# Patient Record
Sex: FEMALE | Race: WHITE | NOT HISPANIC OR LATINO | Employment: UNEMPLOYED | URBAN - METROPOLITAN AREA
[De-identification: names, ages, dates, MRNs, and addresses within clinical notes are randomized per-mention and may not be internally consistent; named-entity substitution may affect disease eponyms.]

---

## 2019-02-08 PROBLEM — R40.4 UNRESPONSIVE EPISODE: Status: ACTIVE | Noted: 2019-02-08

## 2023-02-15 ENCOUNTER — OFFICE VISIT (OUTPATIENT)
Dept: CARDIAC SURGERY | Facility: CLINIC | Age: 78
End: 2023-02-15
Payer: MEDICARE

## 2023-02-15 VITALS
HEART RATE: 67 BPM | DIASTOLIC BLOOD PRESSURE: 65 MMHG | HEIGHT: 62 IN | WEIGHT: 165 LBS | BODY MASS INDEX: 30.36 KG/M2 | SYSTOLIC BLOOD PRESSURE: 143 MMHG

## 2023-02-15 DIAGNOSIS — I35.0 AORTIC STENOSIS WITH TRILEAFLET VALVE: Primary | ICD-10-CM

## 2023-02-15 PROCEDURE — 99214 OFFICE O/P EST MOD 30 MIN: CPT | Mod: ,,, | Performed by: THORACIC SURGERY (CARDIOTHORACIC VASCULAR SURGERY)

## 2023-02-15 PROCEDURE — 99214 PR OFFICE/OUTPT VISIT, EST, LEVL IV, 30-39 MIN: ICD-10-PCS | Mod: ,,, | Performed by: THORACIC SURGERY (CARDIOTHORACIC VASCULAR SURGERY)

## 2023-02-15 RX ORDER — AMLODIPINE BESYLATE 10 MG/1
10 TABLET ORAL DAILY
COMMUNITY

## 2023-02-15 RX ORDER — CYCLOBENZAPRINE HCL 10 MG
10 TABLET ORAL 3 TIMES DAILY
COMMUNITY

## 2023-02-15 RX ORDER — CARVEDILOL 12.5 MG/1
12.5 TABLET ORAL 2 TIMES DAILY WITH MEALS
Status: ON HOLD | COMMUNITY
End: 2023-02-28 | Stop reason: SDUPTHER

## 2023-02-15 RX ORDER — FUROSEMIDE 40 MG/1
40 TABLET ORAL DAILY
COMMUNITY

## 2023-02-15 NOTE — PROGRESS NOTES
History & Physical    SUBJECTIVE:     History of Present Illness:  The patient is presenting for evaluation for severe aortic valve stenosis with an aortic valve area of 0.8 mean gradient of 41.  His EF is 65%.  She has a patent LIMA to LAD graft.  No significant carotid disease.  She is here for transcatheter aortic valve replacement evaluation.      Chief Complaint   Patient presents with    Pre-op Exam     TAVR SCHEDULED 2/27/23-DR. SAL.  DX:  AS, HTN, CABG X 3, CAD, DLP,  L CEA (Dr. Burk), CVA.  States chest pain, (central) and SOB on exertion.       Review of patient's allergies indicates:   Allergen Reactions    Morphine sulfate Other (See Comments)     Hallucinations    Codeine phosphate Itching       Current Outpatient Medications   Medication Sig Dispense Refill    cloNIDine (CATAPRES) 0.2 MG tablet Take 0.2 mg by mouth 2 (two) times daily.      estrogens, conjugated, (PREMARIN) 0.625 MG tablet Take 1 tablet (0.625 mg total) by mouth once daily. 30 tablet 4    gabapentin (NEURONTIN) 600 MG tablet Take 600 mg by mouth 2 (two) times daily.      hydrOXYzine (ATARAX) 50 MG tablet Take 50 mg by mouth 3 (three) times daily as needed for Itching.      rosuvastatin (CRESTOR) 40 MG Tab Take 40 mg by mouth every evening.      sertraline (ZOLOFT) 50 MG tablet Take 150 mg by mouth every evening.       No current facility-administered medications for this visit.       Past Medical History:   Diagnosis Date    Abdominal pain, unspecified site     Coronary atherosclerosis of native coronary artery     Coronary atherosclerosis of native coronary artery     Coronary atherosclerosis of unspecified type of vessel, native or graft     Esophageal reflux     Lumbosacral spondylosis without myelopathy     Other and unspecified hyperlipidemia     Postsurgical aortocoronary bypass status     Rectocele     Unspecified essential hypertension      Past Surgical History:   Procedure Laterality Date    HYSTERECTOMY       History  "reviewed. No pertinent family history.  Social History     Tobacco Use    Smoking status: Never    Smokeless tobacco: Never   Substance Use Topics    Alcohol use: No    Drug use: No        Review of Systems:  Review of Systems   Constitutional: Negative.    HENT: Negative.     Eyes: Negative.    Respiratory:  Positive for shortness of breath.    Cardiovascular: Negative.    Gastrointestinal: Negative.    Endocrine: Negative.    Genitourinary: Negative.    Musculoskeletal: Negative.         Claudications   Skin: Negative.    Allergic/Immunologic: Negative.    Neurological: Negative.    Hematological: Negative.    Psychiatric/Behavioral: Negative.       OBJECTIVE:     Vital Signs (Most Recent)  Pulse: 67 (02/15/23 1137)  BP: (!) 143/65 (02/15/23 1137)  5' 2" (1.575 m)  74.8 kg (165 lb)     Physical Exam:  Physical Exam  Vitals reviewed.   Constitutional:       Appearance: Normal appearance.   HENT:      Head: Normocephalic and atraumatic.      Nose: Nose normal.      Mouth/Throat:      Mouth: Mucous membranes are dry.      Pharynx: Oropharynx is clear.   Eyes:      Extraocular Movements: Extraocular movements intact.      Conjunctiva/sclera: Conjunctivae normal.      Pupils: Pupils are equal, round, and reactive to light.   Cardiovascular:      Rate and Rhythm: Normal rate and regular rhythm.      Pulses: Normal pulses.   Pulmonary:      Effort: Pulmonary effort is normal.      Breath sounds: Normal breath sounds.   Abdominal:      General: Abdomen is flat.      Palpations: Abdomen is soft.   Musculoskeletal:         General: Normal range of motion.      Cervical back: Neck supple.   Skin:     General: Skin is warm and dry.   Neurological:      General: No focal deficit present.   Psychiatric:         Mood and Affect: Mood normal.       Laboratory:  None      Diagnostic Results:  Echocardiogram reviewed      ASSESSMENT/PLAN:     Severe aortic valve stenosis.  The patient will definitely better benefit from a " transcatheter aortic valve replacement.  The risks and benefits of the procedure have been explained to the patient and she elected to proceed

## 2023-02-23 ENCOUNTER — ANESTHESIA EVENT (OUTPATIENT)
Dept: CARDIOLOGY | Facility: HOSPITAL | Age: 78
DRG: 267 | End: 2023-02-23
Payer: MEDICARE

## 2023-02-23 RX ORDER — TRAMADOL HYDROCHLORIDE 50 MG/1
50 TABLET ORAL 3 TIMES DAILY
COMMUNITY
Start: 2023-02-14

## 2023-02-23 RX ORDER — TORSEMIDE 10 MG/1
10 TABLET ORAL DAILY
COMMUNITY

## 2023-02-23 RX ORDER — ASPIRIN 325 MG
1 TABLET ORAL DAILY
COMMUNITY

## 2023-02-23 RX ORDER — ACETAMINOPHEN 500 MG
1 TABLET ORAL DAILY
COMMUNITY

## 2023-02-23 RX ORDER — CHOLECALCIFEROL (VITAMIN D3) 125 MCG
1 CAPSULE ORAL 2 TIMES DAILY
COMMUNITY

## 2023-02-23 RX ORDER — CYANOCOBALAMIN (VITAMIN B-12) 2000 MCG
1 TABLET ORAL DAILY
COMMUNITY

## 2023-02-24 ENCOUNTER — HOSPITAL ENCOUNTER (OUTPATIENT)
Dept: RADIOLOGY | Facility: HOSPITAL | Age: 78
Discharge: HOME | DRG: 267 | End: 2023-02-24
Attending: INTERNAL MEDICINE
Payer: MEDICARE

## 2023-02-24 DIAGNOSIS — I35.0 NODULAR CALCIFIC AORTIC VALVE STENOSIS: ICD-10-CM

## 2023-02-24 PROCEDURE — 71046 X-RAY EXAM CHEST 2 VIEWS: CPT | Mod: TC

## 2023-02-27 ENCOUNTER — HOSPITAL ENCOUNTER (INPATIENT)
Facility: HOSPITAL | Age: 78
LOS: 1 days | Discharge: HOME | DRG: 267 | End: 2023-02-28
Attending: INTERNAL MEDICINE | Admitting: INTERNAL MEDICINE
Payer: MEDICARE

## 2023-02-27 ENCOUNTER — ANESTHESIA (OUTPATIENT)
Dept: CARDIOLOGY | Facility: HOSPITAL | Age: 78
DRG: 267 | End: 2023-02-27
Payer: MEDICARE

## 2023-02-27 DIAGNOSIS — I25.10 CAD (CORONARY ARTERY DISEASE): ICD-10-CM

## 2023-02-27 DIAGNOSIS — I35.0 AORTIC STENOSIS: ICD-10-CM

## 2023-02-27 LAB
AV INDEX (PROSTH): 0.45
AV MEAN GRADIENT: 14 MMHG
AV PEAK GRADIENT: 17 MMHG
AV VALVE AREA: 1.4 CM2
AV VELOCITY RATIO: 0.4
BSA FOR ECHO PROCEDURE: 1.84 M2
DOP CALC AO PEAK VEL: 2.08 M/S
DOP CALC AO VTI: 46 CM
DOP CALC LVOT AREA: 3.1 CM2
DOP CALC LVOT DIAMETER: 2 CM
DOP CALC LVOT PEAK VEL: 0.84 M/S
DOP CALC LVOT STROKE VOLUME: 64.37 CM3
DOP CALCLVOT PEAK VEL VTI: 20.5 CM
GROUP & RH: NORMAL
INDIRECT COOMBS GEL: NORMAL
LVOT MG: 1 MMHG
LVOT MV: 0.53 CM/S
POC ACTIVATED CLOTTING TIME K: 125 SEC (ref 74–137)
SAMPLE: NORMAL

## 2023-02-27 PROCEDURE — 27800903 OPTIME MED/SURG SUP & DEVICES OTHER IMPLANTS: Performed by: INTERNAL MEDICINE

## 2023-02-27 PROCEDURE — 27000221 HC OXYGEN, UP TO 24 HOURS

## 2023-02-27 PROCEDURE — C1887 CATHETER, GUIDING: HCPCS | Performed by: INTERNAL MEDICINE

## 2023-02-27 PROCEDURE — 25500020 PHARM REV CODE 255: Performed by: INTERNAL MEDICINE

## 2023-02-27 PROCEDURE — C1760 CLOSURE DEV, VASC: HCPCS | Performed by: INTERNAL MEDICINE

## 2023-02-27 PROCEDURE — 86900 BLOOD TYPING SEROLOGIC ABO: CPT | Performed by: INTERNAL MEDICINE

## 2023-02-27 PROCEDURE — C1894 INTRO/SHEATH, NON-LASER: HCPCS | Performed by: INTERNAL MEDICINE

## 2023-02-27 PROCEDURE — 63600175 PHARM REV CODE 636 W HCPCS: Performed by: NURSE ANESTHETIST, CERTIFIED REGISTERED

## 2023-02-27 PROCEDURE — 93005 ELECTROCARDIOGRAM TRACING: CPT

## 2023-02-27 PROCEDURE — 33361 REPLACE AORTIC VALVE PERQ: CPT | Mod: Q0 | Performed by: INTERNAL MEDICINE

## 2023-02-27 PROCEDURE — 25000242 PHARM REV CODE 250 ALT 637 W/ HCPCS: Performed by: ANESTHESIOLOGY

## 2023-02-27 PROCEDURE — C1883 ADAPT/EXT, PACING/NEURO LEAD: HCPCS | Performed by: INTERNAL MEDICINE

## 2023-02-27 PROCEDURE — 33361 PR TAVR, PERCUTANEOUS FEMORAL: ICD-10-PCS | Mod: 62,Q0,, | Performed by: THORACIC SURGERY (CARDIOTHORACIC VASCULAR SURGERY)

## 2023-02-27 PROCEDURE — 63600175 PHARM REV CODE 636 W HCPCS: Performed by: ANESTHESIOLOGY

## 2023-02-27 PROCEDURE — 25000003 PHARM REV CODE 250: Performed by: INTERNAL MEDICINE

## 2023-02-27 PROCEDURE — C1779 LEAD, PMKR, TRANSVENOUS VDD: HCPCS | Performed by: INTERNAL MEDICINE

## 2023-02-27 PROCEDURE — 37000008 HC ANESTHESIA 1ST 15 MINUTES: Performed by: INTERNAL MEDICINE

## 2023-02-27 PROCEDURE — 63600175 PHARM REV CODE 636 W HCPCS

## 2023-02-27 PROCEDURE — 93010 ELECTROCARDIOGRAM REPORT: CPT | Mod: ,,, | Performed by: STUDENT IN AN ORGANIZED HEALTH CARE EDUCATION/TRAINING PROGRAM

## 2023-02-27 PROCEDURE — 37000009 HC ANESTHESIA EA ADD 15 MINS: Performed by: INTERNAL MEDICINE

## 2023-02-27 PROCEDURE — C1725 CATH, TRANSLUMIN NON-LASER: HCPCS | Performed by: INTERNAL MEDICINE

## 2023-02-27 PROCEDURE — 33361 REPLACE AORTIC VALVE PERQ: CPT | Mod: 62,Q0,, | Performed by: THORACIC SURGERY (CARDIOTHORACIC VASCULAR SURGERY)

## 2023-02-27 PROCEDURE — C1769 GUIDE WIRE: HCPCS | Performed by: INTERNAL MEDICINE

## 2023-02-27 PROCEDURE — 21400001 HC TELEMETRY ROOM

## 2023-02-27 PROCEDURE — 25000003 PHARM REV CODE 250: Performed by: NURSE ANESTHETIST, CERTIFIED REGISTERED

## 2023-02-27 PROCEDURE — 93010 EKG 12-LEAD: ICD-10-PCS | Mod: ,,, | Performed by: STUDENT IN AN ORGANIZED HEALTH CARE EDUCATION/TRAINING PROGRAM

## 2023-02-27 DEVICE — 18F MANTA VASCULAR CLOSURE DEVICE
Type: IMPLANTABLE DEVICE | Site: HEART | Status: FUNCTIONAL
Brand: MANTA

## 2023-02-27 DEVICE — IMPLANTABLE DEVICE: Type: IMPLANTABLE DEVICE | Site: HEART | Status: FUNCTIONAL

## 2023-02-27 RX ORDER — HYDROCODONE BITARTRATE AND ACETAMINOPHEN 5; 325 MG/1; MG/1
1 TABLET ORAL EVERY 4 HOURS PRN
Status: DISCONTINUED | OUTPATIENT
Start: 2023-02-27 | End: 2023-02-28 | Stop reason: HOSPADM

## 2023-02-27 RX ORDER — ONDANSETRON 4 MG/1
8 TABLET, ORALLY DISINTEGRATING ORAL EVERY 8 HOURS PRN
Status: DISCONTINUED | OUTPATIENT
Start: 2023-02-27 | End: 2023-02-28 | Stop reason: HOSPADM

## 2023-02-27 RX ORDER — PROPOFOL 10 MG/ML
VIAL (ML) INTRAVENOUS CONTINUOUS PRN
Status: DISCONTINUED | OUTPATIENT
Start: 2023-02-27 | End: 2023-02-27

## 2023-02-27 RX ORDER — PHENYLEPHRINE HYDROCHLORIDE 10 MG/ML
INJECTION INTRAVENOUS CONTINUOUS PRN
Status: DISCONTINUED | OUTPATIENT
Start: 2023-02-27 | End: 2023-02-27

## 2023-02-27 RX ORDER — AMLODIPINE BESYLATE 5 MG/1
10 TABLET ORAL DAILY
Status: DISCONTINUED | OUTPATIENT
Start: 2023-02-27 | End: 2023-02-28 | Stop reason: HOSPADM

## 2023-02-27 RX ORDER — DEXAMETHASONE SODIUM PHOSPHATE 4 MG/ML
INJECTION, SOLUTION INTRA-ARTICULAR; INTRALESIONAL; INTRAMUSCULAR; INTRAVENOUS; SOFT TISSUE
Status: DISCONTINUED | OUTPATIENT
Start: 2023-02-27 | End: 2023-02-27

## 2023-02-27 RX ORDER — SERTRALINE HYDROCHLORIDE 50 MG/1
100 TABLET, FILM COATED ORAL NIGHTLY
Status: DISCONTINUED | OUTPATIENT
Start: 2023-02-27 | End: 2023-02-28 | Stop reason: HOSPADM

## 2023-02-27 RX ORDER — ASPIRIN 325 MG
325 TABLET ORAL DAILY
Status: DISCONTINUED | OUTPATIENT
Start: 2023-02-27 | End: 2023-02-28 | Stop reason: HOSPADM

## 2023-02-27 RX ORDER — HEPARIN SODIUM 1000 [USP'U]/ML
INJECTION, SOLUTION INTRAVENOUS; SUBCUTANEOUS
Status: DISCONTINUED | OUTPATIENT
Start: 2023-02-27 | End: 2023-02-27

## 2023-02-27 RX ORDER — ACETAMINOPHEN 10 MG/ML
INJECTION, SOLUTION INTRAVENOUS
Status: DISCONTINUED | OUTPATIENT
Start: 2023-02-27 | End: 2023-02-27

## 2023-02-27 RX ORDER — HYDROCODONE BITARTRATE AND ACETAMINOPHEN 500; 5 MG/1; MG/1
TABLET ORAL
Status: DISCONTINUED | OUTPATIENT
Start: 2023-02-27 | End: 2023-02-28 | Stop reason: HOSPADM

## 2023-02-27 RX ORDER — MORPHINE SULFATE 4 MG/ML
2 INJECTION, SOLUTION INTRAMUSCULAR; INTRAVENOUS EVERY 4 HOURS PRN
Status: DISCONTINUED | OUTPATIENT
Start: 2023-02-27 | End: 2023-02-28 | Stop reason: HOSPADM

## 2023-02-27 RX ORDER — ONDANSETRON 2 MG/ML
INJECTION INTRAMUSCULAR; INTRAVENOUS
Status: DISCONTINUED | OUTPATIENT
Start: 2023-02-27 | End: 2023-02-27

## 2023-02-27 RX ORDER — METOPROLOL TARTRATE 1 MG/ML
INJECTION, SOLUTION INTRAVENOUS
Status: DISCONTINUED | OUTPATIENT
Start: 2023-02-27 | End: 2023-02-27

## 2023-02-27 RX ORDER — CEFAZOLIN SODIUM 1 G/3ML
INJECTION, POWDER, FOR SOLUTION INTRAMUSCULAR; INTRAVENOUS
Status: DISCONTINUED | OUTPATIENT
Start: 2023-02-27 | End: 2023-02-27

## 2023-02-27 RX ORDER — IPRATROPIUM BROMIDE AND ALBUTEROL SULFATE 2.5; .5 MG/3ML; MG/3ML
3 SOLUTION RESPIRATORY (INHALATION) ONCE
Status: COMPLETED | OUTPATIENT
Start: 2023-02-27 | End: 2023-02-27

## 2023-02-27 RX ORDER — HYDRALAZINE HYDROCHLORIDE 20 MG/ML
20 INJECTION INTRAMUSCULAR; INTRAVENOUS
Status: DISCONTINUED | OUTPATIENT
Start: 2023-02-27 | End: 2023-02-28 | Stop reason: HOSPADM

## 2023-02-27 RX ORDER — ASPIRIN 81 MG/1
81 TABLET ORAL DAILY
Status: DISCONTINUED | OUTPATIENT
Start: 2023-02-28 | End: 2023-02-27

## 2023-02-27 RX ORDER — CEFAZOLIN SODIUM 1 G/3ML
1 INJECTION, POWDER, FOR SOLUTION INTRAMUSCULAR; INTRAVENOUS
Status: DISCONTINUED | OUTPATIENT
Start: 2023-02-27 | End: 2023-02-27

## 2023-02-27 RX ORDER — MUPIROCIN 20 MG/G
OINTMENT TOPICAL DAILY
Status: DISCONTINUED | OUTPATIENT
Start: 2023-02-27 | End: 2023-02-27

## 2023-02-27 RX ORDER — MEPERIDINE HYDROCHLORIDE 25 MG/ML
12.5 INJECTION INTRAMUSCULAR; INTRAVENOUS; SUBCUTANEOUS ONCE
Status: DISCONTINUED | OUTPATIENT
Start: 2023-02-27 | End: 2023-02-27 | Stop reason: HOSPADM

## 2023-02-27 RX ORDER — PROTAMINE SULFATE 10 MG/ML
INJECTION, SOLUTION INTRAVENOUS
Status: DISCONTINUED | OUTPATIENT
Start: 2023-02-27 | End: 2023-02-27

## 2023-02-27 RX ORDER — SODIUM CHLORIDE 9 MG/ML
INJECTION, SOLUTION INTRAVENOUS ONCE
Status: DISCONTINUED | OUTPATIENT
Start: 2023-02-27 | End: 2023-02-27

## 2023-02-27 RX ORDER — METOPROLOL TARTRATE 1 MG/ML
INJECTION, SOLUTION INTRAVENOUS
Status: DISPENSED
Start: 2023-02-27 | End: 2023-02-27

## 2023-02-27 RX ORDER — HYDRALAZINE HYDROCHLORIDE 20 MG/ML
INJECTION INTRAMUSCULAR; INTRAVENOUS
Status: COMPLETED
Start: 2023-02-27 | End: 2023-02-27

## 2023-02-27 RX ORDER — ACETAMINOPHEN 325 MG/1
650 TABLET ORAL EVERY 4 HOURS PRN
Status: DISCONTINUED | OUTPATIENT
Start: 2023-02-27 | End: 2023-02-28 | Stop reason: HOSPADM

## 2023-02-27 RX ORDER — IPRATROPIUM BROMIDE AND ALBUTEROL SULFATE 2.5; .5 MG/3ML; MG/3ML
SOLUTION RESPIRATORY (INHALATION)
Status: DISPENSED
Start: 2023-02-27 | End: 2023-02-27

## 2023-02-27 RX ORDER — HYDROMORPHONE HYDROCHLORIDE 2 MG/ML
0.2 INJECTION, SOLUTION INTRAMUSCULAR; INTRAVENOUS; SUBCUTANEOUS EVERY 5 MIN PRN
Status: DISCONTINUED | OUTPATIENT
Start: 2023-02-27 | End: 2023-02-27 | Stop reason: HOSPADM

## 2023-02-27 RX ORDER — HYDRALAZINE HYDROCHLORIDE 20 MG/ML
10 INJECTION INTRAMUSCULAR; INTRAVENOUS ONCE
Status: COMPLETED | OUTPATIENT
Start: 2023-02-27 | End: 2023-02-27

## 2023-02-27 RX ORDER — ONDANSETRON 2 MG/ML
4 INJECTION INTRAMUSCULAR; INTRAVENOUS ONCE
Status: DISCONTINUED | OUTPATIENT
Start: 2023-02-27 | End: 2023-02-27 | Stop reason: HOSPADM

## 2023-02-27 RX ORDER — LIDOCAINE HYDROCHLORIDE 20 MG/ML
INJECTION, SOLUTION EPIDURAL; INFILTRATION; INTRACAUDAL; PERINEURAL
Status: DISCONTINUED | OUTPATIENT
Start: 2023-02-27 | End: 2023-02-27

## 2023-02-27 RX ORDER — FENTANYL CITRATE 50 UG/ML
INJECTION, SOLUTION INTRAMUSCULAR; INTRAVENOUS
Status: DISCONTINUED | OUTPATIENT
Start: 2023-02-27 | End: 2023-02-27

## 2023-02-27 RX ORDER — HYDROMORPHONE HYDROCHLORIDE 2 MG/ML
0.5 INJECTION, SOLUTION INTRAMUSCULAR; INTRAVENOUS; SUBCUTANEOUS EVERY 5 MIN PRN
Status: DISCONTINUED | OUTPATIENT
Start: 2023-02-27 | End: 2023-02-27 | Stop reason: HOSPADM

## 2023-02-27 RX ORDER — DEXMEDETOMIDINE HYDROCHLORIDE 100 UG/ML
INJECTION, SOLUTION INTRAVENOUS
Status: DISCONTINUED | OUTPATIENT
Start: 2023-02-27 | End: 2023-02-27

## 2023-02-27 RX ORDER — DIPHENHYDRAMINE HYDROCHLORIDE 50 MG/ML
25 INJECTION INTRAMUSCULAR; INTRAVENOUS EVERY 6 HOURS PRN
Status: DISCONTINUED | OUTPATIENT
Start: 2023-02-27 | End: 2023-02-27 | Stop reason: HOSPADM

## 2023-02-27 RX ADMIN — MUPIROCIN: 20 OINTMENT TOPICAL at 09:02

## 2023-02-27 RX ADMIN — ONDANSETRON 4 MG: 2 INJECTION INTRAMUSCULAR; INTRAVENOUS at 09:02

## 2023-02-27 RX ADMIN — PHENYLEPHRINE HYDROCHLORIDE 0.2 MCG/KG/MIN: 10 INJECTION INTRAVENOUS at 09:02

## 2023-02-27 RX ADMIN — DEXMEDETOMIDINE 10 MCG: 200 INJECTION, SOLUTION INTRAVENOUS at 09:02

## 2023-02-27 RX ADMIN — DEXAMETHASONE SODIUM PHOSPHATE 4 MG: 4 INJECTION, SOLUTION INTRA-ARTICULAR; INTRALESIONAL; INTRAMUSCULAR; INTRAVENOUS; SOFT TISSUE at 09:02

## 2023-02-27 RX ADMIN — HYDROMORPHONE HYDROCHLORIDE 0.5 MG: 2 INJECTION, SOLUTION INTRAMUSCULAR; INTRAVENOUS; SUBCUTANEOUS at 11:02

## 2023-02-27 RX ADMIN — SERTRALINE HYDROCHLORIDE 100 MG: 50 TABLET ORAL at 09:02

## 2023-02-27 RX ADMIN — ACETAMINOPHEN 1000 MG: 10 INJECTION, SOLUTION INTRAVENOUS at 09:02

## 2023-02-27 RX ADMIN — LIDOCAINE HYDROCHLORIDE 50 MG: 20 INJECTION, SOLUTION EPIDURAL; INFILTRATION; INTRACAUDAL; PERINEURAL at 09:02

## 2023-02-27 RX ADMIN — HEPARIN SODIUM 8000 UNITS: 1000 INJECTION, SOLUTION INTRAVENOUS; SUBCUTANEOUS at 09:02

## 2023-02-27 RX ADMIN — HYDRALAZINE HYDROCHLORIDE 10 MG: 20 INJECTION INTRAMUSCULAR; INTRAVENOUS at 11:02

## 2023-02-27 RX ADMIN — CEFAZOLIN 1 G: 1 INJECTION, POWDER, FOR SOLUTION INTRAMUSCULAR; INTRAVENOUS at 09:02

## 2023-02-27 RX ADMIN — FENTANYL CITRATE 50 MCG: 50 INJECTION, SOLUTION INTRAMUSCULAR; INTRAVENOUS at 09:02

## 2023-02-27 RX ADMIN — HYDRALAZINE HYDROCHLORIDE 20 MG: 20 INJECTION INTRAMUSCULAR; INTRAVENOUS at 04:02

## 2023-02-27 RX ADMIN — PROPOFOL 100 MCG/KG/MIN: 10 INJECTION, EMULSION INTRAVENOUS at 09:02

## 2023-02-27 RX ADMIN — SODIUM CHLORIDE, SODIUM GLUCONATE, SODIUM ACETATE, POTASSIUM CHLORIDE AND MAGNESIUM CHLORIDE: 526; 502; 368; 37; 30 INJECTION, SOLUTION INTRAVENOUS at 09:02

## 2023-02-27 RX ADMIN — IPRATROPIUM BROMIDE AND ALBUTEROL SULFATE 3 ML: .5; 2.5 SOLUTION RESPIRATORY (INHALATION) at 11:02

## 2023-02-27 RX ADMIN — PROTAMINE SULFATE 80 MG: 10 INJECTION, SOLUTION INTRAVENOUS at 10:02

## 2023-02-27 RX ADMIN — METOPROLOL TARTRATE 3 MG: 1 INJECTION, SOLUTION INTRAVENOUS at 10:02

## 2023-02-27 NOTE — TRANSFER OF CARE
"Anesthesia Transfer of Care Note    Patient: lAanna Moya    Procedure(s) Performed: Procedure(s) (LRB):  REPLACEMENT, AORTIC VALVE, TRANSCATHETER (TAVR) (N/A)    Patient location: PACU    Anesthesia Type: general    Transport from OR: Transported from OR on 6-10 L/min O2 by face mask with adequate spontaneous ventilation    Post pain: adequate analgesia    Post assessment: no apparent anesthetic complications    Post vital signs: stable    Level of consciousness: awake and alert    Complications: none    Transfer of care protocol was followed      Last vitals:   Visit Vitals  BP (!) 169/71   Pulse 74   Temp 36.5 °C (97.7 °F) (Oral)   Ht 5' 2" (1.575 m)   Wt 77 kg (169 lb 12.1 oz)   SpO2 97%   Breastfeeding No   BMI 31.05 kg/m²     "

## 2023-02-27 NOTE — Clinical Note
The catheter was repositioned into the Distal AO. An angiography was performed of the RFA. The angiography was performed via power injection.

## 2023-02-27 NOTE — OP NOTE
[facility]                       [address] [address]    PATIENT NAME:      STEVEN TORRES  YOB: 1945  CSN:               [id number]  MRN:               [id number]  ADMIT DATE:        02/27/2023 07:10:00  PHYSICIAN:         Miya Correa MD                          OPERATIVE REPORT      DATE OF SURGERY:    02/27/2023 00:00:00    SURGEON:  Miya Correa MD    PREOPERATIVE DIAGNOSIS:  Severe aortic valve stenosis.    PROCEDURE:  Transcatheter aortic valve replacement.    POSTOPERATIVE DIAGNOSIS:  Severe aortic valve stenosis.    CARDIOLOGIST:  Dr Alejandro Ramon.    BLOOD LOSS:  Minimal.    TECHNIQUE:  Under informed consent, the patient was taken to the cath lab.  MAC   anesthesia was initiated.  The skin of the bilateral groins was prepped and   draped in usual sterile fashion.  Please for full procedure report, refer to Dr. Alejandro Ramon's note.  Aortic valve was deployed very nicely with no   perivalvular leak.  The right groin was closed using the Manta.  There was a   little narrowing in the vessel after the Manta necessitating balloon   angioplasty.  Heparin was reversed with protamine.  Patient tolerated procedure   well.        ______________________________  MD SERGIO Shore/AQS  DD:  02/27/2023  Time:  11:09AM  DT:  02/27/2023  Time:  11:22AM  Job #:  [id number]      OPERATIVE REPORT

## 2023-02-27 NOTE — ANESTHESIA PREPROCEDURE EVALUATION
02/27/2023  Alanna Moya is a 77 y.o., female.  S/p CABG  Here for TAVR  Nl PFTs  EF 65%    Pre-op Assessment    I have reviewed the Patient Summary Reports.     I have reviewed the Nursing Notes. I have reviewed the NPO Status.   I have reviewed the Medications.     Review of Systems  Anesthesia Hx:  No problems with previous Anesthesia    Cardiovascular:   Hypertension CAD  CABG/stent     Musculoskeletal:   Arthritis         Physical Exam  General: Well nourished, Cooperative, Alert and Oriented    Airway:  Mallampati: II   Mouth Opening: Normal  TM Distance: Normal  Tongue: Normal  Neck ROM: Normal ROM    Dental:  Dentures        Anesthesia Plan  Type of Anesthesia, risks & benefits discussed:    Anesthesia Type: Gen Natural Airway  Intra-op Monitoring Plan: Standard ASA Monitors and Art Line  Post Op Pain Control Plan: multimodal analgesia  Induction:  IV  Informed Consent: Informed consent signed with the Patient and all parties understand the risks and agree with anesthesia plan.  All questions answered. Patient consented to blood products? Yes  ASA Score: 3  Day of Surgery Review of History & Physical: H&P Update referred to the surgeon/provider.    Ready For Surgery From Anesthesia Perspective.     .

## 2023-02-27 NOTE — Clinical Note
The catheter was inserted into the, was removed from the and was inserted over the wire into the RCA. An angiography was performed of the RCF. The angiography was performed via power injection.

## 2023-02-27 NOTE — INTERVAL H&P NOTE
Patient name: Alanna Moya  MRN: [id number]  : 1945  Cath Lab Procedure H&P Update    Pre-Procedure Assessment:    I saw and examined the patient face to face. The patient has been re-evaluated and her condition is unchanged. The reason for admission, procedure and care is still present.  Based on the patients H&P, pre-procedure physical exam, relevant diagnostic studies, NPO status and information obtained from the patient, I determined the patient is an appropriate candidate for the proposed procedure and anesthesia planned. I further certify the anesthesia risks, benefits and options have been explained to the patient to which she agrees as documented on the procedural consent.

## 2023-02-27 NOTE — ANESTHESIA PROCEDURE NOTES
Intubation    Date/Time: 2/27/2023 9:27 AM  Performed by: Alecia Chinchilla CRNA  Authorized by: Alecia Chinchilla CRNA     Intubation:     Induction:  Intravenous    Mask Ventilation:  Easy mask    Attempts:  1    Attempted By:  CRNA    Difficult Airway Encountered?: No      Complications:  None    Airway Device:  Supraglottic airway/LMA    Airway Device Size:  3.0    Style/Cuff Inflation:  Cuffed    Placement Verified By:  Capnometry    Complicating Factors:  None    Findings Post-Intubation:  BS equal bilateral

## 2023-02-27 NOTE — Clinical Note
The catheter was inserted in the right ventricle. The transvenous pacing lead was inserted into the RV and was placed and capture was confirmed. The pacer was paced at 50 BPM 10 mA 0.1 mV.

## 2023-02-28 VITALS
HEIGHT: 62 IN | DIASTOLIC BLOOD PRESSURE: 69 MMHG | TEMPERATURE: 99 F | RESPIRATION RATE: 20 BRPM | BODY MASS INDEX: 31.24 KG/M2 | OXYGEN SATURATION: 88 % | WEIGHT: 169.75 LBS | SYSTOLIC BLOOD PRESSURE: 147 MMHG | HEART RATE: 94 BPM

## 2023-02-28 PROBLEM — I35.0 AORTIC STENOSIS: Status: ACTIVE | Noted: 2023-02-28

## 2023-02-28 LAB
ANION GAP SERPL CALC-SCNC: 7 MEQ/L
AV INDEX (PROSTH): 0.6
AV MEAN GRADIENT: 9 MMHG
AV PEAK GRADIENT: 16 MMHG
AV VALVE AREA: 1.87 CM2
AV VELOCITY RATIO: 0.54
BASOPHILS # BLD AUTO: 0.01 X10(3)/MCL (ref 0–0.2)
BASOPHILS NFR BLD AUTO: 0.1 %
BSA FOR ECHO PROCEDURE: 1.84 M2
BUN SERPL-MCNC: 13.8 MG/DL (ref 9.8–20.1)
CALCIUM SERPL-MCNC: 9.1 MG/DL (ref 8.4–10.2)
CHLORIDE SERPL-SCNC: 109 MMOL/L (ref 98–107)
CO2 SERPL-SCNC: 24 MMOL/L (ref 23–31)
CREAT SERPL-MCNC: 0.73 MG/DL (ref 0.55–1.02)
CREAT/UREA NIT SERPL: 19
CV ECHO LV RWT: 0.44 CM
DOP CALC AO PEAK VEL: 1.97 M/S
DOP CALC AO VTI: 38.2 CM
DOP CALC LVOT AREA: 3.1 CM2
DOP CALC LVOT DIAMETER: 2 CM
DOP CALC LVOT PEAK VEL: 1.07 M/S
DOP CALC LVOT STROKE VOLUME: 71.59 CM3
DOP CALC MV VTI: 39.9 CM
DOP CALCLVOT PEAK VEL VTI: 22.8 CM
E WAVE DECELERATION TIME: 169 MSEC
E/A RATIO: 1.47
E/E' RATIO: 19.68 M/S
ECHO LV POSTERIOR WALL: 1.08 CM (ref 0.6–1.1)
EJECTION FRACTION: 59 %
EOSINOPHIL # BLD AUTO: 0.01 X10(3)/MCL (ref 0–0.9)
EOSINOPHIL NFR BLD AUTO: 0.1 %
ERYTHROCYTE [DISTWIDTH] IN BLOOD BY AUTOMATED COUNT: 13.5 % (ref 11.5–17)
FRACTIONAL SHORTENING: 25 % (ref 28–44)
GFR SERPLBLD CREATININE-BSD FMLA CKD-EPI: >60 MLS/MIN/1.73/M2
GLUCOSE SERPL-MCNC: 101 MG/DL (ref 82–115)
HCT VFR BLD AUTO: 30.8 % (ref 37–47)
HGB BLD-MCNC: 9.4 G/DL (ref 12–16)
IMM GRANULOCYTES # BLD AUTO: 0.06 X10(3)/MCL (ref 0–0.04)
IMM GRANULOCYTES NFR BLD AUTO: 0.7 %
INTERVENTRICULAR SEPTUM: 1.15 CM (ref 0.6–1.1)
LEFT ATRIUM SIZE: 4 CM
LEFT ATRIUM VOLUME INDEX MOD: 47.6 ML/M2
LEFT ATRIUM VOLUME MOD: 84.8 CM3
LEFT INTERNAL DIMENSION IN SYSTOLE: 3.67 CM (ref 2.1–4)
LEFT VENTRICLE DIASTOLIC VOLUME INDEX: 63.48 ML/M2
LEFT VENTRICLE DIASTOLIC VOLUME: 113 ML
LEFT VENTRICLE MASS INDEX: 115 G/M2
LEFT VENTRICLE SYSTOLIC VOLUME INDEX: 20.6 ML/M2
LEFT VENTRICLE SYSTOLIC VOLUME: 36.7 ML
LEFT VENTRICULAR INTERNAL DIMENSION IN DIASTOLE: 4.91 CM (ref 3.5–6)
LEFT VENTRICULAR MASS: 204.96 G
LV LATERAL E/E' RATIO: 17 M/S
LV SEPTAL E/E' RATIO: 23.38 M/S
LVOT MG: 3 MMHG
LVOT MV: 0.75 CM/S
LYMPHOCYTES # BLD AUTO: 1.59 X10(3)/MCL (ref 0.6–4.6)
LYMPHOCYTES NFR BLD AUTO: 19.3 %
MCH RBC QN AUTO: 27.1 PG
MCHC RBC AUTO-ENTMCNC: 30.5 G/DL (ref 33–36)
MCV RBC AUTO: 88.8 FL (ref 80–94)
MONOCYTES # BLD AUTO: 0.96 X10(3)/MCL (ref 0.1–1.3)
MONOCYTES NFR BLD AUTO: 11.7 %
MR PISA EROA: 0.15 CM2
MV MEAN GRADIENT: 6 MMHG
MV PEAK A VEL: 1.27 M/S
MV PEAK E VEL: 1.87 M/S
MV PEAK GRADIENT: 14 MMHG
MV STENOSIS PRESSURE HALF TIME: 78 MS
MV VALVE AREA BY CONTINUITY EQUATION: 1.79 CM2
MV VALVE AREA P 1/2 METHOD: 2.82 CM2
NEUTROPHILS # BLD AUTO: 5.59 X10(3)/MCL (ref 2.1–9.2)
NEUTROPHILS NFR BLD AUTO: 68.1 %
NRBC BLD AUTO-RTO: 0 %
PISA MRMAX VEL: 5.98 M/S
PISA RADIUS: 0.7 CM
PISA TR MAX VEL: 3.53 M/S
PISA VN NYQUIST MS: 0.29 M/S
PISA VN NYQUIST: 0.29 M/S
PLATELET # BLD AUTO: 205 X10(3)/MCL (ref 130–400)
PMV BLD AUTO: 10.4 FL (ref 7.4–10.4)
POCT GLUCOSE: 173 MG/DL (ref 70–110)
POTASSIUM SERPL-SCNC: 3.9 MMOL/L (ref 3.5–5.1)
RA PRESSURE: 3 MMHG
RBC # BLD AUTO: 3.47 X10(6)/MCL (ref 4.2–5.4)
SINUS: 3.3 CM
SODIUM SERPL-SCNC: 140 MMOL/L (ref 136–145)
TDI LATERAL: 0.11 M/S
TDI SEPTAL: 0.08 M/S
TDI: 0.1 M/S
TR MAX PG: 50 MMHG
TRICUSPID ANNULAR PLANE SYSTOLIC EXCURSION: 1.86 CM
TV REST PULMONARY ARTERY PRESSURE: 53 MMHG
WBC # SPEC AUTO: 8.2 X10(3)/MCL (ref 4.5–11.5)

## 2023-02-28 PROCEDURE — 86923 COMPATIBILITY TEST ELECTRIC: CPT | Mod: 91 | Performed by: INTERNAL MEDICINE

## 2023-02-28 PROCEDURE — 85025 COMPLETE CBC W/AUTO DIFF WBC: CPT | Performed by: INTERNAL MEDICINE

## 2023-02-28 PROCEDURE — 27000221 HC OXYGEN, UP TO 24 HOURS

## 2023-02-28 PROCEDURE — 25000003 PHARM REV CODE 250: Performed by: INTERNAL MEDICINE

## 2023-02-28 PROCEDURE — 93010 EKG 12-LEAD: ICD-10-PCS | Mod: ,,, | Performed by: INTERNAL MEDICINE

## 2023-02-28 PROCEDURE — 93005 ELECTROCARDIOGRAM TRACING: CPT

## 2023-02-28 PROCEDURE — 80048 BASIC METABOLIC PNL TOTAL CA: CPT | Performed by: INTERNAL MEDICINE

## 2023-02-28 PROCEDURE — 25000003 PHARM REV CODE 250

## 2023-02-28 PROCEDURE — 93010 ELECTROCARDIOGRAM REPORT: CPT | Mod: ,,, | Performed by: INTERNAL MEDICINE

## 2023-02-28 RX ORDER — CARVEDILOL 12.5 MG/1
12.5 TABLET ORAL 2 TIMES DAILY WITH MEALS
Start: 2023-02-28

## 2023-02-28 RX ORDER — CLONIDINE HYDROCHLORIDE 0.2 MG/1
0.2 TABLET ORAL 2 TIMES DAILY
Status: DISCONTINUED | OUTPATIENT
Start: 2023-02-28 | End: 2023-02-28 | Stop reason: HOSPADM

## 2023-02-28 RX ADMIN — CLONIDINE HYDROCHLORIDE 0.2 MG: 0.2 TABLET ORAL at 09:02

## 2023-02-28 RX ADMIN — AMLODIPINE BESYLATE 10 MG: 5 TABLET ORAL at 09:02

## 2023-02-28 RX ADMIN — ASPIRIN 325 MG ORAL TABLET 325 MG: 325 PILL ORAL at 09:02

## 2023-02-28 RX ADMIN — ACETAMINOPHEN 325MG 650 MG: 325 TABLET ORAL at 02:02

## 2023-02-28 NOTE — NURSING
"Mrs. Moya refused blood glucose check. She states, "She is not a diabetic and does not need to get check."   "

## 2023-02-28 NOTE — ANESTHESIA POSTPROCEDURE EVALUATION
Anesthesia Post Evaluation    Patient: Alanna Moya    Procedure(s) Performed: Procedure(s) (LRB):  REPLACEMENT, AORTIC VALVE, TRANSCATHETER (TAVR) (N/A)    Final Anesthesia Type: general      Patient location during evaluation: PACU  Patient participation: Yes- Able to Participate  Level of consciousness: awake and alert  Post-procedure vital signs: reviewed and stable  Pain management: adequate  Airway patency: patent  NELY mitigation strategies: Multimodal analgesia  PONV status at discharge: No PONV  Anesthetic complications: no      Cardiovascular status: blood pressure returned to baseline and hemodynamically stable  Respiratory status: unassisted and spontaneous ventilation  Hydration status: euvolemic  Follow-up not needed.          Vitals Value Taken Time   /73 02/27/23 1501   Temp 36.6 °C (97.9 °F) 02/27/23 1050   Pulse 78 02/27/23 1523   Resp 19 02/27/23 1523   SpO2 96 % 02/27/23 1522   Vitals shown include unvalidated device data.      Event Time   Out of Recovery 15:25:00         Pain/Jerrica Score: Pain Rating Prior to Med Admin: 8 (2/27/2023 11:20 AM)  Jerrica Score: 8 (2/27/2023  3:38 PM)

## 2023-02-28 NOTE — PLAN OF CARE
02/28/23 1047   Discharge Assessment   Assessment Type Discharge Planning Assessment   Confirmed/corrected address, phone number and insurance Yes   Confirmed Demographics Correct on Facesheet   Source of Information patient;family  (Daughter: Nakia Aguirre [phone])   Communicated [place] with patient/caregiver Date not available/Unable to determine   Reason For Admission Severe aortic valve stenosis.TAVR procedure performed on: 2/27/2023.   People in Home child(freeman), adult  (Lives with her disabled Son: Aaron Aguirre.)   Facility Arrived From: Private residence.   Do you expect to return to your current living situation? Yes   Do you have help at home or someone to help you manage your care at home? Yes   Who are your caregiver(s) and their phone number(s)? Daughter: Nakia Aguirre and Son: Aaron Aguirre.   Prior to hospitilization cognitive status: Alert/Oriented   Current cognitive status: Alert/Oriented   Home Layout Other (see comments)  (Climbs 3 steps to enter home onto wooden deck before entering home.)   Equipment Currently Used at Home rollator;raised toilet;bath bench   Readmission within 30 days? No   Patient currently being followed by outpatient case management? No   Do you currently have service(s) that help you manage your care at home? No   Do you take prescription medications? Yes   Do you have prescription coverage? Yes   Coverage Payor:  [employer] MANAGED MEDICARE - [employer] MEDICARE PLAN PPO   Do you have any problems affording any of your prescribed medications? No   Is the patient taking medications as prescribed? yes   Who is going to help you get home at discharge? Daughter: Nakia Aguirre & Son: Aaron Aguirre.   How do you get to doctors appointments? family or friend will provide   Are you on dialysis? No   Do you take coumadin? No   Discharge Plan A Home with family   Discharge Plan B Home with family   DME Needed Upon Discharge  none   Discharge Plan discussed with: Adult  children  (Daughter: Nakia Aguirre.)   Discharge Barriers Identified None   Social Connections   Are you , , , , never , or living with a partner?    Alcohol Use   Q1: How often do you have a drink containing alcohol? Never   Q2: How many drinks containing alcohol do you have on a typical day when you are drinking? None   Q3: How often do you have six or more drinks on one occasion? Never   OTHER   Name(s) of People in Home Son: Aaron Timothy

## 2023-02-28 NOTE — NURSING
Nurses Note -- 4 Eyes      2/27/2023   9:46 PM      Skin assessed during: Admit      [x] No Pressure Injuries Present    []Prevention Measures Documented      [] Yes- Altered Skin Integrity Present or Discovered   [] LDA Added if Not in Epic (Describe Wound)   [] New Altered Skin Integrity was Present on Admit and Documented in LDA   [] Wound Image Taken    Wound Care Consulted? No    Attending Nurse:  Joyce Roberts RN     Second RN/Staff Member:  Rafael Burris RN

## 2023-02-28 NOTE — ANESTHESIA RELEASE NOTE
"Anesthesia Release from PACU Note    Patient: Alanna Moya    Procedure(s) Performed: Procedure(s) (LRB):  REPLACEMENT, AORTIC VALVE, TRANSCATHETER (TAVR) (N/A)    Anesthesia type: general    Post pain: Adequate analgesia    Post assessment: no apparent anesthetic complications    Last Vitals:   Visit Vitals  BP (!) 192/69   Pulse 81   Temp 36.6 °C (97.9 °F) (Skin)   Resp 16   Ht 5' 2" (1.575 m)   Wt 77 kg (169 lb 12.1 oz)   SpO2 99%   Breastfeeding No   BMI 31.05 kg/m²       Post vital signs: stable    Level of consciousness: awake, alert , oriented and unresponsive    Nausea/Vomiting: no nausea/no vomiting    Complications: none    Airway Patency: patent    Respiratory: unassisted, spontaneous ventilation, room air    Cardiovascular: stable and blood pressure at baseline    Hydration: euvolemic  "

## 2023-02-28 NOTE — DISCHARGE SUMMARY
[facility] Tele[facility]  Cardiology  Discharge Summary      Patient Name: Alanna Moya  MRN: [id number]  Admission Date: 2/27/2023  Hospital Length of Stay: 1 days  Discharge Date and Time:  02/28/2023 10:43 AM  Attending Physician: Anselmo Arango MD  Discharging Provider: STEF Davies  Primary Care Physician: Cornel Haddad MD    HPI:   Ms. Moya is a 77 year old male who is known to [facility], Dr. Ramon. She presents to [facility] for a scheduled TAVR. Please see procedure below.     Hospital course:   2.28.23: NAD. Denies CP, SOB, or palps. Bilateral groin sites benign.     Procedure(s) (LRB):  REPLACEMENT, AORTIC VALVE, TRANSCATHETER (TAVR) (N/A)   1. TAVR  23  mm S3 ultra valve, nominal prep,  Right TF approach  2. Limited echo pre, post valve placement  3. Root angiography  4. Distal aortography  5. Temporary pacemaker placement removal  6. Manta closure  Right CFA access site.  7. U/S guided bilateral right groin access   8. Balloon angioplasty of right CFA with a 5 x 40 Napoleon.      Final Active Diagnoses:    Diagnosis Date Noted POA    PRINCIPAL PROBLEM:  Aortic stenosis [I35.0] 02/28/2023 Yes      Problems Resolved During this Admission:       Discharged Condition: stable    Review of Systems   Cardiovascular:  Negative for chest pain and palpitations.   Respiratory:  Negative for shortness of breath.    All other systems reviewed and are negative.    Physical Exam  Vitals reviewed.   HENT:      Head: Normocephalic.      Nose: Nose normal.      Mouth/Throat:      Mouth: Mucous membranes are dry.   Eyes:      Extraocular Movements: Extraocular movements intact.   Cardiovascular:      Rate and Rhythm: Normal rate and regular rhythm.      Pulses: Normal pulses.      Heart sounds: Normal heart sounds.      Comments: Right groin incision - c/d/I  Left groin - soft, nontender. No hematoma noted. +2 peripheral pulse  Pulmonary:      Effort: Pulmonary effort is normal.       Breath sounds: Normal breath sounds.   Abdominal:      Palpations: Abdomen is soft.   Skin:     General: Skin is warm and dry.   Neurological:      Mental Status: She is alert and oriented to person, place, and time.   Psychiatric:         Behavior: Behavior normal.       Disposition: Home or Self Care    Follow Up:   Follow-up Information       Alejandro Ramon MD Follow up on 3/7/2023.    Specialties: Cardiovascular Disease, Interventional Cardiology, Cardiology  Why: [place] office f/u @ 10:10 am  needs MCT x 2 weeks ( in [place])  Contact information:  [address] [place] [address]  [phone]                           Patient Instructions:      Diet Cardiac     Lifting restrictions   Order Comments: Nothing > 10 lbs x 3 days     No driving until:   Order Comments: X 3 days     Notify your health care provider if you experience any of the following:  redness, tenderness, or signs of infection (pain, swelling, redness, odor or green/yellow discharge around incision site)     Notify your health care provider if you experience any of the following:  temperature >100.4     No dressing needed   Order Comments: Shower daily w/ antibacterial soap     Activity as tolerated     Medications:  Reconciled Home Medications:      Medication List        CONTINUE taking these medications      amLODIPine 10 MG tablet  Commonly known as: NORVASC  Take 10 mg by mouth once daily.     aspirin 325 MG tablet  Take 1 tablet by mouth once daily.     carvediloL 12.5 MG tablet  Commonly known as: COREG  Take 1 tablet (12.5 mg total) by mouth 2 (two) times daily with meals.     cholecalciferol (vitamin D3) 50 mcg (2,000 unit) Cap capsule  Commonly known as: VITAMIN D3  Take 1 capsule by mouth once daily.     cloNIDine 0.2 MG tablet  Commonly known as: CATAPRES  Take 0.2 mg by mouth 2 (two) times daily.     cyanocobalamin (vitamin B-12) 2,000 mcg Tab  Take 1 tablet by mouth once daily.      cyclobenzaprine 10 MG tablet  Commonly known as: FLEXERIL  Take 10 mg by mouth 3 (three) times daily.     DAILY PROBIOTIC ORAL  Take 1 capsule by mouth once daily.     estrogens (conjugated) 0.625 MG tablet  Commonly known as: PREMARIN  Take 1 tablet (0.625 mg total) by mouth once daily.     furosemide 40 MG tablet  Commonly known as: LASIX  Take 40 mg by mouth once daily.     gabapentin 600 MG tablet  Commonly known as: NEURONTIN  Take 600 mg by mouth 2 (two) times daily.     hydrOXYzine 50 MG tablet  Commonly known as: ATARAX  Take 50 mg by mouth 3 (three) times daily as needed for Itching.     naproxen sodium 220 mg Cap  Take 1 capsule by mouth 2 (two) times a day.     POTASSIUM GLUCONATE ORAL  Take 1 tablet by mouth once daily. Pt stated 650 mg     rosuvastatin 40 MG Tab  Commonly known as: CRESTOR  Take 40 mg by mouth every evening.     sertraline 50 MG tablet  Commonly known as: ZOLOFT  Take 100 mg by mouth every evening.     STOOL SOFTENER ORAL  Take 2 capsules by mouth 2 (two) times a day.     torsemide 10 MG Tab  Commonly known as: DEMADEX  Take 10 mg by mouth once daily.     traMADoL 50 mg tablet  Commonly known as: ULTRAM  Take 50 mg by mouth 3 (three) times daily.              Impression:  Severe AS    - s/p TAVR 2.27.23  CAD    - s/p CABG x 3  HTN  HLD  Bilateral MELCHOR  CVA    Plan:   Continue single anti-platelet therapy (ASA).  Groin precautions reviewed.  Instructed to hold BB x 48 hours total. Can resume tomorrow evening.  Will need MCT x 2 weeks at discharge. Will arrange for pickup in [place] tomorrow.  Keep scheduled f/u with Dr. Ramon on 3.7.23.    Time spent on the discharge of patient: 39 minutes    STEF Davies  Cardiology  [facility] Tele[facility]     I have seen the patient, reviewed the Nurse Practitioner's note, assessment and plan. I have personally interviewed and examined the patient at bedside and agree with the findings. Medical decision  making listed above were done under my guidance.

## 2023-03-02 LAB
ABO + RH BLD: NORMAL
ABO + RH BLD: NORMAL
BLD PROD TYP BPU: NORMAL
BLD PROD TYP BPU: NORMAL
BLOOD UNIT EXPIRATION DATE: NORMAL
BLOOD UNIT EXPIRATION DATE: NORMAL
BLOOD UNIT TYPE CODE: 5100
BLOOD UNIT TYPE CODE: 5100
CROSSMATCH INTERPRETATION: NORMAL
CROSSMATCH INTERPRETATION: NORMAL
DISPENSE STATUS: NORMAL
DISPENSE STATUS: NORMAL
UNIT NUMBER: NORMAL
UNIT NUMBER: NORMAL

## 2024-06-01 ENCOUNTER — HOSPITAL ENCOUNTER (INPATIENT)
Facility: HOSPITAL | Age: 79
LOS: 21 days | Discharge: HOSPICE | DRG: 189 | End: 2024-06-22
Attending: HOSPITALIST | Admitting: HOSPITALIST
Payer: MEDICARE

## 2024-06-01 DIAGNOSIS — I50.9 CHF (CONGESTIVE HEART FAILURE): ICD-10-CM

## 2024-06-01 DIAGNOSIS — R00.1 SINUS BRADYCARDIA: ICD-10-CM

## 2024-06-01 DIAGNOSIS — I49.9 IRREGULAR CARDIAC RHYTHM: ICD-10-CM

## 2024-06-01 DIAGNOSIS — R09.02 HYPOXIA: ICD-10-CM

## 2024-06-01 DIAGNOSIS — I25.10 CAD (CORONARY ARTERY DISEASE): ICD-10-CM

## 2024-06-01 DIAGNOSIS — J96.90 RESPIRATORY FAILURE: ICD-10-CM

## 2024-06-01 DIAGNOSIS — R94.31 EKG ABNORMALITIES: ICD-10-CM

## 2024-06-01 DIAGNOSIS — R40.4 UNRESPONSIVE EPISODE: ICD-10-CM

## 2024-06-01 DIAGNOSIS — I48.91 ATRIAL FIBRILLATION WITH RAPID VENTRICULAR RESPONSE: ICD-10-CM

## 2024-06-01 DIAGNOSIS — M54.32 BILATERAL SCIATICA: Primary | ICD-10-CM

## 2024-06-01 DIAGNOSIS — M54.31 BILATERAL SCIATICA: Primary | ICD-10-CM

## 2024-06-01 DIAGNOSIS — R07.9 CHEST PAIN: ICD-10-CM

## 2024-06-01 LAB
ALLENS TEST BLOOD GAS (OHS): YES
BASE EXCESS BLD CALC-SCNC: 6.5 MMOL/L (ref -2–2)
BIPAP(E) BLOOD GAS (OHS): 8 CM H2O
BIPAP(I) BLOOD GAS (OHS): 14 CM H2O
BLOOD GAS SAMPLE TYPE (OHS): ABNORMAL
CA-I BLD-SCNC: 1.27 MMOL/L (ref 1.12–1.23)
CO2 BLDA-SCNC: 32.7 MMOL/L
COHGB MFR BLDA: 0.2 % (ref 0.5–1.5)
DRAWN BY BLOOD GAS (OHS): ABNORMAL
HCO3 BLDA-SCNC: 31.3 MMOL/L (ref 22–26)
INHALED O2 CONCENTRATION: 100 %
METHGB MFR BLDA: 0.8 % (ref 0.4–1.5)
MODE (OHS): ABNORMAL
O2 HB BLOOD GAS (OHS): 96.3 % (ref 94–97)
OXYHGB MFR BLDA: 10.9 G/DL (ref 12–16)
PCO2 BLDA: 45 MMHG (ref 35–45)
PH BLDA: 7.45 [PH] (ref 7.35–7.45)
PO2 BLDA: 143 MMHG (ref 80–100)
POTASSIUM BLOOD GAS (OHS): 3.8 MMOL/L (ref 3.5–5)
SAMPLE SITE BLOOD GAS (OHS): ABNORMAL
SAO2 % BLDA: 99.3 %
SODIUM BLOOD GAS (OHS): 133 MMOL/L (ref 137–145)

## 2024-06-01 PROCEDURE — 87798 DETECT AGENT NOS DNA AMP: CPT

## 2024-06-01 PROCEDURE — 25000003 PHARM REV CODE 250: Performed by: HOSPITALIST

## 2024-06-01 PROCEDURE — 87581 M.PNEUMON DNA AMP PROBE: CPT

## 2024-06-01 PROCEDURE — 20000000 HC ICU ROOM

## 2024-06-01 PROCEDURE — 27000190 HC CPAP FULL FACE MASK W/VALVE

## 2024-06-01 PROCEDURE — 94660 CPAP INITIATION&MGMT: CPT

## 2024-06-01 PROCEDURE — 82803 BLOOD GASES ANY COMBINATION: CPT

## 2024-06-01 PROCEDURE — 5A09357 ASSISTANCE WITH RESPIRATORY VENTILATION, LESS THAN 24 CONSECUTIVE HOURS, CONTINUOUS POSITIVE AIRWAY PRESSURE: ICD-10-PCS | Performed by: INTERNAL MEDICINE

## 2024-06-01 PROCEDURE — 36600 WITHDRAWAL OF ARTERIAL BLOOD: CPT

## 2024-06-01 PROCEDURE — 99900035 HC TECH TIME PER 15 MIN (STAT)

## 2024-06-01 RX ORDER — ALBUMIN HUMAN 50 G/1000ML
12.5 SOLUTION INTRAVENOUS ONCE
Status: DISCONTINUED | OUTPATIENT
Start: 2024-06-02 | End: 2024-06-02

## 2024-06-01 RX ORDER — DEXMEDETOMIDINE HYDROCHLORIDE 4 UG/ML
0-1.4 INJECTION, SOLUTION INTRAVENOUS CONTINUOUS
Status: DISCONTINUED | OUTPATIENT
Start: 2024-06-01 | End: 2024-06-06

## 2024-06-01 RX ORDER — DOCUSATE SODIUM 50 MG/5ML
50 LIQUID ORAL DAILY
Status: DISCONTINUED | OUTPATIENT
Start: 2024-06-02 | End: 2024-06-23 | Stop reason: HOSPADM

## 2024-06-01 RX ORDER — MUPIROCIN 20 MG/G
OINTMENT TOPICAL 2 TIMES DAILY
Status: COMPLETED | OUTPATIENT
Start: 2024-06-01 | End: 2024-06-06

## 2024-06-01 RX ORDER — GABAPENTIN 300 MG/1
600 CAPSULE ORAL 2 TIMES DAILY
Status: DISCONTINUED | OUTPATIENT
Start: 2024-06-02 | End: 2024-06-18

## 2024-06-01 RX ORDER — ALBUMIN HUMAN 250 G/1000ML
12.5 SOLUTION INTRAVENOUS ONCE
Status: DISCONTINUED | OUTPATIENT
Start: 2024-06-02 | End: 2024-06-01

## 2024-06-01 RX ORDER — SERTRALINE HYDROCHLORIDE 50 MG/1
100 TABLET, FILM COATED ORAL NIGHTLY
Status: DISCONTINUED | OUTPATIENT
Start: 2024-06-02 | End: 2024-06-23 | Stop reason: HOSPADM

## 2024-06-01 RX ORDER — ENOXAPARIN SODIUM 100 MG/ML
40 INJECTION SUBCUTANEOUS EVERY 12 HOURS
Status: DISCONTINUED | OUTPATIENT
Start: 2024-06-02 | End: 2024-06-06

## 2024-06-01 RX ORDER — ALBUMIN HUMAN 250 G/1000ML
25 SOLUTION INTRAVENOUS ONCE
Status: DISCONTINUED | OUTPATIENT
Start: 2024-06-02 | End: 2024-06-01

## 2024-06-01 RX ORDER — ATORVASTATIN CALCIUM 40 MG/1
40 TABLET, FILM COATED ORAL DAILY
Status: DISCONTINUED | OUTPATIENT
Start: 2024-06-02 | End: 2024-06-07

## 2024-06-01 RX ADMIN — MUPIROCIN: 20 OINTMENT TOPICAL at 11:06

## 2024-06-01 NOTE — Clinical Note
The PA catheter was inserted into the right atrium. Hemodynamics were performed. Catheter repositioned to RV, PCW ,PA

## 2024-06-02 LAB
ALBUMIN SERPL-MCNC: 3.5 G/DL (ref 3.4–4.8)
ALBUMIN/GLOB SERPL: 0.9 RATIO (ref 1.1–2)
ALP SERPL-CCNC: 79 UNIT/L (ref 40–150)
ALT SERPL-CCNC: 30 UNIT/L (ref 0–55)
ANION GAP SERPL CALC-SCNC: 18 MEQ/L
AST SERPL-CCNC: 24 UNIT/L (ref 5–34)
AV INDEX (PROSTH): 0.5
AV MEAN GRADIENT: 8 MMHG
AV PEAK GRADIENT: 15 MMHG
AV VALVE AREA BY VELOCITY RATIO: 1.51 CM²
AV VALVE AREA: 1.56 CM²
AV VELOCITY RATIO: 0.48
B PERT.PT PRMT NPH QL NAA+NON-PROBE: NOT DETECTED
BASOPHILS # BLD AUTO: 0.01 X10(3)/MCL
BASOPHILS NFR BLD AUTO: 0.1 %
BILIRUB SERPL-MCNC: 0.8 MG/DL
BNP BLD-MCNC: 285.8 PG/ML
BSA FOR ECHO PROCEDURE: 1.84 M2
BUN SERPL-MCNC: 88.7 MG/DL (ref 9.8–20.1)
C PNEUM DNA NPH QL NAA+NON-PROBE: NOT DETECTED
CALCIUM SERPL-MCNC: 10.3 MG/DL (ref 8.4–10.2)
CHLORIDE SERPL-SCNC: 98 MMOL/L (ref 98–107)
CO2 SERPL-SCNC: 24 MMOL/L (ref 23–31)
CREAT SERPL-MCNC: 1.4 MG/DL (ref 0.55–1.02)
CREAT/UREA NIT SERPL: 63
CV ECHO LV RWT: 0.62 CM
DOP CALC AO PEAK VEL: 1.94 M/S
DOP CALC AO VTI: 33.8 CM
DOP CALC LVOT AREA: 3.1 CM2
DOP CALC LVOT DIAMETER: 2 CM
DOP CALC LVOT PEAK VEL: 0.93 M/S
DOP CALC LVOT STROKE VOLUME: 52.75 CM3
DOP CALC MV VTI: 29.4 CM
DOP CALCLVOT PEAK VEL VTI: 16.8 CM
E WAVE DECELERATION TIME: 201 MSEC
E/A RATIO: 0.89
E/E' RATIO: 16.77 M/S
ECHO LV POSTERIOR WALL: 1.15 CM (ref 0.6–1.1)
EOSINOPHIL # BLD AUTO: 0.04 X10(3)/MCL (ref 0–0.9)
EOSINOPHIL NFR BLD AUTO: 0.3 %
ERYTHROCYTE [DISTWIDTH] IN BLOOD BY AUTOMATED COUNT: 12.8 % (ref 11.5–17)
FRACTIONAL SHORTENING: 33 % (ref 28–44)
GFR SERPLBLD CREATININE-BSD FMLA CKD-EPI: 39 ML/MIN/1.73/M2
GLOBULIN SER-MCNC: 3.9 GM/DL (ref 2.4–3.5)
GLUCOSE SERPL-MCNC: 149 MG/DL (ref 82–115)
HADV DNA NPH QL NAA+NON-PROBE: NOT DETECTED
HCOV 229E RNA NPH QL NAA+NON-PROBE: NOT DETECTED
HCOV HKU1 RNA NPH QL NAA+NON-PROBE: NOT DETECTED
HCOV NL63 RNA NPH QL NAA+NON-PROBE: NOT DETECTED
HCOV OC43 RNA NPH QL NAA+NON-PROBE: NOT DETECTED
HCT VFR BLD AUTO: 33.1 % (ref 37–47)
HGB BLD-MCNC: 11.2 G/DL (ref 12–16)
HMPV RNA NPH QL NAA+NON-PROBE: NOT DETECTED
HPIV1 RNA NPH QL NAA+NON-PROBE: NOT DETECTED
HPIV2 RNA NPH QL NAA+NON-PROBE: NOT DETECTED
HPIV3 RNA NPH QL NAA+NON-PROBE: NOT DETECTED
HPIV4 RNA NPH QL NAA+NON-PROBE: NOT DETECTED
HR MV ECHO: 87 BPM
IMM GRANULOCYTES # BLD AUTO: 0.21 X10(3)/MCL (ref 0–0.04)
IMM GRANULOCYTES NFR BLD AUTO: 1.6 %
INTERVENTRICULAR SEPTUM: 1.28 CM (ref 0.6–1.1)
LACTATE SERPL-SCNC: 1 MMOL/L (ref 0.5–2.2)
LEFT ATRIUM SIZE: 3.6 CM
LEFT ATRIUM VOLUME INDEX MOD: 35.4 ML/M2
LEFT ATRIUM VOLUME MOD: 63 CM3
LEFT INTERNAL DIMENSION IN SYSTOLE: 2.49 CM (ref 2.1–4)
LEFT VENTRICLE DIASTOLIC VOLUME INDEX: 32.64 ML/M2
LEFT VENTRICLE DIASTOLIC VOLUME: 58.1 ML
LEFT VENTRICLE MASS INDEX: 84 G/M2
LEFT VENTRICLE SYSTOLIC VOLUME INDEX: 12.4 ML/M2
LEFT VENTRICLE SYSTOLIC VOLUME: 22.1 ML
LEFT VENTRICULAR INTERNAL DIMENSION IN DIASTOLE: 3.7 CM (ref 3.5–6)
LEFT VENTRICULAR MASS: 150.1 G
LV LATERAL E/E' RATIO: 21.8 M/S
LV SEPTAL E/E' RATIO: 13.63 M/S
LVOT MG: 2 MMHG
LVOT MV: 0.61 CM/S
LYMPHOCYTES # BLD AUTO: 0.59 X10(3)/MCL (ref 0.6–4.6)
LYMPHOCYTES NFR BLD AUTO: 4.6 %
M PNEUMO DNA NPH QL NAA+NON-PROBE: NOT DETECTED
MAGNESIUM SERPL-MCNC: 2.6 MG/DL (ref 1.6–2.6)
MCH RBC QN AUTO: 30.5 PG (ref 27–31)
MCHC RBC AUTO-ENTMCNC: 33.8 G/DL (ref 33–36)
MCV RBC AUTO: 90.2 FL (ref 80–94)
MONOCYTES # BLD AUTO: 1.09 X10(3)/MCL (ref 0.1–1.3)
MONOCYTES NFR BLD AUTO: 8.4 %
MV MEAN GRADIENT: 5 MMHG
MV PEAK A VEL: 1.22 M/S
MV PEAK E VEL: 1.09 M/S
MV PEAK GRADIENT: 8 MMHG
MV STENOSIS PRESSURE HALF TIME: 68 MS
MV VALVE AREA BY CONTINUITY EQUATION: 1.79 CM2
MV VALVE AREA P 1/2 METHOD: 3.24 CM2
NEUTROPHILS # BLD AUTO: 11 X10(3)/MCL (ref 2.1–9.2)
NEUTROPHILS NFR BLD AUTO: 85 %
NRBC BLD AUTO-RTO: 0 %
OHS LV EJECTION FRACTION SIMPSONS BIPLANE MOD: 57 %
PHOSPHATE SERPL-MCNC: 4.2 MG/DL (ref 2.3–4.7)
PISA TR MAX VEL: 3.3 M/S
PLATELET # BLD AUTO: 375 X10(3)/MCL (ref 130–400)
PMV BLD AUTO: 9.9 FL (ref 7.4–10.4)
POTASSIUM SERPL-SCNC: 3.8 MMOL/L (ref 3.5–5.1)
PROCALCITONIN SERPL-MCNC: 0.34 NG/ML
PROT SERPL-MCNC: 7.4 GM/DL (ref 5.8–7.6)
RA PRESSURE ESTIMATED: 3 MMHG
RBC # BLD AUTO: 3.67 X10(6)/MCL (ref 4.2–5.4)
RSV RNA NPH QL NAA+NON-PROBE: NOT DETECTED
RV TB RVSP: 6 MMHG
RV+EV RNA NPH QL NAA+NON-PROBE: NOT DETECTED
SINUS: 3 CM
SODIUM SERPL-SCNC: 140 MMOL/L (ref 136–145)
TDI LATERAL: 0.05 M/S
TDI SEPTAL: 0.08 M/S
TDI: 0.07 M/S
TR MAX PG: 44 MMHG
TRICUSPID ANNULAR PLANE SYSTOLIC EXCURSION: 1.6 CM
TV REST PULMONARY ARTERY PRESSURE: 47 MMHG
WBC # SPEC AUTO: 12.94 X10(3)/MCL (ref 4.5–11.5)
Z-SCORE OF LEFT VENTRICULAR DIMENSION IN END DIASTOLE: -2.85
Z-SCORE OF LEFT VENTRICULAR DIMENSION IN END SYSTOLE: -1.58

## 2024-06-02 PROCEDURE — 94760 N-INVAS EAR/PLS OXIMETRY 1: CPT

## 2024-06-02 PROCEDURE — 36415 COLL VENOUS BLD VENIPUNCTURE: CPT | Performed by: INTERNAL MEDICINE

## 2024-06-02 PROCEDURE — 25000003 PHARM REV CODE 250: Performed by: INTERNAL MEDICINE

## 2024-06-02 PROCEDURE — 27100171 HC OXYGEN HIGH FLOW UP TO 24 HOURS

## 2024-06-02 PROCEDURE — 27100092 HC HIGH FLOW DELIVERY CANNULA

## 2024-06-02 PROCEDURE — 63600175 PHARM REV CODE 636 W HCPCS

## 2024-06-02 PROCEDURE — 25000003 PHARM REV CODE 250: Performed by: HOSPITALIST

## 2024-06-02 PROCEDURE — 83735 ASSAY OF MAGNESIUM: CPT

## 2024-06-02 PROCEDURE — 87040 BLOOD CULTURE FOR BACTERIA: CPT

## 2024-06-02 PROCEDURE — 94799 UNLISTED PULMONARY SVC/PX: CPT

## 2024-06-02 PROCEDURE — 84100 ASSAY OF PHOSPHORUS: CPT

## 2024-06-02 PROCEDURE — 99900031 HC PATIENT EDUCATION (STAT)

## 2024-06-02 PROCEDURE — 94660 CPAP INITIATION&MGMT: CPT

## 2024-06-02 PROCEDURE — 20000000 HC ICU ROOM

## 2024-06-02 PROCEDURE — 85025 COMPLETE CBC W/AUTO DIFF WBC: CPT

## 2024-06-02 PROCEDURE — 99900035 HC TECH TIME PER 15 MIN (STAT)

## 2024-06-02 PROCEDURE — 83605 ASSAY OF LACTIC ACID: CPT

## 2024-06-02 PROCEDURE — 84145 PROCALCITONIN (PCT): CPT

## 2024-06-02 PROCEDURE — 27000249 HC VAPOTHERM CIRCUIT

## 2024-06-02 PROCEDURE — P9045 ALBUMIN (HUMAN), 5%, 250 ML: HCPCS | Mod: JZ,JG

## 2024-06-02 PROCEDURE — 83880 ASSAY OF NATRIURETIC PEPTIDE: CPT | Performed by: INTERNAL MEDICINE

## 2024-06-02 PROCEDURE — 36415 COLL VENOUS BLD VENIPUNCTURE: CPT

## 2024-06-02 PROCEDURE — 80053 COMPREHEN METABOLIC PANEL: CPT

## 2024-06-02 PROCEDURE — 25000003 PHARM REV CODE 250

## 2024-06-02 PROCEDURE — 63600175 PHARM REV CODE 636 W HCPCS: Performed by: INTERNAL MEDICINE

## 2024-06-02 RX ORDER — HYDROMORPHONE HYDROCHLORIDE 2 MG/ML
0.2 INJECTION, SOLUTION INTRAMUSCULAR; INTRAVENOUS; SUBCUTANEOUS EVERY 6 HOURS PRN
Status: DISCONTINUED | OUTPATIENT
Start: 2024-06-02 | End: 2024-06-11

## 2024-06-02 RX ORDER — FUROSEMIDE 10 MG/ML
40 INJECTION INTRAMUSCULAR; INTRAVENOUS ONCE
Status: COMPLETED | OUTPATIENT
Start: 2024-06-02 | End: 2024-06-02

## 2024-06-02 RX ORDER — TRAMADOL HYDROCHLORIDE 50 MG/1
50 TABLET ORAL 3 TIMES DAILY
Status: DISCONTINUED | OUTPATIENT
Start: 2024-06-02 | End: 2024-06-20

## 2024-06-02 RX ORDER — HYDRALAZINE HYDROCHLORIDE 25 MG/1
25 TABLET, FILM COATED ORAL EVERY 8 HOURS
Status: ON HOLD | COMMUNITY
End: 2024-06-22 | Stop reason: HOSPADM

## 2024-06-02 RX ORDER — TRAMADOL HYDROCHLORIDE 50 MG/1
50 TABLET ORAL 3 TIMES DAILY
Status: DISCONTINUED | OUTPATIENT
Start: 2024-06-02 | End: 2024-06-02

## 2024-06-02 RX ORDER — MAGNESIUM GLUCONATE 27 MG(500)
650 TABLET ORAL ONCE
Status: ON HOLD | COMMUNITY
End: 2024-06-22 | Stop reason: HOSPADM

## 2024-06-02 RX ORDER — SODIUM CHLORIDE 9 MG/ML
INJECTION, SOLUTION INTRAVENOUS
Status: DISCONTINUED | OUTPATIENT
Start: 2024-06-02 | End: 2024-06-23 | Stop reason: HOSPADM

## 2024-06-02 RX ADMIN — ALBUMIN (HUMAN) 12.5 G: 12.5 INJECTION, SOLUTION INTRAVENOUS at 01:06

## 2024-06-02 RX ADMIN — SERTRALINE HYDROCHLORIDE 100 MG: 50 TABLET ORAL at 08:06

## 2024-06-02 RX ADMIN — PIPERACILLIN SODIUM AND TAZOBACTAM SODIUM 4.5 G: 4; .5 INJECTION, POWDER, LYOPHILIZED, FOR SOLUTION INTRAVENOUS at 09:06

## 2024-06-02 RX ADMIN — ENOXAPARIN SODIUM 40 MG: 40 INJECTION SUBCUTANEOUS at 08:06

## 2024-06-02 RX ADMIN — DOCUSATE SODIUM LIQUID 50 MG: 100 LIQUID ORAL at 08:06

## 2024-06-02 RX ADMIN — HYDROCORTISONE SODIUM SUCCINATE 100 MG: 100 INJECTION, POWDER, FOR SOLUTION INTRAMUSCULAR; INTRAVENOUS at 01:06

## 2024-06-02 RX ADMIN — ATORVASTATIN CALCIUM 40 MG: 40 TABLET, FILM COATED ORAL at 08:06

## 2024-06-02 RX ADMIN — CEFTRIAXONE SODIUM 2 G: 2 INJECTION, POWDER, FOR SOLUTION INTRAMUSCULAR; INTRAVENOUS at 01:06

## 2024-06-02 RX ADMIN — GABAPENTIN 600 MG: 300 CAPSULE ORAL at 08:06

## 2024-06-02 RX ADMIN — TRAMADOL HYDROCHLORIDE 50 MG: 50 TABLET, COATED ORAL at 01:06

## 2024-06-02 RX ADMIN — FUROSEMIDE 40 MG: 10 INJECTION, SOLUTION INTRAMUSCULAR; INTRAVENOUS at 01:06

## 2024-06-02 RX ADMIN — MUPIROCIN: 20 OINTMENT TOPICAL at 08:06

## 2024-06-02 RX ADMIN — TRAMADOL HYDROCHLORIDE 50 MG: 50 TABLET, COATED ORAL at 05:06

## 2024-06-02 RX ADMIN — PIPERACILLIN SODIUM AND TAZOBACTAM SODIUM 4.5 G: 4; .5 INJECTION, POWDER, LYOPHILIZED, FOR SOLUTION INTRAVENOUS at 05:06

## 2024-06-02 RX ADMIN — TRAMADOL HYDROCHLORIDE 50 MG: 50 TABLET, COATED ORAL at 08:06

## 2024-06-02 RX ADMIN — HYDROCORTISONE SODIUM SUCCINATE 100 MG: 100 INJECTION, POWDER, FOR SOLUTION INTRAMUSCULAR; INTRAVENOUS at 09:06

## 2024-06-02 RX ADMIN — SODIUM CHLORIDE: 9 INJECTION, SOLUTION INTRAVENOUS at 09:06

## 2024-06-02 RX ADMIN — VANCOMYCIN HYDROCHLORIDE 1500 MG: 1.5 INJECTION, POWDER, LYOPHILIZED, FOR SOLUTION INTRAVENOUS at 10:06

## 2024-06-02 RX ADMIN — AZITHROMYCIN MONOHYDRATE 500 MG: 500 INJECTION, POWDER, LYOPHILIZED, FOR SOLUTION INTRAVENOUS at 01:06

## 2024-06-02 NOTE — NURSING
Nurses Note -- 4 Eyes      6/1/2024   10:40 PM      Skin assessed during: Admit      [x] No Altered Skin Integrity Present    [x]Prevention Measures Documented      [] Yes- Altered Skin Integrity Present or Discovered   [] LDA Added if Not in Epic (Describe Wound)   [] New Altered Skin Integrity was Present on Admit and Documented in LDA   [] Wound Image Taken    Wound Care Consulted? No    Attending Nurse:  Seda Pederson RN/Staff Member:   MELINA Gavin

## 2024-06-02 NOTE — H&P
[facility] - 7th Floor ICU  Pulmonary Critical Care Note    Patient Name: Alanna Moya  MRN: [id number]  Admission Date: 6/1/2024  Hospital Length of Stay: 0 days  Code Status: No Order  Attending Provider: Vishnu Stevenson MD  Primary Care Provider: Cornel Haddad MD     Subjective:     HPI:   78-year-old female who presents from [facility] as a transfer with past medical history of CHF, aortic valve replacement, CAD, MI (2018), hypertension, diverticular disease, and CVA for pulmonology services.  On presentation patient looks uncomfortable in his very short of breath.  She answers questions in small sentences.  Her oxygen saturation on room air is around 83%.  She arrived on BiPAP at 14/8.  Currently saturating 92%.  Patient reports for the last 2 months she has had more shortness of breath.  She is a CHF patient who is on Lasix.  She could  not tell me the rest of her medications.  Patient reports that she has noticed her diuretics sometimes work sometimes they do not.  She does report that she was given diuretics at [facility] and she had  adequate urination.  Per chart review from hospital above, chest x-rays showed bilateral airspace opacification and increase his right-sided pleural effusion suggestive of worsening pneumonia or possibly edema.  We will get a CT chest to evaluate. Echo from 5/16 with EF of 55-60%.  Patient states she lives at home with her son.  He was or next of kin and POA.  Patient also reports she wishes to be DNR.    Hospital Course/Significant events:        Past Medical History:   Diagnosis Date    Abdominal pain, unspecified site     Aortic stenosis     Coronary atherosclerosis of unspecified type of vessel, native or graft     Esophageal reflux     Fatigue     History of glaucoma     History of heart attack     Hypertension     Lumbosacral spondylosis without myelopathy     Other and unspecified hyperlipidemia     Rectocele     SOB (shortness of breath) on  exertion     Unspecified essential hypertension     Weakness        Past Surgical History:   Procedure Laterality Date    CHOLECYSTECTOMY      CORONARY ARTERY BYPASS GRAFT      GLAUCOMA SURGERY Bilateral     HYSTERECTOMY      TRANSCATHETER AORTIC VALVE REPLACEMENT (TAVR) N/A 2/27/2023    Procedure: REPLACEMENT, AORTIC VALVE, TRANSCATHETER (TAVR);  Surgeon: Anselmo Arango MD;  Location: [facility] CATH LAB;  Service: Cardiology;  Laterality: N/A;  TAVR W/ ANEST.       Social History     Socioeconomic History    Marital status:    Tobacco Use    Smoking status: Never    Smokeless tobacco: Never   Substance and Sexual Activity    Alcohol use: No    Drug use: No    Sexual activity: Never     Birth control/protection: Surgical     Social Determinants of Health     Financial Resource Strain: Low Risk  (5/3/2021)    Received from [facility]    Overall Financial Resource Strain (CARDIA)     Difficulty of Paying Living Expenses: Not hard at all   Food Insecurity: No Food Insecurity (5/3/2021)    Received from [facility]    Hunger Vital Sign     Worried About Running Out of Food in the Last Year: Never true     Ran Out of Food in the Last Year: Never true   Transportation Needs: No Transportation Needs (5/3/2021)    Received from [facility]    PRAPARE - Transportation     Lack of Transportation (Medical): No     Lack of Transportation (Non-Medical): No   Physical Activity: Sufficiently Active (5/3/2021)    Received from [facility]    Exercise Vital Sign     Days of Exercise per Week: 2 days     Minutes of Exercise per Session: 130 min   Stress: Stress Concern Present (5/3/2021)    Received from [facility]    [employer] [place] [employer] - Occupational Stress Questionnaire     Feeling of Stress : Very much           Current Outpatient Medications   Medication Instructions    amLODIPine (NORVASC) 10 mg, Oral, Daily    aspirin 325 MG tablet 1 tablet, Oral, Daily    carvediloL (COREG) 12.5 mg, Oral, 2 times  daily with meals    cholecalciferol, vitamin D3, (VITAMIN D3) 50 mcg (2,000 unit) Cap capsule 1 capsule, Oral, Daily    cloNIDine (CATAPRES) 0.2 mg, Oral, 2 times daily    cyanocobalamin, vitamin B-12, 2,000 mcg Tab 1 tablet, Oral, Daily    cyclobenzaprine (FLEXERIL) 10 mg, Oral, 3 times daily    docusate sodium (STOOL SOFTENER ORAL) 2 capsules, Oral, 2 times daily    estrogens (conjugated) (PREMARIN) 0.625 mg, Oral, Daily    furosemide (LASIX) 40 mg, Oral, Daily    gabapentin (NEURONTIN) 600 mg, Oral, 2 times daily    hydrOXYzine (ATARAX) 50 mg, Oral, 3 times daily PRN    L. acidophilus/Bifid. animalis (DAILY PROBIOTIC ORAL) 1 capsule, Oral, Daily    naproxen sodium 220 mg Cap 1 capsule, Oral, 2 times daily    POTASSIUM GLUCONATE ORAL 1 tablet, Oral, Daily, Pt stated 650 mg    rosuvastatin (CRESTOR) 40 mg, Oral, Nightly    sertraline (ZOLOFT) 100 mg, Oral, Nightly    torsemide (DEMADEX) 10 mg, Oral, Daily    traMADoL (ULTRAM) 50 mg, Oral, 3 times daily       Current Inpatient Medications   mupirocin   Nasal BID       Current Intravenous Infusions   dexmedeTOMIDine (Precedex) infusion (titrating)  0-1.4 mcg/kg/hr Intravenous Continuous             Review of Systems   Respiratory:  Positive for cough and shortness of breath.           Objective:     No intake or output data in the 24 hours ending 06/01/24 2231      Vital Signs (Most Recent):  Temp: 98.5 °F (36.9 °C) (06/01/24 2230)  Pulse: 86 (06/01/24 2230)  Resp: 17 (06/01/24 2230)  BP: (!) 161/88 (06/01/24 2230)  SpO2: (!) 92 % (06/01/24 2230)  Body mass index is 31.09 kg/m².  Weight: 77.1 kg (170 lb) Vital Signs (24h Range):  Temp:  [98.5 °F (36.9 °C)] 98.5 °F (36.9 °C)  Pulse:  [85-87] 86  Resp:  [17-25] 17  SpO2:  [87 %-92 %] 92 %  BP: (161-174)/(82-88) 161/88     Physical Exam  Constitutional:       General: She is in acute distress.      Appearance: She is ill-appearing and toxic-appearing.   Eyes:      General:         Right eye: No discharge.         Left  "eye: No discharge.   Cardiovascular:      Rate and Rhythm: Normal rate and regular rhythm.      Heart sounds:      No friction rub. No gallop.   Pulmonary:      Effort: Respiratory distress present.      Comments: Diminished breath sounds B/L  Abdominal:      General: Abdomen is flat. Bowel sounds are normal. There is no distension.      Palpations: Abdomen is soft. There is no mass.      Tenderness: There is no abdominal tenderness.      Hernia: No hernia is present.   Musculoskeletal:         General: No deformity.      Comments: 2+ pitting edema B/L   Skin:     Coloration: Skin is not jaundiced or pale.      Findings: No bruising or erythema.   Neurological:      General: No focal deficit present.      Mental Status: She is oriented to person, place, and time.      Cranial Nerves: No cranial nerve deficit.      Sensory: No sensory deficit.           Lines/Drains/Airways       None                   Significant Labs:    Lab Results   Component Value Date    WBC 8.2 02/28/2023    HGB 9.4 (L) 02/28/2023    HCT 30.8 (L) 02/28/2023    MCV 88.8 02/28/2023     02/28/2023           BMP  Lab Results   Component Value Date     05/16/2024    K 3.6 05/16/2024    CHLORIDE 97 05/04/2021    CO2 24 05/16/2024    BUN 16 05/16/2024    CREATININE 0.62 05/16/2024    CALCIUM 10.2 05/16/2024    AGAP 7.0 02/28/2023    ESTGFRAFRICA 92 05/16/2024    EGFRNONAA 28.1 (A) 08/10/2017         ABG  No results for input(s): "PH", "PO2", "PCO2", "HCO3", "POCBASEDEF" in the last 168 hours.    Mechanical Ventilation Support:         Significant Imaging:  I have reviewed the pertinent imaging within the past 24 hours.        Assessment/Plan:     Assessment  Acute respiratory failure  CHF exacerbation  Pulmonary Edema vs  Pneumonia  HTN  Aortic Valve replacement      Plan  Currently on Bipap 14/8 fio2 100%. Saturating 83% on RA. Saturating around 92% on BiPAP. CT chest ordered to evaluate. Diminished breath sounds B/L.  RT blood gas " wnl  EF 55-60% on 5/16. Repeat echo pending. Likely cause of SOB. Pulmonary Edema noted on previous CXR. 2+ B/L pitting Edema.    Pulmonary Edema likely secondary to above. Blood cultures pending. CBC. Will get resp cultures and resp panel.         DVT Prophylaxis: Lovenox  GI Prophylaxis: none     32 minutes of critical care was time spent personally by me on the following activities: development of treatment plan with patient or surrogate and bedside caregivers, discussions with consultants, evaluation of patient's response to treatment, examination of patient, ordering and performing treatments and interventions, ordering and review of laboratory studies, ordering and review of radiographic studies, pulse oximetry, re-evaluation of patient's condition.  This critical care time did not overlap with that of any other provider or involve time for any procedures.     Marga Smith MD  Pulmonary Critical Care Medicine  [facility] - 7th Floor ICU  DOS: 06/01/2024

## 2024-06-02 NOTE — PROGRESS NOTES
"Pharmacokinetic Initial Assessment: IV Vancomycin    Assessment/Plan:  Initiate intravenous vancomycin with loading dose of 1500 mg once (20 mg/kg) with subsequent doses when random concentrations are less than 20 mcg/mL  Desired empiric serum trough concentration is 15 to 20 mcg/mL  Draw vancomycin random level on 06/03 at 0900.  Pharmacy will continue to follow and monitor vancomycin.      Please contact pharmacy at extension [phone] with any questions regarding this assessment.     Thank you for the consult,   Fanny Gifford       Patient brief summary:  Alanna Moya is a 78 y.o. female initiated on antimicrobial therapy with IV Vancomycin for treatment of suspected  pneumonia    Drug Allergies:   Review of patient's allergies indicates:  No Known Allergies    Actual Body Weight:   Wt Readings from Last 1 Encounters:   06/01/24 77.1 kg (170 lb)       Renal Function:   Estimated Creatinine Clearance: 32.9 mL/min (A) (based on SCr of 1.4 mg/dL (H)).,     Dialysis Method (if applicable):  N/A    CBC (last 72 hours):  Recent Labs   Lab Result Units 06/02/24  0115   WBC x10(3)/mcL 12.94*   Hgb g/dL 11.2*   Hct % 33.1*   Platelet x10(3)/mcL 375   Mono % % 8.4   Eos % % 0.3   Basophil % % 0.1       Metabolic Panel (last 72 hours):  Recent Labs   Lab Result Units 06/02/24  0115   Sodium mmol/L 140   Potassium mmol/L 3.8   Chloride mmol/L 98   CO2 mmol/L 24   Glucose mg/dL 149*   Blood Urea Nitrogen mg/dL 88.7*   Creatinine mg/dL 1.40*   Albumin g/dL 3.5   Bilirubin Total mg/dL 0.8   ALP unit/L 79   AST unit/L 24   ALT unit/L 30   Magnesium Level mg/dL 2.60   Phosphorus Level mg/dL 4.2       Drug levels (last 3 results):  No results for input(s): "VANCOMYCINRA", "VANCORANDOM", "VANCOMYCINPE", "VANCOPEAK", "VANCOMYCINTR", "VANCOTROUGH" in the last 72 hours.    Microbiologic Results:  Microbiology Results (last 7 days)       Procedure Component Value Units Date/Time    Blood Culture [[id number]] Collected: 06/02/24 0115 "    Order Status: Resulted Specimen: Blood, Venous Updated: 06/02/24 0119    Blood Culture [[id number]] Collected: 06/02/24 0115    Order Status: Resulted Specimen: Blood, Venous Updated: 06/02/24 0119    Respiratory Culture [[id number]] Collected: 06/01/24 2318    Order Status: Sent Specimen: Sputum, Expectorated

## 2024-06-03 LAB
ANION GAP SERPL CALC-SCNC: 15 MEQ/L
BASOPHILS # BLD AUTO: 0.02 X10(3)/MCL
BASOPHILS NFR BLD AUTO: 0.2 %
BUN SERPL-MCNC: 65.1 MG/DL (ref 9.8–20.1)
CALCIUM SERPL-MCNC: 9.7 MG/DL (ref 8.4–10.2)
CHLORIDE SERPL-SCNC: 96 MMOL/L (ref 98–107)
CO2 SERPL-SCNC: 31 MMOL/L (ref 23–31)
CREAT SERPL-MCNC: 1.26 MG/DL (ref 0.55–1.02)
CREAT/UREA NIT SERPL: 52
EOSINOPHIL # BLD AUTO: 0 X10(3)/MCL (ref 0–0.9)
EOSINOPHIL NFR BLD AUTO: 0 %
ERYTHROCYTE [DISTWIDTH] IN BLOOD BY AUTOMATED COUNT: 12.5 % (ref 11.5–17)
GFR SERPLBLD CREATININE-BSD FMLA CKD-EPI: 44 ML/MIN/1.73/M2
GLUCOSE SERPL-MCNC: 174 MG/DL (ref 82–115)
HCT VFR BLD AUTO: 34.2 % (ref 37–47)
HGB BLD-MCNC: 11.3 G/DL (ref 12–16)
IMM GRANULOCYTES # BLD AUTO: 0.16 X10(3)/MCL (ref 0–0.04)
IMM GRANULOCYTES NFR BLD AUTO: 1.2 %
LYMPHOCYTES # BLD AUTO: 0.72 X10(3)/MCL (ref 0.6–4.6)
LYMPHOCYTES NFR BLD AUTO: 5.6 %
MCH RBC QN AUTO: 29.8 PG (ref 27–31)
MCHC RBC AUTO-ENTMCNC: 33 G/DL (ref 33–36)
MCV RBC AUTO: 90.2 FL (ref 80–94)
MONOCYTES # BLD AUTO: 1.2 X10(3)/MCL (ref 0.1–1.3)
MONOCYTES NFR BLD AUTO: 9.4 %
NEUTROPHILS # BLD AUTO: 10.71 X10(3)/MCL (ref 2.1–9.2)
NEUTROPHILS NFR BLD AUTO: 83.6 %
NRBC BLD AUTO-RTO: 0 %
PLATELET # BLD AUTO: 341 X10(3)/MCL (ref 130–400)
PMV BLD AUTO: 10.1 FL (ref 7.4–10.4)
POTASSIUM SERPL-SCNC: 3.1 MMOL/L (ref 3.5–5.1)
RBC # BLD AUTO: 3.79 X10(6)/MCL (ref 4.2–5.4)
SODIUM SERPL-SCNC: 142 MMOL/L (ref 136–145)
VANCOMYCIN SERPL-MCNC: 11.8 UG/ML (ref 15–20)
WBC # SPEC AUTO: 12.81 X10(3)/MCL (ref 4.5–11.5)

## 2024-06-03 PROCEDURE — 25000003 PHARM REV CODE 250: Performed by: HOSPITALIST

## 2024-06-03 PROCEDURE — 20000000 HC ICU ROOM

## 2024-06-03 PROCEDURE — 63600175 PHARM REV CODE 636 W HCPCS

## 2024-06-03 PROCEDURE — 85025 COMPLETE CBC W/AUTO DIFF WBC: CPT

## 2024-06-03 PROCEDURE — 36415 COLL VENOUS BLD VENIPUNCTURE: CPT | Performed by: HOSPITALIST

## 2024-06-03 PROCEDURE — 80048 BASIC METABOLIC PNL TOTAL CA: CPT | Performed by: HOSPITALIST

## 2024-06-03 PROCEDURE — 99900035 HC TECH TIME PER 15 MIN (STAT)

## 2024-06-03 PROCEDURE — 25000003 PHARM REV CODE 250

## 2024-06-03 PROCEDURE — 94660 CPAP INITIATION&MGMT: CPT

## 2024-06-03 PROCEDURE — 25000003 PHARM REV CODE 250: Performed by: INTERNAL MEDICINE

## 2024-06-03 PROCEDURE — 27100171 HC OXYGEN HIGH FLOW UP TO 24 HOURS

## 2024-06-03 PROCEDURE — 63600175 PHARM REV CODE 636 W HCPCS: Performed by: HOSPITALIST

## 2024-06-03 PROCEDURE — 80202 ASSAY OF VANCOMYCIN: CPT | Performed by: INTERNAL MEDICINE

## 2024-06-03 PROCEDURE — 99900031 HC PATIENT EDUCATION (STAT)

## 2024-06-03 PROCEDURE — 63600175 PHARM REV CODE 636 W HCPCS: Performed by: INTERNAL MEDICINE

## 2024-06-03 PROCEDURE — 36415 COLL VENOUS BLD VENIPUNCTURE: CPT

## 2024-06-03 PROCEDURE — 94760 N-INVAS EAR/PLS OXIMETRY 1: CPT

## 2024-06-03 RX ORDER — VANCOMYCIN HCL IN 5 % DEXTROSE 1G/250ML
1000 PLASTIC BAG, INJECTION (ML) INTRAVENOUS
Status: DISCONTINUED | OUTPATIENT
Start: 2024-06-03 | End: 2024-06-04

## 2024-06-03 RX ADMIN — VANCOMYCIN HYDROCHLORIDE 1000 MG: 1 INJECTION, POWDER, LYOPHILIZED, FOR SOLUTION INTRAVENOUS at 12:06

## 2024-06-03 RX ADMIN — HYDROCORTISONE SODIUM SUCCINATE 100 MG: 100 INJECTION, POWDER, FOR SOLUTION INTRAMUSCULAR; INTRAVENOUS at 02:06

## 2024-06-03 RX ADMIN — PIPERACILLIN SODIUM AND TAZOBACTAM SODIUM 4.5 G: 4; .5 INJECTION, POWDER, LYOPHILIZED, FOR SOLUTION INTRAVENOUS at 12:06

## 2024-06-03 RX ADMIN — TRAMADOL HYDROCHLORIDE 50 MG: 50 TABLET, COATED ORAL at 08:06

## 2024-06-03 RX ADMIN — GABAPENTIN 600 MG: 300 CAPSULE ORAL at 08:06

## 2024-06-03 RX ADMIN — DOCUSATE SODIUM LIQUID 50 MG: 100 LIQUID ORAL at 08:06

## 2024-06-03 RX ADMIN — HYDROCORTISONE SODIUM SUCCINATE 100 MG: 100 INJECTION, POWDER, FOR SOLUTION INTRAMUSCULAR; INTRAVENOUS at 05:06

## 2024-06-03 RX ADMIN — LEUCINE, PHENYLALANINE, LYSINE, METHIONINE, ISOLEUCINE, VALINE, HISTIDINE, THREONINE, TRYPTOPHAN, ALANINE, GLYCINE, ARGININE, PROLINE, SERINE, TYROSINE, SODIUM ACETATE, DIBASIC POTASSIUM PHOSPHATE, MAGNESIUM CHLORIDE, SODIUM CHLORIDE, CALCIUM CHLORIDE, DEXTROSE
311; 238; 247; 170; 255; 247; 204; 179; 77; 880; 438; 489; 289; 213; 17; 297; 261; 51; 77; 33; 5 INJECTION INTRAVENOUS at 10:06

## 2024-06-03 RX ADMIN — ATORVASTATIN CALCIUM 40 MG: 40 TABLET, FILM COATED ORAL at 08:06

## 2024-06-03 RX ADMIN — ENOXAPARIN SODIUM 40 MG: 40 INJECTION SUBCUTANEOUS at 08:06

## 2024-06-03 RX ADMIN — MUPIROCIN: 20 OINTMENT TOPICAL at 08:06

## 2024-06-03 RX ADMIN — SERTRALINE HYDROCHLORIDE 100 MG: 50 TABLET ORAL at 08:06

## 2024-06-03 RX ADMIN — HYDROMORPHONE HYDROCHLORIDE 0.2 MG: 2 INJECTION, SOLUTION INTRAMUSCULAR; INTRAVENOUS; SUBCUTANEOUS at 04:06

## 2024-06-03 RX ADMIN — HYDROCORTISONE SODIUM SUCCINATE 100 MG: 100 INJECTION, POWDER, FOR SOLUTION INTRAMUSCULAR; INTRAVENOUS at 10:06

## 2024-06-03 RX ADMIN — PIPERACILLIN SODIUM AND TAZOBACTAM SODIUM 4.5 G: 4; .5 INJECTION, POWDER, LYOPHILIZED, FOR SOLUTION INTRAVENOUS at 08:06

## 2024-06-03 RX ADMIN — PIPERACILLIN SODIUM AND TAZOBACTAM SODIUM 4.5 G: 4; .5 INJECTION, POWDER, LYOPHILIZED, FOR SOLUTION INTRAVENOUS at 04:06

## 2024-06-03 NOTE — NURSING
Nurses Note -- 4 Eyes      6/3/2024   3:30 AM      Skin assessed during: Q Shift Change      [x] No Altered Skin Integrity Present    [x]Prevention Measures Documented      [] Yes- Altered Skin Integrity Present or Discovered   [] LDA Added if Not in Epic (Describe Wound)   [] New Altered Skin Integrity was Present on Admit and Documented in LDA   [] Wound Image Taken    Wound Care Consulted? No    Attending Nurse:  Seda Pederson RN/Staff Member:  MELINA Torres

## 2024-06-03 NOTE — PROGRESS NOTES
Extensive discussion with patient's family at the bedside.  All questions answered to the best of my ability.  Patient appears to be improving now tolerating Vapotherm albeit on 40 L of flow and 100% FiO2.  Continue antibiotics.  I believe this is less likely to be congestive heart failure given BNP is only 246 and normal echocardiogram.  Patient's  also reports that patient has frequent episodes of aspiration at home.

## 2024-06-03 NOTE — PLAN OF CARE
06/03/24 0935   Medicare Message   Important Message from Medicare regarding Discharge Appeal Rights Given to patient/caregiver;Explained to patient/caregiver

## 2024-06-03 NOTE — PLAN OF CARE
06/03/24 0935   Discharge Assessment   Assessment Type Discharge Planning Assessment   Confirmed/corrected address, phone number and insurance Yes   Confirmed Demographics Correct on Facesheet   Source of Information patient;family   When was your last doctors appointment? 05/06/24   Communicated [place] with patient/caregiver Date not available/Unable to determine   Reason For Admission respiratory failure   People in Home child(freeman), adult   Facility Arrived From: [facility]   Do you expect to return to your current living situation? Yes   Do you have help at home or someone to help you manage your care at home? Yes   Who are your caregiver(s) and their phone number(s)? Aaron paul [phone]   Prior to hospitilization cognitive status: Alert/Oriented   Current cognitive status: Alert/Oriented   Walking or Climbing Stairs Difficulty yes   Walking or Climbing Stairs ambulation difficulty, requires equipment   Mobility Management walker   Dressing/Bathing Difficulty yes   Dressing/Bathing bathing difficulty, requires equipment   Dressing/Bathing Management shower chair   Equipment Currently Used at Home walker, rolling;shower chair   Patient currently being followed by outpatient case management? No   Do you currently have service(s) that help you manage your care at home? Yes   How Many hours does patient receive services 2   Name and Contact number of agency [facility]   Is the pt/caregiver preference to resume services with current agency Yes   Do you take prescription medications? Yes   Do you have prescription coverage? Yes   Coverage [employer] Managed Medicare   Who is going to help you get home at discharge? Aaron paul   How do you get to doctors appointments? family or friend will provide   Are you on dialysis? No   Do you take coumadin? No   Discharge Plan A Home with family;Home Health   Discharge Plan B Skilled Nursing Facility   DME Needed Upon Discharge  none   Discharge  Plan discussed with: Adult children;Patient   Transition of Care Barriers None   OTHER   Name(s) of People in Home sonAaron     Patient lives with her son, Aaron. Uses walker and shower chair. Has [facility]. Sent updates via [employer].

## 2024-06-03 NOTE — PROGRESS NOTES
[facility] - 7th Floor ICU  Pulmonary Critical Care Note    Patient Name: Alanna Moya  MRN: [id number]  Admission Date: 6/1/2024  Hospital Length of Stay: 2 days  Code Status: DNR  Attending Provider: Vishnu Stevenson MD  Primary Care Provider: Cornel Haddad MD     Subjective:     HPI:   78-year-old female who presents from [facility] as a transfer with past medical history of CHF, aortic valve replacement, CAD, MI (2018), hypertension, diverticular disease, and CVA for pulmonology services.  On presentation patient looks uncomfortable in his very short of breath.  She answers questions in small sentences.  Her oxygen saturation on room air is around 83%.  She arrived on BiPAP at 14/8.  Currently saturating 92%.  Patient reports for the last 2 months she has had more shortness of breath.  She is a CHF patient who is on Lasix.  She could  not tell me the rest of her medications.  Patient reports that she has noticed her diuretics sometimes work sometimes they do not.  She does report that she was given diuretics at [facility] and she had  adequate urination.  Per chart review from hospital above, chest x-rays showed bilateral airspace opacification and increase his right-sided pleural effusion suggestive of worsening pneumonia or possibly edema.  Echo from 5/16 with EF of 55-60%.  Patient states she lives at home with her son.  He was or next of kin and POA.  Patient also reports she wishes to be DNR.  Patient also had hospitalization at [facility] about 2 weeks ago for facial weakness and hypertension.  Chest x-ray at that time not available for review but radiologist reports clear lung fields.  Patient also had autoimmune workup which was all negative.    Hospital Course/Significant events:  Patient awake and alert still requiring high-flow O2.  On BiPAP overnight.  Did not tolerate Vapotherm very well during the day yesterday.  Significant desaturation despite 100%.  She complains of being thirsty  this morning.  She is awake and alert follows commands      Past Medical History:   Diagnosis Date    Abdominal pain, unspecified site     Aortic stenosis     Coronary atherosclerosis of unspecified type of vessel, native or graft     Esophageal reflux     Fatigue     History of glaucoma     History of heart attack     Hypertension     Lumbosacral spondylosis without myelopathy     Other and unspecified hyperlipidemia     Rectocele     SOB (shortness of breath) on exertion     Unspecified essential hypertension     Weakness        Past Surgical History:   Procedure Laterality Date    CHOLECYSTECTOMY      CORONARY ARTERY BYPASS GRAFT      GLAUCOMA SURGERY Bilateral     HYSTERECTOMY      TRANSCATHETER AORTIC VALVE REPLACEMENT (TAVR) N/A 2/27/2023    Procedure: REPLACEMENT, AORTIC VALVE, TRANSCATHETER (TAVR);  Surgeon: Anselmo Arango MD;  Location: [facility] CATH LAB;  Service: Cardiology;  Laterality: N/A;  TAVR W/ ANEST.       Social History     Socioeconomic History    Marital status:    Tobacco Use    Smoking status: Never    Smokeless tobacco: Never   Substance and Sexual Activity    Alcohol use: No    Drug use: No    Sexual activity: Never     Birth control/protection: Surgical     Social Determinants of Health     Financial Resource Strain: Low Risk  (5/3/2021)    Received from [facility]    Overall Financial Resource Strain (CARDIA)     Difficulty of Paying Living Expenses: Not hard at all   Food Insecurity: No Food Insecurity (5/3/2021)    Received from [facility]    Hunger Vital Sign     Worried About Running Out of Food in the Last Year: Never true     Ran Out of Food in the Last Year: Never true   Transportation Needs: No Transportation Needs (5/3/2021)    Received from [facility]    PRAPARE - Transportation     Lack of Transportation (Medical): No     Lack of Transportation (Non-Medical): No   Physical Activity: Sufficiently Active (5/3/2021)    Received from [facility]    Exercise Vital Sign      Days of Exercise per Week: 2 days     Minutes of Exercise per Session: 130 min   Stress: Stress Concern Present (5/3/2021)    Received from [facility] - Occupational Stress Questionnaire     Feeling of Stress : Very much           Current Outpatient Medications   Medication Instructions    amLODIPine (NORVASC) 10 mg, Oral, 2 times daily    aspirin 325 MG tablet 1 tablet, Oral, Daily    carvediloL (COREG) 12.5 mg, Oral, 2 times daily with meals    cholecalciferol, vitamin D3, (VITAMIN D3) 50 mcg (2,000 unit) Cap capsule 1 capsule, Oral, Daily    cyanocobalamin, vitamin B-12, 2,000 mcg Tab 1 tablet, Oral, Daily    cyclobenzaprine (FLEXERIL) 10 mg, Oral, 3 times daily    docusate sodium (STOOL SOFTENER ORAL) 2 capsules, Oral, 2 times daily    furosemide (LASIX) 40 mg, Oral, Daily    hydrALAZINE (APRESOLINE) 25 mg, Oral, Every 8 hours    L. acidophilus/Bifid. animalis (DAILY PROBIOTIC ORAL) 1 capsule, Oral, Daily    potassium gluconate 650 mg, Oral, Once    rosuvastatin (CRESTOR) 40 mg, Oral, Nightly    sertraline (ZOLOFT) 100 mg, Oral, Nightly    traMADoL (ULTRAM) 50 mg, Oral, 3 times daily       Current Inpatient Medications   atorvastatin  40 mg Oral Daily    docusate  50 mg Oral Daily    enoxparin  40 mg Subcutaneous Q12H    gabapentin  600 mg Oral BID    hydrocortisone sodium succinate  100 mg Intravenous Q8H    mupirocin   Nasal BID    piperacillin-tazobactam (Zosyn) IV (PEDS and ADULTS) (extended infusion is not appropriate)  4.5 g Intravenous Q8H    sertraline  100 mg Oral QHS    traMADoL  50 mg Oral TID       Current Intravenous Infusions   Amino acid 4.25% - dextrose 5% (CLINIMIX-E) solution (1L provides 42.5 gm AA, 50 gm CHO (170 kcal/L dextrose), Na 35, K 30, Mg 5, Ca 4.5, Acetate 70, Cl 39, Phos 15)   Intravenous Continuous        dexmedeTOMIDine (Precedex) infusion (titrating)  0-1.4 mcg/kg/hr Intravenous Continuous             Review of Systems   Respiratory:   Positive for cough and shortness of breath.           Objective:       Intake/Output Summary (Last 24 hours) at 6/3/2024 0800  Last data filed at 6/3/2024 0622  Gross per 24 hour   Intake 980.08 ml   Output 2300 ml   Net -1319.92 ml         Vital Signs (Most Recent):  Temp: 97.9 °F (36.6 °C) (06/03/24 0400)  Pulse: 86 (06/03/24 0730)  Resp: 17 (06/03/24 0730)  BP: (!) 155/94 (06/03/24 0730)  SpO2: 96 % (06/03/24 0730)  Body mass index is 31.09 kg/m².  Weight: 77.1 kg (170 lb) Vital Signs (24h Range):  Temp:  [97.9 °F (36.6 °C)-98.7 °F (37.1 °C)] 97.9 °F (36.6 °C)  Pulse:  [] 86  Resp:  [10-30] 17  SpO2:  [88 %-99 %] 96 %  BP: (133-169)/(73-94) 155/94     Physical exam  General-elderly woman in no acute distress  HEENT-BiPAP in place.  Conjunctiva injected sclerae nonicteric  Chest-crackles heard anteriorly.  No wheezes  CV-regular rhythm  Abdomen-nondistended bowel sounds present  Extremities-minimal pretibial edema.  No cyanosis or clubbing  Neuro-awake and alert.  Follows commands.  No focal deficits noted        Lines/Drains/Airways       Peripherally Inserted Central Catheter Line  Duration             PICC Double Lumen 05/31/24 1500 right basilic 2 days              Drain  Duration             Female External Urinary Catheter w/ Suction 06/02/24 0400 1 day              Peripheral Intravenous Line  Duration                  Peripheral IV - Single Lumen 05/30/24 2000 20 G Anterior;Left Forearm 3 days         Peripheral IV - Single Lumen 05/30/24 2000 20 G Right Antecubital 3 days                    Significant Labs:    Lab Results   Component Value Date    WBC 12.94 (H) 06/02/2024    HGB 11.2 (L) 06/02/2024    HCT 33.1 (L) 06/02/2024    MCV 90.2 06/02/2024     06/02/2024           BMP  Lab Results   Component Value Date     06/02/2024    K 3.8 06/02/2024    CHLORIDE 97 05/04/2021    CO2 24 06/02/2024    BUN 88.7 (H) 06/02/2024    CREATININE 1.40 (H) 06/02/2024    CALCIUM 10.3 (H) 06/02/2024     AGAP 18.0 06/02/2024    ESTGFRAFRICA 92 05/16/2024    EGFRNONAA 28.1 (A) 08/10/2017         ABG  Recent Labs   Lab 06/01/24  2314   PH 7.450   PO2 143.0*   PCO2 45.0   HCO3 31.3*   POCBASEDEF 6.50*       Mechanical Ventilation Support:  Oxygen Concentration (%): 100 (06/03/24 0400)      Significant Imaging:  I have reviewed the pertinent imaging within the past 24 hours.        Assessment/Plan:     Assessment  Hypoxemic respiratory failure requiring high-flow O2 alternating between BiPAP and Vapotherm  Bilateral pulmonary infiltrates with areas of consolidation concerning for pneumonia.  Bilateral pleural effusions and interstitial edema suggest an element CHF.  Chest x-ray 2 weeks ago was clear.  History previous CABG and AVR      Plan  Continue antibiotics.  Continue high-flow O2 as needed.  Wean as tolerated.  I will begin Clinimix.  Repeat chest x-ray today.  Continue monitoring renal function closely.  Echo yesterday shows EF of 60% and no real valve abnormalities.        DVT Prophylaxis: Lovenox  GI Prophylaxis: none     32 minutes of critical care was time spent personally by me on the following activities: development of treatment plan with patient or surrogate and bedside caregivers, discussions with consultants, evaluation of patient's response to treatment, examination of patient, ordering and performing treatments and interventions, ordering and review of laboratory studies, ordering and review of radiographic studies, pulse oximetry, re-evaluation of patient's condition.  This critical care time did not overlap with that of any other provider or involve time for any procedures.     JOESPH Brown MD  Pulmonary Critical Care Medicine  [facility] - 7th Floor ICU  DOS: 06/03/2024

## 2024-06-03 NOTE — PROGRESS NOTES
Pharmacokinetic Initial Assessment: IV Vancomycin    Assessment/Plan:    Initiate intravenous vancomycin with loading dose of 1500 mg once followed by a maintenance dose of vancomycin 1000mg IV every 24 hours  Desired empiric serum trough concentration is 15 to 20 mcg/mL  Draw vancomycin trough level 60 min prior to third dose on 06/05 at approximately 1200  Pharmacy will continue to follow and monitor vancomycin.      Please contact pharmacy at extension [phone] with any questions regarding this assessment.     Thank you for the consult,   Rl Husain       Patient brief summary:  Alanna Moya is a 78 y.o. female initiated on antimicrobial therapy with IV Vancomycin for treatment of suspected  PNA    Drug Allergies:   Review of patient's allergies indicates:  No Known Allergies    Actual Body Weight:   77 kg    Renal Function:   Estimated Creatinine Clearance: 36.5 mL/min (A) (based on SCr of 1.26 mg/dL (H)).,     Dialysis Method (if applicable):  N/A    CBC (last 72 hours):  Recent Labs   Lab Result Units 06/02/24  0115   WBC x10(3)/mcL 12.94*   Hgb g/dL 11.2*   Hct % 33.1*   Platelet x10(3)/mcL 375   Mono % % 8.4   Eos % % 0.3   Basophil % % 0.1       Metabolic Panel (last 72 hours):  Recent Labs   Lab Result Units 06/02/24 0115 06/03/24  1029   Sodium mmol/L 140 142   Potassium mmol/L 3.8 3.1*   Chloride mmol/L 98 96*   CO2 mmol/L 24 31   Glucose mg/dL 149* 174*   Blood Urea Nitrogen mg/dL 88.7* 65.1*   Creatinine mg/dL 1.40* 1.26*   Albumin g/dL 3.5  --    Bilirubin Total mg/dL 0.8  --    ALP unit/L 79  --    AST unit/L 24  --    ALT unit/L 30  --    Magnesium Level mg/dL 2.60  --    Phosphorus Level mg/dL 4.2  --        Drug levels (last 3 results):  Recent Labs   Lab Result Units 06/03/24  1029   Vancomycin Random ug/ml 11.8*       Microbiologic Results:  Microbiology Results (last 7 days)       Procedure Component Value Units Date/Time    Blood Culture [[id number]]  (Normal) Collected: 06/02/24 0115     Order Status: Completed Specimen: Blood, Venous Updated: 06/03/24 0300     Blood Culture No Growth At 24 Hours    Blood Culture [[id number]]  (Normal) Collected: 06/02/24 0115    Order Status: Completed Specimen: Blood, Venous Updated: 06/03/24 0300     Blood Culture No Growth At 24 Hours    Respiratory Culture [[id number]] Collected: 06/01/24 2318    Order Status: Sent Specimen: Sputum, Expectorated

## 2024-06-03 NOTE — PROGRESS NOTES
Inpatient Nutrition Assessment    Admit Date: 6/1/2024   Total duration of encounter: 2 days   Patient Age: 78 y.o.    Nutrition Recommendation/Prescription     -Oral diet once medically feasible. Goal Heart Healthy diet.     -If not able to resume oral diet, consider tube feeding.   Recommend Peptamen AF to goal 60mL/hr to provide  1440 kcal/day (98% est needs)  90 g/protein/day (106% est needs)  972 mL free water/day  Calculations based on tube feeding running over estimated 20hrs/day.    -Continue PPN (PICC noted) until able to resume oral diet or tolerate tube feeding       Communication of Recommendations: reviewed with nurse, reviewed with patient, and reviewed with family    Nutrition Assessment     Malnutrition Assessment/Nutrition-Focused Physical Exam    Malnutrition Context: other (see comments) (does not meet criteria) (06/03/24 1413)  Malnutrition Level: other (see comments) (does not meet criteria) (06/03/24 1413)  Energy Intake (Malnutrition): other (see comments) (does not meet criteria) (06/03/24 1413)  Weight Loss (Malnutrition): other (see comments) (does not meet criteria) (06/03/24 1413)  Subcutaneous Fat (Malnutrition): other (see comments) (does not meet criteria) (06/03/24 1413)           Muscle Mass (Malnutrition): other (see comments) (does not meet criteria) (06/03/24 1413)                          Fluid Accumulation (Malnutrition): other (see comments) (does not meet criteria) (06/03/24 1413)        A minimum of two characteristics is recommended for diagnosis of either severe or non-severe malnutrition.    Chart Review    Reason Seen: continuous nutrition monitoring    Malnutrition Screening Tool Results   Have you recently lost weight without trying?: No  Have you been eating poorly because of a decreased appetite?: No   MST Score: 0   Diagnosis:  Acute respiratory failure alternating between BiPAP and Vapotherm   CHF exacerbation  Pulmonary Edema vs Pneumonia  HTN  Aortic Valve  replacement     Relevant Medical History:    Abdominal pain      Aortic stenosis      Coronary atherosclerosis      Esophageal reflux      Fatigue      Glaucoma      Heart attack      Hypertension      Lumbosacral spondylosis without myelopathy      Other and unspecified hyperlipidemia      Rectocele      SOB (shortness of breath) on exertion      CABG and AVR         Scheduled Medications:  atorvastatin, 40 mg, Daily  docusate, 50 mg, Daily  enoxparin, 40 mg, Q12H  gabapentin, 600 mg, BID  hydrocortisone sodium succinate, 100 mg, Q8H  mupirocin, , BID  piperacillin-tazobactam (Zosyn) IV (PEDS and ADULTS) (extended infusion is not appropriate), 4.5 g, Q8H  sertraline, 100 mg, QHS  traMADoL, 50 mg, TID  vancomycin (VANCOCIN) IV (PEDS and ADULTS), 1,000 mg, Q24H    Continuous Infusions:  Amino acid 4.25% - dextrose 5% (CLINIMIX-E) solution (1L provides 42.5 gm AA, 50 gm CHO (170 kcal/L dextrose), Na 35, K 30, Mg 5, Ca 4.5, Acetate 70, Cl 39, Phos 15), Last Rate: 75 mL/hr at 06/03/24 1024  dexmedeTOMIDine (Precedex) infusion (titrating)    PRN Medications:  sodium chloride 0.9%, , PRN  HYDROmorphone, 0.2 mg, Q6H PRN  vancomycin - pharmacy to dose, , pharmacy to manage frequency    Calorie Containing IV Medications: Clinimix    Recent Labs   Lab 06/02/24  0115 06/03/24  1029 06/03/24  1355    142  --    K 3.8 3.1*  --    CALCIUM 10.3* 9.7  --    PHOS 4.2  --   --    MG 2.60  --   --    CO2 24 31  --    BUN 88.7* 65.1*  --    CREATININE 1.40* 1.26*  --    EGFRNORACEVR 39 44  --    GLUCOSE 149* 174*  --    BILITOT 0.8  --   --    ALKPHOS 79  --   --    ALT 30  --   --    AST 24  --   --    ALBUMIN 3.5  --   --    WBC 12.94*  --  12.81*   HGB 11.2*  --  11.3*   HCT 33.1*  --  34.2*     Nutrition Orders:  Amino acid 4.25% - dextrose 5% (CLINIMIX-E) solution (1L provides 42.5 gm AA, 50 gm CHO (170 kcal/L dextrose), Na 35, K 30, Mg 5, Ca 4.5, Acetate 70, Cl 39, Phos 15)      Appetite/Oral Intake: NPO/NPO  Factors  "Affecting Nutritional Intake: NPO  Social Needs Impacting Access to Food: unable to assess at this time; will attempt on follow-up  Food/Yazdanism/Cultural Preferences: none reported  Food Allergies: no known food allergies  Last Bowel Movement: 06/01/24  Wound(s):  none noted    Comments    6/3/24: Patient currently NPO on BiPAP/Vapotherm. Clinimix running via PICC. Patient reports good-fair oral intake prior to admit. Denies any unintentional wt loss.     Anthropometrics    Height: 5' 2" (157.5 cm),    Last Weight: 77.1 kg (170 lb) (06/01/24 2230), Weight Method: Standard Scale  BMI (Calculated): 31.1  BMI Classification: obese grade I (BMI 30-34.9)     Ideal Body Weight (IBW), Female: 110 lb     % Ideal Body Weight, Female (lb): 154.55 %                             Usual Weight Provided By: patient denies unintentional weight loss    Wt Readings from Last 5 Encounters:   06/01/24 77.1 kg (170 lb)   02/27/23 77 kg (169 lb 12.1 oz)   02/15/23 74.8 kg (165 lb)   08/10/17 78.8 kg (173 lb 11.6 oz)   08/10/17 78.8 kg (173 lb 11.6 oz)     Weight Change(s) Since Admission: .  Wt Readings from Last 1 Encounters:   06/01/24 2230 77.1 kg (170 lb)   Admit Weight: 77.1 kg (170 lb) (06/01/24 2230), Weight Method: Stated    Estimated Needs    Weight Used For Calorie Calculations: 77.1 kg (169 lb 15.6 oz)  Energy Calorie Requirements (kcal): 1467 kcal/day (mifflin st jeor x 1.2 SF)  Energy Need Method: [place] Jeor  Weight Used For Protein Calculations: 77.1 kg (169 lb 15.6 oz)  Protein Requirements: 85 g/day (1.1 g/kg/d)  Fluid Requirements (mL): 1467 mL/day (1mL/kcal)        Enteral Nutrition     Patient not receiving enteral nutrition at this time.    Parenteral Nutrition     Standard Formula: Clinimix E 4.25/5  Custom Formula: not applicable  Additives: none  Rate/Volume: 75 mL/hr  Lipids: none  Total Nutrition Provided by Parenteral Nutrition:  Calories Provided  612 kcal/d, 42% needs   Protein Provided  77 g/d, 91% " needs   Dextrose Provided  90 g/d, GIR 0.81 mg CHO/kg/min   Fluid Provided  1800 ml/d, 122% needs       Evaluation of Received Nutrient Intake    Calories: not meeting estimated needs  Protein: meeting estimated needs    Patient Education     Not applicable.    Nutrition Diagnosis     PES: Inadequate oral intake related to acute illness as evidenced by NPO due to BiPAP. (new)       Nutrition Interventions     Intervention(s): collaboration with other providers    Goal: Meet greater than 80% of nutritional needs by follow-up. (new)  Goal: Maintain weight throughout hospitalization. (new)    Nutrition Goals & Monitoring     Dietitian will monitor: energy intake, parenteral nutrition intake, weight, electrolyte/renal panel, and glucose/endocrine profile  Discharge planning: too early to determine; pending clinical course  Nutrition Risk/Follow-Up: high (follow-up in 1-4 days)   Please consult if re-assessment needed sooner.

## 2024-06-04 LAB
ALBUMIN SERPL-MCNC: 3.4 G/DL (ref 3.4–4.8)
ALBUMIN/GLOB SERPL: 0.8 RATIO (ref 1.1–2)
ALP SERPL-CCNC: 78 UNIT/L (ref 40–150)
ALT SERPL-CCNC: 21 UNIT/L (ref 0–55)
ANION GAP SERPL CALC-SCNC: 13 MEQ/L
AST SERPL-CCNC: 14 UNIT/L (ref 5–34)
BASOPHILS # BLD AUTO: 0.01 X10(3)/MCL
BASOPHILS NFR BLD AUTO: 0.1 %
BILIRUB SERPL-MCNC: 0.7 MG/DL
BUN SERPL-MCNC: 57.6 MG/DL (ref 9.8–20.1)
CALCIUM SERPL-MCNC: 10.3 MG/DL (ref 8.4–10.2)
CHLORIDE SERPL-SCNC: 96 MMOL/L (ref 98–107)
CO2 SERPL-SCNC: 34 MMOL/L (ref 23–31)
CREAT SERPL-MCNC: 1.03 MG/DL (ref 0.55–1.02)
CREAT/UREA NIT SERPL: 56
EOSINOPHIL # BLD AUTO: 0.05 X10(3)/MCL (ref 0–0.9)
EOSINOPHIL NFR BLD AUTO: 0.3 %
ERYTHROCYTE [DISTWIDTH] IN BLOOD BY AUTOMATED COUNT: 12.3 % (ref 11.5–17)
GFR SERPLBLD CREATININE-BSD FMLA CKD-EPI: 56 ML/MIN/1.73/M2
GLOBULIN SER-MCNC: 4.1 GM/DL (ref 2.4–3.5)
GLUCOSE SERPL-MCNC: 233 MG/DL (ref 82–115)
HCT VFR BLD AUTO: 36.2 % (ref 37–47)
HGB BLD-MCNC: 12 G/DL (ref 12–16)
IMM GRANULOCYTES # BLD AUTO: 0.19 X10(3)/MCL (ref 0–0.04)
IMM GRANULOCYTES NFR BLD AUTO: 1.3 %
LYMPHOCYTES # BLD AUTO: 0.64 X10(3)/MCL (ref 0.6–4.6)
LYMPHOCYTES NFR BLD AUTO: 4.3 %
MCH RBC QN AUTO: 30.1 PG (ref 27–31)
MCHC RBC AUTO-ENTMCNC: 33.1 G/DL (ref 33–36)
MCV RBC AUTO: 90.7 FL (ref 80–94)
MONOCYTES # BLD AUTO: 0.98 X10(3)/MCL (ref 0.1–1.3)
MONOCYTES NFR BLD AUTO: 6.6 %
MRSA PCR SCRN (OHS): NOT DETECTED
NEUTROPHILS # BLD AUTO: 12.99 X10(3)/MCL (ref 2.1–9.2)
NEUTROPHILS NFR BLD AUTO: 87.4 %
NRBC BLD AUTO-RTO: 0 %
PLATELET # BLD AUTO: 377 X10(3)/MCL (ref 130–400)
PMV BLD AUTO: 10.1 FL (ref 7.4–10.4)
POTASSIUM SERPL-SCNC: 2.8 MMOL/L (ref 3.5–5.1)
PROT SERPL-MCNC: 7.5 GM/DL (ref 5.8–7.6)
RBC # BLD AUTO: 3.99 X10(6)/MCL (ref 4.2–5.4)
SODIUM SERPL-SCNC: 143 MMOL/L (ref 136–145)
VANCOMYCIN TROUGH SERPL-MCNC: 24.7 UG/ML (ref 15–20)
WBC # SPEC AUTO: 14.86 X10(3)/MCL (ref 4.5–11.5)

## 2024-06-04 PROCEDURE — 5A0935A ASSISTANCE WITH RESPIRATORY VENTILATION, LESS THAN 24 CONSECUTIVE HOURS, HIGH NASAL FLOW/VELOCITY: ICD-10-PCS | Performed by: INTERNAL MEDICINE

## 2024-06-04 PROCEDURE — 63600175 PHARM REV CODE 636 W HCPCS: Performed by: HOSPITALIST

## 2024-06-04 PROCEDURE — 94660 CPAP INITIATION&MGMT: CPT

## 2024-06-04 PROCEDURE — 20000000 HC ICU ROOM

## 2024-06-04 PROCEDURE — 63600175 PHARM REV CODE 636 W HCPCS: Performed by: INTERNAL MEDICINE

## 2024-06-04 PROCEDURE — 80053 COMPREHEN METABOLIC PANEL: CPT

## 2024-06-04 PROCEDURE — 97166 OT EVAL MOD COMPLEX 45 MIN: CPT

## 2024-06-04 PROCEDURE — 97162 PT EVAL MOD COMPLEX 30 MIN: CPT

## 2024-06-04 PROCEDURE — 80202 ASSAY OF VANCOMYCIN: CPT | Performed by: HOSPITALIST

## 2024-06-04 PROCEDURE — 63600175 PHARM REV CODE 636 W HCPCS

## 2024-06-04 PROCEDURE — 94799 UNLISTED PULMONARY SVC/PX: CPT

## 2024-06-04 PROCEDURE — 99900035 HC TECH TIME PER 15 MIN (STAT)

## 2024-06-04 PROCEDURE — 25000003 PHARM REV CODE 250: Performed by: HOSPITALIST

## 2024-06-04 PROCEDURE — 27100171 HC OXYGEN HIGH FLOW UP TO 24 HOURS

## 2024-06-04 PROCEDURE — 94760 N-INVAS EAR/PLS OXIMETRY 1: CPT

## 2024-06-04 PROCEDURE — 25000003 PHARM REV CODE 250

## 2024-06-04 PROCEDURE — 63600175 PHARM REV CODE 636 W HCPCS: Performed by: STUDENT IN AN ORGANIZED HEALTH CARE EDUCATION/TRAINING PROGRAM

## 2024-06-04 PROCEDURE — 36415 COLL VENOUS BLD VENIPUNCTURE: CPT | Performed by: HOSPITALIST

## 2024-06-04 PROCEDURE — 99900031 HC PATIENT EDUCATION (STAT)

## 2024-06-04 PROCEDURE — 25000003 PHARM REV CODE 250: Performed by: INTERNAL MEDICINE

## 2024-06-04 PROCEDURE — 85025 COMPLETE CBC W/AUTO DIFF WBC: CPT

## 2024-06-04 PROCEDURE — 94761 N-INVAS EAR/PLS OXIMETRY MLT: CPT

## 2024-06-04 PROCEDURE — 87641 MR-STAPH DNA AMP PROBE: CPT

## 2024-06-04 PROCEDURE — 36415 COLL VENOUS BLD VENIPUNCTURE: CPT

## 2024-06-04 RX ORDER — HYDRALAZINE HYDROCHLORIDE 20 MG/ML
10 INJECTION INTRAMUSCULAR; INTRAVENOUS EVERY 6 HOURS PRN
Status: DISCONTINUED | OUTPATIENT
Start: 2024-06-04 | End: 2024-06-23 | Stop reason: HOSPADM

## 2024-06-04 RX ORDER — FLUCONAZOLE 100 MG/1
100 TABLET ORAL DAILY
Status: DISCONTINUED | OUTPATIENT
Start: 2024-06-05 | End: 2024-06-04

## 2024-06-04 RX ORDER — POTASSIUM CHLORIDE 14.9 MG/ML
20 INJECTION INTRAVENOUS
Status: COMPLETED | OUTPATIENT
Start: 2024-06-04 | End: 2024-06-04

## 2024-06-04 RX ADMIN — ENOXAPARIN SODIUM 40 MG: 40 INJECTION SUBCUTANEOUS at 08:06

## 2024-06-04 RX ADMIN — TRAMADOL HYDROCHLORIDE 50 MG: 50 TABLET, COATED ORAL at 08:06

## 2024-06-04 RX ADMIN — MUPIROCIN: 20 OINTMENT TOPICAL at 08:06

## 2024-06-04 RX ADMIN — LEUCINE, PHENYLALANINE, LYSINE, METHIONINE, ISOLEUCINE, VALINE, HISTIDINE, THREONINE, TRYPTOPHAN, ALANINE, GLYCINE, ARGININE, PROLINE, SERINE, TYROSINE, SODIUM ACETATE, DIBASIC POTASSIUM PHOSPHATE, MAGNESIUM CHLORIDE, SODIUM CHLORIDE, CALCIUM CHLORIDE, DEXTROSE
311; 238; 247; 170; 255; 247; 204; 179; 77; 880; 438; 489; 289; 213; 17; 297; 261; 51; 77; 33; 5 INJECTION INTRAVENOUS at 12:06

## 2024-06-04 RX ADMIN — HYDROMORPHONE HYDROCHLORIDE 0.2 MG: 2 INJECTION, SOLUTION INTRAMUSCULAR; INTRAVENOUS; SUBCUTANEOUS at 01:06

## 2024-06-04 RX ADMIN — PIPERACILLIN SODIUM AND TAZOBACTAM SODIUM 4.5 G: 4; .5 INJECTION, POWDER, LYOPHILIZED, FOR SOLUTION INTRAVENOUS at 04:06

## 2024-06-04 RX ADMIN — POTASSIUM CHLORIDE 20 MEQ: 14.9 INJECTION, SOLUTION INTRAVENOUS at 08:06

## 2024-06-04 RX ADMIN — POTASSIUM CHLORIDE 20 MEQ: 14.9 INJECTION, SOLUTION INTRAVENOUS at 02:06

## 2024-06-04 RX ADMIN — GABAPENTIN 600 MG: 300 CAPSULE ORAL at 08:06

## 2024-06-04 RX ADMIN — VANCOMYCIN HYDROCHLORIDE 1000 MG: 1 INJECTION, POWDER, LYOPHILIZED, FOR SOLUTION INTRAVENOUS at 01:06

## 2024-06-04 RX ADMIN — HYDROCORTISONE SODIUM SUCCINATE 100 MG: 100 INJECTION, POWDER, FOR SOLUTION INTRAMUSCULAR; INTRAVENOUS at 09:06

## 2024-06-04 RX ADMIN — HYDRALAZINE HYDROCHLORIDE 10 MG: 20 INJECTION INTRAMUSCULAR; INTRAVENOUS at 09:06

## 2024-06-04 RX ADMIN — TRAMADOL HYDROCHLORIDE 50 MG: 50 TABLET, COATED ORAL at 02:06

## 2024-06-04 RX ADMIN — HYDROCORTISONE SODIUM SUCCINATE 100 MG: 100 INJECTION, POWDER, FOR SOLUTION INTRAMUSCULAR; INTRAVENOUS at 02:06

## 2024-06-04 RX ADMIN — POTASSIUM CHLORIDE 20 MEQ: 14.9 INJECTION, SOLUTION INTRAVENOUS at 12:06

## 2024-06-04 RX ADMIN — PIPERACILLIN SODIUM AND TAZOBACTAM SODIUM 4.5 G: 4; .5 INJECTION, POWDER, LYOPHILIZED, FOR SOLUTION INTRAVENOUS at 12:06

## 2024-06-04 RX ADMIN — PIPERACILLIN SODIUM AND TAZOBACTAM SODIUM 4.5 G: 4; .5 INJECTION, POWDER, LYOPHILIZED, FOR SOLUTION INTRAVENOUS at 08:06

## 2024-06-04 RX ADMIN — SERTRALINE HYDROCHLORIDE 100 MG: 50 TABLET ORAL at 08:06

## 2024-06-04 RX ADMIN — HYDROCORTISONE SODIUM SUCCINATE 100 MG: 100 INJECTION, POWDER, FOR SOLUTION INTRAMUSCULAR; INTRAVENOUS at 05:06

## 2024-06-04 RX ADMIN — POTASSIUM CHLORIDE 20 MEQ: 14.9 INJECTION, SOLUTION INTRAVENOUS at 10:06

## 2024-06-04 NOTE — PT/OT/SLP EVAL
"Occupational Therapy  Evaluation    Name: Alanna Moya  MRN: [id number]  Admitting Diagnosis: SOB  Recent Surgery: * No surgery found *      Recommendations:     Discharge therapy intensity: Moderate Intensity Therapy   Discharge Equipment Recommendations:  none  Barriers to discharge:       Assessment:     Alanna Moya is a 78 y.o. female with a medical diagnosis of resp failure, pneumonia, pleural effusions.  She presents on vapotherm, agreeable to participate.  Minx2 sit<>stand, OT to order BSC.  Anticipate mod intensity therapy upon discharge, will continue to monitor progress.  She presents with the following performance deficits affecting function: weakness, impaired endurance, impaired self care skills, impaired functional mobility, gait instability, impaired balance, decreased upper extremity function, decreased lower extremity function.     Rehab Prognosis: Good; patient would benefit from acute skilled OT services to address these deficits and reach maximum level of function.       Plan:     Patient to be seen 4 x/week to address the above listed problems via self-care/home management  Plan of Care Expires: 07/04/24  Plan of Care Reviewed with: patient, sibling    Subjective     Chief Complaint: "Im not supposed to be like this", support provided  Patient/Family Comments/goals: "get better and stronger"    Occupational Profile:  Living Environment: lives with son, 5 steps to enter with B rails, tub/shower  Previous level of function: independent, utilizes rollator  Roles and Routines: mother, sister  Equipment Used at Home:  (rollator, shower chair)  Assistance upon Discharge: son will be able to assist    Pain/Comfort:  Pain Rating 1: 0/10    Patients cultural, spiritual, Uatsdin conflicts given the current situation: no    Objective:     OT communicated with RN prior to session.      Patient was found HOB elevated with  (vital monitoring, PICC, vapotherm) upon OT entry to room.    General " Precautions: Standard, fall  Orthopedic Precautions: N/A  Braces: N/A    Vital Signs: Blood Pressure: 183/86 RN provided meds, decreased to 175/89 then 138/70 while seated EOB  Vapotherm 40L/min, 85% FiO2    Bed Mobility:    Patient completed Supine to Sit with minimum assistance  Patient completed Sit to Supine with minimum assistance    Functional Mobility/Transfers:  Patient completed Sit <> Stand Transfer with minx2  with  hand-held assist   Functional Mobility: HHAx2, good tolerance but slightly unsteady    Activities of Daily Living:  Toileting: BSC ordered    Functional Cognition:  Follows commands, orientedx4    Visual Perceptual Skills:  Intact    Upper Extremity Function:  Right Upper Extremity:   WFL    Left Upper Extremity:  WFL    Balance:   Intact    Therapeutic Positioning  Risk for acquired pressure injuries is increased due to relative decrease in mobility d/t hospitalization  and impaired mobility.    OT interventions performed during the course of today's session:   Education was provided on benefits of and recommendations for therapeutic positioning  PUP ordered    Skin assessment: all bony prominences were assessed    Findings: no redness or breakdown noted    OT recommendations for therapeutic positioning throughout hospitalization:   Follow [facility] Pressure Injury Prevention Protocol        Patient Education:  Patient provided with verbal education education regarding OT role/goals/POC, fall prevention, safety awareness, and Discharge/DME recommendations.  Understanding was verbalized, however additional teaching warranted.     Patient left HOB elevated with all lines intact.    GOALS:   Multidisciplinary Problems       Occupational Therapy Goals          Problem: Occupational Therapy    Goal Priority Disciplines Outcome Interventions   Occupational Therapy Goal     OT, PT/OT Progressing    Description: Goals to be met by: 7/4/24     Patient will increase functional independence with ADLs by  performing:    UE Dressing with Supervision.  LE Dressing with Supervision.  Grooming while standing at sink with Supervision.  Toileting from toilet with Supervision for hygiene and clothing management.   Toilet transfer to toilet with Supervision.                         History:     Past Medical History:   Diagnosis Date    Abdominal pain, unspecified site     Aortic stenosis     Coronary atherosclerosis of unspecified type of vessel, native or graft     Esophageal reflux     Fatigue     History of glaucoma     History of heart attack     Hypertension     Lumbosacral spondylosis without myelopathy     Other and unspecified hyperlipidemia     Rectocele     SOB (shortness of breath) on exertion     Unspecified essential hypertension     Weakness          Past Surgical History:   Procedure Laterality Date    CHOLECYSTECTOMY      CORONARY ARTERY BYPASS GRAFT      GLAUCOMA SURGERY Bilateral     HYSTERECTOMY      TRANSCATHETER AORTIC VALVE REPLACEMENT (TAVR) N/A 2/27/2023    Procedure: REPLACEMENT, AORTIC VALVE, TRANSCATHETER (TAVR);  Surgeon: Anselmo Arango MD;  Location: [facility] CATH LAB;  Service: Cardiology;  Laterality: N/A;  TAVR W/ ANEST.       Time Tracking:     OT Date of Treatment:    OT Start Time: 0952  OT Stop Time: 1016  OT Total Time (min): 24 min    Billable Minutes:Evaluation MOD    6/4/2024

## 2024-06-04 NOTE — PROGRESS NOTES
Pharmacokinetic Assessment Follow Up: IV Vancomycin    Vancomycin serum concentration assessment(s):    The trough level was drawn incorrectly and cannot be used to guide therapy at this time.    Vancomycin Regimen Plan:  Trough level was taken while dose was being infused and is not accurate   Continue regimen to Vancomycin 1000 mg IV every 24 hours with next serum trough concentration measured at 1200 prior to next dose on 06/05    Drug levels (last 3 results):  Recent Labs   Lab Result Units 06/03/24  1029 06/04/24  1325   Vancomycin Random ug/ml 11.8*  --    Vancomycin Trough ug/ml  --  24.7*       Vancomycin Administrations:  vancomycin given in the last 96 hours                     vancomycin in dextrose 5 % 1 gram/250 mL IVPB 1,000 mg (mg) 1,000 mg New Bag 06/04/24 1309     1,000 mg New Bag 06/03/24 1243    vancomycin 1.5 g in dextrose 5 % 250 mL IVPB (ready to mix) (mg) 1,500 mg New Bag 06/02/24 1039                    Pharmacy will continue to follow and monitor vancomycin.    Please contact pharmacy at extension [phone] for questions regarding this assessment.    Thank you for the consult,   Rl Husain       Patient brief summary:  Alanna Moya is a 78 y.o. female initiated on antimicrobial therapy with IV Vancomycin for treatment of  PNA      Drug Allergies:   Review of patient's allergies indicates:  No Known Allergies    Actual Body Weight:  Wt Readings from Last 1 Encounters:   06/01/24 77.1 kg (170 lb)       Renal Function:   Estimated Creatinine Clearance: 44.7 mL/min (A) (based on SCr of 1.03 mg/dL (H)).,     Dialysis Method (if applicable):  N/A    CBC (last 72 hours):  Recent Labs   Lab Result Units 06/02/24  0115 06/03/24  1355 06/04/24  0454   WBC x10(3)/mcL 12.94* 12.81* 14.86*   Hgb g/dL 11.2* 11.3* 12.0   Hct % 33.1* 34.2* 36.2*   Platelet x10(3)/mcL 375 341 377   Mono % % 8.4 9.4 6.6   Eos % % 0.3 0.0 0.3   Basophil % % 0.1 0.2 0.1       Metabolic Panel (last 72 hours):  Recent  Labs   Lab Result Units 06/02/24  0115 06/03/24  1029 06/04/24  0454   Sodium mmol/L 140 142 143   Potassium mmol/L 3.8 3.1* 2.8*   Chloride mmol/L 98 96* 96*   CO2 mmol/L 24 31 34*   Glucose mg/dL 149* 174* 233*   Blood Urea Nitrogen mg/dL 88.7* 65.1* 57.6*   Creatinine mg/dL 1.40* 1.26* 1.03*   Albumin g/dL 3.5  --  3.4   Bilirubin Total mg/dL 0.8  --  0.7   ALP unit/L 79  --  78   AST unit/L 24  --  14   ALT unit/L 30  --  21   Magnesium Level mg/dL 2.60  --   --    Phosphorus Level mg/dL 4.2  --   --        Microbiologic Results:  Microbiology Results (last 7 days)       Procedure Component Value Units Date/Time    Blood Culture [[id number]]  (Normal) Collected: 06/02/24 0115    Order Status: Completed Specimen: Blood, Venous Updated: 06/04/24 0300     Blood Culture No Growth At 48 Hours    Blood Culture [[id number]]  (Normal) Collected: 06/02/24 0115    Order Status: Completed Specimen: Blood, Venous Updated: 06/04/24 0300     Blood Culture No Growth At 48 Hours    Respiratory Culture [[id number]]     Order Status: Sent Specimen: Sputum, Expectorated

## 2024-06-04 NOTE — PT/OT/SLP EVAL
"Physical Therapy Evaluation    Patient Name:  Alanna Moya   MRN:  [id number]    Recommendations:     Discharge therapy intensity: Moderate Intensity Therapy   Discharge Equipment Recommendations: none   Barriers to discharge:  medical dx, impaired mobility, decreased independence, impaired endurance     Assessment:     Alanna Moya is a 78 y.o. female admitted with a medical diagnosis of SOB, respiratory failure, pneumonia, pleural effusions.    She presents with the following impairments/functional limitations: weakness, gait instability, decreased upper extremity function, impaired endurance, impaired balance, decreased lower extremity function, impaired cardiopulmonary response to activity, impaired self care skills, impaired functional mobility.  Pt tolerated PT eval fairly well. Pt requires Yobany for bed mobility and Yobany x2 for sit<>stand and taking steps along EOB. Pt demonstrates limited tolerance to activity and generalized weakness to which she would benefit from placement beyond this stay in order to maximize functional mobility and overall independence. Of note, pt did become somewhat tearful while sitting EOB 2/2 being in this current medical state; comfort provided.     Rehab Prognosis: Good; patient would benefit from acute skilled PT services to address these deficits and reach maximum level of function.    Recent Surgery: * No surgery found *      Plan:     During this hospitalization, patient would benefit from acute PT services 5 x/week to address the identified rehab impairments via gait training, therapeutic activities, therapeutic exercises and progress toward the following goals:    Plan of Care Expires:  07/04/24    Subjective     Chief Complaint: n/a  Patient/Family Comments/goals: "to bake a coconut cake again"  Pain/Comfort:  Pain Rating 1: 0/10    Patients cultural, spiritual, Religion conflicts given the current situation: no    Living Environment:  Pt lives with son in a " [facility]; 5 steps to enter/exit with B railings  Prior to admission, patients level of function was independent/modified.    Equipment used at home: rollator, shower chair.  DME owned (not currently used): rolling walker, single point cane, and bedside commode.    Upon discharge, patient will have assistance from son.    Objective:     Communicated with NSG prior to session.  Patient found HOB elevated with blood pressure cuff, pulse ox (continuous), telemetry, PureWick, peripheral IV, oxygen  upon PT entry to room.    General Precautions: Standard, fall  Orthopedic Precautions:N/A   Braces: N/A  Respiratory Status:  vapotherm 85% fiO2, 40LPM  Blood Pressure: 183/86-RN present to administer IV BP meds, 175/89 prior to activity, 138/70 sitting EOB with complaints of dizziness  SpO2: 97% at rest, 91% with activity      Exams:  Cognitive Exam:  Patient is oriented to Person, Place, Time, and Situation  RLE Strength: WFL  LLE Strength: WFL  Skin integrity: Visible skin intact      Functional Mobility:  Bed Mobility:     Supine to Sit: minimum assistance  Sit to Supine: minimum assistance  Transfers:     Sit to/from Stand:  minimum assistance and of 2 persons with hand-held assist  Pre-Gait: initially began with lateral weight shifts, progressing to static marches, then able to perform lateral steps towards the L and R with Yobany/B HHA; slight unsteadiness noted throughout         Treatment & Education:  Patient provided with verbal education  regarding PT role/goals/POC, fall prevention, safety awareness, and discharge/DME recommendations.  Understanding was verbalized.     Patient left HOB elevated with all lines intact, call button in reach, RN notified, and sister present.    GOALS:   Multidisciplinary Problems       Physical Therapy Goals          Problem: Physical Therapy    Goal Priority Disciplines Outcome Goal Variances Interventions   Physical Therapy Goal     PT, PT/OT Progressing     Description: Goals to be met  by: 24     Patient will increase functional independence with mobility by performin. Supine to sit with Stand-by Assistance  2. Sit to supine with Stand-by Assistance  3. Sit to stand transfer with Stand-by Assistance  4. Bed to chair transfer with Stand-by Assistance using Rolling Walker  5. Gait  x 150 feet with Stand-by Assistance using Rolling Walker.                          History:     Past Medical History:   Diagnosis Date    Abdominal pain, unspecified site     Aortic stenosis     Coronary atherosclerosis of unspecified type of vessel, native or graft     Esophageal reflux     Fatigue     History of glaucoma     History of heart attack     Hypertension     Lumbosacral spondylosis without myelopathy     Other and unspecified hyperlipidemia     Rectocele     SOB (shortness of breath) on exertion     Unspecified essential hypertension     Weakness        Past Surgical History:   Procedure Laterality Date    CHOLECYSTECTOMY      CORONARY ARTERY BYPASS GRAFT      GLAUCOMA SURGERY Bilateral     HYSTERECTOMY      TRANSCATHETER AORTIC VALVE REPLACEMENT (TAVR) N/A 2023    Procedure: REPLACEMENT, AORTIC VALVE, TRANSCATHETER (TAVR);  Surgeon: Anselmo Arango MD;  Location: [facility] CATH LAB;  Service: Cardiology;  Laterality: N/A;  TAVR W/ ANEST.       Time Tracking:     PT Received On: 24  PT Start Time: 54     PT Stop Time: 1017  PT Total Time (min): 23 min     Billable Minutes: Evaluation mod      2024

## 2024-06-04 NOTE — PLAN OF CARE
Problem: Physical Therapy  Goal: Physical Therapy Goal  Description: Goals to be met by: 24     Patient will increase functional independence with mobility by performin. Supine to sit with Stand-by Assistance  2. Sit to supine with Stand-by Assistance  3. Sit to stand transfer with Stand-by Assistance  4. Bed to chair transfer with Stand-by Assistance using Rolling Walker  5. Gait  x 150 feet with Stand-by Assistance using Rolling Walker.     Outcome: Progressing

## 2024-06-04 NOTE — PLAN OF CARE
Problem: Occupational Therapy  Goal: Occupational Therapy Goal  Description: Goals to be met by: 7/4/24     Patient will increase functional independence with ADLs by performing:    UE Dressing with Supervision.  LE Dressing with Supervision.  Grooming while standing at sink with Supervision.  Toileting from toilet with Supervision for hygiene and clothing management.   Toilet transfer to toilet with Supervision.    Outcome: Progressing

## 2024-06-04 NOTE — PROGRESS NOTES
[facility] - 7th Floor ICU  Pulmonary Critical Care Note    Patient Name: Alanna Moya  MRN: [id number]  Admission Date: 6/1/2024  Hospital Length of Stay: 3 days  Code Status: DNR  Attending Provider: Vishnu Stevenson MD  Primary Care Provider: Cornel Haddad MD     Subjective:     HPI:   78-year-old female who presents from [facility] as a transfer with past medical history of CHF, aortic valve replacement, CAD, MI (2018), hypertension, diverticular disease, and CVA for pulmonology services.  On presentation patient looks uncomfortable in his very short of breath.  She answers questions in small sentences.  Her oxygen saturation on room air is around 83%.  She arrived on BiPAP at 14/8.  Currently saturating 92%.  Patient reports for the last 2 months she has had more shortness of breath.  She is a CHF patient who is on Lasix.  She could  not tell me the rest of her medications.  Patient reports that she has noticed her diuretics sometimes work sometimes they do not.  She does report that she was given diuretics at [facility] and she had  adequate urination.  Per chart review from hospital above, chest x-rays showed bilateral airspace opacification and increase his right-sided pleural effusion suggestive of worsening pneumonia or possibly edema.  Echo from 5/16 with EF of 55-60%.  Patient states she lives at home with her son.  He was or next of kin and POA.  Patient also reports she wishes to be DNR.  Patient also had hospitalization at [facility] about 2 weeks ago for facial weakness and hypertension.  Chest x-ray at that time not available for review but radiologist reports clear lung fields.  Patient also had autoimmune workup which was all negative.    Hospital Course/Significant events:  Patient awake and alert still requiring high-flow O2.  On BiPAP overnight.  Tolerating Vapotherm this morning.  Chest x-ray yesterday showed improved bilateral infiltrates.  She complains of being thirsty this  morning.  She is awake and alert follows commands      Past Medical History:   Diagnosis Date    Abdominal pain, unspecified site     Aortic stenosis     Coronary atherosclerosis of unspecified type of vessel, native or graft     Esophageal reflux     Fatigue     History of glaucoma     History of heart attack     Hypertension     Lumbosacral spondylosis without myelopathy     Other and unspecified hyperlipidemia     Rectocele     SOB (shortness of breath) on exertion     Unspecified essential hypertension     Weakness        Past Surgical History:   Procedure Laterality Date    CHOLECYSTECTOMY      CORONARY ARTERY BYPASS GRAFT      GLAUCOMA SURGERY Bilateral     HYSTERECTOMY      TRANSCATHETER AORTIC VALVE REPLACEMENT (TAVR) N/A 2/27/2023    Procedure: REPLACEMENT, AORTIC VALVE, TRANSCATHETER (TAVR);  Surgeon: Anselmo Arango MD;  Location: [facility] CATH LAB;  Service: Cardiology;  Laterality: N/A;  TAVR W/ ANEST.       Social History     Socioeconomic History    Marital status:    Tobacco Use    Smoking status: Never    Smokeless tobacco: Never   Substance and Sexual Activity    Alcohol use: No    Drug use: No    Sexual activity: Never     Birth control/protection: Surgical     Social Determinants of Health     Financial Resource Strain: Low Risk  (5/3/2021)    Received from [facility]    Overall Financial Resource Strain (CARDIA)     Difficulty of Paying Living Expenses: Not hard at all   Food Insecurity: No Food Insecurity (5/3/2021)    Received from [facility]    Hunger Vital Sign     Worried About Running Out of Food in the Last Year: Never true     Ran Out of Food in the Last Year: Never true   Transportation Needs: No Transportation Needs (5/3/2021)    Received from [facility]    PRAPARE - Transportation     Lack of Transportation (Medical): No     Lack of Transportation (Non-Medical): No   Physical Activity: Sufficiently Active (5/3/2021)    Received from [facility]    Exercise Vital Sign     Days  of Exercise per Week: 2 days     Minutes of Exercise per Session: 130 min   Stress: Stress Concern Present (5/3/2021)    Received [facility]    [facility] of Occupational Health - Occupational Stress Questionnaire     Feeling of Stress : Very much           Current Outpatient Medications   Medication Instructions    amLODIPine (NORVASC) 10 mg, Oral, 2 times daily    aspirin 325 MG tablet 1 tablet, Oral, Daily    carvediloL (COREG) 12.5 mg, Oral, 2 times daily with meals    cholecalciferol, vitamin D3, (VITAMIN D3) 50 mcg (2,000 unit) Cap capsule 1 capsule, Oral, Daily    cyanocobalamin, vitamin B-12, 2,000 mcg Tab 1 tablet, Oral, Daily    cyclobenzaprine (FLEXERIL) 10 mg, Oral, 3 times daily    docusate sodium (STOOL SOFTENER ORAL) 2 capsules, Oral, 2 times daily    furosemide (LASIX) 40 mg, Oral, Daily    hydrALAZINE (APRESOLINE) 25 mg, Oral, Every 8 hours    L. acidophilus/Bifid. animalis (DAILY PROBIOTIC ORAL) 1 capsule, Oral, Daily    potassium gluconate 650 mg, Oral, Once    rosuvastatin (CRESTOR) 40 mg, Oral, Nightly    sertraline (ZOLOFT) 100 mg, Oral, Nightly    traMADoL (ULTRAM) 50 mg, Oral, 3 times daily       Current Inpatient Medications   atorvastatin  40 mg Oral Daily    docusate  50 mg Oral Daily    enoxparin  40 mg Subcutaneous Q12H    gabapentin  600 mg Oral BID    hydrocortisone sodium succinate  100 mg Intravenous Q8H    mupirocin   Nasal BID    piperacillin-tazobactam (Zosyn) IV (PEDS and ADULTS) (extended infusion is not appropriate)  4.5 g Intravenous Q8H    potassium chloride in water  20 mEq Intravenous Q2H    sertraline  100 mg Oral QHS    traMADoL  50 mg Oral TID    vancomycin (VANCOCIN) IV (PEDS and ADULTS)  1,000 mg Intravenous Q24H       Current Intravenous Infusions   Amino acid 4.25% - dextrose 5% (CLINIMIX-E) solution (1L provides 42.5 gm AA, 50 gm CHO (170 kcal/L dextrose), Na 35, K 30, Mg 5, Ca 4.5, Acetate 70, Cl 39, Phos 15)   Intravenous Continuous 75 mL/hr at  06/04/24 0504 Rate Verify at 06/04/24 0504    dexmedeTOMIDine (Precedex) infusion (titrating)  0-1.4 mcg/kg/hr Intravenous Continuous             Review of Systems   Respiratory:  Positive for cough and shortness of breath.           Objective:       Intake/Output Summary (Last 24 hours) at 6/4/2024 0812  Last data filed at 6/4/2024 0504  Gross per 24 hour   Intake 2040.28 ml   Output 1000 ml   Net 1040.28 ml         Vital Signs (Most Recent):  Temp: 97.8 °F (36.6 °C) (06/04/24 0400)  Pulse: 78 (06/04/24 0500)  Resp: 12 (06/04/24 0500)  BP: (!) 175/86 (06/04/24 0500)  SpO2: 100 % (06/04/24 0500)  Body mass index is 31.09 kg/m².  Weight: 77.1 kg (170 lb) Vital Signs (24h Range):  Temp:  [97.8 °F (36.6 °C)-98.5 °F (36.9 °C)] 97.8 °F (36.6 °C)  Pulse:  [70-98] 78  Resp:  [10-25] 12  SpO2:  [90 %-100 %] 100 %  BP: (138-178)/() 175/86     Physical exam  General-elderly woman in no acute distress  HEENT-BiPAP in place.  Conjunctiva injected sclerae nonicteric  Chest-crackles heard anteriorly.  No wheezes  CV-regular rhythm  Abdomen-nondistended bowel sounds present  Extremities-minimal pretibial edema.  No cyanosis or clubbing  Neuro-awake and alert.  Follows commands.  No focal deficits noted        Lines/Drains/Airways       Peripherally Inserted Central Catheter Line  Duration             PICC Double Lumen 05/31/24 1500 right basilic 3 days              Drain  Duration             Female External Urinary Catheter w/ Suction 06/02/24 0400 2 days              Peripheral Intravenous Line  Duration                  Peripheral IV - Single Lumen 05/30/24 2000 20 G Anterior;Left Forearm 4 days         Peripheral IV - Single Lumen 05/30/24 2000 20 G Right Antecubital 4 days                    Significant Labs:    Lab Results   Component Value Date    WBC 14.86 (H) 06/04/2024    HGB 12.0 06/04/2024    HCT 36.2 (L) 06/04/2024    MCV 90.7 06/04/2024     06/04/2024           BMP  Lab Results   Component Value Date      06/04/2024    K 2.8 (L) 06/04/2024    CHLORIDE 97 05/04/2021    CO2 34 (H) 06/04/2024    BUN 57.6 (H) 06/04/2024    CREATININE 1.03 (H) 06/04/2024    CALCIUM 10.3 (H) 06/04/2024    AGAP 13.0 06/04/2024    ESTGFRAFRICA 92 05/16/2024    EGFRNONAA 28.1 (A) 08/10/2017         ABG  Recent Labs   Lab 06/01/24  2314   PH 7.450   PO2 143.0*   PCO2 45.0   HCO3 31.3*   POCBASEDEF 6.50*       Mechanical Ventilation Support:  Oxygen Concentration (%): 100 (06/04/24 0525)      Significant Imaging:  I have reviewed the pertinent imaging within the past 24 hours.        Assessment/Plan:     Assessment  Hypoxemic respiratory failure requiring high-flow O2 alternating between BiPAP and Vapotherm  Bilateral pulmonary infiltrates with areas of consolidation concerning for pneumonia.  Bilateral pleural effusions and interstitial edema suggest an element CHF.  Chest x-ray 2 weeks ago was clear.  History previous CABG and AVR      Plan  Continue weaning FiO2 as tolerated.  Replacing potassium this morning.  Continue antibiotics for aspiration pneumonia.  Continue monitoring volume status closely.  Continue ICU for now due to tenuous respiratory status.        DVT Prophylaxis: Lovenox  GI Prophylaxis: none     34 minutes of critical care was time spent personally by me on the following activities: development of treatment plan with patient or surrogate and bedside caregivers, discussions with consultants, evaluation of patient's response to treatment, examination of patient, ordering and performing treatments and interventions, ordering and review of laboratory studies, ordering and review of radiographic studies, pulse oximetry, re-evaluation of patient's condition.  This critical care time did not overlap with that of any other provider or involve time for any procedures.     JOESPH Brown MD  Pulmonary Critical Care Medicine  [facility] - 7th Floor ICU  DOS: 06/04/2024

## 2024-06-04 NOTE — NURSING
Nurses Note -- 4 Eyes      6/3/2024   11:40 PM      Skin assessed during: Q Shift Change      [x] No Altered Skin Integrity Present    [x]Prevention Measures Documented      [] Yes- Altered Skin Integrity Present or Discovered   [] LDA Added if Not in Epic (Describe Wound)   [] New Altered Skin Integrity was Present on Admit and Documented in LDA   [] Wound Image Taken    Wound Care Consulted? No    Attending Nurse:  Seda Pederson RN/Staff Member:  MELINA Calderón

## 2024-06-04 NOTE — NURSING
Nurses Note -- 4 Eyes      6/3/2024   7:07 PM      Skin assessed during: Q Shift Change      [x] No Altered Skin Integrity Present    [x]Prevention Measures Documented      [] Yes- Altered Skin Integrity Present or Discovered   [] LDA Added if Not in Epic (Describe Wound)   [] New Altered Skin Integrity was Present on Admit and Documented in LDA   [] Wound Image Taken    Wound Care Consulted? No    Attending Nurse:  Stephane Pederson RN/Staff Member:  Regine

## 2024-06-04 NOTE — NURSING
Nurses Note -- 4 Eyes      6/4/2024   1:01 PM      Skin assessed during: Daily Assessment      [x] No Altered Skin Integrity Present    [x]Prevention Measures Documented      [] Yes- Altered Skin Integrity Present or Discovered   [] LDA Added if Not in Epic (Describe Wound)   [] New Altered Skin Integrity was Present on Admit and Documented in LDA   [] Wound Image Taken    Wound Care Consulted? No    Attending Nurse:  Gypsy Pederson RN/Staff Member:  MELINA Valencia

## 2024-06-05 LAB
ALBUMIN SERPL-MCNC: 2.9 G/DL (ref 3.4–4.8)
ALBUMIN/GLOB SERPL: 0.7 RATIO (ref 1.1–2)
ALP SERPL-CCNC: 67 UNIT/L (ref 40–150)
ALT SERPL-CCNC: 16 UNIT/L (ref 0–55)
ANION GAP SERPL CALC-SCNC: 12 MEQ/L
ANION GAP SERPL CALC-SCNC: 13 MEQ/L
AST SERPL-CCNC: 12 UNIT/L (ref 5–34)
BASOPHILS # BLD AUTO: 0.03 X10(3)/MCL
BASOPHILS NFR BLD AUTO: 0.2 %
BILIRUB SERPL-MCNC: 0.6 MG/DL
BUN SERPL-MCNC: 46.9 MG/DL (ref 9.8–20.1)
BUN SERPL-MCNC: 47 MG/DL (ref 9.8–20.1)
CALCIUM SERPL-MCNC: 10.1 MG/DL (ref 8.4–10.2)
CALCIUM SERPL-MCNC: 10.2 MG/DL (ref 8.4–10.2)
CHLORIDE SERPL-SCNC: 100 MMOL/L (ref 98–107)
CHLORIDE SERPL-SCNC: 98 MMOL/L (ref 98–107)
CO2 SERPL-SCNC: 29 MMOL/L (ref 23–31)
CO2 SERPL-SCNC: 31 MMOL/L (ref 23–31)
CREAT SERPL-MCNC: 0.88 MG/DL (ref 0.55–1.02)
CREAT SERPL-MCNC: 0.9 MG/DL (ref 0.55–1.02)
CREAT/UREA NIT SERPL: 52
CREAT/UREA NIT SERPL: 53
EOSINOPHIL # BLD AUTO: 0.02 X10(3)/MCL (ref 0–0.9)
EOSINOPHIL NFR BLD AUTO: 0.1 %
ERYTHROCYTE [DISTWIDTH] IN BLOOD BY AUTOMATED COUNT: 12.8 % (ref 11.5–17)
GFR SERPLBLD CREATININE-BSD FMLA CKD-EPI: >60 ML/MIN/1.73/M2
GFR SERPLBLD CREATININE-BSD FMLA CKD-EPI: >60 ML/MIN/1.73/M2
GLOBULIN SER-MCNC: 3.9 GM/DL (ref 2.4–3.5)
GLUCOSE SERPL-MCNC: 210 MG/DL (ref 82–115)
GLUCOSE SERPL-MCNC: 225 MG/DL (ref 82–115)
HCT VFR BLD AUTO: 34.4 % (ref 37–47)
HGB BLD-MCNC: 11.3 G/DL (ref 12–16)
IMM GRANULOCYTES # BLD AUTO: 0.22 X10(3)/MCL (ref 0–0.04)
IMM GRANULOCYTES NFR BLD AUTO: 1.4 %
LYMPHOCYTES # BLD AUTO: 0.55 X10(3)/MCL (ref 0.6–4.6)
LYMPHOCYTES NFR BLD AUTO: 3.5 %
MAGNESIUM SERPL-MCNC: 2.1 MG/DL (ref 1.6–2.6)
MCH RBC QN AUTO: 30.2 PG (ref 27–31)
MCHC RBC AUTO-ENTMCNC: 32.8 G/DL (ref 33–36)
MCV RBC AUTO: 92 FL (ref 80–94)
MONOCYTES # BLD AUTO: 1.25 X10(3)/MCL (ref 0.1–1.3)
MONOCYTES NFR BLD AUTO: 7.9 %
NEUTROPHILS # BLD AUTO: 13.8 X10(3)/MCL (ref 2.1–9.2)
NEUTROPHILS NFR BLD AUTO: 86.9 %
NRBC BLD AUTO-RTO: 0 %
PLATELET # BLD AUTO: 341 X10(3)/MCL (ref 130–400)
PMV BLD AUTO: 10.7 FL (ref 7.4–10.4)
POTASSIUM SERPL-SCNC: 3.3 MMOL/L (ref 3.5–5.1)
POTASSIUM SERPL-SCNC: 3.4 MMOL/L (ref 3.5–5.1)
PROT SERPL-MCNC: 6.8 GM/DL (ref 5.8–7.6)
RBC # BLD AUTO: 3.74 X10(6)/MCL (ref 4.2–5.4)
SODIUM SERPL-SCNC: 141 MMOL/L (ref 136–145)
SODIUM SERPL-SCNC: 142 MMOL/L (ref 136–145)
TROPONIN I SERPL-MCNC: 0.03 NG/ML (ref 0–0.04)
WBC # SPEC AUTO: 15.87 X10(3)/MCL (ref 4.5–11.5)

## 2024-06-05 PROCEDURE — 97530 THERAPEUTIC ACTIVITIES: CPT | Mod: CQ

## 2024-06-05 PROCEDURE — 85025 COMPLETE CBC W/AUTO DIFF WBC: CPT

## 2024-06-05 PROCEDURE — 63600175 PHARM REV CODE 636 W HCPCS: Performed by: STUDENT IN AN ORGANIZED HEALTH CARE EDUCATION/TRAINING PROGRAM

## 2024-06-05 PROCEDURE — 84484 ASSAY OF TROPONIN QUANT: CPT | Performed by: INTERNAL MEDICINE

## 2024-06-05 PROCEDURE — 20000000 HC ICU ROOM

## 2024-06-05 PROCEDURE — 63600175 PHARM REV CODE 636 W HCPCS

## 2024-06-05 PROCEDURE — 93005 ELECTROCARDIOGRAM TRACING: CPT

## 2024-06-05 PROCEDURE — 36415 COLL VENOUS BLD VENIPUNCTURE: CPT | Performed by: INTERNAL MEDICINE

## 2024-06-05 PROCEDURE — 99900031 HC PATIENT EDUCATION (STAT)

## 2024-06-05 PROCEDURE — 94760 N-INVAS EAR/PLS OXIMETRY 1: CPT

## 2024-06-05 PROCEDURE — 25000003 PHARM REV CODE 250: Performed by: HOSPITALIST

## 2024-06-05 PROCEDURE — 25000003 PHARM REV CODE 250

## 2024-06-05 PROCEDURE — 36415 COLL VENOUS BLD VENIPUNCTURE: CPT

## 2024-06-05 PROCEDURE — 80053 COMPREHEN METABOLIC PANEL: CPT

## 2024-06-05 PROCEDURE — 99900035 HC TECH TIME PER 15 MIN (STAT)

## 2024-06-05 PROCEDURE — 83735 ASSAY OF MAGNESIUM: CPT | Performed by: INTERNAL MEDICINE

## 2024-06-05 PROCEDURE — 93010 ELECTROCARDIOGRAM REPORT: CPT | Mod: ,,, | Performed by: INTERNAL MEDICINE

## 2024-06-05 PROCEDURE — 25000003 PHARM REV CODE 250: Performed by: NURSE PRACTITIONER

## 2024-06-05 PROCEDURE — 25000003 PHARM REV CODE 250: Performed by: INTERNAL MEDICINE

## 2024-06-05 PROCEDURE — 94660 CPAP INITIATION&MGMT: CPT

## 2024-06-05 PROCEDURE — 63600175 PHARM REV CODE 636 W HCPCS: Performed by: INTERNAL MEDICINE

## 2024-06-05 PROCEDURE — 27100171 HC OXYGEN HIGH FLOW UP TO 24 HOURS

## 2024-06-05 RX ORDER — POTASSIUM CHLORIDE 14.9 MG/ML
20 INJECTION INTRAVENOUS ONCE
Status: COMPLETED | OUTPATIENT
Start: 2024-06-05 | End: 2024-06-05

## 2024-06-05 RX ORDER — METOPROLOL TARTRATE 1 MG/ML
5 INJECTION, SOLUTION INTRAVENOUS ONCE
Status: DISCONTINUED | OUTPATIENT
Start: 2024-06-05 | End: 2024-06-07

## 2024-06-05 RX ORDER — METOPROLOL TARTRATE 1 MG/ML
INJECTION, SOLUTION INTRAVENOUS
Status: COMPLETED
Start: 2024-06-05 | End: 2024-06-05

## 2024-06-05 RX ORDER — POTASSIUM CHLORIDE 14.9 MG/ML
20 INJECTION INTRAVENOUS
Status: COMPLETED | OUTPATIENT
Start: 2024-06-06 | End: 2024-06-06

## 2024-06-05 RX ORDER — FUROSEMIDE 10 MG/ML
20 INJECTION INTRAMUSCULAR; INTRAVENOUS EVERY 12 HOURS
Status: DISCONTINUED | OUTPATIENT
Start: 2024-06-05 | End: 2024-06-10

## 2024-06-05 RX ORDER — LABETALOL HYDROCHLORIDE 5 MG/ML
10 INJECTION, SOLUTION INTRAVENOUS EVERY 4 HOURS PRN
Status: DISCONTINUED | OUTPATIENT
Start: 2024-06-05 | End: 2024-06-23 | Stop reason: HOSPADM

## 2024-06-05 RX ORDER — DILTIAZEM HYDROCHLORIDE 5 MG/ML
10 INJECTION INTRAVENOUS ONCE
Status: COMPLETED | OUTPATIENT
Start: 2024-06-05 | End: 2024-06-05

## 2024-06-05 RX ADMIN — ENOXAPARIN SODIUM 40 MG: 40 INJECTION SUBCUTANEOUS at 08:06

## 2024-06-05 RX ADMIN — FUROSEMIDE 20 MG: 10 INJECTION, SOLUTION INTRAMUSCULAR; INTRAVENOUS at 08:06

## 2024-06-05 RX ADMIN — LEUCINE, PHENYLALANINE, LYSINE, METHIONINE, ISOLEUCINE, VALINE, HISTIDINE, THREONINE, TRYPTOPHAN, ALANINE, GLYCINE, ARGININE, PROLINE, SERINE, TYROSINE, SODIUM ACETATE, DIBASIC POTASSIUM PHOSPHATE, MAGNESIUM CHLORIDE, SODIUM CHLORIDE, CALCIUM CHLORIDE, DEXTROSE
311; 238; 247; 170; 255; 247; 204; 179; 77; 880; 438; 489; 289; 213; 17; 297; 261; 51; 77; 33; 5 INJECTION INTRAVENOUS at 02:06

## 2024-06-05 RX ADMIN — HYDROCORTISONE SODIUM SUCCINATE 100 MG: 100 INJECTION, POWDER, FOR SOLUTION INTRAMUSCULAR; INTRAVENOUS at 02:06

## 2024-06-05 RX ADMIN — POTASSIUM CHLORIDE 20 MEQ: 14.9 INJECTION, SOLUTION INTRAVENOUS at 08:06

## 2024-06-05 RX ADMIN — GABAPENTIN 600 MG: 300 CAPSULE ORAL at 08:06

## 2024-06-05 RX ADMIN — HYDROCORTISONE SODIUM SUCCINATE 100 MG: 100 INJECTION, POWDER, FOR SOLUTION INTRAMUSCULAR; INTRAVENOUS at 05:06

## 2024-06-05 RX ADMIN — MUPIROCIN: 20 OINTMENT TOPICAL at 08:06

## 2024-06-05 RX ADMIN — PIPERACILLIN SODIUM AND TAZOBACTAM SODIUM 4.5 G: 4; .5 INJECTION, POWDER, LYOPHILIZED, FOR SOLUTION INTRAVENOUS at 12:06

## 2024-06-05 RX ADMIN — DILTIAZEM HYDROCHLORIDE 10 MG: 5 INJECTION INTRAVENOUS at 11:06

## 2024-06-05 RX ADMIN — METOPROLOL TARTRATE 5 MG: 1 INJECTION, SOLUTION INTRAVENOUS at 09:06

## 2024-06-05 RX ADMIN — LABETALOL HYDROCHLORIDE 10 MG: 5 INJECTION, SOLUTION INTRAVENOUS at 02:06

## 2024-06-05 RX ADMIN — HYDROCORTISONE SODIUM SUCCINATE 100 MG: 100 INJECTION, POWDER, FOR SOLUTION INTRAMUSCULAR; INTRAVENOUS at 09:06

## 2024-06-05 RX ADMIN — LEUCINE, PHENYLALANINE, LYSINE, METHIONINE, ISOLEUCINE, VALINE, HISTIDINE, THREONINE, TRYPTOPHAN, ALANINE, GLYCINE, ARGININE, PROLINE, SERINE, TYROSINE, SODIUM ACETATE, DIBASIC POTASSIUM PHOSPHATE, MAGNESIUM CHLORIDE, SODIUM CHLORIDE, CALCIUM CHLORIDE, DEXTROSE
311; 238; 247; 170; 255; 247; 204; 179; 77; 880; 438; 489; 289; 213; 17; 297; 261; 51; 77; 33; 5 INJECTION INTRAVENOUS at 04:06

## 2024-06-05 RX ADMIN — PIPERACILLIN SODIUM AND TAZOBACTAM SODIUM 4.5 G: 4; .5 INJECTION, POWDER, LYOPHILIZED, FOR SOLUTION INTRAVENOUS at 08:06

## 2024-06-05 RX ADMIN — LABETALOL HYDROCHLORIDE 10 MG: 5 INJECTION, SOLUTION INTRAVENOUS at 05:06

## 2024-06-05 RX ADMIN — PIPERACILLIN SODIUM AND TAZOBACTAM SODIUM 4.5 G: 4; .5 INJECTION, POWDER, LYOPHILIZED, FOR SOLUTION INTRAVENOUS at 04:06

## 2024-06-05 RX ADMIN — HYDRALAZINE HYDROCHLORIDE 10 MG: 20 INJECTION INTRAMUSCULAR; INTRAVENOUS at 09:06

## 2024-06-05 RX ADMIN — SERTRALINE HYDROCHLORIDE 100 MG: 50 TABLET ORAL at 08:06

## 2024-06-05 RX ADMIN — TRAMADOL HYDROCHLORIDE 50 MG: 50 TABLET, COATED ORAL at 08:06

## 2024-06-05 RX ADMIN — HYDRALAZINE HYDROCHLORIDE 10 MG: 20 INJECTION INTRAMUSCULAR; INTRAVENOUS at 03:06

## 2024-06-05 RX ADMIN — HYDROMORPHONE HYDROCHLORIDE 0.2 MG: 2 INJECTION, SOLUTION INTRAMUSCULAR; INTRAVENOUS; SUBCUTANEOUS at 12:06

## 2024-06-05 RX ADMIN — DILTIAZEM HYDROCHLORIDE 5 MG/HR: 5 INJECTION INTRAVENOUS at 10:06

## 2024-06-05 NOTE — PROGRESS NOTES
[facility] - 7th Floor ICU  Pulmonary Critical Care Note    Patient Name: Alanna Moya  MRN: [id number]  Admission Date: 6/1/2024  Hospital Length of Stay: 4 days  Code Status: DNR  Attending Provider: Vishnu Stevenson MD  Primary Care Provider: Cornel Haddad MD     Subjective:     HPI:   78-year-old female who presents from [facility] as a transfer with past medical history of CHF, aortic valve replacement, CAD, MI (2018), hypertension, diverticular disease, and CVA for pulmonology services.  On presentation patient looks uncomfortable in his very short of breath.  She answers questions in small sentences.  Her oxygen saturation on room air is around 83%.  She arrived on BiPAP at 14/8.  Currently saturating 92%.  Patient reports for the last 2 months she has had more shortness of breath.  She is a CHF patient who is on Lasix.  She could  not tell me the rest of her medications.  Patient reports that she has noticed her diuretics sometimes work sometimes they do not.  She does report that she was given diuretics at [facility] and she had  adequate urination.  Per chart review from hospital above, chest x-rays showed bilateral airspace opacification and increase his right-sided pleural effusion suggestive of worsening pneumonia or possibly edema.  Echo from 5/16 with EF of 55-60%.  Patient states she lives at home with her son.  He was or next of kin and POA.  Patient also reports she wishes to be DNR.  Patient also had hospitalization at [facility] about 2 weeks ago for facial weakness and hypertension.  Chest x-ray at that time not available for review but radiologist reports clear lung fields.  Patient also had autoimmune workup which was all negative.    Hospital Course/Significant events:  Patient awake and alert still requiring high-flow O2.  On BiPAP overnight.  Tolerating Vapotherm this morning.  Chest x-ray yesterday showed improved bilateral infiltrates.  She complains of being thirsty this  morning.  She is awake and alert follows commands.  Down to 75% Vapotherm this morning.      Past Medical History:   Diagnosis Date    Abdominal pain, unspecified site     Aortic stenosis     Coronary atherosclerosis of unspecified type of vessel, native or graft     Esophageal reflux     Fatigue     History of glaucoma     History of heart attack     Hypertension     Lumbosacral spondylosis without myelopathy     Other and unspecified hyperlipidemia     Rectocele     SOB (shortness of breath) on exertion     Unspecified essential hypertension     Weakness        Past Surgical History:   Procedure Laterality Date    CHOLECYSTECTOMY      CORONARY ARTERY BYPASS GRAFT      GLAUCOMA SURGERY Bilateral     HYSTERECTOMY      TRANSCATHETER AORTIC VALVE REPLACEMENT (TAVR) N/A 2/27/2023    Procedure: REPLACEMENT, AORTIC VALVE, TRANSCATHETER (TAVR);  Surgeon: Anselmo Arango MD;  Location: [facility] CATH LAB;  Service: Cardiology;  Laterality: N/A;  TAVR W/ ANEST.       Social History     Socioeconomic History    Marital status:    Tobacco Use    Smoking status: Never    Smokeless tobacco: Never   Substance and Sexual Activity    Alcohol use: No    Drug use: No    Sexual activity: Never     Birth control/protection: Surgical     Social Determinants of Health     Financial Resource Strain: Low Risk  (5/3/2021)    Received from [facility]    Overall Financial Resource Strain (CARDIA)     Difficulty of Paying Living Expenses: Not hard at all   Food Insecurity: No Food Insecurity (5/3/2021)    Received from [facility]    Hunger Vital Sign     Worried About Running Out of Food in the Last Year: Never true     Ran Out of Food in the Last Year: Never true   Transportation Needs: No Transportation Needs (5/3/2021)    Received from [facility]    PRAPARE - Transportation     Lack of Transportation (Medical): No     Lack of Transportation (Non-Medical): No   Physical Activity: Sufficiently Active (5/3/2021)    Received from [facility]    Exercise Vital Sign     Days of Exercise per Week: 2 days     Minutes of Exercise per Session: 130 min   Stress: Stress Concern Present (5/3/2021)    Received from [facility] of Occupational Health - Occupational Stress Questionnaire     Feeling of Stress : Very much           Current Outpatient Medications   Medication Instructions    amLODIPine (NORVASC) 10 mg, Oral, 2 times daily    aspirin 325 MG tablet 1 tablet, Oral, Daily    carvediloL (COREG) 12.5 mg, Oral, 2 times daily with meals    cholecalciferol, vitamin D3, (VITAMIN D3) 50 mcg (2,000 unit) Cap capsule 1 capsule, Oral, Daily    cyanocobalamin, vitamin B-12, 2,000 mcg Tab 1 tablet, Oral, Daily    cyclobenzaprine (FLEXERIL) 10 mg, Oral, 3 times daily    docusate sodium (STOOL SOFTENER ORAL) 2 capsules, Oral, 2 times daily    furosemide (LASIX) 40 mg, Oral, Daily    hydrALAZINE (APRESOLINE) 25 mg, Oral, Every 8 hours    L. acidophilus/Bifid. animalis (DAILY PROBIOTIC ORAL) 1 capsule, Oral, Daily    potassium gluconate 650 mg, Oral, Once    rosuvastatin (CRESTOR) 40 mg, Oral, Nightly    sertraline (ZOLOFT) 100 mg, Oral, Nightly    traMADoL (ULTRAM) 50 mg, Oral, 3 times daily       Current Inpatient Medications   atorvastatin  40 mg Oral Daily    docusate  50 mg Oral Daily    enoxparin  40 mg Subcutaneous Q12H    gabapentin  600 mg Oral BID    hydrocortisone sodium succinate  100 mg Intravenous Q8H    mupirocin   Nasal BID    piperacillin-tazobactam (Zosyn) IV (PEDS and ADULTS) (extended infusion is not appropriate)  4.5 g Intravenous Q8H    sertraline  100 mg Oral QHS    traMADoL  50 mg Oral TID       Current Intravenous Infusions   Amino acid 4.25% - dextrose 5% (CLINIMIX-E) solution (1L provides 42.5 gm AA, 50 gm CHO (170 kcal/L dextrose), Na 35, K 30, Mg 5, Ca 4.5, Acetate 70, Cl 39, Phos 15)   Intravenous Continuous 75 mL/hr at 06/05/24 0633 Rate Verify at 06/05/24 0633    dexmedeTOMIDine (Precedex) infusion (titrating)   0-1.4 mcg/kg/hr Intravenous Continuous             Review of Systems   Respiratory:  Positive for cough and shortness of breath.           Objective:       Intake/Output Summary (Last 24 hours) at 6/5/2024 0652  Last data filed at 6/5/2024 0633  Gross per 24 hour   Intake 2357.12 ml   Output 1200 ml   Net 1157.12 ml         Vital Signs (Most Recent):  Temp: 97.5 °F (36.4 °C) (06/05/24 0400)  Pulse: 84 (06/05/24 0600)  Resp: 13 (06/05/24 0600)  BP: (!) 173/80 (06/05/24 0600)  SpO2: 97 % (06/05/24 0600)  Body mass index is 31.09 kg/m².  Weight: 77.1 kg (170 lb) Vital Signs (24h Range):  Temp:  [97.5 °F (36.4 °C)-97.9 °F (36.6 °C)] 97.5 °F (36.4 °C)  Pulse:  [70-97] 84  Resp:  [10-32] 13  SpO2:  [88 %-99 %] 97 %  BP: (138-176)/(70-94) 173/80     Physical exam  General-elderly woman in no acute distress  HEENT-BiPAP in place.  Conjunctiva injected sclerae nonicteric  Chest-crackles heard anteriorly.  No wheezes  CV-regular rhythm  Abdomen-nondistended bowel sounds present  Extremities-minimal pretibial edema.  No cyanosis or clubbing  Neuro-awake and alert.  Follows commands.  No focal deficits noted        Lines/Drains/Airways       Peripherally Inserted Central Catheter Line  Duration             PICC Double Lumen 05/31/24 1500 right basilic 4 days              Drain  Duration             Female External Urinary Catheter w/ Suction 06/02/24 0400 3 days              Peripheral Intravenous Line  Duration                  Peripheral IV - Single Lumen 05/30/24 2000 20 G Anterior;Left Forearm 5 days         Peripheral IV - Single Lumen 05/30/24 2000 20 G Right Antecubital 5 days                    Significant Labs:    Lab Results   Component Value Date    WBC 15.87 (H) 06/05/2024    HGB 11.3 (L) 06/05/2024    HCT 34.4 (L) 06/05/2024    MCV 92.0 06/05/2024     06/05/2024           BMP  Lab Results   Component Value Date     06/05/2024    K 3.4 (L) 06/05/2024    CHLORIDE 97 05/04/2021    CO2 31 06/05/2024    BUN  46.9 (H) 06/05/2024    CREATININE 0.90 06/05/2024    CALCIUM 10.1 06/05/2024    AGAP 13.0 06/05/2024    ESTGFRAFRICA 92 05/16/2024    EGFRNONAA 28.1 (A) 08/10/2017         ABG  Recent Labs   Lab 06/01/24  2314   PH 7.450   PO2 143.0*   PCO2 45.0   HCO3 31.3*   POCBASEDEF 6.50*       Mechanical Ventilation Support:  Oxygen Concentration (%): 80 (06/05/24 0600)      Significant Imaging:  I have reviewed the pertinent imaging within the past 24 hours.        Assessment/Plan:     Assessment  Hypoxemic respiratory failure requiring high-flow O2 alternating between BiPAP and Vapotherm  Bilateral pulmonary infiltrates with areas of consolidation concerning for pneumonia.  Bilateral pleural effusions and interstitial edema suggest an element CHF.  Chest x-ray 2 weeks ago was clear.  History previous CABG and AVR      Plan  I will begin Lasix 20 mg twice daily.  Continue Clinimix for now.  Repeat chest x-ray tomorrow.  Replace her potassium today.  Mobilize as tolerated.  Continue weaning FiO2 if possible.        DVT Prophylaxis: Lovenox  GI Prophylaxis: none     33 minutes of critical care was time spent personally by me on the following activities: development of treatment plan with patient or surrogate and bedside caregivers, discussions with consultants, evaluation of patient's response to treatment, examination of patient, ordering and performing treatments and interventions, ordering and review of laboratory studies, ordering and review of radiographic studies, pulse oximetry, re-evaluation of patient's condition.  This critical care time did not overlap with that of any other provider or involve time for any procedures.     JOESPH Brown MD  Pulmonary Critical Care Medicine  [facility] - 7th Floor ICU  DOS: 06/05/2024

## 2024-06-05 NOTE — PT/OT/SLP PROGRESS
Physical Therapy Treatment    Patient Name:  Alanna Moya   MRN:  [id number]    Recommendations:     Discharge therapy intensity: Moderate Intensity Therapy   Discharge Equipment Recommendations: other (see comments) (TBD)  Barriers to discharge: Impaired mobility and Ongoing medical needs    Assessment:     Alanna Moya is a 78 y.o. female admitted with a medical diagnosis of SOB, respiratory failure, pneumonia, pleural effusions.  She presents with the following impairments/functional limitations: weakness, gait instability, impaired endurance, impaired balance, impaired cardiopulmonary response to activity, impaired self care skills, impaired functional mobility.    Rehab Prognosis: Good; patient would benefit from acute skilled PT services to address these deficits and reach maximum level of function.    Recent Surgery: * No surgery found *      Plan:     During this hospitalization, patient would benefit from acute PT services 5 x/week to address the identified rehab impairments via gait training, therapeutic activities, therapeutic exercises and progress toward the following goals:    Plan of Care Expires:  07/04/24    Subjective     Chief Complaint: tired of the bed  Patient/Family Comments/goals: to get better  Pain/Comfort:  Pain Rating 1: 0/10      Objective:     Communicated with nurse prior to session.  Patient found HOB elevated with pulse ox (continuous), telemetry, oxygen upon PT entry to room.     General Precautions: Standard, fall  Orthopedic Precautions: N/A  Braces: N/A  Respiratory Status: High flow, flow 35 L/min, concentration 80%  Blood Pressure: 164/82  HR: 99  SPO2: 94% at rest 90-92% with activity  Skin Integrity: Visible skin intact      Functional Mobility:  Bed Mobility:    Supine to sit with mod assist  Sit to supine with min assist  Scooting to EOB with mod assist  Rolling GAYE to change chucks 2/2 void and BM, pt had two more BM's during kirk-care.   Transfers:     Sit<->stand with mod assist; increased time to achieve erect posture. Pt urinated and had BM during trial.     Therapeutic Activities/Exercises:  Tx limited by frequent BM's. Will order a recliner and attempt t/f to chair at next tx session. Will likely need a brief however.     Education:  Patient provided with verbal education education regarding PT role/goals/POC, fall prevention, safety awareness, and discharge/DME recommendations.  Understanding was verbalized.     Patient left HOB elevated with all lines intact, call button in reach, and nurse notified    GOALS:   Multidisciplinary Problems       Physical Therapy Goals          Problem: Physical Therapy    Goal Priority Disciplines Outcome Goal Variances Interventions   Physical Therapy Goal     PT, PT/OT Progressing     Description: Goals to be met by: 24     Patient will increase functional independence with mobility by performin. Supine to sit with Stand-by Assistance  2. Sit to supine with Stand-by Assistance  3. Sit to stand transfer with Stand-by Assistance  4. Bed to chair transfer with Stand-by Assistance using Rolling Walker  5. Gait  x 150 feet with Stand-by Assistance using Rolling Walker.                          Time Tracking:     PT Received On: 24  PT Start Time: 1002     PT Stop Time: 1035  PT Total Time (min): 33 min     Billable Minutes: Therapeutic Activity 2 units    Treatment Type: Treatment  PT/PTA: PTA     Number of PTA visits since last PT visit: 2024

## 2024-06-05 NOTE — NURSING
Nurses Note -- 4 Eyes      6/4/2024   10:04 PM      Skin assessed during: Q Shift Change      [x] No Altered Skin Integrity Present    [x]Prevention Measures Documented      [] Yes- Altered Skin Integrity Present or Discovered   [] LDA Added if Not in Epic (Describe Wound)   [] New Altered Skin Integrity was Present on Admit and Documented in LDA   [] Wound Image Taken    Wound Care Consulted? No    Attending Nurse:  Seda Pederson RN/Staff Member: MELINA Betancur

## 2024-06-05 NOTE — PRE ADMISSION SCREENING
[facility]    Pre-Admission Patient Screening                    Pre-Screen type:  LTAC:  Reason for Admission:    ACUTE HYPOXEMIC RESPIRATORY FAILURE   PNA   CHF EXACERBATION     PATIENT REQUIRES LONG TERM ACUTE CARE FOR AT LEAST 25 DAYS D/T HIGH LEVEL OF OXYGEN REQUIREMENTS TO MAINTAIN APPROPRIATE OXYGEN SATURATION, CONTINUOUS CARDIAC MONITORING, FREQUENT LABS WITH ELECTROLYTE REPLACEMENT, IV ANTIBIOTICS AND IV LASIX FOR ABOVE DIAGNOSIS AND ADDITIONAL DEVELOPMENT OF METABOLIC ACIDOSIS - PATIENT CANNOT BE ADEQUATELY TREATED AND MONITORED APPROPRIATELY AT A LOWER LEVEL OF CARE  WITH CURRENT CLINICAL STATUS AND REQUIRES A LONG TERM ACUTE CARE FACILITY.      PLEASE SEE UPDATES AS OF 6/20/24 - PATIENT HAS MADE PROGRESS WITH PULMONARY AND RENAL INVOLVEMENT, HOWEVER PATIENT IS STILL REQUIRING A LONG TERM ACUTE CARE FACILITY TO CONTINUE CARE AND MEDICAL MANAGEMENT.     LTACH Admission Criteria:    Management of at least one of the following complex respiratory conditions:  Bronchodilators (excluding MDIs) greater than or equal to 4 times in 24 hours  Cardiac monitoring for dyspnea, electrolyte imbalances, post pacer insertion, significant arrhythmia, or syncope/pre-syncope  Active weaning process from mechanical ventilation  Oxygen greater than or equal to 40%    Must meet at least three of the following concomitant treatments/intervention daily unless notes: (excludes PO meds unless notes)  IV medication per therapeutic regimen  Bronchodilators  Cardiac Monitoring  Hemodynamic monitoring  IV fluides greater than 50 cc/hr  Laboratory assessment and medication adjustment(s)  Mechanical ventilation/NIPPV  Nebulizer treatments at least every 6 hours  Oxygen and SaO2/ABG adjustments and greater than or equal to 28% supplemental O2  Rehab Therapy (PT/OT/ST) 1-3 hours a day greater than or equal to 5 days a week  Parenteral nutrition/Enteral feedings      LTACH more appropriate than other levels of care  (eg, skilled nursing facility, home health care), as indicated by:    Clinical management of patient deemed too frequent and needed beyond the capabilities of alternative levels of care as evidence by: Blood glucose monitoring greater than or equal to 4 times daily requiring clinical intervention, Active titration of oxygen , and Frequency of IV medications greater than or equal to 2 times daily  Frequent diagnostic services needed on an inpatient basis, including clinical assessments, laboratory, and imaging as evidence by: Frequent monitoring and clinical assessments performed by a licensed RN to identify current and future patient needs by incorporating the recognition of normal vs abnormal body physiology, and to prompt recognition of pertinent changes to identify and prioritize appropriate interventions that can be performed within the acute inpatient setting. , Frequent monitoring and clinical assessments performed by a licensed RT to identify current and future patient needs by incorporating the recognition of normal vs abnormal body physiology of the Respiratory System, and to prompt recognition of pertinent changes to identify and prioritize appropriate interventions that can be performed within the acute inpatient setting. , and Frequent laboratory testing and/or imaging to aide in the improvement and effectiveness of patient's individualized treatment plan.   More intensive services, such as speciality nursing care, and/or onsite physician assessments needed that are not available at a lower level of care as evidence by: Daily physician intervention , Collaboration between consulting and attending providers still deemed a necessity to aide in the improvement and effectiveness of patient's individualized treatment plan, which can be provided at an [facility] level of care. , and Therapy Services to be included in patient's treatment plan in an effort to restore/improve patient's modality status to a safe level  of functioning prior to acute illness.      [facility] as indicated by ALL of the following:      Hypotension Absent     Cardiovascular status stable     Stable chest findings     Renal function accepctable   Pain adequately managed    Intake acceptable       No acute significant hepatic dysfunction (eg, new encephalopathy)   No acute severe unstable neurologic abnormalities (eg, Altered mental status that is severe or persistent, or evidence of ongoing CNS embolization or ischemia, worsening hydrocephalus)   No active bleeding or unstable disorders of hemostasis (eg, no recent need for transfusion, severe thrombocytopenia with bleeding)   No need for respiratory or other isolation, OR manageable at LTACH level of care    Long-term enteral feeding (eg, PEG) and intravenous access established, not needed, OR to be placed at LTACH level of care      Anticipated Discharge Disposition:    Home with caregiver support or Home with Home Health    Facility Status: Accept     Referring Physician:  DR. STEVENSON    Admitting Physician:  Vishnu Stevenson MD    Primary Care Physician:  Cornel Haddad MD    History         Patient Active Problem List    Diagnosis Date Noted    Aortic stenosis 02/28/2023    Unresponsive episode 02/08/2019         Previous Specialties/Consulted physicians:      Cardiology  and Pulmonology       Past and Current Medical History    Past Medical History:   Diagnosis Date    Abdominal pain, unspecified site     Aortic stenosis     Coronary atherosclerosis of unspecified type of vessel, native or graft     Esophageal reflux     Fatigue     History of glaucoma     History of heart attack     Hypertension     Lumbosacral spondylosis without myelopathy     Other and unspecified hyperlipidemia     Rectocele     SOB (shortness of breath) on exertion     Unspecified essential hypertension     Weakness            History of Present Illness     Subjective:      HPI:   78-year-old  female who presents from [facility] as a transfer with past medical history of CHF, aortic valve replacement, CAD, MI (2018), hypertension, diverticular disease, and CVA for pulmonology services.  On presentation patient looks uncomfortable in his very short of breath.  She answers questions in small sentences.  Her oxygen saturation on room air is around 83%.  She arrived on BiPAP at 14/8.  Currently saturating 92%.  Patient reports for the last 2 months she has had more shortness of breath.  She is a CHF patient who is on Lasix.  She could  not tell me the rest of her medications.  Patient reports that she has noticed her diuretics sometimes work sometimes they do not.  She does report that she was given diuretics at [facility] and she had  adequate urination.  Per chart review from hospital above, chest x-rays showed bilateral airspace opacification and increase his right-sided pleural effusion suggestive of worsening pneumonia or possibly edema.  Echo from 5/16 with EF of 55-60%.  Patient states she lives at home with her son.  He was or next of kin and POA.  Patient also reports she wishes to be DNR.  Patient also had hospitalization at [facility] about 2 weeks ago for facial weakness and hypertension.  Chest x-ray at that time not available for review but radiologist reports clear lung fields.  Patient also had autoimmune workup which was all negative.     Hospital Course/Significant events:  Patient awake and alert still requiring high-flow O2.  On BiPAP overnight.  Tolerating Vapotherm this morning.  Chest x-ray yesterday showed improved bilateral infiltrates.  She complains of being thirsty this morning.  She is awake and alert follows commands.  Down to 75% Vapotherm this morning.      Assessment/Plan:      Assessment  Hypoxemic respiratory failure requiring high-flow O2 alternating between BiPAP and Vapotherm  Bilateral pulmonary infiltrates with areas of consolidation concerning for pneumonia.  Bilateral  pleural effusions and interstitial edema suggest an element CHF.  Chest x-ray 2 weeks ago was clear.  History previous CABG and AVR        Plan  I will begin Lasix 20 mg twice daily.  Continue Clinimix for now.  Repeat chest x-ray tomorrow.  Replace her potassium today.  Mobilize as tolerated.  Continue weaning FiO2 if possible.     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 diltiazem 180 mg q.day, Eliquis  Follow up with pulmonology input to discontinue steroids  Patient back in AFib RVR today will continue with diltiazem drip for rate control   Monitor electrolytes on significant diuresis   Give a dose of IV morphine 0.5 mg Q 4 hourly to help with shortness of breath  Use of p.o. lorazepam to help with anxiety   Hemoglobin trended downwards will check FOBT x3, IV PPI 40 b.i.d.   She is status post thoracentesis with removal of 950 cc of fluid on 6/14  strict input and output   Antibiotics were discontinued repeat chest x-ray done today still shows bilateral pleural effusions right greater than left  CTA neg for PE on 6/15  Palliative care consulted and following  continue with p.r.n. Xopenex   Continue with speech PT and OT  Patient was denied by LTAC     Critical care note:  Critical care diagnosis:  Acute hypoxemic respiratory failure secondary tod bilateral pleural effusions on  Vapotherm,  Critical care interventions: Hands-on evaluation, review of labs/radiographs/records and discussion with patient and family if present  Critical care time spent: 35 minutes      VTE prophylaxis:  eliquis   Patient condition:  Stable   Anticipated discharge and Disposition: tbd  Patient Traveled outside of the [place] in the last 3 months? no     Patient discharged from this LTAC to SNF within the last 45 days? no    Patient discharged from this LTAC to Rehab within the last 27 days? no    Prior residence: home    Prior Post-Acute Services: N/A     Allergies: Review of patient's allergies indicates:  No Known Allergies    Has patient received the current influenza vaccine (Oct 1 - March 31)? Unknown     Has patient received PPD skin test prior to admit? No    Code Status: DNR    Orientation: Time, Place, Person, and Events    Speech: normal     Vital Signs:         Date     Blood Pressure     Pulse     Respiratory Rate     O2 Saturation     Temperature         Bowel/Bladder: incontinent of bladder  Bowel/Bladder Appliance: external urinary catheter    Dialysis: N/A    PICC Double Lumen 05/31/24 1500 right basilic (Active)   Line Necessity Review Medication caustic to vasculature 06/04/24 2000   Site Assessment No drainage;No redness;No swelling;No warmth 06/05/24 1000   Extremity Assessment Distal to IV No abnormal discoloration;No redness;No swelling;No warmth 06/05/24 1000   Line Securement Device Secured with sutureless device 06/05/24 0800   Dressing Type Central line dressing 06/05/24 1000   Dressing Status Clean;Dry;Intact 06/05/24 1000   Dressing Intervention Integrity maintained 06/05/24 1000   Date on Dressing 06/05/24 06/05/24 0800   Dressing Due to be Changed 06/12/24 06/05/24 1000   Distal Patency/Care flushed w/o difficulty;normal saline locked 06/05/24 1000   Proximal 1 Patency/Care flushed w/o difficulty;normal saline locked 06/05/24 1000   Number of days: 4            Peripheral IV - Single  Lumen 24 20 G Right Antecubital (Active)   Site Assessment Clean;Dry;Intact;No redness;No swelling 24 1000   Extremity Assessment Distal to IV No abnormal discoloration;No redness;No swelling;No warmth 24 1000   Line Status Infusing 24 1000   Dressing Status Clean;Dry;Intact 24 1000   Dressing Intervention Integrity maintained 24 1000   Number of days: 5            Peripheral IV - Single Lumen 24 20 G Anterior;Left Forearm (Active)   Site Assessment Clean;Dry;Intact;No redness;No swelling 24 1000   Extremity Assessment Distal to IV No abnormal discoloration;No redness;No swelling;No warmth 24 1000   Line Status Flushed;Saline locked 24 1000   Dressing Status Clean;Dry;Intact 24 1000   Dressing Intervention Integrity maintained 24 1000   Number of days: 5       Female External Urinary Catheter w/ Suction 24 0400 (Active)   Skin no redness;no breakdown 24 0800   Tolerance no signs/symptoms of discomfort 24 0800   Suction Continuous suction at 60 mmHg 24 2000   Date of last wick change 24 0000   Time of last wick change 0000 24 0000   Output (mL) 500 mL 24 0600   Number of days: 3       CBGs/Accuchecks: Yes     Precautions: Fall    Restraints: No     Isolation Precautions: N/A       Facility-Administered Medications as of 2024   Medication Dose Route Frequency Provider Last Rate Last Admin    0.9%  NaCl infusion   Intravenous PRN Vishnu Stevenson MD 5 mL/hr at 24 1107 Rate Verify at 24 1107    [] Amino acid 4.25% - dextrose 5% (CLINIMIX-E) solution (1L provides 42.5 gm AA, 50 gm CHO (170 kcal/L dextrose), Na 35, K 30, Mg 5, Ca 4.5, Acetate 70, Cl 39, Phos 15)   Intravenous Continuous DOUGIE Brown MD   Stopped at 24 0038    Amino acid 4.25% - dextrose 5% (CLINIMIX-E) solution (1L provides 42.5 gm AA, 50 gm CHO (170 kcal/L dextrose), Na 35, K 30, Mg 5, Ca  4.5, Acetate 70, Cl 39, Phos 15)   Intravenous Continuous DOUGIE Brown MD 75 mL/hr at 06/05/24 1107 Rate Verify at 06/05/24 1107    atorvastatin tablet 40 mg  40 mg Oral Daily Marga Smith MD   40 mg at 06/03/24 0810    dexmedetomidine (PRECEDEX) 400mcg/100mL 0.9% NaCL infusion  0-1.4 mcg/kg/hr Intravenous Continuous Marga Smith MD        docusate 50 mg/5 mL liquid 50 mg  50 mg Oral Daily Marga Smith MD   50 mg at 06/03/24 0810    enoxaparin injection 40 mg  40 mg Subcutaneous Q12H Marga Smith MD   40 mg at 06/05/24 0839    furosemide injection 20 mg  20 mg Intravenous Q12H DOUGIE Brown MD   20 mg at 06/05/24 0836    [COMPLETED] furosemide injection 40 mg  40 mg Intravenous Once Ky Linn DO   40 mg at 06/02/24 1354    gabapentin capsule 600 mg  600 mg Oral BID Marga Smith MD   600 mg at 06/05/24 0830    hydrALAZINE injection 10 mg  10 mg Intravenous Q6H PRN Eleno Abbasi MD   10 mg at 06/05/24 0906    hydrocortisone sodium succinate injection 100 mg  100 mg Intravenous Q8H DOUGIE Brown MD   100 mg at 06/05/24 0548    HYDROmorphone (PF) injection 0.2 mg  0.2 mg Intravenous Q6H PRN Ky Linn DO   0.2 mg at 06/05/24 0029    labetaloL injection 10 mg  10 mg Intravenous Q4H PRN Baldo Finney DO        mupirocin 2 % ointment   Nasal BID Vishnu Stevenson MD   Given at 06/05/24 0839    piperacillin-tazobactam (ZOSYN) 4.5 g in dextrose 5 % in water (D5W) 100 mL IVPB (MB+)  4.5 g Intravenous Q8H DOUGIE Brown MD 25 mL/hr at 06/05/24 1107 Rate Verify at 06/05/24 1107    [COMPLETED] potassium chloride 20 mEq in 100 mL IVPB (FOR CENTRAL LINE ADMINISTRATION ONLY)  20 mEq Intravenous Q2H Baldo Finney DO 50 mL/hr at 06/04/24 1404 20 mEq at 06/04/24 1404    [COMPLETED] potassium chloride 20 mEq in 100 mL IVPB (FOR CENTRAL LINE ADMINISTRATION ONLY)  20 mEq Intravenous Once DOUGIE Brown MD   Stopped at 06/05/24 1040    sertraline tablet 100 mg  100 mg Oral QHS Marga Smith MD   100 mg at  06/04/24 2032    traMADoL tablet 50 mg  50 mg Oral TID Vishnu Stevenson MD   50 mg at 06/05/24 0830    [COMPLETED] vancomycin 1.5 g in dextrose 5 % 250 mL IVPB (ready to mix)  20 mg/kg Intravenous Once DOUGIE Brown MD   Stopped at 06/02/24 1206     Outpatient Medications as of 6/5/2024   Medication Sig Dispense Refill    amLODIPine (NORVASC) 10 MG tablet Take 10 mg by mouth 2 (two) times daily.      aspirin 325 MG tablet Take 1 tablet by mouth once daily.      carvediloL (COREG) 12.5 MG tablet Take 1 tablet (12.5 mg total) by mouth 2 (two) times daily with meals. (Patient taking differently: Take 25 mg by mouth 2 (two) times daily with meals.)      cyanocobalamin, vitamin B-12, 2,000 mcg Tab Take 1 tablet by mouth once daily.      cyclobenzaprine (FLEXERIL) 10 MG tablet Take 10 mg by mouth 3 (three) times daily.      docusate sodium (STOOL SOFTENER ORAL) Take 2 capsules by mouth 2 (two) times a day.      furosemide (LASIX) 40 MG tablet Take 40 mg by mouth once daily.      L. acidophilus/Bifid. animalis (DAILY PROBIOTIC ORAL) Take 1 capsule by mouth once daily.      rosuvastatin (CRESTOR) 40 MG Tab Take 40 mg by mouth every evening.      sertraline (ZOLOFT) 50 MG tablet Take 100 mg by mouth every evening.      traMADoL (ULTRAM) 50 mg tablet Take 50 mg by mouth 3 (three) times daily.      cholecalciferol, vitamin D3, (VITAMIN D3) 50 mcg (2,000 unit) Cap capsule Take 1 capsule by mouth once daily.      hydrALAZINE (APRESOLINE) 25 MG tablet Take 25 mg by mouth every 8 (eight) hours.      potassium gluconate 600 mg (99 mg) Tab Take 650 mg by mouth once.          Cardiovascular:    Cardiovascular Review: Requires Review    Telemetry: Yes    Rhythm:  NSR    AICD: No      Respiratory:    Oxygen:  VAPOTHERM 80%, 30L ,6/20/24- VAPOTHERM 65%, 25L O2    CPT/Frequency: N/A    Incentive Spirometer/Frequency: N/A    CPAP/Settings: N/A    BiPAP/Settings:  WITH SLEEP @14/8 80%    Oxygen Saturation:  88-99%    Suction Frequency:  "N/A      Vent Settings:   Mode:   Rate:   TV:   FIO2:   PEEP:   PS:   iTime:    Trial/HS Settings:   Mode:   Rate:   TV:   FIO2:   PEEP:   PS:       Nutrition:      Ht Readings from Last 3 Encounters:   06/01/24 5' 2" (1.575 m)   02/27/23 5' 2" (1.575 m)   02/15/23 5' 2" (1.575 m)     Wt Readings from Last 1 Encounters:   06/01/24 2230 77.1 kg (170 lb)       Feeding Status:   Current Diet: NPO   Supplements: Amino acid 4.25% - dextrose 5% (CLINIMIX-E) solution (1L provides 42.5 gm AA, 50 gm CHO (170 kcal/L dextrose), Na 35, K 30, Mg 5, Ca 4.5, Acetate 70, Cl 39, Phos 15) at 75 mL/hr starting at 06/04 1400   Tube Feeding: N/A   Flushes: N/A    UPDATE 6/17 :Diet Heart Healthy Fluid - 1500mL: Heart Healthy starting at 06/17 1552     Integumentary:    Integumentary: Within definable limits (WDL)              Musculoskeletal:  Alanna Moya is a 78 y.o. female with a medical diagnosis of resp failure, pneumonia, pleural effusions.  She presents on vapotherm, agreeable to participate.  Minx2 sit<>stand, OT to order BSC.  Anticipate mod intensity therapy upon discharge, will continue to monitor progress.  She presents with the following performance deficits affecting function: weakness, impaired endurance, impaired self care skills, impaired functional mobility, gait instability, impaired balance, decreased upper extremity function, decreased lower extremity function.      General Precautions: Standard, fall  Orthopedic Precautions: N/A  Braces: N/A     Vital Signs: Blood Pressure: 183/86 RN provided meds, decreased to 175/89 then 138/70 while seated EOB  Vapotherm 40L/min, 85% FiO2     Bed Mobility:    Patient completed Supine to Sit with minimum assistance  Patient completed Sit to Supine with minimum assistance     Functional Mobility/Transfers:  Patient completed Sit <> Stand Transfer with minx2  with  hand-held assist   Functional Mobility: HHAx2, good tolerance but slightly unsteady     Activities of Daily " Living:  Toileting: BSC ordered     Functional Cognition:  Follows commands, orientedx4     Transfer assist: Minimal Assistance and Moderate Assistance    Weight Bearing Status: Full    Comments: N/A    ADL Assist: Minimal Assistance and Moderate Assistance    Special Equipment: walker    Radiology:    Radiology (Last 168 hours)               06/03 0833 X-Ray Chest 1 View       06/02 1453 Echo       06/02 0112 CT Chest Without Contrast       06/01 2235 Xray Previous       06/01 2235 US Previous       06/01 2235 CT Previous       05/16 1156 Transthoracic echo (TTE) complete     05/15 1620 VAS US Venous Legs Bilateral     05/15 1339 CTA Neck     05/15 1339 CTA Head     05/14 2123 MRA Brain without contrast     05/14 2116 MRI Brain Without Contrast     05/14 1909 CT Head Without Contrast     05/14 1835 X-Ray Chest 1 View     01/09 1527 X-Ray Chest 1 View     05/03 1312 Nuclear Stress Test     05/02 2235 VAS US Venous Legs Bilateral     05/02 2016 CT Chest w/ CTA Cardiac     05/02 1609 X-Ray Chest 1 View     03/03 0624 X-Ray Abdomen AP 1 View     03/02 0719 X-Ray Abdomen AP 1 View     03/01 1149 X-Ray Abdomen AP 1 View     02/28 1015 X-Ray Abdomen Flat And Erect     02/27 1148 CT Head Without Contrast     02/24 1238 CT Chest Without Contrast     02/23 1946 X-Ray Abdomen AP 1 View     02/23 1946 X-Ray Chest 1 View     02/23 1829 X-Ray Abdomen AP 1 View     02/23 1711 VAS US Venous Legs Bilateral     02/23 1354 X-Ray Chest 1 View     07/07 0919 Cardiac catheterization     07/06 0946 CTA Chest Aorta Non Coronary     07/06 0755 X-Ray Chest 1 View     07/02 1311 X-Ray Chest PA And Lateral     11/14 1134 X-Ray Chest PA And Lateral     10/20 0925 Fl Modified Barium Swallow Speech     10/13 0927 Fl Modified Barium Swallow Speech     10/12 1534 Transesophageal echo (ELVIS)     10/12 1513 Electrophysiology Procedure     10/12 1513 Transesophageal echo (ELVIS)     10/11 1035 Transthoracic echo (TTE) complete     10/10 1438 CTA Neck      10/10 1438 CTA Head     10/09 1617 MRI Brain Without Contrast     10/09 1246 CT Cervical Spine Without Contrast     10/09 1246 CT Head Without Contrast     10/09 1234 X-Ray Chest PA And Lateral              X-Ray Chest 1 View  Narrative: EXAMINATION:  XR CHEST 1 VIEW    CPT 48645    CLINICAL HISTORY:  respiratory failure;    COMPARISON:  February 24, 2023    FINDINGS:  Examination reveals mediastinal cardiac silhouette to be within normal limits there might be minimal blunting to the left costophrenic angle which may indicate the presence of left-sided pleural effusion with some haziness at the left base.    There is increased left retrocardiac density and silhouetting of the left hemidiaphragm which might be related to pleural fluid and or represent an infiltrate/atelectasis.    Some left perihilar opacities are identified early infiltrate can not be completely excluded  Impression: Changes of left-sided pleural effusion.    Increased left retrocardiac density and silhouetting of the left hemidiaphragm as above.    Slightly more confluent opacities in the left perihilar region infiltrate cannot be completely excluded    Electronically signed by: Baljeet Mullins  Date:    06/03/2024  Time:    08:49      Lab/Cultures:  Significant Labs:           Lab Results   Component Value Date     WBC 15.87 (H) 06/05/2024     HGB 11.3 (L) 06/05/2024     HCT 34.4 (L) 06/05/2024     MCV 92.0 06/05/2024      06/05/2024               BMP        Lab Results   Component Value Date      06/05/2024     K 3.4 (L) 06/05/2024     CHLORIDE 97 05/04/2021     CO2 31 06/05/2024     BUN 46.9 (H) 06/05/2024     CREATININE 0.90 06/05/2024     CALCIUM 10.1 06/05/2024     AGAP 13.0 06/05/2024     ESTGFRAFRICA 92 05/16/2024     EGFRNONAA 28.1 (A) 08/10/2017            ABG      Recent Labs   Lab 06/01/24  2314   PH 7.450   PO2 143.0*   PCO2 45.0   HCO3 31.3*   POCBASEDEF 6.50*         Mechanical Ventilation Support:  Oxygen  "Concentration (%): 80 (06/05/24 0600)     BUN   Date Value Ref Range Status   05/04/2021 18.0 7.0 - 25.0 mg/dL Final   05/03/2021 16.0 7.0 - 25.0 mg/dL Final   08/10/2017 61 (H) 8 - 23 mg/dL Final     Blood Urea Nitrogen   Date Value Ref Range Status   06/05/2024 46.9 (H) 9.8 - 20.1 mg/dL Final   06/04/2024 57.6 (H) 9.8 - 20.1 mg/dL Final   05/16/2024 16 5 - 25 mg/dL Final   05/15/2024 22 5 - 25 mg/dL Final     Creatinine   Date Value Ref Range Status   06/05/2024 0.90 0.55 - 1.02 mg/dL Final   06/04/2024 1.03 (H) 0.55 - 1.02 mg/dL Final   05/16/2024 0.62 0.55 - 1.02 mg/dL Final   05/15/2024 0.64 0.55 - 1.02 mg/dL Final   08/10/2017 1.8 (H) 0.5 - 1.4 mg/dL Final     WBC   Date Value Ref Range Status   06/05/2024 15.87 (H) 4.50 - 11.50 x10(3)/mcL Final   06/04/2024 14.86 (H) 4.50 - 11.50 x10(3)/mcL Final   08/10/2017 4.28 3.90 - 12.70 K/uL Final      Blood Culture   Date Value Ref Range Status   06/02/2024 No Growth At 72 Hours  Preliminary   06/02/2024 No Growth At 72 Hours  Preliminary     No results for input(s): "PH", "PCO2", "PO2", "HCO3", "POCSATURATED", "BE" in the last 72 hours.       "

## 2024-06-05 NOTE — PLAN OF CARE
Son, Aaron called and said they would like referral sent to [facility]. Care port is not working properly. Messaged Michelle Macdonald and Génesis Vigil via epic messaging and sent referral.

## 2024-06-05 NOTE — NURSING
Nurses Note -- 4 Eyes      6/5/2024   4:55 PM      Skin assessed during: Daily Assessment      [x] No Altered Skin Integrity Present    []Prevention Measures Documented      [] Yes- Altered Skin Integrity Present or Discovered   [] LDA Added if Not in Epic (Describe Wound)   [] New Altered Skin Integrity was Present on Admit and Documented in LDA   [] Wound Image Taken    Wound Care Consulted? No    Attending Nurse:  Gypsy Pederson RN/Staff Member:  MELINA Hazel

## 2024-06-05 NOTE — PLAN OF CARE
06/05/24 0932   Discharge Reassessment   Assessment Type Discharge Planning Reassessment   Did the patient's condition or plan change since previous assessment? Yes   Discharge Plan discussed with: Adult children;Patient   Communicated [place] with patient/caregiver Date not available/Unable to determine   Discharge Plan A Long-term acute care facility (LTAC)   Discharge Plan B Skilled Nursing Facility   DME Needed Upon Discharge  none   Transition of Care Barriers None   Why the patient remains in the hospital Requires continued medical care   Post-Acute Status   Post-Acute Authorization Placement   Post-Acute Placement Status Patient List Provided   Patient choice form signed by patient/caregiver List with quality metrics by geographic area provided   Discharge Delays None known at this time     Patient and children, Nakia and Aaron were approached about LTAC. Explained purpose of LTAC. I texted choices to children and gave list to patient in room. Awaiting choice.

## 2024-06-06 LAB
ALBUMIN SERPL-MCNC: 2.6 G/DL (ref 3.4–4.8)
ALBUMIN/GLOB SERPL: 0.7 RATIO (ref 1.1–2)
ALP SERPL-CCNC: 63 UNIT/L (ref 40–150)
ALT SERPL-CCNC: 12 UNIT/L (ref 0–55)
ANION GAP SERPL CALC-SCNC: 12 MEQ/L
AST SERPL-CCNC: 12 UNIT/L (ref 5–34)
BASOPHILS # BLD AUTO: 0.03 X10(3)/MCL
BASOPHILS NFR BLD AUTO: 0.2 %
BILIRUB SERPL-MCNC: 0.4 MG/DL
BUN SERPL-MCNC: 55.5 MG/DL (ref 9.8–20.1)
CALCIUM SERPL-MCNC: 10 MG/DL (ref 8.4–10.2)
CHLORIDE SERPL-SCNC: 100 MMOL/L (ref 98–107)
CO2 SERPL-SCNC: 28 MMOL/L (ref 23–31)
CREAT SERPL-MCNC: 0.95 MG/DL (ref 0.55–1.02)
CREAT/UREA NIT SERPL: 58
EOSINOPHIL # BLD AUTO: 0.01 X10(3)/MCL (ref 0–0.9)
EOSINOPHIL NFR BLD AUTO: 0.1 %
ERYTHROCYTE [DISTWIDTH] IN BLOOD BY AUTOMATED COUNT: 12.9 % (ref 11.5–17)
GFR SERPLBLD CREATININE-BSD FMLA CKD-EPI: >60 ML/MIN/1.73/M2
GLOBULIN SER-MCNC: 3.7 GM/DL (ref 2.4–3.5)
GLUCOSE SERPL-MCNC: 227 MG/DL (ref 82–115)
HCT VFR BLD AUTO: 33.6 % (ref 37–47)
HGB BLD-MCNC: 10.8 G/DL (ref 12–16)
IMM GRANULOCYTES # BLD AUTO: 0.4 X10(3)/MCL (ref 0–0.04)
IMM GRANULOCYTES NFR BLD AUTO: 2.6 %
LYMPHOCYTES # BLD AUTO: 0.55 X10(3)/MCL (ref 0.6–4.6)
LYMPHOCYTES NFR BLD AUTO: 3.6 %
MCH RBC QN AUTO: 29.8 PG (ref 27–31)
MCHC RBC AUTO-ENTMCNC: 32.1 G/DL (ref 33–36)
MCV RBC AUTO: 92.6 FL (ref 80–94)
MONOCYTES # BLD AUTO: 1.54 X10(3)/MCL (ref 0.1–1.3)
MONOCYTES NFR BLD AUTO: 10.1 %
NEUTROPHILS # BLD AUTO: 12.77 X10(3)/MCL (ref 2.1–9.2)
NEUTROPHILS NFR BLD AUTO: 83.4 %
NRBC BLD AUTO-RTO: 0 %
OHS QRS DURATION: 140 MS
OHS QTC CALCULATION: 546 MS
PLATELET # BLD AUTO: 306 X10(3)/MCL (ref 130–400)
PMV BLD AUTO: 10.8 FL (ref 7.4–10.4)
POTASSIUM SERPL-SCNC: 3.7 MMOL/L (ref 3.5–5.1)
PROT SERPL-MCNC: 6.3 GM/DL (ref 5.8–7.6)
RBC # BLD AUTO: 3.63 X10(6)/MCL (ref 4.2–5.4)
SODIUM SERPL-SCNC: 140 MMOL/L (ref 136–145)
WBC # SPEC AUTO: 15.3 X10(3)/MCL (ref 4.5–11.5)

## 2024-06-06 PROCEDURE — 99900035 HC TECH TIME PER 15 MIN (STAT)

## 2024-06-06 PROCEDURE — 80053 COMPREHEN METABOLIC PANEL: CPT

## 2024-06-06 PROCEDURE — 63600175 PHARM REV CODE 636 W HCPCS: Performed by: STUDENT IN AN ORGANIZED HEALTH CARE EDUCATION/TRAINING PROGRAM

## 2024-06-06 PROCEDURE — 97535 SELF CARE MNGMENT TRAINING: CPT | Mod: CO

## 2024-06-06 PROCEDURE — 25000003 PHARM REV CODE 250: Performed by: INTERNAL MEDICINE

## 2024-06-06 PROCEDURE — 97530 THERAPEUTIC ACTIVITIES: CPT | Mod: CQ

## 2024-06-06 PROCEDURE — 25000003 PHARM REV CODE 250: Performed by: NURSE PRACTITIONER

## 2024-06-06 PROCEDURE — 36415 COLL VENOUS BLD VENIPUNCTURE: CPT

## 2024-06-06 PROCEDURE — 85025 COMPLETE CBC W/AUTO DIFF WBC: CPT

## 2024-06-06 PROCEDURE — 63600175 PHARM REV CODE 636 W HCPCS

## 2024-06-06 PROCEDURE — 63600175 PHARM REV CODE 636 W HCPCS: Performed by: INTERNAL MEDICINE

## 2024-06-06 PROCEDURE — 99900031 HC PATIENT EDUCATION (STAT)

## 2024-06-06 PROCEDURE — 94660 CPAP INITIATION&MGMT: CPT

## 2024-06-06 PROCEDURE — 25000003 PHARM REV CODE 250: Performed by: HOSPITALIST

## 2024-06-06 PROCEDURE — 94760 N-INVAS EAR/PLS OXIMETRY 1: CPT

## 2024-06-06 PROCEDURE — 27100171 HC OXYGEN HIGH FLOW UP TO 24 HOURS

## 2024-06-06 PROCEDURE — 21400001 HC TELEMETRY ROOM

## 2024-06-06 PROCEDURE — 25000003 PHARM REV CODE 250

## 2024-06-06 RX ORDER — METOPROLOL TARTRATE 1 MG/ML
5 INJECTION, SOLUTION INTRAVENOUS EVERY 4 HOURS PRN
Status: DISCONTINUED | OUTPATIENT
Start: 2024-06-06 | End: 2024-06-09

## 2024-06-06 RX ORDER — ENOXAPARIN SODIUM 100 MG/ML
1 INJECTION SUBCUTANEOUS EVERY 12 HOURS
Status: DISCONTINUED | OUTPATIENT
Start: 2024-06-06 | End: 2024-06-07

## 2024-06-06 RX ORDER — DILTIAZEM HYDROCHLORIDE 60 MG/1
60 TABLET, FILM COATED ORAL EVERY 8 HOURS
Status: DISCONTINUED | OUTPATIENT
Start: 2024-06-06 | End: 2024-06-07

## 2024-06-06 RX ORDER — ENOXAPARIN SODIUM 100 MG/ML
40 INJECTION SUBCUTANEOUS ONCE
Status: COMPLETED | OUTPATIENT
Start: 2024-06-06 | End: 2024-06-06

## 2024-06-06 RX ADMIN — TRAMADOL HYDROCHLORIDE 50 MG: 50 TABLET, COATED ORAL at 08:06

## 2024-06-06 RX ADMIN — LABETALOL HYDROCHLORIDE 10 MG: 5 INJECTION, SOLUTION INTRAVENOUS at 05:06

## 2024-06-06 RX ADMIN — GABAPENTIN 600 MG: 300 CAPSULE ORAL at 08:06

## 2024-06-06 RX ADMIN — MUPIROCIN: 20 OINTMENT TOPICAL at 08:06

## 2024-06-06 RX ADMIN — FUROSEMIDE 20 MG: 10 INJECTION, SOLUTION INTRAMUSCULAR; INTRAVENOUS at 08:06

## 2024-06-06 RX ADMIN — SERTRALINE HYDROCHLORIDE 100 MG: 50 TABLET ORAL at 08:06

## 2024-06-06 RX ADMIN — POTASSIUM CHLORIDE 20 MEQ: 14.9 INJECTION, SOLUTION INTRAVENOUS at 08:06

## 2024-06-06 RX ADMIN — HYDROCORTISONE SODIUM SUCCINATE 100 MG: 100 INJECTION, POWDER, FOR SOLUTION INTRAMUSCULAR; INTRAVENOUS at 06:06

## 2024-06-06 RX ADMIN — POTASSIUM CHLORIDE 20 MEQ: 14.9 INJECTION, SOLUTION INTRAVENOUS at 01:06

## 2024-06-06 RX ADMIN — SODIUM CHLORIDE: 9 INJECTION, SOLUTION INTRAVENOUS at 09:06

## 2024-06-06 RX ADMIN — PIPERACILLIN SODIUM AND TAZOBACTAM SODIUM 4.5 G: 4; .5 INJECTION, POWDER, LYOPHILIZED, FOR SOLUTION INTRAVENOUS at 09:06

## 2024-06-06 RX ADMIN — ATORVASTATIN CALCIUM 40 MG: 40 TABLET, FILM COATED ORAL at 08:06

## 2024-06-06 RX ADMIN — POTASSIUM CHLORIDE 20 MEQ: 14.9 INJECTION, SOLUTION INTRAVENOUS at 04:06

## 2024-06-06 RX ADMIN — DILTIAZEM HYDROCHLORIDE 60 MG: 60 TABLET, FILM COATED ORAL at 12:06

## 2024-06-06 RX ADMIN — PIPERACILLIN SODIUM AND TAZOBACTAM SODIUM 4.5 G: 4; .5 INJECTION, POWDER, LYOPHILIZED, FOR SOLUTION INTRAVENOUS at 05:06

## 2024-06-06 RX ADMIN — DILTIAZEM HYDROCHLORIDE 60 MG: 60 TABLET, FILM COATED ORAL at 10:06

## 2024-06-06 RX ADMIN — ENOXAPARIN SODIUM 40 MG: 40 INJECTION SUBCUTANEOUS at 05:06

## 2024-06-06 RX ADMIN — PIPERACILLIN SODIUM AND TAZOBACTAM SODIUM 4.5 G: 4; .5 INJECTION, POWDER, LYOPHILIZED, FOR SOLUTION INTRAVENOUS at 01:06

## 2024-06-06 RX ADMIN — HYDRALAZINE HYDROCHLORIDE 10 MG: 20 INJECTION INTRAMUSCULAR; INTRAVENOUS at 04:06

## 2024-06-06 RX ADMIN — DILTIAZEM HYDROCHLORIDE 5 MG/HR: 5 INJECTION INTRAVENOUS at 04:06

## 2024-06-06 RX ADMIN — HYDROCORTISONE SODIUM SUCCINATE 100 MG: 100 INJECTION, POWDER, FOR SOLUTION INTRAMUSCULAR; INTRAVENOUS at 02:06

## 2024-06-06 RX ADMIN — LEUCINE, PHENYLALANINE, LYSINE, METHIONINE, ISOLEUCINE, VALINE, HISTIDINE, THREONINE, TRYPTOPHAN, ALANINE, GLYCINE, ARGININE, PROLINE, SERINE, TYROSINE, SODIUM ACETATE, DIBASIC POTASSIUM PHOSPHATE, MAGNESIUM CHLORIDE, SODIUM CHLORIDE, CALCIUM CHLORIDE, DEXTROSE
311; 238; 247; 170; 255; 247; 204; 179; 77; 880; 438; 489; 289; 213; 17; 297; 261; 51; 77; 33; 5 INJECTION INTRAVENOUS at 09:06

## 2024-06-06 RX ADMIN — ENOXAPARIN SODIUM 40 MG: 40 INJECTION SUBCUTANEOUS at 08:06

## 2024-06-06 RX ADMIN — ENOXAPARIN SODIUM 80 MG: 80 INJECTION SUBCUTANEOUS at 08:06

## 2024-06-06 RX ADMIN — HYDROCORTISONE SODIUM SUCCINATE 100 MG: 100 INJECTION, POWDER, FOR SOLUTION INTRAMUSCULAR; INTRAVENOUS at 10:06

## 2024-06-06 NOTE — PT/OT/SLP PROGRESS
Occupational Therapy   Treatment    Name: Alanna Moya  MRN: [id number]  Admitting Diagnosis:  SOB       Recommendations:     Recommended therapy intensity at discharge: Moderate Intensity Therapy   Discharge Equipment Recommendations:  none  Barriers to discharge:       Assessment:     Alanna Moya is a 78 y.o. female with a medical diagnosis of SOB.  She presents with improved balance and increased endurance, tolerating session well, O2 stable on oxymizer. Pt. Is a fall risk at this time, recommending Mod intensity therapy pending progress.. Performance deficits affecting function are weakness, impaired endurance, impaired self care skills, impaired functional mobility, gait instability, impaired balance, decreased upper extremity function, decreased lower extremity function.     Rehab Prognosis:  Good; patient would benefit from acute skilled OT services to address these deficits and reach maximum level of function.       Plan:     Patient to be seen 4 x/week to address the above listed problems via self-care/home management  Plan of Care Expires: 07/04/24  Plan of Care Reviewed with: patient    Subjective     Pain/Comfort:  No pain    Objective:     Communicated with: RN prior to session.  Patient found up in chair with   upon OT entry to room.    General Precautions: Standard, fall    Orthopedic Precautions:N/A  Braces: N/A  Respiratory Status:  oxymizer 8L  Vital Signs: Blood Pressure: 157/79  Sp02: 95     Occupational Performance:   Pt. Performing LB dressing task while donning/doffing socks in a figure 4 position Mod I.   (Sit to stand- Mod A) From BS chair, brief donned with Max A for pulling over hips in supported stance.   (Standing balance-Min A)  Pt. Performing lateral steps with Min A. Fatiguing quickly, pt. Seated back into BS chair, left with all needs within reach.       Therapeutic Positioning    OT interventions performed during the course of today's session in an effort to prevent  and/or reduce acquired pressure injuries:   Therapeutic positioning was provided at the conclusion of session to offload all bony prominences for the prevention and/or reduction of pressure injuries      [facility] 6 Click ADL:      Patient Education:  Patient provided with verbal education education regarding fall prevention and safety awareness.  Additional teaching is warranted.      Patient left up in chair with all lines intact and call button in reach.    GOALS:   Multidisciplinary Problems       Occupational Therapy Goals          Problem: Occupational Therapy    Goal Priority Disciplines Outcome Interventions   Occupational Therapy Goal     OT, PT/OT Progressing    Description: Goals to be met by: 7/4/24     Patient will increase functional independence with ADLs by performing:    UE Dressing with Supervision.  LE Dressing with Supervision.  Grooming while standing at sink with Supervision.  Toileting from toilet with Supervision for hygiene and clothing management.   Toilet transfer to toilet with Supervision.                         Time Tracking:     OT Date of Treatment: 06/06/24  OT Start Time: 1258  OT Stop Time: 1315  OT Total Time (min): 17 min    Billable Minutes:Self Care/Home Management 1    OT/MARY: MARY     Number of MARY visits since last OT visit: 1    6/6/2024

## 2024-06-06 NOTE — NURSING
Nurses Note -- 4 Eyes      6/6/2024   4:00 PM      Skin assessed during: Daily Assessment      [x] No Altered Skin Integrity Present    [x]Prevention Measures Documented      [] Yes- Altered Skin Integrity Present or Discovered   [] LDA Added if Not in Epic (Describe Wound)   [] New Altered Skin Integrity was Present on Admit and Documented in LDA   [] Wound Image Taken    Wound Care Consulted? No    Attending Nurse:  MELINA Christie     Second RN/Staff Member:  MELINA Peace

## 2024-06-06 NOTE — PROGRESS NOTES
"RT attempted to stick patient for an ABG. Patient became combative while RT performed to obtain gas. Patient yelled to RT, "Get the needle out of my arm NOW!!!" RT asked patient to attempt to get ABG again. Patient refused. Nurse notified.   "

## 2024-06-06 NOTE — CONSULTS
Inpatient consult to Cardiology  Consult performed by: Nataly Mead FNP  Consult ordered by: Vishnu Stevenson MD  Reason for consult: SVT        [facility] - 7th Floor ICU    Cardiology  Consult Note    Patient Name: Alanna Moya  MRN: [id number]  Admission Date: 6/1/2024  Hospital Length of Stay: 5 days  Code Status: DNR   Attending Provider: DOUGIE Brown MD   Consulting Provider: STEF Iqbal  Primary Care Physician: Cornel Haddad MD  Principal Problem:<principal problem not specified>    Patient information was obtained from past medical records, ER records, and primary team.     Subjective:   Reason for Consult: SVT    HPI:   Ms. Moya is a 78 year old female, known to Dr. Ramon in [place], who presented to the hospital from [facility] as transfer due to respiratory failure. Upon arrival she was noted to be SOB. Noted hypoxia requiring BIPAP. Chesr Radiograph from outside facility revealed bilateral airspace opacification and increase his right-sided pleural effusion suggestive of worsening pneumonia or possibly edema. CT Chest revealed mixed picture of decompensated HF, pleural effusions and superimposed pneumonia. During this hospitalization patient with tachycardia concerning for AF RVR, with noted Left Bundle Branch Block. She was given IV Metoprolol and started on Cardizem Infusion for Acute HR Control. She does have history of PSVT. Echocardiogram on 6.2.24 revealed intact LV Function with EF 55-60% & Grade I DD. Electrolytes grossly stable. Troponin normal. Lactic Acid Normal. . CIS Consulted for AF Management.    PMH: CAD/CABG, Hypertension, Dyslipidemia, MELCHOR, CEA, CVA, AS/TAVR, PSVT  PSH: LHC, CABG, TAVR, CEA, Hysterectomy, Cholecystectomy, Eye Surgery, Hand Surgery  Family History: Father- Old MI/CAD, Mother- HF, Son- Hypertension, Daughter- Cancer/Hypertension, Brother- Heart Disease, Brother- Old MI/CAD, Brother- Cancer  Social History: Tobacco-  Negative, Alcohol- Negative, Substance Abuse- Negative     Previous Cardiac Diagnostics:   Echocardiogram (6.2.24):  Left Ventricle: The left ventricle is normal in size. Increased wall thickness. There is normal systolic function with a visually estimated ejection fraction of 55 - 60%. Grade I diastolic dysfunction.  Right Ventricle: Systolic function is reduced.TAPSE is 1.60 cm.  Aortic Valve: There is a transcatheter valve replacement in the aortic position. Aortic valve area by VTI is 1.56 cm². Aortic valve peak velocity is 1.94 m/s. Mean gradient is 8 mmHg. The dimensionless index is 0.50.  Mitral Valve: Mildly thickened leaflets. There is mitral annular calcification present. There is mild stenosis. The mean pressure gradient across the mitral valve is 5 mmHg at a heart rate of 87 bpm. There is mild regurgitation.  Tricuspid Valve: The tricuspid valve is structurally normal. There is mild regurgitation.  Pulmonic Valve: The pulmonic valve is structurally normal.  Pulmonary Artery: The estimated pulmonary artery systolic pressure is 47 mmHg.  IVC/SVC: Normal venous pressure at 3 mmHg.  Pericardium: Left pleural effusion.    Echocardiogram (5.15.24):  EF 55-60%. Grade I Diastolic Dysfunction. Normal LV Wall Motion.  Mild MAC with Mild MR and No MS. Mild to Moderate TR.  Mild to Moderate AI, No AS.    CT Angiogram Head/Neck (5.15.24):  Carotid arteries:   Calcified plaque at the siphons without significant narrowing.   Normal A1 and M1 segments.   Vertebrobasilar Circulation:   Vertebral arteries: Patent. Calcified plaque on the right without significant narrowing. Otherwise no abnormalities.   Basilar artery: Patent, no abnormalities.   Posterior cerebral arteries:  Patent. Right fetal origin. Otherwise no abnormalities.     Echocardiogram (4.21.23):  The study quality is good.   The left ventricle is normal in size. Global left ventricular systolic function is normal. The left ventricular ejection fraction by  Power's is 62%. The left ventricle diastolic function is impaired (Grade II) with an elevated left atrial pressure. Noted left ventricular hypertrophy. Concentric left ventricular hypertrophy is present. It is mild. LVSI=41ml/m2.    LVEDV=67.2ml and LVESV=25.7ml.  The left atrial diameter is moderately increased. Left atrial diameter is 4.4 cms. The left atrium is severely enlarged based on the left atrium volume index of 48.2ml/m².  S/P TAVR. Bioprosthetic aortic valve is well seated and functions normally with no evidence of insufficiency or perivalvular leaks. JACINTA=2.0cm2. The trans-aortic peak gradient is 15.8 mmHg. The trans-aortic mean gradient is 8.6 mmHg. DI=1.1.  Mild mitral annular calcification is noted. The mean trans mitral gradient is 1.9 mmHg.  Mild to moderate (1-2+) mitral regurgitation. Mild to moderate (1-2+) tricuspid regurgitation.  The estimated pulmonary artery systolic pressure is 41 mmHg assuming a right atrial pressure of 3 mmHg. Evidence of pulmonary hypertension is noted.     MCT Monitor (3.15.23):  Predominant Rhythm SR.  PSVT    TAVR (2.27.23):  TAVR  23  mm S3 ultra valve, nominal prep,  Right TF approach  Limited echo pre, post valve placement  Root angiography  Distal aortography  Temporary pacemaker placement removal  Manta closure  Right CFA access site.  U/S guided bilateral right groin access   Balloon angioplasty of right CFA with a 5 x 40 Napoleon.    LHC (1.26.23):  LM: Distal 50% Stenosis, LAD: Occluded Mid, LCX: Ostial 50% at Mid Portion, RCA: Dominant Patent Arter.  JENNINGS to LAD is Patent.  Impression: LIMA to LAD- Patent, 50% Stenosis LCX Artery  Medical Management.    Review of patient's allergies indicates:  No Known Allergies  No current facility-administered medications on file prior to encounter.     Current Outpatient Medications on File Prior to Encounter   Medication Sig    amLODIPine (NORVASC) 10 MG tablet Take 10 mg by mouth 2 (two) times daily.    aspirin 325 MG  tablet Take 1 tablet by mouth once daily.    carvediloL (COREG) 12.5 MG tablet Take 1 tablet (12.5 mg total) by mouth 2 (two) times daily with meals. (Patient taking differently: Take 25 mg by mouth 2 (two) times daily with meals.)    cyanocobalamin, vitamin B-12, 2,000 mcg Tab Take 1 tablet by mouth once daily.    cyclobenzaprine (FLEXERIL) 10 MG tablet Take 10 mg by mouth 3 (three) times daily.    docusate sodium (STOOL SOFTENER ORAL) Take 2 capsules by mouth 2 (two) times a day.    furosemide (LASIX) 40 MG tablet Take 40 mg by mouth once daily.    L. acidophilus/Bifid. animalis (DAILY PROBIOTIC ORAL) Take 1 capsule by mouth once daily.    rosuvastatin (CRESTOR) 40 MG Tab Take 40 mg by mouth every evening.    sertraline (ZOLOFT) 50 MG tablet Take 100 mg by mouth every evening.    traMADoL (ULTRAM) 50 mg tablet Take 50 mg by mouth 3 (three) times daily.    cholecalciferol, vitamin D3, (VITAMIN D3) 50 mcg (2,000 unit) Cap capsule Take 1 capsule by mouth once daily.    hydrALAZINE (APRESOLINE) 25 MG tablet Take 25 mg by mouth every 8 (eight) hours.    potassium gluconate 600 mg (99 mg) Tab Take 650 mg by mouth once.     Review of Systems   Constitutional:  Positive for fatigue.   Respiratory:  Negative for chest tightness.         Intermittent SOB   Cardiovascular:  Negative for chest pain.   All other systems reviewed and are negative.    Objective:     Vital Signs (Most Recent):  Temp: 98.2 °F (36.8 °C) (06/06/24 0800)  Pulse: 80 (06/06/24 0400)  Resp: (!) 25 (06/06/24 0830)  BP: (!) 150/73 (06/06/24 0400)  SpO2: 98 % (06/06/24 0400) Vital Signs (24h Range):  Temp:  [97.8 °F (36.6 °C)-98.6 °F (37 °C)] 98.2 °F (36.8 °C)  Pulse:  [] 80  Resp:  [12-34] 25  SpO2:  [87 %-98 %] 98 %  BP: (107-192)/() 150/73   Weight: 77.1 kg (170 lb)  Body mass index is 31.09 kg/m².  SpO2: 98 %       Intake/Output Summary (Last 24 hours) at 6/6/2024 0998  Last data filed at 6/6/2024 0408  Gross per 24 hour   Intake 1174.67 ml    Output 1250 ml   Net -75.33 ml     Lines/Drains/Airways       Peripherally Inserted Central Catheter Line  Duration             PICC Double Lumen 05/31/24 1500 right basilic 5 days              Drain  Duration             Female External Urinary Catheter w/ Suction 06/02/24 0400 4 days              Peripheral Intravenous Line  Duration                  Peripheral IV - Single Lumen 05/30/24 2000 20 G Anterior;Left Forearm 6 days                  Significant Labs:  Recent Results (from the past 72 hour(s))   Vancomycin, Random    Collection Time: 06/03/24 10:29 AM   Result Value Ref Range    Vancomycin Random 11.8 (L) 15.0 - 20.0 ug/ml   Basic Metabolic Panel    Collection Time: 06/03/24 10:29 AM   Result Value Ref Range    Sodium 142 136 - 145 mmol/L    Potassium 3.1 (L) 3.5 - 5.1 mmol/L    Chloride 96 (L) 98 - 107 mmol/L    CO2 31 23 - 31 mmol/L    Glucose 174 (H) 82 - 115 mg/dL    Blood Urea Nitrogen 65.1 (H) 9.8 - 20.1 mg/dL    Creatinine 1.26 (H) 0.55 - 1.02 mg/dL    BUN/Creatinine Ratio 52     Calcium 9.7 8.4 - 10.2 mg/dL    Anion Gap 15.0 mEq/L    eGFR 44 mL/min/1.73/m2   CBC with Differential    Collection Time: 06/03/24  1:55 PM   Result Value Ref Range    WBC 12.81 (H) 4.50 - 11.50 x10(3)/mcL    RBC 3.79 (L) 4.20 - 5.40 x10(6)/mcL    Hgb 11.3 (L) 12.0 - 16.0 g/dL    Hct 34.2 (L) 37.0 - 47.0 %    MCV 90.2 80.0 - 94.0 fL    MCH 29.8 27.0 - 31.0 pg    MCHC 33.0 33.0 - 36.0 g/dL    RDW 12.5 11.5 - 17.0 %    Platelet 341 130 - 400 x10(3)/mcL    MPV 10.1 7.4 - 10.4 fL    Neut % 83.6 %    Lymph % 5.6 %    Mono % 9.4 %    Eos % 0.0 %    Basophil % 0.2 %    Lymph # 0.72 0.6 - 4.6 x10(3)/mcL    Neut # 10.71 (H) 2.1 - 9.2 x10(3)/mcL    Mono # 1.20 0.1 - 1.3 x10(3)/mcL    Eos # 0.00 0 - 0.9 x10(3)/mcL    Baso # 0.02 <=0.2 x10(3)/mcL    IG# 0.16 (H) 0 - 0.04 x10(3)/mcL    IG% 1.2 %    NRBC% 0.0 %   Comprehensive Metabolic Panel    Collection Time: 06/04/24  4:54 AM   Result Value Ref Range    Sodium 143 136 - 145  mmol/L    Potassium 2.8 (L) 3.5 - 5.1 mmol/L    Chloride 96 (L) 98 - 107 mmol/L    CO2 34 (H) 23 - 31 mmol/L    Glucose 233 (H) 82 - 115 mg/dL    Blood Urea Nitrogen 57.6 (H) 9.8 - 20.1 mg/dL    Creatinine 1.03 (H) 0.55 - 1.02 mg/dL    Calcium 10.3 (H) 8.4 - 10.2 mg/dL    Protein Total 7.5 5.8 - 7.6 gm/dL    Albumin 3.4 3.4 - 4.8 g/dL    Globulin 4.1 (H) 2.4 - 3.5 gm/dL    Albumin/Globulin Ratio 0.8 (L) 1.1 - 2.0 ratio    Bilirubin Total 0.7 <=1.5 mg/dL    ALP 78 40 - 150 unit/L    ALT 21 0 - 55 unit/L    AST 14 5 - 34 unit/L    eGFR 56 mL/min/1.73/m2    Anion Gap 13.0 mEq/L    BUN/Creatinine Ratio 56    CBC with Differential    Collection Time: 06/04/24  4:54 AM   Result Value Ref Range    WBC 14.86 (H) 4.50 - 11.50 x10(3)/mcL    RBC 3.99 (L) 4.20 - 5.40 x10(6)/mcL    Hgb 12.0 12.0 - 16.0 g/dL    Hct 36.2 (L) 37.0 - 47.0 %    MCV 90.7 80.0 - 94.0 fL    MCH 30.1 27.0 - 31.0 pg    MCHC 33.1 33.0 - 36.0 g/dL    RDW 12.3 11.5 - 17.0 %    Platelet 377 130 - 400 x10(3)/mcL    MPV 10.1 7.4 - 10.4 fL    Neut % 87.4 %    Lymph % 4.3 %    Mono % 6.6 %    Eos % 0.3 %    Basophil % 0.1 %    Lymph # 0.64 0.6 - 4.6 x10(3)/mcL    Neut # 12.99 (H) 2.1 - 9.2 x10(3)/mcL    Mono # 0.98 0.1 - 1.3 x10(3)/mcL    Eos # 0.05 0 - 0.9 x10(3)/mcL    Baso # 0.01 <=0.2 x10(3)/mcL    IG# 0.19 (H) 0 - 0.04 x10(3)/mcL    IG% 1.3 %    NRBC% 0.0 %   VANCOMYCIN, TROUGH    Collection Time: 06/04/24  1:25 PM   Result Value Ref Range    Vancomycin Trough 24.7 (H) 15.0 - 20.0 ug/ml   MRSA PCR    Collection Time: 06/04/24  4:33 PM   Result Value Ref Range    MRSA PCR Screen Not Detected Not Detected   Comprehensive Metabolic Panel    Collection Time: 06/05/24  2:58 AM   Result Value Ref Range    Sodium 142 136 - 145 mmol/L    Potassium 3.4 (L) 3.5 - 5.1 mmol/L    Chloride 98 98 - 107 mmol/L    CO2 31 23 - 31 mmol/L    Glucose 225 (H) 82 - 115 mg/dL    Blood Urea Nitrogen 46.9 (H) 9.8 - 20.1 mg/dL    Creatinine 0.90 0.55 - 1.02 mg/dL    Calcium 10.1 8.4 -  10.2 mg/dL    Protein Total 6.8 5.8 - 7.6 gm/dL    Albumin 2.9 (L) 3.4 - 4.8 g/dL    Globulin 3.9 (H) 2.4 - 3.5 gm/dL    Albumin/Globulin Ratio 0.7 (L) 1.1 - 2.0 ratio    Bilirubin Total 0.6 <=1.5 mg/dL    ALP 67 40 - 150 unit/L    ALT 16 0 - 55 unit/L    AST 12 5 - 34 unit/L    eGFR >60 mL/min/1.73/m2    Anion Gap 13.0 mEq/L    BUN/Creatinine Ratio 52    CBC with Differential    Collection Time: 06/05/24  2:58 AM   Result Value Ref Range    WBC 15.87 (H) 4.50 - 11.50 x10(3)/mcL    RBC 3.74 (L) 4.20 - 5.40 x10(6)/mcL    Hgb 11.3 (L) 12.0 - 16.0 g/dL    Hct 34.4 (L) 37.0 - 47.0 %    MCV 92.0 80.0 - 94.0 fL    MCH 30.2 27.0 - 31.0 pg    MCHC 32.8 (L) 33.0 - 36.0 g/dL    RDW 12.8 11.5 - 17.0 %    Platelet 341 130 - 400 x10(3)/mcL    MPV 10.7 (H) 7.4 - 10.4 fL    Neut % 86.9 %    Lymph % 3.5 %    Mono % 7.9 %    Eos % 0.1 %    Basophil % 0.2 %    Lymph # 0.55 (L) 0.6 - 4.6 x10(3)/mcL    Neut # 13.80 (H) 2.1 - 9.2 x10(3)/mcL    Mono # 1.25 0.1 - 1.3 x10(3)/mcL    Eos # 0.02 0 - 0.9 x10(3)/mcL    Baso # 0.03 <=0.2 x10(3)/mcL    IG# 0.22 (H) 0 - 0.04 x10(3)/mcL    IG% 1.4 %    NRBC% 0.0 %   EKG 12-lead    Collection Time: 06/05/24  9:13 PM   Result Value Ref Range    QRS Duration 140 ms    [facility] QTC Calculation 546 ms   Basic Metabolic Panel    Collection Time: 06/05/24  9:35 PM   Result Value Ref Range    Sodium 141 136 - 145 mmol/L    Potassium 3.3 (L) 3.5 - 5.1 mmol/L    Chloride 100 98 - 107 mmol/L    CO2 29 23 - 31 mmol/L    Glucose 210 (H) 82 - 115 mg/dL    Blood Urea Nitrogen 47.0 (H) 9.8 - 20.1 mg/dL    Creatinine 0.88 0.55 - 1.02 mg/dL    BUN/Creatinine Ratio 53     Calcium 10.2 8.4 - 10.2 mg/dL    Anion Gap 12.0 mEq/L    eGFR >60 mL/min/1.73/m2   Troponin I    Collection Time: 06/05/24  9:35 PM   Result Value Ref Range    Troponin-I 0.032 0.000 - 0.045 ng/mL   Magnesium    Collection Time: 06/05/24  9:35 PM   Result Value Ref Range    Magnesium Level 2.10 1.60 - 2.60 mg/dL   Comprehensive Metabolic Panel     Collection Time: 06/06/24  3:25 AM   Result Value Ref Range    Sodium 140 136 - 145 mmol/L    Potassium 3.7 3.5 - 5.1 mmol/L    Chloride 100 98 - 107 mmol/L    CO2 28 23 - 31 mmol/L    Glucose 227 (H) 82 - 115 mg/dL    Blood Urea Nitrogen 55.5 (H) 9.8 - 20.1 mg/dL    Creatinine 0.95 0.55 - 1.02 mg/dL    Calcium 10.0 8.4 - 10.2 mg/dL    Protein Total 6.3 5.8 - 7.6 gm/dL    Albumin 2.6 (L) 3.4 - 4.8 g/dL    Globulin 3.7 (H) 2.4 - 3.5 gm/dL    Albumin/Globulin Ratio 0.7 (L) 1.1 - 2.0 ratio    Bilirubin Total 0.4 <=1.5 mg/dL    ALP 63 40 - 150 unit/L    ALT 12 0 - 55 unit/L    AST 12 5 - 34 unit/L    eGFR >60 mL/min/1.73/m2    Anion Gap 12.0 mEq/L    BUN/Creatinine Ratio 58    CBC with Differential    Collection Time: 06/06/24  3:25 AM   Result Value Ref Range    WBC 15.30 (H) 4.50 - 11.50 x10(3)/mcL    RBC 3.63 (L) 4.20 - 5.40 x10(6)/mcL    Hgb 10.8 (L) 12.0 - 16.0 g/dL    Hct 33.6 (L) 37.0 - 47.0 %    MCV 92.6 80.0 - 94.0 fL    MCH 29.8 27.0 - 31.0 pg    MCHC 32.1 (L) 33.0 - 36.0 g/dL    RDW 12.9 11.5 - 17.0 %    Platelet 306 130 - 400 x10(3)/mcL    MPV 10.8 (H) 7.4 - 10.4 fL    Neut % 83.4 %    Lymph % 3.6 %    Mono % 10.1 %    Eos % 0.1 %    Basophil % 0.2 %    Lymph # 0.55 (L) 0.6 - 4.6 x10(3)/mcL    Neut # 12.77 (H) 2.1 - 9.2 x10(3)/mcL    Mono # 1.54 (H) 0.1 - 1.3 x10(3)/mcL    Eos # 0.01 0 - 0.9 x10(3)/mcL    Baso # 0.03 <=0.2 x10(3)/mcL    IG# 0.40 (H) 0 - 0.04 x10(3)/mcL    IG% 2.6 %    NRBC% 0.0 %         EKG:       Telemetry:  Sinus Rhythm     Physical Exam  Vitals and nursing note reviewed.   Constitutional:       General: She is not in acute distress.     Appearance: She is ill-appearing.   HENT:      Head: Normocephalic.      Mouth/Throat:      Mouth: Mucous membranes are moist.      Pharynx: Oropharynx is clear.   Cardiovascular:      Rate and Rhythm: Normal rate.      Heart sounds: Normal heart sounds.   Pulmonary:      Effort: Pulmonary effort is normal. No respiratory distress.      Breath sounds:  Rhonchi present.      Comments: Vapotherm 60^ FIO2 25 L/Min  Abdominal:      Palpations: Abdomen is soft.   Musculoskeletal:         General: Normal range of motion.      Cervical back: Neck supple.      Right lower leg: No edema.      Left lower leg: No edema.   Skin:     General: Skin is warm and dry.   Neurological:      General: No focal deficit present.      Mental Status: She is alert. Mental status is at baseline.   Psychiatric:         Behavior: Behavior normal.       Home Medications:   No current facility-administered medications on file prior to encounter.     Current Outpatient Medications on File Prior to Encounter   Medication Sig Dispense Refill    amLODIPine (NORVASC) 10 MG tablet Take 10 mg by mouth 2 (two) times daily.      aspirin 325 MG tablet Take 1 tablet by mouth once daily.      carvediloL (COREG) 12.5 MG tablet Take 1 tablet (12.5 mg total) by mouth 2 (two) times daily with meals. (Patient taking differently: Take 25 mg by mouth 2 (two) times daily with meals.)      cyanocobalamin, vitamin B-12, 2,000 mcg Tab Take 1 tablet by mouth once daily.      cyclobenzaprine (FLEXERIL) 10 MG tablet Take 10 mg by mouth 3 (three) times daily.      docusate sodium (STOOL SOFTENER ORAL) Take 2 capsules by mouth 2 (two) times a day.      furosemide (LASIX) 40 MG tablet Take 40 mg by mouth once daily.      L. acidophilus/Bifid. animalis (DAILY PROBIOTIC ORAL) Take 1 capsule by mouth once daily.      rosuvastatin (CRESTOR) 40 MG Tab Take 40 mg by mouth every evening.      sertraline (ZOLOFT) 50 MG tablet Take 100 mg by mouth every evening.      traMADoL (ULTRAM) 50 mg tablet Take 50 mg by mouth 3 (three) times daily.      cholecalciferol, vitamin D3, (VITAMIN D3) 50 mcg (2,000 unit) Cap capsule Take 1 capsule by mouth once daily.      hydrALAZINE (APRESOLINE) 25 MG tablet Take 25 mg by mouth every 8 (eight) hours.      potassium gluconate 600 mg (99 mg) Tab Take 650 mg by mouth once.       Current Inpatient  Medications:    Current Facility-Administered Medications:     0.9%  NaCl infusion, , Intravenous, PRN, Vishnu Stevenson MD, Last Rate: 5 mL/hr at 06/06/24 0408, Rate Verify at 06/06/24 0408    Amino acid 4.25% - dextrose 5% (CLINIMIX-E) solution (1L provides 42.5 gm AA, 50 gm CHO (170 kcal/L dextrose), Na 35, K 30, Mg 5, Ca 4.5, Acetate 70, Cl 39, Phos 15), , Intravenous, Continuous, DOUGIE Brown MD    atorvastatin tablet 40 mg, 40 mg, Oral, Daily, Marga Smith MD, 40 mg at 06/06/24 0841    dexmedetomidine (PRECEDEX) 400mcg/100mL 0.9% NaCL infusion, 0-1.4 mcg/kg/hr, Intravenous, Continuous, Marga Smith MD    diltiaZEM 125 mg in dextrose 5 % 125 mL IVPB (ready to mix) (titrating), 0-15 mg/hr, Intravenous, Continuous, Jaime Chavira Q., NP, Last Rate: 5 mL/hr at 06/06/24 0453, 5 mg/hr at 06/06/24 0453    docusate 50 mg/5 mL liquid 50 mg, 50 mg, Oral, Daily, Marga Smith MD, 50 mg at 06/03/24 0810    enoxaparin injection 40 mg, 40 mg, Subcutaneous, Q12H, Marga Smith MD, 40 mg at 06/06/24 0841    furosemide injection 20 mg, 20 mg, Intravenous, Q12H, DOUGIE Brown MD, 20 mg at 06/06/24 0841    gabapentin capsule 600 mg, 600 mg, Oral, BID, Marga Smith MD, 600 mg at 06/06/24 0841    hydrALAZINE injection 10 mg, 10 mg, Intravenous, Q6H PRN, Eleno Abbasi MD, 10 mg at 06/05/24 2106    hydrocortisone sodium succinate injection 100 mg, 100 mg, Intravenous, Q8H, DOUGIE Brown MD, 100 mg at 06/06/24 0600    HYDROmorphone (PF) injection 0.2 mg, 0.2 mg, Intravenous, Q6H PRN, Ky Linn DO, 0.2 mg at 06/05/24 0029    labetaloL injection 10 mg, 10 mg, Intravenous, Q4H PRN, Baldo Finney DO, 10 mg at 06/05/24 1738    metoprolol injection 5 mg, 5 mg, Intravenous, Once, Héctor Mercado DO    piperacillin-tazobactam (ZOSYN) 4.5 g in dextrose 5 % in water (D5W) 100 mL IVPB (MB+), 4.5 g, Intravenous, Q8H, DOUGIE Brown MD, Stopped at 06/06/24 0512    sertraline tablet 100 mg, 100 mg, Oral, QHS, Marga Smith MD,  100 mg at 06/05/24 2047    traMADoL tablet 50 mg, 50 mg, Oral, TID, Vishnu Stevenson MD, 50 mg at 06/06/24 0830  VTE Risk Mitigation (From admission, onward)           Ordered     enoxaparin injection 40 mg  Every 12 hours         06/01/24 2247     IP VTE HIGH RISK PATIENT  Once         06/01/24 2247                  Assessment:   Atrial Fibrillation with RVR (New Onset)- Now Sinus Rhythm     - [id number]: 7    - History of PSVT  Acute on Chronic Diastolic HF    - EF 55-60% with Grade I Diastolic Dysfunction  Valvular Heart Disease    - AS: Status Post TAVR  23  mm S3 ultra valve, nominal prep,  Right TF approach (2.27.23)  CAD/CABG    - LIMA to LAD (Patent) with 50% Stenosis Native LCX  Hypertension  Hyperlipidemia  Chronic Left Bundle Branch Block ([place] TAVR)  MELCHOR/CEA  Pneumonia  History of CVA  DNR Status     Plan:   Start Cardizem IR 60 Mg PO TID with Hold Parameters.  Wean Cardizem Infusion off once Oral Form Given  Recommend Initiation of FD Lovenox or Eliquis 5 Mg PO BID when safe from Primary Perspective Re: AF/Stroke Risk Reduction  Continue Diuretics as Clinically Indicated  K+ Goal 4 Mag Goal 2  Antibiotic Management as per Primary Team  Will follow     Thank you for your consult.     Nataly Mead, STEF  Cardiology  [facility] - 7th Floor ICU  06/06/2024

## 2024-06-06 NOTE — PT/OT/SLP PROGRESS
Physical Therapy Treatment    Patient Name:  Alanna Moya   MRN:  [id number]    Recommendations:     Discharge therapy intensity: Moderate Intensity Therapy   Discharge Equipment Recommendations: other (see comments) (TBD)  Barriers to discharge: Impaired mobility and Ongoing medical needs    Assessment:     Alanna Moya is a 78 y.o. female admitted with a medical diagnosis of SOB, respiratory failure, pneumonia, pleural effusions.  She presents with the following impairments/functional limitations: weakness, gait instability, impaired endurance, impaired balance, impaired cardiopulmonary response to activity, impaired self care skills, impaired functional mobility.    Pt currently on Cardizem drip 2/2 SVT overnight. Cleared to continue therapy. Kept on telemetry and SPO2 probe t/o session to monitor vitals with activity.  Tolerated fairly well.     Rehab Prognosis: Good; patient would benefit from acute skilled PT services to address these deficits and reach maximum level of function.    Recent Surgery: * No surgery found *      Plan:     During this hospitalization, patient would benefit from acute PT services 5 x/week to address the identified rehab impairments via gait training, therapeutic activities, therapeutic exercises and progress toward the following goals:    Plan of Care Expires:  07/04/24    Subjective     Chief Complaint: tired of the bed  Patient/Family Comments/goals: to get better  Pain/Comfort:  Pain Rating 1: 0/10      Objective:     Communicated with nurse prior to session.  Patient found HOB elevated with pressure relief boots, telemetry, pulse ox (continuous), oxygen upon PT entry to room.     General Precautions: Standard, fall  Orthopedic Precautions: N/A  Braces: N/A  Respiratory Status: High flow, flow 35 L/min, concentration 80%  Blood Pressure: 151/92  HR: 79 at rest; 87 with activity  SPO2: 93% at rest; 87-95% with activity  Skin Integrity: Visible skin  intact      Functional Mobility:  Bed Mobility:    Supine to sit with min assist  Scooting to EOB with mod assist  Rolling GAYE to change chucks 2/2 void and BM   Transfers:    Sit<->stand with min assist  Stand step bed to recliner with min assist x 2.     Therapeutic Activities/Exercises:  Donned brief to t/f to chair 2/2 frequent loose stools.     Education:  Patient provided with verbal education education regarding PT role/goals/POC, fall prevention, safety awareness, and discharge/DME recommendations.  Understanding was verbalized.     Patient left up in chair with all lines intact, call button in reach, and nurse present    GOALS:   Multidisciplinary Problems       Physical Therapy Goals          Problem: Physical Therapy    Goal Priority Disciplines Outcome Goal Variances Interventions   Physical Therapy Goal     PT, PT/OT Progressing     Description: Goals to be met by: 24     Patient will increase functional independence with mobility by performin. Supine to sit with Stand-by Assistance  2. Sit to supine with Stand-by Assistance  3. Sit to stand transfer with Stand-by Assistance  4. Bed to chair transfer with Stand-by Assistance using Rolling Walker  5. Gait  x 150 feet with Stand-by Assistance using Rolling Walker.                          Time Tracking:     PT Received On: 24  PT Start Time: 840     PT Stop Time: 09  PT Total Time (min): 25 min     Billable Minutes: Therapeutic Activity 2 units    Treatment Type: Treatment  PT/PTA: PTA     Number of PTA visits since last PT visit: 2     2024

## 2024-06-06 NOTE — PLAN OF CARE
Problem: Adult Inpatient Plan of Care  Goal: Plan of Care Review  6/6/2024 0226 by Masoud Hong RN  Outcome: Progressing  6/6/2024 0226 by Masoud Hong RN  Outcome: Progressing  Goal: Patient-Specific Goal (Individualized)  6/6/2024 0226 by Masoud Hong RN  Outcome: Progressing  6/6/2024 0226 by Masoud Hong RN  Outcome: Progressing  Goal: Absence of Hospital-Acquired Illness or Injury  6/6/2024 0226 by Masoud Hong RN  Outcome: Progressing  6/6/2024 0226 by Masoud Hong RN  Outcome: Progressing  Goal: Optimal Comfort and Wellbeing  6/6/2024 0226 by Masoud Hong RN  Outcome: Progressing  6/6/2024 0226 by Masoud Hong RN  Outcome: Progressing  Goal: Readiness for Transition of Care  6/6/2024 0226 by Masoud Hong RN  Outcome: Progressing  6/6/2024 0226 by Masoud Hong RN  Outcome: Progressing     Problem: Infection  Goal: Absence of Infection Signs and Symptoms  6/6/2024 0226 by Masoud Hong RN  Outcome: Progressing  6/6/2024 0226 by Masoud Hong RN  Outcome: Progressing     Problem: Skin Injury Risk Increased  Goal: Skin Health and Integrity  6/6/2024 0226 by Masoud Hong RN  Outcome: Progressing  6/6/2024 0226 by Masoud Hong RN  Outcome: Progressing     Problem: Fall Injury Risk  Goal: Absence of Fall and Fall-Related Injury  6/6/2024 0226 by Masoud Hong RN  Outcome: Progressing  6/6/2024 0226 by Masoud Hong RN  Outcome: Progressing

## 2024-06-06 NOTE — H&P (VIEW-ONLY)
Inpatient consult to Cardiology  Consult performed by: Nataly Mead FNP  Consult ordered by: Vishnu Stevenson MD  Reason for consult: SVT        [facility] - 7th Floor ICU    Cardiology  Consult Note    Patient Name: Alanna Moya  MRN: [id number]  Admission Date: 6/1/2024  Hospital Length of Stay: 5 days  Code Status: DNR   Attending Provider: DOUGIE Brown MD   Consulting Provider: STEF Iqbal  Primary Care Physician: Cornel Haddad MD  Principal Problem:<principal problem not specified>    Patient information was obtained from past medical records, ER records, and primary team.     Subjective:   Reason for Consult: SVT    HPI:   Ms. Moya is a 78 year old female, known to Dr. Ramon in [place], who presented to the hospital from [facility] as transfer due to respiratory failure. Upon arrival she was noted to be SOB. Noted hypoxia requiring BIPAP. Chesr Radiograph from outside facility revealed bilateral airspace opacification and increase his right-sided pleural effusion suggestive of worsening pneumonia or possibly edema. CT Chest revealed mixed picture of decompensated HF, pleural effusions and superimposed pneumonia. During this hospitalization patient with tachycardia concerning for AF RVR, with noted Left Bundle Branch Block. She was given IV Metoprolol and started on Cardizem Infusion for Acute HR Control. She does have history of PSVT. Echocardiogram on 6.2.24 revealed intact LV Function with EF 55-60% & Grade I DD. Electrolytes grossly stable. Troponin normal. Lactic Acid Normal. . CIS Consulted for AF Management.    PMH: CAD/CABG, Hypertension, Dyslipidemia, MELCHOR, CEA, CVA, AS/TAVR, PSVT  PSH: LHC, CABG, TAVR, CEA, Hysterectomy, Cholecystectomy, Eye Surgery, Hand Surgery  Family History: Father- Old MI/CAD, Mother- HF, Son- Hypertension, Daughter- Cancer/Hypertension, Brother- Heart Disease, Brother- Old MI/CAD, Brother- Cancer  Social History: Tobacco-  Negative, Alcohol- Negative, Substance Abuse- Negative     Previous Cardiac Diagnostics:   Echocardiogram (6.2.24):  Left Ventricle: The left ventricle is normal in size. Increased wall thickness. There is normal systolic function with a visually estimated ejection fraction of 55 - 60%. Grade I diastolic dysfunction.  Right Ventricle: Systolic function is reduced.TAPSE is 1.60 cm.  Aortic Valve: There is a transcatheter valve replacement in the aortic position. Aortic valve area by VTI is 1.56 cm². Aortic valve peak velocity is 1.94 m/s. Mean gradient is 8 mmHg. The dimensionless index is 0.50.  Mitral Valve: Mildly thickened leaflets. There is mitral annular calcification present. There is mild stenosis. The mean pressure gradient across the mitral valve is 5 mmHg at a heart rate of 87 bpm. There is mild regurgitation.  Tricuspid Valve: The tricuspid valve is structurally normal. There is mild regurgitation.  Pulmonic Valve: The pulmonic valve is structurally normal.  Pulmonary Artery: The estimated pulmonary artery systolic pressure is 47 mmHg.  IVC/SVC: Normal venous pressure at 3 mmHg.  Pericardium: Left pleural effusion.    Echocardiogram (5.15.24):  EF 55-60%. Grade I Diastolic Dysfunction. Normal LV Wall Motion.  Mild MAC with Mild MR and No MS. Mild to Moderate TR.  Mild to Moderate AI, No AS.    CT Angiogram Head/Neck (5.15.24):  Carotid arteries:   Calcified plaque at the siphons without significant narrowing.   Normal A1 and M1 segments.   Vertebrobasilar Circulation:   Vertebral arteries: Patent. Calcified plaque on the right without significant narrowing. Otherwise no abnormalities.   Basilar artery: Patent, no abnormalities.   Posterior cerebral arteries:  Patent. Right fetal origin. Otherwise no abnormalities.     Echocardiogram (4.21.23):  The study quality is good.   The left ventricle is normal in size. Global left ventricular systolic function is normal. The left ventricular ejection fraction by  Power's is 62%. The left ventricle diastolic function is impaired (Grade II) with an elevated left atrial pressure. Noted left ventricular hypertrophy. Concentric left ventricular hypertrophy is present. It is mild. LVSI=41ml/m2.    LVEDV=67.2ml and LVESV=25.7ml.  The left atrial diameter is moderately increased. Left atrial diameter is 4.4 cms. The left atrium is severely enlarged based on the left atrium volume index of 48.2ml/m².  S/P TAVR. Bioprosthetic aortic valve is well seated and functions normally with no evidence of insufficiency or perivalvular leaks. JACINTA=2.0cm2. The trans-aortic peak gradient is 15.8 mmHg. The trans-aortic mean gradient is 8.6 mmHg. DI=1.1.  Mild mitral annular calcification is noted. The mean trans mitral gradient is 1.9 mmHg.  Mild to moderate (1-2+) mitral regurgitation. Mild to moderate (1-2+) tricuspid regurgitation.  The estimated pulmonary artery systolic pressure is 41 mmHg assuming a right atrial pressure of 3 mmHg. Evidence of pulmonary hypertension is noted.     MCT Monitor (3.15.23):  Predominant Rhythm SR.  PSVT    TAVR (2.27.23):  TAVR  23  mm S3 ultra valve, nominal prep,  Right TF approach  Limited echo pre, post valve placement  Root angiography  Distal aortography  Temporary pacemaker placement removal  Manta closure  Right CFA access site.  U/S guided bilateral right groin access   Balloon angioplasty of right CFA with a 5 x 40 Napoleon.    LHC (1.26.23):  LM: Distal 50% Stenosis, LAD: Occluded Mid, LCX: Ostial 50% at Mid Portion, RCA: Dominant Patent Arter.  JENNINGS to LAD is Patent.  Impression: LIMA to LAD- Patent, 50% Stenosis LCX Artery  Medical Management.    Review of patient's allergies indicates:  No Known Allergies  No current facility-administered medications on file prior to encounter.     Current Outpatient Medications on File Prior to Encounter   Medication Sig    amLODIPine (NORVASC) 10 MG tablet Take 10 mg by mouth 2 (two) times daily.    aspirin 325 MG  tablet Take 1 tablet by mouth once daily.    carvediloL (COREG) 12.5 MG tablet Take 1 tablet (12.5 mg total) by mouth 2 (two) times daily with meals. (Patient taking differently: Take 25 mg by mouth 2 (two) times daily with meals.)    cyanocobalamin, vitamin B-12, 2,000 mcg Tab Take 1 tablet by mouth once daily.    cyclobenzaprine (FLEXERIL) 10 MG tablet Take 10 mg by mouth 3 (three) times daily.    docusate sodium (STOOL SOFTENER ORAL) Take 2 capsules by mouth 2 (two) times a day.    furosemide (LASIX) 40 MG tablet Take 40 mg by mouth once daily.    L. acidophilus/Bifid. animalis (DAILY PROBIOTIC ORAL) Take 1 capsule by mouth once daily.    rosuvastatin (CRESTOR) 40 MG Tab Take 40 mg by mouth every evening.    sertraline (ZOLOFT) 50 MG tablet Take 100 mg by mouth every evening.    traMADoL (ULTRAM) 50 mg tablet Take 50 mg by mouth 3 (three) times daily.    cholecalciferol, vitamin D3, (VITAMIN D3) 50 mcg (2,000 unit) Cap capsule Take 1 capsule by mouth once daily.    hydrALAZINE (APRESOLINE) 25 MG tablet Take 25 mg by mouth every 8 (eight) hours.    potassium gluconate 600 mg (99 mg) Tab Take 650 mg by mouth once.     Review of Systems   Constitutional:  Positive for fatigue.   Respiratory:  Negative for chest tightness.         Intermittent SOB   Cardiovascular:  Negative for chest pain.   All other systems reviewed and are negative.    Objective:     Vital Signs (Most Recent):  Temp: 98.2 °F (36.8 °C) (06/06/24 0800)  Pulse: 80 (06/06/24 0400)  Resp: (!) 25 (06/06/24 0830)  BP: (!) 150/73 (06/06/24 0400)  SpO2: 98 % (06/06/24 0400) Vital Signs (24h Range):  Temp:  [97.8 °F (36.6 °C)-98.6 °F (37 °C)] 98.2 °F (36.8 °C)  Pulse:  [] 80  Resp:  [12-34] 25  SpO2:  [87 %-98 %] 98 %  BP: (107-192)/() 150/73   Weight: 77.1 kg (170 lb)  Body mass index is 31.09 kg/m².  SpO2: 98 %       Intake/Output Summary (Last 24 hours) at 6/6/2024 0971  Last data filed at 6/6/2024 0408  Gross per 24 hour   Intake 1174.67 ml    Output 1250 ml   Net -75.33 ml     Lines/Drains/Airways       Peripherally Inserted Central Catheter Line  Duration             PICC Double Lumen 05/31/24 1500 right basilic 5 days              Drain  Duration             Female External Urinary Catheter w/ Suction 06/02/24 0400 4 days              Peripheral Intravenous Line  Duration                  Peripheral IV - Single Lumen 05/30/24 2000 20 G Anterior;Left Forearm 6 days                  Significant Labs:  Recent Results (from the past 72 hour(s))   Vancomycin, Random    Collection Time: 06/03/24 10:29 AM   Result Value Ref Range    Vancomycin Random 11.8 (L) 15.0 - 20.0 ug/ml   Basic Metabolic Panel    Collection Time: 06/03/24 10:29 AM   Result Value Ref Range    Sodium 142 136 - 145 mmol/L    Potassium 3.1 (L) 3.5 - 5.1 mmol/L    Chloride 96 (L) 98 - 107 mmol/L    CO2 31 23 - 31 mmol/L    Glucose 174 (H) 82 - 115 mg/dL    Blood Urea Nitrogen 65.1 (H) 9.8 - 20.1 mg/dL    Creatinine 1.26 (H) 0.55 - 1.02 mg/dL    BUN/Creatinine Ratio 52     Calcium 9.7 8.4 - 10.2 mg/dL    Anion Gap 15.0 mEq/L    eGFR 44 mL/min/1.73/m2   CBC with Differential    Collection Time: 06/03/24  1:55 PM   Result Value Ref Range    WBC 12.81 (H) 4.50 - 11.50 x10(3)/mcL    RBC 3.79 (L) 4.20 - 5.40 x10(6)/mcL    Hgb 11.3 (L) 12.0 - 16.0 g/dL    Hct 34.2 (L) 37.0 - 47.0 %    MCV 90.2 80.0 - 94.0 fL    MCH 29.8 27.0 - 31.0 pg    MCHC 33.0 33.0 - 36.0 g/dL    RDW 12.5 11.5 - 17.0 %    Platelet 341 130 - 400 x10(3)/mcL    MPV 10.1 7.4 - 10.4 fL    Neut % 83.6 %    Lymph % 5.6 %    Mono % 9.4 %    Eos % 0.0 %    Basophil % 0.2 %    Lymph # 0.72 0.6 - 4.6 x10(3)/mcL    Neut # 10.71 (H) 2.1 - 9.2 x10(3)/mcL    Mono # 1.20 0.1 - 1.3 x10(3)/mcL    Eos # 0.00 0 - 0.9 x10(3)/mcL    Baso # 0.02 <=0.2 x10(3)/mcL    IG# 0.16 (H) 0 - 0.04 x10(3)/mcL    IG% 1.2 %    NRBC% 0.0 %   Comprehensive Metabolic Panel    Collection Time: 06/04/24  4:54 AM   Result Value Ref Range    Sodium 143 136 - 145  mmol/L    Potassium 2.8 (L) 3.5 - 5.1 mmol/L    Chloride 96 (L) 98 - 107 mmol/L    CO2 34 (H) 23 - 31 mmol/L    Glucose 233 (H) 82 - 115 mg/dL    Blood Urea Nitrogen 57.6 (H) 9.8 - 20.1 mg/dL    Creatinine 1.03 (H) 0.55 - 1.02 mg/dL    Calcium 10.3 (H) 8.4 - 10.2 mg/dL    Protein Total 7.5 5.8 - 7.6 gm/dL    Albumin 3.4 3.4 - 4.8 g/dL    Globulin 4.1 (H) 2.4 - 3.5 gm/dL    Albumin/Globulin Ratio 0.8 (L) 1.1 - 2.0 ratio    Bilirubin Total 0.7 <=1.5 mg/dL    ALP 78 40 - 150 unit/L    ALT 21 0 - 55 unit/L    AST 14 5 - 34 unit/L    eGFR 56 mL/min/1.73/m2    Anion Gap 13.0 mEq/L    BUN/Creatinine Ratio 56    CBC with Differential    Collection Time: 06/04/24  4:54 AM   Result Value Ref Range    WBC 14.86 (H) 4.50 - 11.50 x10(3)/mcL    RBC 3.99 (L) 4.20 - 5.40 x10(6)/mcL    Hgb 12.0 12.0 - 16.0 g/dL    Hct 36.2 (L) 37.0 - 47.0 %    MCV 90.7 80.0 - 94.0 fL    MCH 30.1 27.0 - 31.0 pg    MCHC 33.1 33.0 - 36.0 g/dL    RDW 12.3 11.5 - 17.0 %    Platelet 377 130 - 400 x10(3)/mcL    MPV 10.1 7.4 - 10.4 fL    Neut % 87.4 %    Lymph % 4.3 %    Mono % 6.6 %    Eos % 0.3 %    Basophil % 0.1 %    Lymph # 0.64 0.6 - 4.6 x10(3)/mcL    Neut # 12.99 (H) 2.1 - 9.2 x10(3)/mcL    Mono # 0.98 0.1 - 1.3 x10(3)/mcL    Eos # 0.05 0 - 0.9 x10(3)/mcL    Baso # 0.01 <=0.2 x10(3)/mcL    IG# 0.19 (H) 0 - 0.04 x10(3)/mcL    IG% 1.3 %    NRBC% 0.0 %   VANCOMYCIN, TROUGH    Collection Time: 06/04/24  1:25 PM   Result Value Ref Range    Vancomycin Trough 24.7 (H) 15.0 - 20.0 ug/ml   MRSA PCR    Collection Time: 06/04/24  4:33 PM   Result Value Ref Range    MRSA PCR Screen Not Detected Not Detected   Comprehensive Metabolic Panel    Collection Time: 06/05/24  2:58 AM   Result Value Ref Range    Sodium 142 136 - 145 mmol/L    Potassium 3.4 (L) 3.5 - 5.1 mmol/L    Chloride 98 98 - 107 mmol/L    CO2 31 23 - 31 mmol/L    Glucose 225 (H) 82 - 115 mg/dL    Blood Urea Nitrogen 46.9 (H) 9.8 - 20.1 mg/dL    Creatinine 0.90 0.55 - 1.02 mg/dL    Calcium 10.1 8.4 -  10.2 mg/dL    Protein Total 6.8 5.8 - 7.6 gm/dL    Albumin 2.9 (L) 3.4 - 4.8 g/dL    Globulin 3.9 (H) 2.4 - 3.5 gm/dL    Albumin/Globulin Ratio 0.7 (L) 1.1 - 2.0 ratio    Bilirubin Total 0.6 <=1.5 mg/dL    ALP 67 40 - 150 unit/L    ALT 16 0 - 55 unit/L    AST 12 5 - 34 unit/L    eGFR >60 mL/min/1.73/m2    Anion Gap 13.0 mEq/L    BUN/Creatinine Ratio 52    CBC with Differential    Collection Time: 06/05/24  2:58 AM   Result Value Ref Range    WBC 15.87 (H) 4.50 - 11.50 x10(3)/mcL    RBC 3.74 (L) 4.20 - 5.40 x10(6)/mcL    Hgb 11.3 (L) 12.0 - 16.0 g/dL    Hct 34.4 (L) 37.0 - 47.0 %    MCV 92.0 80.0 - 94.0 fL    MCH 30.2 27.0 - 31.0 pg    MCHC 32.8 (L) 33.0 - 36.0 g/dL    RDW 12.8 11.5 - 17.0 %    Platelet 341 130 - 400 x10(3)/mcL    MPV 10.7 (H) 7.4 - 10.4 fL    Neut % 86.9 %    Lymph % 3.5 %    Mono % 7.9 %    Eos % 0.1 %    Basophil % 0.2 %    Lymph # 0.55 (L) 0.6 - 4.6 x10(3)/mcL    Neut # 13.80 (H) 2.1 - 9.2 x10(3)/mcL    Mono # 1.25 0.1 - 1.3 x10(3)/mcL    Eos # 0.02 0 - 0.9 x10(3)/mcL    Baso # 0.03 <=0.2 x10(3)/mcL    IG# 0.22 (H) 0 - 0.04 x10(3)/mcL    IG% 1.4 %    NRBC% 0.0 %   EKG 12-lead    Collection Time: 06/05/24  9:13 PM   Result Value Ref Range    QRS Duration 140 ms    [facility] QTC Calculation 546 ms   Basic Metabolic Panel    Collection Time: 06/05/24  9:35 PM   Result Value Ref Range    Sodium 141 136 - 145 mmol/L    Potassium 3.3 (L) 3.5 - 5.1 mmol/L    Chloride 100 98 - 107 mmol/L    CO2 29 23 - 31 mmol/L    Glucose 210 (H) 82 - 115 mg/dL    Blood Urea Nitrogen 47.0 (H) 9.8 - 20.1 mg/dL    Creatinine 0.88 0.55 - 1.02 mg/dL    BUN/Creatinine Ratio 53     Calcium 10.2 8.4 - 10.2 mg/dL    Anion Gap 12.0 mEq/L    eGFR >60 mL/min/1.73/m2   Troponin I    Collection Time: 06/05/24  9:35 PM   Result Value Ref Range    Troponin-I 0.032 0.000 - 0.045 ng/mL   Magnesium    Collection Time: 06/05/24  9:35 PM   Result Value Ref Range    Magnesium Level 2.10 1.60 - 2.60 mg/dL   Comprehensive Metabolic Panel     Collection Time: 06/06/24  3:25 AM   Result Value Ref Range    Sodium 140 136 - 145 mmol/L    Potassium 3.7 3.5 - 5.1 mmol/L    Chloride 100 98 - 107 mmol/L    CO2 28 23 - 31 mmol/L    Glucose 227 (H) 82 - 115 mg/dL    Blood Urea Nitrogen 55.5 (H) 9.8 - 20.1 mg/dL    Creatinine 0.95 0.55 - 1.02 mg/dL    Calcium 10.0 8.4 - 10.2 mg/dL    Protein Total 6.3 5.8 - 7.6 gm/dL    Albumin 2.6 (L) 3.4 - 4.8 g/dL    Globulin 3.7 (H) 2.4 - 3.5 gm/dL    Albumin/Globulin Ratio 0.7 (L) 1.1 - 2.0 ratio    Bilirubin Total 0.4 <=1.5 mg/dL    ALP 63 40 - 150 unit/L    ALT 12 0 - 55 unit/L    AST 12 5 - 34 unit/L    eGFR >60 mL/min/1.73/m2    Anion Gap 12.0 mEq/L    BUN/Creatinine Ratio 58    CBC with Differential    Collection Time: 06/06/24  3:25 AM   Result Value Ref Range    WBC 15.30 (H) 4.50 - 11.50 x10(3)/mcL    RBC 3.63 (L) 4.20 - 5.40 x10(6)/mcL    Hgb 10.8 (L) 12.0 - 16.0 g/dL    Hct 33.6 (L) 37.0 - 47.0 %    MCV 92.6 80.0 - 94.0 fL    MCH 29.8 27.0 - 31.0 pg    MCHC 32.1 (L) 33.0 - 36.0 g/dL    RDW 12.9 11.5 - 17.0 %    Platelet 306 130 - 400 x10(3)/mcL    MPV 10.8 (H) 7.4 - 10.4 fL    Neut % 83.4 %    Lymph % 3.6 %    Mono % 10.1 %    Eos % 0.1 %    Basophil % 0.2 %    Lymph # 0.55 (L) 0.6 - 4.6 x10(3)/mcL    Neut # 12.77 (H) 2.1 - 9.2 x10(3)/mcL    Mono # 1.54 (H) 0.1 - 1.3 x10(3)/mcL    Eos # 0.01 0 - 0.9 x10(3)/mcL    Baso # 0.03 <=0.2 x10(3)/mcL    IG# 0.40 (H) 0 - 0.04 x10(3)/mcL    IG% 2.6 %    NRBC% 0.0 %         EKG:       Telemetry:  Sinus Rhythm     Physical Exam  Vitals and nursing note reviewed.   Constitutional:       General: She is not in acute distress.     Appearance: She is ill-appearing.   HENT:      Head: Normocephalic.      Mouth/Throat:      Mouth: Mucous membranes are moist.      Pharynx: Oropharynx is clear.   Cardiovascular:      Rate and Rhythm: Normal rate.      Heart sounds: Normal heart sounds.   Pulmonary:      Effort: Pulmonary effort is normal. No respiratory distress.      Breath sounds:  Rhonchi present.      Comments: Vapotherm 60^ FIO2 25 L/Min  Abdominal:      Palpations: Abdomen is soft.   Musculoskeletal:         General: Normal range of motion.      Cervical back: Neck supple.      Right lower leg: No edema.      Left lower leg: No edema.   Skin:     General: Skin is warm and dry.   Neurological:      General: No focal deficit present.      Mental Status: She is alert. Mental status is at baseline.   Psychiatric:         Behavior: Behavior normal.       Home Medications:   No current facility-administered medications on file prior to encounter.     Current Outpatient Medications on File Prior to Encounter   Medication Sig Dispense Refill    amLODIPine (NORVASC) 10 MG tablet Take 10 mg by mouth 2 (two) times daily.      aspirin 325 MG tablet Take 1 tablet by mouth once daily.      carvediloL (COREG) 12.5 MG tablet Take 1 tablet (12.5 mg total) by mouth 2 (two) times daily with meals. (Patient taking differently: Take 25 mg by mouth 2 (two) times daily with meals.)      cyanocobalamin, vitamin B-12, 2,000 mcg Tab Take 1 tablet by mouth once daily.      cyclobenzaprine (FLEXERIL) 10 MG tablet Take 10 mg by mouth 3 (three) times daily.      docusate sodium (STOOL SOFTENER ORAL) Take 2 capsules by mouth 2 (two) times a day.      furosemide (LASIX) 40 MG tablet Take 40 mg by mouth once daily.      L. acidophilus/Bifid. animalis (DAILY PROBIOTIC ORAL) Take 1 capsule by mouth once daily.      rosuvastatin (CRESTOR) 40 MG Tab Take 40 mg by mouth every evening.      sertraline (ZOLOFT) 50 MG tablet Take 100 mg by mouth every evening.      traMADoL (ULTRAM) 50 mg tablet Take 50 mg by mouth 3 (three) times daily.      cholecalciferol, vitamin D3, (VITAMIN D3) 50 mcg (2,000 unit) Cap capsule Take 1 capsule by mouth once daily.      hydrALAZINE (APRESOLINE) 25 MG tablet Take 25 mg by mouth every 8 (eight) hours.      potassium gluconate 600 mg (99 mg) Tab Take 650 mg by mouth once.       Current Inpatient  Medications:    Current Facility-Administered Medications:     0.9%  NaCl infusion, , Intravenous, PRN, Vishnu Stevenson MD, Last Rate: 5 mL/hr at 06/06/24 0408, Rate Verify at 06/06/24 0408    Amino acid 4.25% - dextrose 5% (CLINIMIX-E) solution (1L provides 42.5 gm AA, 50 gm CHO (170 kcal/L dextrose), Na 35, K 30, Mg 5, Ca 4.5, Acetate 70, Cl 39, Phos 15), , Intravenous, Continuous, DOUGIE Brown MD    atorvastatin tablet 40 mg, 40 mg, Oral, Daily, Marga Smith MD, 40 mg at 06/06/24 0841    dexmedetomidine (PRECEDEX) 400mcg/100mL 0.9% NaCL infusion, 0-1.4 mcg/kg/hr, Intravenous, Continuous, Marga Smith MD    diltiaZEM 125 mg in dextrose 5 % 125 mL IVPB (ready to mix) (titrating), 0-15 mg/hr, Intravenous, Continuous, Jaime Chavira Q., NP, Last Rate: 5 mL/hr at 06/06/24 0453, 5 mg/hr at 06/06/24 0453    docusate 50 mg/5 mL liquid 50 mg, 50 mg, Oral, Daily, Marga Smith MD, 50 mg at 06/03/24 0810    enoxaparin injection 40 mg, 40 mg, Subcutaneous, Q12H, Marga Smith MD, 40 mg at 06/06/24 0841    furosemide injection 20 mg, 20 mg, Intravenous, Q12H, DOUGIE Brown MD, 20 mg at 06/06/24 0841    gabapentin capsule 600 mg, 600 mg, Oral, BID, Marga Smith MD, 600 mg at 06/06/24 0841    hydrALAZINE injection 10 mg, 10 mg, Intravenous, Q6H PRN, Eleno Abbasi MD, 10 mg at 06/05/24 2106    hydrocortisone sodium succinate injection 100 mg, 100 mg, Intravenous, Q8H, DOUGIE Brown MD, 100 mg at 06/06/24 0600    HYDROmorphone (PF) injection 0.2 mg, 0.2 mg, Intravenous, Q6H PRN, Ky Linn DO, 0.2 mg at 06/05/24 0029    labetaloL injection 10 mg, 10 mg, Intravenous, Q4H PRN, Baldo Finney DO, 10 mg at 06/05/24 1738    metoprolol injection 5 mg, 5 mg, Intravenous, Once, Héctor Mercado DO    piperacillin-tazobactam (ZOSYN) 4.5 g in dextrose 5 % in water (D5W) 100 mL IVPB (MB+), 4.5 g, Intravenous, Q8H, DOUGIE Brown MD, Stopped at 06/06/24 0512    sertraline tablet 100 mg, 100 mg, Oral, QHS, Marga Smith MD,  100 mg at 06/05/24 2047    traMADoL tablet 50 mg, 50 mg, Oral, TID, Vishnu Stevenson MD, 50 mg at 06/06/24 0830  VTE Risk Mitigation (From admission, onward)           Ordered     enoxaparin injection 40 mg  Every 12 hours         06/01/24 2247     IP VTE HIGH RISK PATIENT  Once         06/01/24 2247                  Assessment:   Atrial Fibrillation with RVR (New Onset)- Now Sinus Rhythm     - [id number]: 7    - History of PSVT  Acute on Chronic Diastolic HF    - EF 55-60% with Grade I Diastolic Dysfunction  Valvular Heart Disease    - AS: Status Post TAVR  23  mm S3 ultra valve, nominal prep,  Right TF approach (2.27.23)  CAD/CABG    - LIMA to LAD (Patent) with 50% Stenosis Native LCX  Hypertension  Hyperlipidemia  Chronic Left Bundle Branch Block ([address] TAVR)  MELCHOR/CEA  Pneumonia  History of CVA  DNR Status     Plan:   Start Cardizem IR 60 Mg PO TID with Hold Parameters.  Wean Cardizem Infusion off once Oral Form Given  Recommend Initiation of FD Lovenox or Eliquis 5 Mg PO BID when safe from Primary Perspective Re: AF/Stroke Risk Reduction  Continue Diuretics as Clinically Indicated  K+ Goal 4 Mag Goal 2  Antibiotic Management as per Primary Team  Will follow     Thank you for your consult.     Nataly Mead, STEF  Cardiology  [facility] - 7th Floor ICU  06/06/2024

## 2024-06-06 NOTE — NURSING
Nurses Note -- 4 Eyes      6/5/2024   9:52 PM      Skin assessed during: Q Shift Change      [x] No Altered Skin Integrity Present    [x]Prevention Measures Documented      [] Yes- Altered Skin Integrity Present or Discovered   [] LDA Added if Not in Epic (Describe Wound)   [] New Altered Skin Integrity was Present on Admit and Documented in LDA   [] Wound Image Taken    Wound Care Consulted? No    Attending Nurse:  Masoud SUMMERS    Second RN/Staff Member:  Fernando SUMMERS

## 2024-06-06 NOTE — PLAN OF CARE
Problem: Adult Inpatient Plan of Care  Goal: Plan of Care Review  Outcome: Progressing  Goal: Patient-Specific Goal (Individualized)  Outcome: Progressing  Goal: Absence of Hospital-Acquired Illness or Injury  Outcome: Progressing  Goal: Optimal Comfort and Wellbeing  Outcome: Progressing  Goal: Readiness for Transition of Care  Outcome: Progressing     Problem: Infection  Goal: Absence of Infection Signs and Symptoms  Outcome: Progressing     Problem: Skin Injury Risk Increased  Goal: Skin Health and Integrity  Outcome: Progressing     Problem: Fall Injury Risk  Goal: Absence of Fall and Fall-Related Injury  Outcome: Progressing

## 2024-06-06 NOTE — PLAN OF CARE
Problem: Adult Inpatient Plan of Care  Goal: Plan of Care Review  6/6/2024 0227 by Masoud Hong RN  Outcome: Progressing  6/6/2024 0226 by Masoud Hong RN  Outcome: Progressing  6/6/2024 0226 by Masoud Hong RN  Outcome: Progressing  Goal: Patient-Specific Goal (Individualized)  6/6/2024 0227 by Masoud Hong RN  Outcome: Progressing  6/6/2024 0226 by Masoud Hong RN  Outcome: Progressing  6/6/2024 0226 by Masoud Hong RN  Outcome: Progressing  Goal: Absence of Hospital-Acquired Illness or Injury  6/6/2024 0227 by Masoud Hong RN  Outcome: Progressing  6/6/2024 0226 by Masoud Hong RN  Outcome: Progressing  6/6/2024 0226 by Masoud Hong RN  Outcome: Progressing  Goal: Optimal Comfort and Wellbeing  6/6/2024 0227 by Masoud Hong RN  Outcome: Progressing  6/6/2024 0226 by Masoud Hong RN  Outcome: Progressing  6/6/2024 0226 by Masoud Hong RN  Outcome: Progressing  Goal: Readiness for Transition of Care  6/6/2024 0227 by Masoud Hong RN  Outcome: Progressing  6/6/2024 0226 by Masoud Hong RN  Outcome: Progressing  6/6/2024 0226 by Masoud Hong RN  Outcome: Progressing     Problem: Infection  Goal: Absence of Infection Signs and Symptoms  6/6/2024 0227 by Masoud Hong RN  Outcome: Progressing  6/6/2024 0226 by Masoud Hong RN  Outcome: Progressing  6/6/2024 0226 by Masoud Hong RN  Outcome: Progressing     Problem: Skin Injury Risk Increased  Goal: Skin Health and Integrity  6/6/2024 0227 by Masoud Hong RN  Outcome: Progressing  6/6/2024 0226 by Masoud Hong RN  Outcome: Progressing  6/6/2024 0226 by Masoud Hong RN  Outcome: Progressing     Problem: Fall Injury Risk  Goal: Absence of Fall and Fall-Related Injury  6/6/2024 0227 by Masoud Hong, RN  Outcome: Progressing  6/6/2024 0226 by Masoud Hong, RN  Outcome: Progressing  6/6/2024 0226 by Masoud Hong, RN  Outcome: Progressing

## 2024-06-06 NOTE — PLAN OF CARE
Problem: Adult Inpatient Plan of Care  Goal: Plan of Care Review  6/6/2024 0408 by Masoud Hong RN  Outcome: Progressing  6/6/2024 0227 by Masoud Hong RN  Outcome: Progressing  6/6/2024 0226 by Masoud Hong RN  Outcome: Progressing  6/6/2024 0226 by Masoud Hong RN  Outcome: Progressing  Goal: Patient-Specific Goal (Individualized)  6/6/2024 0408 by Masoud Hong RN  Outcome: Progressing  6/6/2024 0227 by Masoud Hong RN  Outcome: Progressing  6/6/2024 0226 by Masoud Hong RN  Outcome: Progressing  6/6/2024 0226 by Masoud Hong RN  Outcome: Progressing  Goal: Absence of Hospital-Acquired Illness or Injury  6/6/2024 0408 by Masoud Hong RN  Outcome: Progressing  6/6/2024 0227 by Masoud Hong RN  Outcome: Progressing  6/6/2024 0226 by Masoud Hong RN  Outcome: Progressing  6/6/2024 0226 by Masoud Hong RN  Outcome: Progressing  Goal: Optimal Comfort and Wellbeing  6/6/2024 0408 by Masoud Hong RN  Outcome: Progressing  6/6/2024 0227 by Masoud Hong RN  Outcome: Progressing  6/6/2024 0226 by Masoud Hong RN  Outcome: Progressing  6/6/2024 0226 by Masoud Hong RN  Outcome: Progressing  Goal: Readiness for Transition of Care  6/6/2024 0408 by Masoud Hong RN  Outcome: Progressing  6/6/2024 0227 by Masoud Hong RN  Outcome: Progressing  6/6/2024 0226 by Masoud Hong RN  Outcome: Progressing  6/6/2024 0226 by Masoud Hong RN  Outcome: Progressing     Problem: Infection  Goal: Absence of Infection Signs and Symptoms  6/6/2024 0408 by Masoud Hong RN  Outcome: Progressing  6/6/2024 0227 by Masoud Hong RN  Outcome: Progressing  6/6/20242024 0226 by Masoud Hong RN  Outcome: Progressing  6/6/2024 0226 by Masoud Hong RN  Outcome: Progressing     Problem: Skin Injury Risk Increased  Goal: Skin Health and Integrity  6/6/2024 0408 by Masoud Hong RN  Outcome: Progressing  6/6/2024 0227 by Masoud Hong RN  Outcome:  Progressing  6/6/2024 0226 by Masoud Hong RN  Outcome: Progressing  6/6/2024 0226 by Masoud Hong RN  Outcome: Progressing     Problem: Fall Injury Risk  Goal: Absence of Fall and Fall-Related Injury  6/6/2024 0408 by Masoud Hong RN  Outcome: Progressing  6/6/2024 0227 by Masoud Hong RN  Outcome: Progressing  6/6/2024 0226 by Masoud Hong RN  Outcome: Progressing  6/6/2024 0226 by Masoud Hong RN  Outcome: Progressing

## 2024-06-06 NOTE — PROGRESS NOTES
[facility] - 7th Floor ICU  Pulmonary Critical Care Note    Patient Name: Alanna Moya  MRN: [id number]  Admission Date: 6/1/2024  Hospital Length of Stay: 5 days  Code Status: DNR  Attending Provider: DOUGIE Brown MD  Primary Care Provider: Cornel Haddad MD     Subjective:     HPI:   78-year-old female who presents from [facility] as a transfer with past medical history of CHF, aortic valve replacement, CAD, MI (2018), hypertension, diverticular disease, and CVA for pulmonology services.  On presentation patient looks uncomfortable in his very short of breath.  She answers questions in small sentences.  Her oxygen saturation on room air is around 83%.  She arrived on BiPAP at 14/8.  Currently saturating 92%.  Patient reports for the last 2 months she has had more shortness of breath.  She is a CHF patient who is on Lasix.  She could  not tell me the rest of her medications.  Patient reports that she has noticed her diuretics sometimes work sometimes they do not.  She does report that she was given diuretics at [facility] and she had  adequate urination.  Per chart review from hospital above, chest x-rays showed bilateral airspace opacification and increase his right-sided pleural effusion suggestive of worsening pneumonia or possibly edema.  Echo from 5/16 with EF of 55-60%.  Patient states she lives at home with her son.  He was or next of kin and POA.  Patient also reports she wishes to be DNR.  Patient also had hospitalization at [facility] about 2 weeks ago for facial weakness and hypertension.  Chest x-ray at that time not available for review but radiologist reports clear lung fields.  Patient also had autoimmune workup which was all negative.    Hospital Course/Significant events:  Patient awake and alert still requiring high-flow O2.  On BiPAP overnight.  Tolerating Vapotherm this morning.  Chest x-ray yesterday showed improved bilateral infiltrates.  She complains of being thirsty this  morning.  She is awake and alert follows commands.  Down to 75% Vapotherm this morning.      Past Medical History:   Diagnosis Date    Abdominal pain, unspecified site     Aortic stenosis     Coronary atherosclerosis of unspecified type of vessel, native or graft     Esophageal reflux     Fatigue     History of glaucoma     History of heart attack     Hypertension     Lumbosacral spondylosis without myelopathy     Other and unspecified hyperlipidemia     Rectocele     SOB (shortness of breath) on exertion     Unspecified essential hypertension     Weakness        Past Surgical History:   Procedure Laterality Date    CHOLECYSTECTOMY      CORONARY ARTERY BYPASS GRAFT      GLAUCOMA SURGERY Bilateral     HYSTERECTOMY      TRANSCATHETER AORTIC VALVE REPLACEMENT (TAVR) N/A 2/27/2023    Procedure: REPLACEMENT, AORTIC VALVE, TRANSCATHETER (TAVR);  Surgeon: Anselmo Arango MD;  Location: [facility] CATH LAB;  Service: Cardiology;  Laterality: N/A;  TAVR W/ ANEST.       Social History     Socioeconomic History    Marital status:    Tobacco Use    Smoking status: Never    Smokeless tobacco: Never   Substance and Sexual Activity    Alcohol use: No    Drug use: No    Sexual activity: Never     Birth control/protection: Surgical     Social Determinants of Health     Financial Resource Strain: Low Risk  (5/3/2021)    Received from [facility]    Overall Financial Resource Strain (CARDIA)     Difficulty of Paying Living Expenses: Not hard at all   Food Insecurity: No Food Insecurity (5/3/2021)    Received from [facility]    Hunger Vital Sign     Worried About Running Out of Food in the Last Year: Never true     Ran Out of Food in the Last Year: Never true   Transportation Needs: No Transportation Needs (5/3/2021)    Received from [facility]    PRAPARE - Transportation     Lack of Transportation (Medical): No     Lack of Transportation (Non-Medical): No   Physical Activity: Sufficiently Active (5/3/2021)    [facility]    Exercise Vital Sign     Days of Exercise per Week: 2 days     Minutes of Exercise per Session: 130 min   Stress: Stress Concern Present (5/3/2021)    Received from [facility] - Occupational Stress Questionnaire     Feeling of Stress : Very much           Current Outpatient Medications   Medication Instructions    amLODIPine (NORVASC) 10 mg, Oral, 2 times daily    aspirin 325 MG tablet 1 tablet, Oral, Daily    carvediloL (COREG) 12.5 mg, Oral, 2 times daily with meals    cholecalciferol, vitamin D3, (VITAMIN D3) 50 mcg (2,000 unit) Cap capsule 1 capsule, Oral, Daily    cyanocobalamin, vitamin B-12, 2,000 mcg Tab 1 tablet, Oral, Daily    cyclobenzaprine (FLEXERIL) 10 mg, Oral, 3 times daily    docusate sodium (STOOL SOFTENER ORAL) 2 capsules, Oral, 2 times daily    furosemide (LASIX) 40 mg, Oral, Daily    hydrALAZINE (APRESOLINE) 25 mg, Oral, Every 8 hours    L. acidophilus/Bifid. animalis (DAILY PROBIOTIC ORAL) 1 capsule, Oral, Daily    potassium gluconate 650 mg, Oral, Once    rosuvastatin (CRESTOR) 40 mg, Oral, Nightly    sertraline (ZOLOFT) 100 mg, Oral, Nightly    traMADoL (ULTRAM) 50 mg, Oral, 3 times daily       Current Inpatient Medications   atorvastatin  40 mg Oral Daily    docusate  50 mg Oral Daily    enoxparin  40 mg Subcutaneous Q12H    furosemide (LASIX) injection  20 mg Intravenous Q12H    gabapentin  600 mg Oral BID    hydrocortisone sodium succinate  100 mg Intravenous Q8H    metoprolol  5 mg Intravenous Once    piperacillin-tazobactam (Zosyn) IV (PEDS and ADULTS) (extended infusion is not appropriate)  4.5 g Intravenous Q8H    sertraline  100 mg Oral QHS    traMADoL  50 mg Oral TID       Current Intravenous Infusions   Amino acid 4.25% - dextrose 5% (CLINIMIX-E) solution (1L provides 42.5 gm AA, 50 gm CHO (170 kcal/L dextrose), Na 35, K 30, Mg 5, Ca 4.5, Acetate 70, Cl 39, Phos 15)   Intravenous Continuous 75 mL/hr at 06/06/24 0926 New Bag at  06/06/24 0926    dexmedeTOMIDine (Precedex) infusion (titrating)  0-1.4 mcg/kg/hr Intravenous Continuous        dilTIAZem  0-15 mg/hr Intravenous Continuous 5 mL/hr at 06/06/24 0453 5 mg/hr at 06/06/24 0453         Review of Systems   Respiratory:  Positive for cough and shortness of breath.           Objective:       Intake/Output Summary (Last 24 hours) at 6/6/2024 0951  Last data filed at 6/6/2024 0408  Gross per 24 hour   Intake 1174.67 ml   Output 1250 ml   Net -75.33 ml         Vital Signs (Most Recent):  Temp: 98.2 °F (36.8 °C) (06/06/24 0800)  Pulse: 80 (06/06/24 0400)  Resp: (!) 25 (06/06/24 0830)  BP: (!) 150/73 (06/06/24 0400)  SpO2: 98 % (06/06/24 0400)  Body mass index is 31.09 kg/m².  Weight: 77.1 kg (170 lb) Vital Signs (24h Range):  Temp:  [97.8 °F (36.6 °C)-98.6 °F (37 °C)] 98.2 °F (36.8 °C)  Pulse:  [] 80  Resp:  [12-34] 25  SpO2:  [87 %-98 %] 98 %  BP: (107-192)/() 150/73     Physical exam  General-elderly woman in no acute distress  HEENT-BiPAP in place.  Conjunctiva injected sclerae nonicteric  Chest-crackles heard anteriorly.  No wheezes  CV-regular rhythm  Abdomen-nondistended bowel sounds present  Extremities-minimal pretibial edema.  No cyanosis or clubbing  Neuro-awake and alert.  Follows commands.  No focal deficits noted        Lines/Drains/Airways       Peripherally Inserted Central Catheter Line  Duration             PICC Double Lumen 05/31/24 1500 right basilic 5 days              Drain  Duration             Female External Urinary Catheter w/ Suction 06/02/24 0400 4 days              Peripheral Intravenous Line  Duration                  Peripheral IV - Single Lumen 05/30/24 2000 20 G Anterior;Left Forearm 6 days                    Significant Labs:    Lab Results   Component Value Date    WBC 15.30 (H) 06/06/2024    HGB 10.8 (L) 06/06/2024    HCT 33.6 (L) 06/06/2024    MCV 92.6 06/06/2024     06/06/2024           BMP  Lab Results   Component Value Date      06/06/2024    K 3.7 06/06/2024    CHLORIDE 97 05/04/2021    CO2 28 06/06/2024    BUN 55.5 (H) 06/06/2024    CREATININE 0.95 06/06/2024    CALCIUM 10.0 06/06/2024    AGAP 12.0 06/06/2024    ESTGFRAFRICA 92 05/16/2024    EGFRNONAA 28.1 (A) 08/10/2017         ABG  Recent Labs   Lab 06/01/24  2314   PH 7.450   PO2 143.0*   PCO2 45.0   HCO3 31.3*   POCBASEDEF 6.50*       Mechanical Ventilation Support:  Oxygen Concentration (%): 100 (06/06/24 0335)      Significant Imaging:  I have reviewed the pertinent imaging within the past 24 hours.  Chest x-ray this morning shows improvement in the bilateral infiltrates.      Assessment/Plan:     Assessment  Hypoxemic respiratory failure requiring high-flow O2 alternating between BiPAP and Vapotherm  2.   New onset SVT this morning.  Potassium being replaced.  Bilateral pulmonary infiltrates with areas of consolidation concerning for pneumonia.  Bilateral pleural effusions and interstitial edema suggest an element CHF.    History previous CABG and AVR      Plan    Patient had excellent response to diuretics yesterday so will continue.  Monitor electrolytes closely.  Needs to mobilize.  LTAC eval underway.        DVT Prophylaxis: Lovenox  GI Prophylaxis: none     33 minutes of critical care was time spent personally by me on the following activities: development of treatment plan with patient or surrogate and bedside caregivers, discussions with consultants, evaluation of patient's response to treatment, examination of patient, ordering and performing treatments and interventions, ordering and review of laboratory studies, ordering and review of radiographic studies, pulse oximetry, re-evaluation of patient's condition.  This critical care time did not overlap with that of any other provider or involve time for any procedures.     JOESPH Brown MD  Pulmonary Critical Care Medicine  [facility] - 7th Floor ICU  DOS: 06/06/2024

## 2024-06-07 LAB
ALBUMIN SERPL-MCNC: 2.8 G/DL (ref 3.4–4.8)
ALBUMIN/GLOB SERPL: 0.8 RATIO (ref 1.1–2)
ALP SERPL-CCNC: 63 UNIT/L (ref 40–150)
ALT SERPL-CCNC: 13 UNIT/L (ref 0–55)
ANION GAP SERPL CALC-SCNC: 12 MEQ/L
ANION GAP SERPL CALC-SCNC: 12 MEQ/L
AST SERPL-CCNC: 15 UNIT/L (ref 5–34)
BACTERIA BLD CULT: NORMAL
BACTERIA BLD CULT: NORMAL
BASOPHILS # BLD AUTO: 0.03 X10(3)/MCL
BASOPHILS NFR BLD AUTO: 0.2 %
BILIRUB SERPL-MCNC: 0.6 MG/DL
BUN SERPL-MCNC: 37.9 MG/DL (ref 9.8–20.1)
BUN SERPL-MCNC: 43.5 MG/DL (ref 9.8–20.1)
CALCIUM SERPL-MCNC: 9 MG/DL (ref 8.4–10.2)
CALCIUM SERPL-MCNC: 9.3 MG/DL (ref 8.4–10.2)
CHLORIDE SERPL-SCNC: 100 MMOL/L (ref 98–107)
CHLORIDE SERPL-SCNC: 97 MMOL/L (ref 98–107)
CO2 SERPL-SCNC: 26 MMOL/L (ref 23–31)
CO2 SERPL-SCNC: 28 MMOL/L (ref 23–31)
CREAT SERPL-MCNC: 0.87 MG/DL (ref 0.55–1.02)
CREAT SERPL-MCNC: 0.89 MG/DL (ref 0.55–1.02)
CREAT/UREA NIT SERPL: 43
CREAT/UREA NIT SERPL: 50
EOSINOPHIL # BLD AUTO: 0.04 X10(3)/MCL (ref 0–0.9)
EOSINOPHIL NFR BLD AUTO: 0.3 %
ERYTHROCYTE [DISTWIDTH] IN BLOOD BY AUTOMATED COUNT: 13.1 % (ref 11.5–17)
GFR SERPLBLD CREATININE-BSD FMLA CKD-EPI: >60 ML/MIN/1.73/M2
GFR SERPLBLD CREATININE-BSD FMLA CKD-EPI: >60 ML/MIN/1.73/M2
GLOBULIN SER-MCNC: 3.7 GM/DL (ref 2.4–3.5)
GLUCOSE SERPL-MCNC: 147 MG/DL (ref 82–115)
GLUCOSE SERPL-MCNC: 242 MG/DL (ref 82–115)
HCT VFR BLD AUTO: 34.8 % (ref 37–47)
HGB BLD-MCNC: 11.2 G/DL (ref 12–16)
IMM GRANULOCYTES # BLD AUTO: 0.33 X10(3)/MCL (ref 0–0.04)
IMM GRANULOCYTES NFR BLD AUTO: 2.6 %
LYMPHOCYTES # BLD AUTO: 0.87 X10(3)/MCL (ref 0.6–4.6)
LYMPHOCYTES NFR BLD AUTO: 6.7 %
MAGNESIUM SERPL-MCNC: 1.9 MG/DL (ref 1.6–2.6)
MAGNESIUM SERPL-MCNC: 2.3 MG/DL (ref 1.6–2.6)
MCH RBC QN AUTO: 30.2 PG (ref 27–31)
MCHC RBC AUTO-ENTMCNC: 32.2 G/DL (ref 33–36)
MCV RBC AUTO: 93.8 FL (ref 80–94)
MONOCYTES # BLD AUTO: 1.08 X10(3)/MCL (ref 0.1–1.3)
MONOCYTES NFR BLD AUTO: 8.4 %
NEUTROPHILS # BLD AUTO: 10.57 X10(3)/MCL (ref 2.1–9.2)
NEUTROPHILS NFR BLD AUTO: 81.8 %
NRBC BLD AUTO-RTO: 0 %
PLATELET # BLD AUTO: 320 X10(3)/MCL (ref 130–400)
PMV BLD AUTO: 10.7 FL (ref 7.4–10.4)
POTASSIUM SERPL-SCNC: 3.2 MMOL/L (ref 3.5–5.1)
POTASSIUM SERPL-SCNC: 3.4 MMOL/L (ref 3.5–5.1)
PROT SERPL-MCNC: 6.5 GM/DL (ref 5.8–7.6)
RBC # BLD AUTO: 3.71 X10(6)/MCL (ref 4.2–5.4)
SODIUM SERPL-SCNC: 135 MMOL/L (ref 136–145)
SODIUM SERPL-SCNC: 140 MMOL/L (ref 136–145)
WBC # SPEC AUTO: 12.92 X10(3)/MCL (ref 4.5–11.5)

## 2024-06-07 PROCEDURE — 21400001 HC TELEMETRY ROOM

## 2024-06-07 PROCEDURE — 83735 ASSAY OF MAGNESIUM: CPT | Performed by: INTERNAL MEDICINE

## 2024-06-07 PROCEDURE — 25000003 PHARM REV CODE 250: Performed by: NURSE PRACTITIONER

## 2024-06-07 PROCEDURE — 63600175 PHARM REV CODE 636 W HCPCS: Performed by: INTERNAL MEDICINE

## 2024-06-07 PROCEDURE — 36415 COLL VENOUS BLD VENIPUNCTURE: CPT

## 2024-06-07 PROCEDURE — 80053 COMPREHEN METABOLIC PANEL: CPT

## 2024-06-07 PROCEDURE — 63600175 PHARM REV CODE 636 W HCPCS: Performed by: STUDENT IN AN ORGANIZED HEALTH CARE EDUCATION/TRAINING PROGRAM

## 2024-06-07 PROCEDURE — 97116 GAIT TRAINING THERAPY: CPT | Mod: CQ

## 2024-06-07 PROCEDURE — 25000003 PHARM REV CODE 250: Performed by: INTERNAL MEDICINE

## 2024-06-07 PROCEDURE — 94660 CPAP INITIATION&MGMT: CPT

## 2024-06-07 PROCEDURE — 25000003 PHARM REV CODE 250: Performed by: HOSPITALIST

## 2024-06-07 PROCEDURE — 83735 ASSAY OF MAGNESIUM: CPT | Performed by: NURSE PRACTITIONER

## 2024-06-07 PROCEDURE — 97535 SELF CARE MNGMENT TRAINING: CPT

## 2024-06-07 PROCEDURE — 85025 COMPLETE CBC W/AUTO DIFF WBC: CPT

## 2024-06-07 PROCEDURE — 99900035 HC TECH TIME PER 15 MIN (STAT)

## 2024-06-07 PROCEDURE — 94760 N-INVAS EAR/PLS OXIMETRY 1: CPT

## 2024-06-07 PROCEDURE — 27100171 HC OXYGEN HIGH FLOW UP TO 24 HOURS

## 2024-06-07 PROCEDURE — 94799 UNLISTED PULMONARY SVC/PX: CPT

## 2024-06-07 PROCEDURE — 97110 THERAPEUTIC EXERCISES: CPT | Mod: CQ

## 2024-06-07 PROCEDURE — 36415 COLL VENOUS BLD VENIPUNCTURE: CPT | Performed by: NURSE PRACTITIONER

## 2024-06-07 PROCEDURE — 99900031 HC PATIENT EDUCATION (STAT)

## 2024-06-07 PROCEDURE — 25000003 PHARM REV CODE 250

## 2024-06-07 RX ORDER — POTASSIUM CHLORIDE 20 MEQ/1
20 TABLET, EXTENDED RELEASE ORAL ONCE
Status: COMPLETED | OUTPATIENT
Start: 2024-06-07 | End: 2024-06-07

## 2024-06-07 RX ORDER — MAGNESIUM SULFATE HEPTAHYDRATE 40 MG/ML
2 INJECTION, SOLUTION INTRAVENOUS ONCE
Status: COMPLETED | OUTPATIENT
Start: 2024-06-07 | End: 2024-06-07

## 2024-06-07 RX ORDER — ATORVASTATIN CALCIUM 40 MG/1
40 TABLET, FILM COATED ORAL NIGHTLY
Status: DISCONTINUED | OUTPATIENT
Start: 2024-06-08 | End: 2024-06-09

## 2024-06-07 RX ORDER — METOPROLOL SUCCINATE 25 MG/1
25 TABLET, EXTENDED RELEASE ORAL DAILY
Status: DISCONTINUED | OUTPATIENT
Start: 2024-06-07 | End: 2024-06-08

## 2024-06-07 RX ORDER — METOPROLOL TARTRATE 1 MG/ML
5 INJECTION, SOLUTION INTRAVENOUS ONCE
Status: COMPLETED | OUTPATIENT
Start: 2024-06-07 | End: 2024-06-07

## 2024-06-07 RX ORDER — POTASSIUM CHLORIDE 20 MEQ/1
40 TABLET, EXTENDED RELEASE ORAL
Status: COMPLETED | OUTPATIENT
Start: 2024-06-07 | End: 2024-06-07

## 2024-06-07 RX ORDER — DILTIAZEM HYDROCHLORIDE 300 MG/1
300 CAPSULE, COATED, EXTENDED RELEASE ORAL DAILY
Status: DISCONTINUED | OUTPATIENT
Start: 2024-06-07 | End: 2024-06-08

## 2024-06-07 RX ADMIN — SERTRALINE HYDROCHLORIDE 100 MG: 50 TABLET ORAL at 08:06

## 2024-06-07 RX ADMIN — METOPROLOL TARTRATE 5 MG: 1 INJECTION, SOLUTION INTRAVENOUS at 12:06

## 2024-06-07 RX ADMIN — TRAMADOL HYDROCHLORIDE 50 MG: 50 TABLET, COATED ORAL at 09:06

## 2024-06-07 RX ADMIN — DILTIAZEM HYDROCHLORIDE 5 MG/HR: 5 INJECTION INTRAVENOUS at 05:06

## 2024-06-07 RX ADMIN — PIPERACILLIN SODIUM AND TAZOBACTAM SODIUM 4.5 G: 4; .5 INJECTION, POWDER, LYOPHILIZED, FOR SOLUTION INTRAVENOUS at 09:06

## 2024-06-07 RX ADMIN — DILTIAZEM HYDROCHLORIDE 60 MG: 60 TABLET, FILM COATED ORAL at 06:06

## 2024-06-07 RX ADMIN — APIXABAN 5 MG: 5 TABLET, FILM COATED ORAL at 08:06

## 2024-06-07 RX ADMIN — TRAMADOL HYDROCHLORIDE 50 MG: 50 TABLET, COATED ORAL at 08:06

## 2024-06-07 RX ADMIN — POTASSIUM CHLORIDE 20 MEQ: 1500 TABLET, EXTENDED RELEASE ORAL at 10:06

## 2024-06-07 RX ADMIN — MAGNESIUM SULFATE HEPTAHYDRATE 2 G: 40 INJECTION, SOLUTION INTRAVENOUS at 02:06

## 2024-06-07 RX ADMIN — ENOXAPARIN SODIUM 80 MG: 80 INJECTION SUBCUTANEOUS at 08:06

## 2024-06-07 RX ADMIN — FUROSEMIDE 20 MG: 10 INJECTION, SOLUTION INTRAMUSCULAR; INTRAVENOUS at 08:06

## 2024-06-07 RX ADMIN — PIPERACILLIN SODIUM AND TAZOBACTAM SODIUM 4.5 G: 4; .5 INJECTION, POWDER, LYOPHILIZED, FOR SOLUTION INTRAVENOUS at 01:06

## 2024-06-07 RX ADMIN — ATORVASTATIN CALCIUM 40 MG: 40 TABLET, FILM COATED ORAL at 08:06

## 2024-06-07 RX ADMIN — PIPERACILLIN SODIUM AND TAZOBACTAM SODIUM 4.5 G: 4; .5 INJECTION, POWDER, LYOPHILIZED, FOR SOLUTION INTRAVENOUS at 04:06

## 2024-06-07 RX ADMIN — GABAPENTIN 600 MG: 300 CAPSULE ORAL at 08:06

## 2024-06-07 RX ADMIN — POTASSIUM CHLORIDE 40 MEQ: 1500 TABLET, EXTENDED RELEASE ORAL at 12:06

## 2024-06-07 RX ADMIN — DILTIAZEM HYDROCHLORIDE 60 MG: 60 TABLET, FILM COATED ORAL at 01:06

## 2024-06-07 RX ADMIN — POTASSIUM CHLORIDE 40 MEQ: 1500 TABLET, EXTENDED RELEASE ORAL at 11:06

## 2024-06-07 RX ADMIN — METOPROLOL TARTRATE 5 MG: 1 INJECTION, SOLUTION INTRAVENOUS at 11:06

## 2024-06-07 RX ADMIN — DILTIAZEM HYDROCHLORIDE 300 MG: 300 CAPSULE, COATED, EXTENDED RELEASE ORAL at 02:06

## 2024-06-07 RX ADMIN — METOPROLOL SUCCINATE 25 MG: 25 TABLET, EXTENDED RELEASE ORAL at 03:06

## 2024-06-07 RX ADMIN — HYDROCORTISONE SODIUM SUCCINATE 100 MG: 100 INJECTION, POWDER, FOR SOLUTION INTRAMUSCULAR; INTRAVENOUS at 06:06

## 2024-06-07 NOTE — NURSING
Nurses Note -- 4 Eyes      6/6/2024   7:19 PM      Skin assessed during: Q Shift Change      [x] No Altered Skin Integrity Present    [x]Prevention Measures Documented      [] Yes- Altered Skin Integrity Present or Discovered   [] LDA Added if Not in Epic (Describe Wound)   [] New Altered Skin Integrity was Present on Admit and Documented in LDA   [] Wound Image Taken    Wound Care Consulted? No    Attending Nurse: Masoud SUMMERS    Second RN/Staff Member:  Kang SUMMERS

## 2024-06-07 NOTE — NURSING
Nurses Note -- 4 Eyes      6/7/2024   4:00 PM      Skin assessed during: Daily Assessment      [x] No Altered Skin Integrity Present    [x]Prevention Measures Documented      [] Yes- Altered Skin Integrity Present or Discovered   [] LDA Added if Not in Epic (Describe Wound)   [] New Altered Skin Integrity was Present on Admit and Documented in LDA   [] Wound Image Taken    Wound Care Consulted? No    Attending Nurse:  MELINA Christie     Second RN/Staff Member:  MELINA Peace

## 2024-06-07 NOTE — PLAN OF CARE
Problem: Adult Inpatient Plan of Care  Goal: Plan of Care Review  6/7/2024 0518 by Masoud Hong RN  Outcome: Progressing  6/6/2024 1920 by Masoud Hong RN  Outcome: Progressing  Goal: Patient-Specific Goal (Individualized)  6/7/2024 0518 by Masoud Hong RN  Outcome: Progressing  6/6/2024 1920 by Masoud Hong RN  Outcome: Progressing  Goal: Absence of Hospital-Acquired Illness or Injury  6/7/2024 0518 by Masoud Hong RN  Outcome: Progressing  6/6/2024 1920 by Masoud Hong RN  Outcome: Progressing  Goal: Optimal Comfort and Wellbeing  6/7/2024 0518 by Masoud Hong RN  Outcome: Progressing  6/6/2024 1920 by Masoud Hong RN  Outcome: Progressing  Goal: Readiness for Transition of Care  6/7/2024 0518 by Masoud Hong RN  Outcome: Progressing  6/6/2024 1920 by Masoud Hong RN  Outcome: Progressing     Problem: Infection  Goal: Absence of Infection Signs and Symptoms  6/7/2024 0518 by Masoud Hong RN  Outcome: Progressing  6/6/2024 1920 by Masoud Hong RN  Outcome: Progressing     Problem: Skin Injury Risk Increased  Goal: Skin Health and Integrity  6/7/2024 0518 by Masoud Hong RN  Outcome: Progressing  6/6/2024 1920 by Masoud Hong RN  Outcome: Progressing     Problem: Fall Injury Risk  Goal: Absence of Fall and Fall-Related Injury  6/7/2024 0518 by Masoud Hong RN  Outcome: Progressing  6/6/2024 1920 by Masoud Hong RN  Outcome: Progressing

## 2024-06-07 NOTE — PROGRESS NOTES
[facility] - 7th Floor ICU    Cardiology  Progress Note    Patient Name: Alanna Moya  MRN: [id number]  Admission Date: 6/1/2024  Hospital Length of Stay: 6 days  Code Status: DNR   Attending Physician: Augustus Barreto MD   Primary Care Physician: Cornel Haddad MD  Expected Discharge Date:   Principal Problem:<principal problem not specified>    Subjective:     Reason for Consult: SVT     HPI:Ms. Moya is a 78 year old female, known to Dr. Ramon in [place], who presented to the hospital from [facility] as transfer due to respiratory failure. Upon arrival she was noted to be SOB. Noted hypoxia requiring BIPAP. Chesr Radiograph from outside facility revealed bilateral airspace opacification and increase his right-sided pleural effusion suggestive of worsening pneumonia or possibly edema. CT Chest revealed mixed picture of decompensated HF, pleural effusions and superimposed pneumonia. During this hospitalization patient with tachycardia concerning for AF RVR, with noted Left Bundle Branch Block. She was given IV Metoprolol and started on Cardizem Infusion for Acute HR Control. She does have history of PSVT. Echocardiogram on 6.2.24 revealed intact LV Function with EF 55-60% & Grade I DD. Electrolytes grossly stable. Troponin normal. Lactic Acid Normal. . CIS Consulted for AF Management.       Hospital Course:   6.7.24: Patient seen sitting in chair. She denies SOB, CP, or nausea. Remains in Afib RVR     PMH: CAD/CABG, Hypertension, Dyslipidemia, MELCHOR, CEA, CVA, AS/TAVR, PSVT  PSH: LHC, CABG, TAVR, CEA, Hysterectomy, Cholecystectomy, Eye Surgery, Hand Surgery  Family History: Father- Old MI/CAD, Mother- HF, Son- Hypertension, Daughter- Cancer/Hypertension, Brother- Heart Disease, Brother- Old MI/CAD, Brother- Cancer  Social History: Tobacco- Negative, Alcohol- Negative, Substance Abuse- Negative      Previous Cardiac Diagnostics:   Echocardiogram (6.2.24):  Left Ventricle: The  left ventricle is normal in size. Increased wall thickness. There is normal systolic function with a visually estimated ejection fraction of 55 - 60%. Grade I diastolic dysfunction.  Right Ventricle: Systolic function is reduced.TAPSE is 1.60 cm.  Aortic Valve: There is a transcatheter valve replacement in the aortic position. Aortic valve area by VTI is 1.56 cm². Aortic valve peak velocity is 1.94 m/s. Mean gradient is 8 mmHg. The dimensionless index is 0.50.  Mitral Valve: Mildly thickened leaflets. There is mitral annular calcification present. There is mild stenosis. The mean pressure gradient across the mitral valve is 5 mmHg at a heart rate of 87 bpm. There is mild regurgitation.  Tricuspid Valve: The tricuspid valve is structurally normal. There is mild regurgitation.  Pulmonic Valve: The pulmonic valve is structurally normal.  Pulmonary Artery: The estimated pulmonary artery systolic pressure is 47 mmHg.  IVC/SVC: Normal venous pressure at 3 mmHg.  Pericardium: Left pleural effusion.     Echocardiogram (5.15.24):  EF 55-60%. Grade I Diastolic Dysfunction. Normal LV Wall Motion.  Mild MAC with Mild MR and No MS. Mild to Moderate TR.  Mild to Moderate AI, No AS.     CT Angiogram Head/Neck (5.15.24):  Carotid arteries:   Calcified plaque at the siphons without significant narrowing.   Normal A1 and M1 segments.   Vertebrobasilar Circulation:   Vertebral arteries: Patent. Calcified plaque on the right without significant narrowing. Otherwise no abnormalities.   Basilar artery: Patent, no abnormalities.   Posterior cerebral arteries:  Patent. Right fetal origin. Otherwise no abnormalities.      Echocardiogram (4.21.23):  The study quality is good.   The left ventricle is normal in size. Global left ventricular systolic function is normal. The left ventricular ejection fraction by Power's is 62%. The left ventricle diastolic function is impaired (Grade II) with an elevated left atrial pressure. Noted left  ventricular hypertrophy. Concentric left ventricular hypertrophy is present. It is mild. LVSI=41ml/m2.    LVEDV=67.2ml and LVESV=25.7ml.  The left atrial diameter is moderately increased. Left atrial diameter is 4.4 cms. The left atrium is severely enlarged based on the left atrium volume index of 48.2ml/m².  S/P TAVR. Bioprosthetic aortic valve is well seated and functions normally with no evidence of insufficiency or perivalvular leaks. JACINTA=2.0cm2. The trans-aortic peak gradient is 15.8 mmHg. The trans-aortic mean gradient is 8.6 mmHg. DI=1.1.  Mild mitral annular calcification is noted. The mean trans mitral gradient is 1.9 mmHg.  Mild to moderate (1-2+) mitral regurgitation. Mild to moderate (1-2+) tricuspid regurgitation.  The estimated pulmonary artery systolic pressure is 41 mmHg assuming a right atrial pressure of 3 mmHg. Evidence of pulmonary hypertension is noted.      MCT Monitor (3.15.23):  Predominant Rhythm SR.  PSVT     TAVR (2.27.23):  TAVR  23  mm S3 ultra valve, nominal prep,  Right TF approach  Limited echo pre, post valve placement  Root angiography  Distal aortography  Temporary pacemaker placement removal  Manta closure  Right CFA access site.  U/S guided bilateral right groin access   Balloon angioplasty of right CFA with a 5 x 40 Napoleon.     LHC (1.26.23):  LM: Distal 50% Stenosis, LAD: Occluded Mid, LCX: Ostial 50% at Mid Portion, RCA: Dominant Patent Arter.  JENNINGS to LAD is Patent.  Impression: LIMA to LAD- Patent, 50% Stenosis LCX Artery  Medical Management.    Review of Systems   Constitutional: Negative for chills and fever.   Cardiovascular:  Negative for chest pain, palpitations and syncope.   Respiratory:  Negative for cough and shortness of breath.    Skin: Negative.    Musculoskeletal: Negative.    Gastrointestinal:  Negative for abdominal pain.     Objective:     Vital Signs (Most Recent):  Temp: 97.2 °F (36.2 °C) (06/07/24 1200)  Pulse: (!) 137 (06/07/24 1200)  Resp: (!) 28  (06/07/24 1200)  BP: 136/74 (06/07/24 1200)  SpO2: 96 % (06/07/24 1200) Vital Signs (24h Range):  Temp:  [97.2 °F (36.2 °C)-98.3 °F (36.8 °C)] 97.2 °F (36.2 °C)  Pulse:  [] 137  Resp:  [14-28] 28  SpO2:  [85 %-99 %] 96 %  BP: (127-175)/(64-94) 136/74   Weight: 77.1 kg (170 lb)  Body mass index is 31.09 kg/m².  SpO2: 96 %       Intake/Output Summary (Last 24 hours) at 6/7/2024 1312  Last data filed at 6/7/2024 1200  Gross per 24 hour   Intake 926.15 ml   Output 1050 ml   Net -123.85 ml     Lines/Drains/Airways       Drain  Duration             Female External Urinary Catheter w/ Suction 06/02/24 0400 5 days              Peripheral Intravenous Line  Duration                  Peripheral IV - Single Lumen 06/07/24 0515 20 G Right Forearm <1 day                  Significant Labs:   Recent Results (from the past 72 hour(s))   VANCOMYCIN, TROUGH    Collection Time: 06/04/24  1:25 PM   Result Value Ref Range    Vancomycin Trough 24.7 (H) 15.0 - 20.0 ug/ml   MRSA PCR    Collection Time: 06/04/24  4:33 PM   Result Value Ref Range    MRSA PCR Screen Not Detected Not Detected   Comprehensive Metabolic Panel    Collection Time: 06/05/24  2:58 AM   Result Value Ref Range    Sodium 142 136 - 145 mmol/L    Potassium 3.4 (L) 3.5 - 5.1 mmol/L    Chloride 98 98 - 107 mmol/L    CO2 31 23 - 31 mmol/L    Glucose 225 (H) 82 - 115 mg/dL    Blood Urea Nitrogen 46.9 (H) 9.8 - 20.1 mg/dL    Creatinine 0.90 0.55 - 1.02 mg/dL    Calcium 10.1 8.4 - 10.2 mg/dL    Protein Total 6.8 5.8 - 7.6 gm/dL    Albumin 2.9 (L) 3.4 - 4.8 g/dL    Globulin 3.9 (H) 2.4 - 3.5 gm/dL    Albumin/Globulin Ratio 0.7 (L) 1.1 - 2.0 ratio    Bilirubin Total 0.6 <=1.5 mg/dL    ALP 67 40 - 150 unit/L    ALT 16 0 - 55 unit/L    AST 12 5 - 34 unit/L    eGFR >60 mL/min/1.73/m2    Anion Gap 13.0 mEq/L    BUN/Creatinine Ratio 52    CBC with Differential    Collection Time: 06/05/24  2:58 AM   Result Value Ref Range    WBC 15.87 (H) 4.50 - 11.50 x10(3)/mcL    RBC 3.74 (L)  4.20 - 5.40 x10(6)/mcL    Hgb 11.3 (L) 12.0 - 16.0 g/dL    Hct 34.4 (L) 37.0 - 47.0 %    MCV 92.0 80.0 - 94.0 fL    MCH 30.2 27.0 - 31.0 pg    MCHC 32.8 (L) 33.0 - 36.0 g/dL    RDW 12.8 11.5 - 17.0 %    Platelet 341 130 - 400 x10(3)/mcL    MPV 10.7 (H) 7.4 - 10.4 fL    Neut % 86.9 %    Lymph % 3.5 %    Mono % 7.9 %    Eos % 0.1 %    Basophil % 0.2 %    Lymph # 0.55 (L) 0.6 - 4.6 x10(3)/mcL    Neut # 13.80 (H) 2.1 - 9.2 x10(3)/mcL    Mono # 1.25 0.1 - 1.3 x10(3)/mcL    Eos # 0.02 0 - 0.9 x10(3)/mcL    Baso # 0.03 <=0.2 x10(3)/mcL    IG# 0.22 (H) 0 - 0.04 x10(3)/mcL    IG% 1.4 %    NRBC% 0.0 %   EKG 12-lead    Collection Time: 06/05/24  9:13 PM   Result Value Ref Range    QRS Duration 140 ms    OHS QTC Calculation 546 ms   Basic Metabolic Panel    Collection Time: 06/05/24  9:35 PM   Result Value Ref Range    Sodium 141 136 - 145 mmol/L    Potassium 3.3 (L) 3.5 - 5.1 mmol/L    Chloride 100 98 - 107 mmol/L    CO2 29 23 - 31 mmol/L    Glucose 210 (H) 82 - 115 mg/dL    Blood Urea Nitrogen 47.0 (H) 9.8 - 20.1 mg/dL    Creatinine 0.88 0.55 - 1.02 mg/dL    BUN/Creatinine Ratio 53     Calcium 10.2 8.4 - 10.2 mg/dL    Anion Gap 12.0 mEq/L    eGFR >60 mL/min/1.73/m2   Troponin I    Collection Time: 06/05/24  9:35 PM   Result Value Ref Range    Troponin-I 0.032 0.000 - 0.045 ng/mL   Magnesium    Collection Time: 06/05/24  9:35 PM   Result Value Ref Range    Magnesium Level 2.10 1.60 - 2.60 mg/dL   Comprehensive Metabolic Panel    Collection Time: 06/06/24  3:25 AM   Result Value Ref Range    Sodium 140 136 - 145 mmol/L    Potassium 3.7 3.5 - 5.1 mmol/L    Chloride 100 98 - 107 mmol/L    CO2 28 23 - 31 mmol/L    Glucose 227 (H) 82 - 115 mg/dL    Blood Urea Nitrogen 55.5 (H) 9.8 - 20.1 mg/dL    Creatinine 0.95 0.55 - 1.02 mg/dL    Calcium 10.0 8.4 - 10.2 mg/dL    Protein Total 6.3 5.8 - 7.6 gm/dL    Albumin 2.6 (L) 3.4 - 4.8 g/dL    Globulin 3.7 (H) 2.4 - 3.5 gm/dL    Albumin/Globulin Ratio 0.7 (L) 1.1 - 2.0 ratio    Bilirubin  Total 0.4 <=1.5 mg/dL    ALP 63 40 - 150 unit/L    ALT 12 0 - 55 unit/L    AST 12 5 - 34 unit/L    eGFR >60 mL/min/1.73/m2    Anion Gap 12.0 mEq/L    BUN/Creatinine Ratio 58    CBC with Differential    Collection Time: 06/06/24  3:25 AM   Result Value Ref Range    WBC 15.30 (H) 4.50 - 11.50 x10(3)/mcL    RBC 3.63 (L) 4.20 - 5.40 x10(6)/mcL    Hgb 10.8 (L) 12.0 - 16.0 g/dL    Hct 33.6 (L) 37.0 - 47.0 %    MCV 92.6 80.0 - 94.0 fL    MCH 29.8 27.0 - 31.0 pg    MCHC 32.1 (L) 33.0 - 36.0 g/dL    RDW 12.9 11.5 - 17.0 %    Platelet 306 130 - 400 x10(3)/mcL    MPV 10.8 (H) 7.4 - 10.4 fL    Neut % 83.4 %    Lymph % 3.6 %    Mono % 10.1 %    Eos % 0.1 %    Basophil % 0.2 %    Lymph # 0.55 (L) 0.6 - 4.6 x10(3)/mcL    Neut # 12.77 (H) 2.1 - 9.2 x10(3)/mcL    Mono # 1.54 (H) 0.1 - 1.3 x10(3)/mcL    Eos # 0.01 0 - 0.9 x10(3)/mcL    Baso # 0.03 <=0.2 x10(3)/mcL    IG# 0.40 (H) 0 - 0.04 x10(3)/mcL    IG% 2.6 %    NRBC% 0.0 %   Comprehensive Metabolic Panel    Collection Time: 06/07/24  6:45 AM   Result Value Ref Range    Sodium 140 136 - 145 mmol/L    Potassium 3.2 (L) 3.5 - 5.1 mmol/L    Chloride 100 98 - 107 mmol/L    CO2 28 23 - 31 mmol/L    Glucose 147 (H) 82 - 115 mg/dL    Blood Urea Nitrogen 43.5 (H) 9.8 - 20.1 mg/dL    Creatinine 0.87 0.55 - 1.02 mg/dL    Calcium 9.3 8.4 - 10.2 mg/dL    Protein Total 6.5 5.8 - 7.6 gm/dL    Albumin 2.8 (L) 3.4 - 4.8 g/dL    Globulin 3.7 (H) 2.4 - 3.5 gm/dL    Albumin/Globulin Ratio 0.8 (L) 1.1 - 2.0 ratio    Bilirubin Total 0.6 <=1.5 mg/dL    ALP 63 40 - 150 unit/L    ALT 13 0 - 55 unit/L    AST 15 5 - 34 unit/L    eGFR >60 mL/min/1.73/m2    Anion Gap 12.0 mEq/L    BUN/Creatinine Ratio 50    CBC with Differential    Collection Time: 06/07/24  6:45 AM   Result Value Ref Range    WBC 12.92 (H) 4.50 - 11.50 x10(3)/mcL    RBC 3.71 (L) 4.20 - 5.40 x10(6)/mcL    Hgb 11.2 (L) 12.0 - 16.0 g/dL    Hct 34.8 (L) 37.0 - 47.0 %    MCV 93.8 80.0 - 94.0 fL    MCH 30.2 27.0 - 31.0 pg    MCHC 32.2 (L) 33.0 -  36.0 g/dL    RDW 13.1 11.5 - 17.0 %    Platelet 320 130 - 400 x10(3)/mcL    MPV 10.7 (H) 7.4 - 10.4 fL    Neut % 81.8 %    Lymph % 6.7 %    Mono % 8.4 %    Eos % 0.3 %    Basophil % 0.2 %    Lymph # 0.87 0.6 - 4.6 x10(3)/mcL    Neut # 10.57 (H) 2.1 - 9.2 x10(3)/mcL    Mono # 1.08 0.1 - 1.3 x10(3)/mcL    Eos # 0.04 0 - 0.9 x10(3)/mcL    Baso # 0.03 <=0.2 x10(3)/mcL    IG# 0.33 (H) 0 - 0.04 x10(3)/mcL    IG% 2.6 %    NRBC% 0.0 %   Magnesium    Collection Time: 06/07/24  6:45 AM   Result Value Ref Range    Magnesium Level 1.90 1.60 - 2.60 mg/dL     Telemetry:  Afib RVR    Physical Exam  HENT:      Head: Normocephalic.      Mouth/Throat:      Mouth: Mucous membranes are moist.   Cardiovascular:      Rate and Rhythm: Tachycardia present. Rhythm irregular.   Pulmonary:      Effort: Pulmonary effort is normal. No respiratory distress.      Breath sounds: Normal breath sounds.   Abdominal:      General: Abdomen is flat.   Skin:     General: Skin is warm.   Neurological:      Mental Status: She is alert and oriented to person, place, and time.   Psychiatric:         Mood and Affect: Mood normal.         Behavior: Behavior normal.         Judgment: Judgment normal.       Current Inpatient Medications:    Current Facility-Administered Medications:     0.9%  NaCl infusion, , Intravenous, PRN, Vishnu Stevenson MD, Stopped at 06/07/24 0907    [START ON 6/8/2024] atorvastatin tablet 40 mg, 40 mg, Oral, QHS, Augustus Barreto MD    diltiaZEM tablet 60 mg, 60 mg, Oral, Q8H, Nataly Mead, FNP, 60 mg at 06/07/24 1307    docusate 50 mg/5 mL liquid 50 mg, 50 mg, Oral, Daily, Marga Smith MD, 50 mg at 06/03/24 0810    enoxaparin injection 80 mg, 1 mg/kg (Dosing Weight), Subcutaneous, Q12H, Marshal Alvarado MD, 80 mg at 06/07/24 0854    furosemide injection 20 mg, 20 mg, Intravenous, Q12H, DOUGIE Brown MD, 20 mg at 06/07/24 0854    gabapentin capsule 600 mg, 600 mg, Oral, BID, Marga Smith MD, 600 mg at 06/07/24 0854    hydrALAZINE  injection 10 mg, 10 mg, Intravenous, Q6H PRN, Eleno Abbasi MD, 10 mg at 06/06/24 1631    HYDROmorphone (PF) injection 0.2 mg, 0.2 mg, Intravenous, Q6H PRN, Ky Linn DO, 0.2 mg at 06/05/24 0029    labetaloL injection 10 mg, 10 mg, Intravenous, Q4H PRN, Baldo Finney DO, 10 mg at 06/06/24 1719    metoprolol injection 5 mg, 5 mg, Intravenous, Q4H PRN, Nataly Mead, FNP, 5 mg at 06/07/24 1140    piperacillin-tazobactam (ZOSYN) 4.5 g in dextrose 5 % in water (D5W) 100 mL IVPB (MB+), 4.5 g, Intravenous, Q8H, DOUGIE Brown MD, Last Rate: 25 mL/hr at 06/07/24 1200, Rate Verify at 06/07/24 1200    sertraline tablet 100 mg, 100 mg, Oral, QHS, Marga Smith MD, 100 mg at 06/06/24 2008    traMADoL tablet 50 mg, 50 mg, Oral, TID, Vishnu Stevenson MD, 50 mg at 06/07/24 0900  VTE Risk Mitigation (From admission, onward)           Ordered     enoxaparin injection 80 mg  Every 12 hours         06/06/24 1619     IP VTE HIGH RISK PATIENT  Once         06/01/24 2247                  Assessment:   Atrial Fibrillation with RVR (New Onset)- Now Sinus Rhythm     - [id number]: 7    - History of PSVT  Acute on Chronic Diastolic HF    - EF 55-60% with Grade I Diastolic Dysfunction  Valvular Heart Disease    - AS: Status Post TAVR  23  mm S3 ultra valve, nominal prep,  Right TF approach (2.27.23)  CAD/CABG    - LIMA to LAD (Patent) with 50% Stenosis Native LCX  Hypertension  Hyperlipidemia  Chronic Left Bundle Branch Block ([place] TAVR)  MELCHOR/CEA  Pneumonia  History of CVA  DNR Status       Plan:   Increase Cardizem CD to 300mg  Start Wliquis 5mg PO BID ~ okay with hospital medicine   Continue Diuretics as Clinically Indicated  K+ Goal 4 Mag Goal 2  Antibiotic Management as per Primary Team  Monitor tele closely  Will follow     Jennifer Lynch NP  Cardiology  [facility] - 7th Floor ICU  06/07/2024

## 2024-06-07 NOTE — PROGRESS NOTES
[facility] Medicine Progress Note      Mrs Moya is a 78-year-old lady with PMH of AS s/p AVR, CAD, mi, HTN, chronic back pain, HLD, CVA, CHF, GERD, diverticulosis who was being downgraded from the ICU to Hospital Medicine after she was initially admitted for acute hypoxemic respiratory failure requiring BiPAP for adequate saturations.  She was transferred from an outside facility to [facility] with complaints of worsening shortness of breath for the last few weeks with initial CXR at outside facility showing bilateral airspace opacities and increased right-sided pleural effusion.  Her last echocardiogram from 05/16 showed EF 55-60%.  She was placed on antibiotics, steroids as well as IV Lasix for diuresis for pulmonary congestion.  She was able to be weaned down from BiPAP to Vapotherm and eventually Oxymizer 8 L. she was also noted to have new onset atrial fibrillation for which he was started on IV Cardizem which has since been transitioned to p.o. Cardizem.  She is on full-dose Lovenox due to CHADS-VASc of 7.  She reported feeling significantly better and endorsed improvement in her shortness of breath since admission.  Denied having any fever, chills, chest pain, cough, sputum production, abdominal pain, nausea, vomiting, headache, numbness, weakness, dizziness/lightheadedness or loss of consciousness.  Blood cultures from admission are negative.  PT/OT on board; in recommending moderate intensity therapy.  Cardiology on board.       Today's information   Patient seen and examined at bedside, family member at bedside   Patient was no new complaints  Nurse reports elevated heart rate since after physical therapy  White cell count trending downwards appropriately at 12.9, hemoglobin levels 11.2, potassium 3.2    Discussed with nursing and        Exam  General: alert lady lying comfortably in bed, in no acute distress.  HENT: oral and oropharyngeal mucosa moist, pink,  with no erythema or exudates, no ear pain or discharge  Neck: normal neck movement, no lymph nodes or swellings, no JVD or Carotid bruit  Respiratory: clear breathing sounds bilaterally, no crackles, rales, ronchi or wheezes  Cardiovascular: clear S1 and S2, no murmurs, rubs or gallops  Peripheral Vascular: no lesions, ulcers or erosions, normal peripheral pulses and no pedal edema  Gastrointestinal: soft, non-tender, non-distended abdomen, no guarding, rigidity or rebound tenderness, normal bowel sounds  Integumentary: normal skin color, no rashes or lesions  Neuro: AAO x 3; motor strength 5/5 in B/L UEs & LEs; sensation intact to gross and fine touch B/L; CN II-XII grossly intact       ASSESSMENT/PLAN  Acute respiratory failure with hypoxia-requiring supplemental oxygen therapy-POA   Bilateral pneumonia/pulmonary infiltrates-bacteria-POA   JUN- POA  CHF with exacerbation and bilateral pleural effusions-POA   SVT-new onset, 06/06/2024  CAD-status post CABG-POA   Valvular heart disease-status post AVR-POA   Weakness-POA   DNR    Plan  On 8 L Oxymizer, will wean as tolerated.  Provide with incentive spirometry   On IV Zosyn; will aim to complete 4-5 days.    Continue with IV Lasix BID.  CIS on-board. On PO Cardizem & FD Lovenox for A Fib.   PT/OT on-board; will follow recs.  Renal indices improved since admission.    Replete p.o. potassium 40 x 2, check magnesium levels  Continued on atorvastatin 40 mg, docusate 50 mg, gabapentin 600 mg b.i.d., sertraline 100 mg, tramadol 50 mg t.i.d..    Continue monitoring patient's symptoms.    Critical care note:  Critical care diagnosis: Fluid Overload req IV Lasix  Critical care interventions: Hands-on evaluation, review of labs/radiographs/records and discussion with patient and family if present  Critical care time spent: 45 minutes      VITAL SIGNS: 24 HRS MIN & MAX LAST   Temp  Min: 97.2 °F (36.2 °C)  Max: 98.3 °F (36.8 °C) 97.2 °F (36.2 °C)   BP  Min: 127/76  Max: 175/93  136/74   Pulse  Min: 76  Max: 137  (!) 137   Resp  Min: 14  Max: 28 (!) 28   SpO2  Min: 85 %  Max: 99 % 96 %     I have reviewed the following labs:  Recent Labs   Lab 06/05/24 0258 06/06/24 0325 06/07/24  0645   WBC 15.87* 15.30* 12.92*   RBC 3.74* 3.63* 3.71*   HGB 11.3* 10.8* 11.2*   HCT 34.4* 33.6* 34.8*   MCV 92.0 92.6 93.8   MCH 30.2 29.8 30.2   MCHC 32.8* 32.1* 32.2*   RDW 12.8 12.9 13.1    306 320   MPV 10.7* 10.8* 10.7*     Recent Labs   Lab 06/01/24  2314 06/02/24  0115 06/03/24  1029 06/05/24 0258 06/05/24 2135 06/06/24 0325 06/07/24 0645   NA  --  140   < > 142 141 140 140   K  --  3.8   < > 3.4* 3.3* 3.7 3.2*   CL  --  98   < > 98 100 100 100   CO2  --  24   < > 31 29 28 28   BUN  --  88.7*   < > 46.9* 47.0* 55.5* 43.5*   CREATININE  --  1.40*   < > 0.90 0.88 0.95 0.87   CALCIUM  --  10.3*   < > 10.1 10.2 10.0 9.3   PH 7.450  --   --   --   --   --   --    MG  --  2.60  --   --  2.10  --  1.90   ALBUMIN  --  3.5   < > 2.9*  --  2.6* 2.8*   ALKPHOS  --  79   < > 67  --  63 63   ALT  --  30   < > 16  --  12 13   AST  --  24   < > 12  --  12 15   BILITOT  --  0.8   < > 0.6  --  0.4 0.6    < > = values in this interval not displayed.     Microbiology Results (last 7 days)       Procedure Component Value Units Date/Time    Blood Culture [[id number]]  (Normal) Collected: 06/02/24 0115    Order Status: Completed Specimen: Blood, Venous Updated: 06/07/24 0300     Blood Culture No Growth at 5 days    Blood Culture [[id number]]  (Normal) Collected: 06/02/24 0115    Order Status: Completed Specimen: Blood, Venous Updated: 06/07/24 0300     Blood Culture No Growth at 5 days    Respiratory Culture [[id number]]     Order Status: Sent Specimen: Sputum, Expectorated              See below for Radiology    Scheduled Med:   [START ON 6/8/2024] atorvastatin  40 mg Oral QHS    diltiaZEM  60 mg Oral Q8H    docusate  50 mg Oral Daily    enoxparin  1 mg/kg (Dosing Weight) Subcutaneous Q12H    furosemide (LASIX)  injection  20 mg Intravenous Q12H    gabapentin  600 mg Oral BID    piperacillin-tazobactam (Zosyn) IV (PEDS and ADULTS) (extended infusion is not appropriate)  4.5 g Intravenous Q8H    sertraline  100 mg Oral QHS    traMADoL  50 mg Oral TID      Continuous Infusions:     PRN Meds:    Current Facility-Administered Medications:     sodium chloride 0.9%, , Intravenous, PRN    hydrALAZINE, 10 mg, Intravenous, Q6H PRN    HYDROmorphone, 0.2 mg, Intravenous, Q6H PRN    labetalol, 10 mg, Intravenous, Q4H PRN    metoprolol, 5 mg, Intravenous, Q4H PRN     Assessment/Plan:      VTE prophylaxis:     Patient condition:  Stable/Fair/Guarded/ Serious/ Critical    Anticipated discharge and Disposition:         All diagnosis and differential diagnosis have been reviewed; assessment and plan has been documented; I have personally reviewed the labs and test results that are presently available; I have reviewed the patients medication list; I have reviewed the consulting providers response and recommendations. I have reviewed or attempted to review medical records based upon their availability    All of the patient's questions have been  addressed and answered. Patient's is agreeable to the above stated plan. I will continue to monitor closely and make adjustments to medical management as needed.  _____________________________________________________________________    Nutrition Status:    Radiology:  I have personally reviewed the following imaging and agree with the radiologist.     X-Ray Chest 1 View  Narrative: EXAMINATION:  XR CHEST 1 VIEW    CLINICAL HISTORY:  Aspiration;    TECHNIQUE:  AP chest    COMPARISON:  Chest x-ray dated 06/03/2024    FINDINGS:  Right-sided PICC line has its tip over the superior vena cava.  The heart is normal in size.  There is a left retrocardiac opacity with layering pleural effusion, not significantly changed.  There is no definite visible pneumothorax.  Impression: Stable exam without significant  interval change.    Electronically signed by: Dimple Sellers  Date:    06/06/2024  Time:    06:04      Augustus Barreto MD  Department of Hospital Medicine   [facility]   06/07/2024

## 2024-06-07 NOTE — PT/OT/SLP PROGRESS
Occupational Therapy   Treatment    Name: Alanna Moya  MRN: [id number]  Admitting Diagnosis:  <principal problem not specified>       Recommendations:     Recommended therapy intensity at discharge: Moderate Intensity Therapy   Discharge Equipment Recommendations:   (TBD)      Assessment:     Alanna Moya is a 78 y.o. female with a medical diagnosis of acute resp failure with hypoxia, B pneumonia, JUN, CHF with exacerbation and B pleural effusions.  New onset SVT, cardiology management.  She presents with intermittent PVC and VT reading on telemetry, RN reports not accurate.  Overall endurance improving and decreased O2 requirements.  RN to re-order BSC and RW for room.  OT ordered geomat. L ptosis, chronic.  Performance deficits affecting function are weakness, impaired endurance, impaired self care skills, impaired functional mobility, gait instability, impaired balance, decreased upper extremity function, decreased lower extremity function.     Rehab Prognosis:  Good; patient would benefit from acute skilled OT services to address these deficits and reach maximum level of function.       Plan:     Patient to be seen 4 x/week to address the above listed problems via self-care/home management  Plan of Care Expires: 07/04/24  Plan of Care Reviewed with: patient    Subjective     Pain/Comfort:  Pain Rating 1: 0/10    Objective:     Communicated with: RN prior to session.  Patient found up in chair with  (oxymizer, purewick, vital monitoring) upon OT entry to room.    General Precautions: Standard, fall    Orthopedic Precautions:N/A  Braces: N/A  Respiratory Status: oxymizer 8L  Vital Signs: oxymizer 95% O2 saturation with activity     Occupational Performance:     Functional Mobility/Transfers:  Patient completed Sit <> Stand Transfer with minimum assistance  with  rolling walker   Functional Mobility: Min A short distance (approx 5 feet) functional mobility.  O2 saturation 96%    Activities of Daily  Living:  Toileting: maximal assistance standing, +BM hygiene , required assist for brief management and hygiene, min A balance while standing.      Therapeutic Positioning  OT interventions performed during the course of today's session in an effort to prevent and/or reduce acquired pressure injuries:   Geomat ordered    Patient Education:  Patient provided with verbal education education regarding OT role/goals/POC.  Understanding was verbalized, however additional teaching warranted.      Patient left up in chair with all lines intact and PTA present.    GOALS:   Multidisciplinary Problems       Occupational Therapy Goals          Problem: Occupational Therapy    Goal Priority Disciplines Outcome Interventions   Occupational Therapy Goal     OT, PT/OT Progressing    Description: Goals to be met by: 7/4/24     Patient will increase functional independence with ADLs by performing:    UE Dressing with Supervision.  LE Dressing with Supervision.  Grooming while standing at sink with Supervision.  Toileting from toilet with Supervision for hygiene and clothing management.   Toilet transfer to toilet with Supervision.                         Time Tracking:     OT Date of Treatment:    OT Start Time: 1046  OT Stop Time: 1108  OT Total Time (min): 22 min    Billable Minutes:Self Care/Home Management 1    OT/MARY: OT     Number of MARY visits since last OT visit: 2    6/7/2024

## 2024-06-07 NOTE — PROGRESS NOTES
Inpatient Nutrition Assessment    Admit Date: 6/1/2024   Total duration of encounter: 6 days   Patient Age: 78 y.o.    Nutrition Recommendation/Prescription     Continue current diet (Diet Heart Healthy) as tolerated.   Boost Plus (provides 360 kcal, 14 g protein per serving) BID.    Communication of Recommendations: reviewed with nurse, reviewed with patient, and reviewed with family    Nutrition Assessment     Malnutrition Assessment/Nutrition-Focused Physical Exam    Malnutrition Context: other (see comments) (does not meet criteria) (06/03/24 1413)  Malnutrition Level: other (see comments) (does not meet criteria) (06/03/24 1413)  Energy Intake (Malnutrition): other (see comments) (does not meet criteria) (06/03/24 1413)  Weight Loss (Malnutrition): other (see comments) (does not meet criteria) (06/03/24 1413)  Subcutaneous Fat (Malnutrition): other (see comments) (does not meet criteria) (06/03/24 1413)           Muscle Mass (Malnutrition): other (see comments) (does not meet criteria) (06/03/24 1413)                          Fluid Accumulation (Malnutrition): other (see comments) (does not meet criteria) (06/03/24 1413)        A minimum of two characteristics is recommended for diagnosis of either severe or non-severe malnutrition.    Chart Review    Reason Seen: follow-up    Malnutrition Screening Tool Results   Have you recently lost weight without trying?: No  Have you been eating poorly because of a decreased appetite?: No   MST Score: 0   Diagnosis:  Acute respiratory failure alternating between BiPAP and Vapotherm   CHF exacerbation  Pulmonary Edema vs Pneumonia  HTN  Aortic Valve replacement     Relevant Medical History:    Abdominal pain      Aortic stenosis      Coronary atherosclerosis      Esophageal reflux      Fatigue      Glaucoma      Heart attack      Hypertension      Lumbosacral spondylosis without myelopathy      Other and unspecified hyperlipidemia      Rectocele      SOB (shortness of  breath) on exertion      CABG and AVR         Scheduled Medications:  [START ON 6/8/2024] atorvastatin, 40 mg, QHS  diltiaZEM, 60 mg, Q8H  docusate, 50 mg, Daily  enoxparin, 1 mg/kg (Dosing Weight), Q12H  furosemide (LASIX) injection, 20 mg, Q12H  gabapentin, 600 mg, BID  piperacillin-tazobactam (Zosyn) IV (PEDS and ADULTS) (extended infusion is not appropriate), 4.5 g, Q8H  potassium chloride, 40 mEq, Q2H  sertraline, 100 mg, QHS  traMADoL, 50 mg, TID    Continuous Infusions: none       PRN Medications:  sodium chloride 0.9%, , PRN  hydrALAZINE, 10 mg, Q6H PRN  HYDROmorphone, 0.2 mg, Q6H PRN  labetalol, 10 mg, Q4H PRN  metoprolol, 5 mg, Q4H PRN    Calorie Containing IV Medications: Clinimix    Recent Labs   Lab 06/02/24  0115 06/03/24  1029 06/03/24  1355 06/04/24  0454 06/05/24  0258 06/05/24  2135 06/06/24  0325 06/07/24  0645    142  --  143 142 141 140 140   K 3.8 3.1*  --  2.8* 3.4* 3.3* 3.7 3.2*   CALCIUM 10.3* 9.7  --  10.3* 10.1 10.2 10.0 9.3   PHOS 4.2  --   --   --   --   --   --   --    MG 2.60  --   --   --   --  2.10  --  1.90   CO2 24 31  --  34* 31 29 28 28   BUN 88.7* 65.1*  --  57.6* 46.9* 47.0* 55.5* 43.5*   CREATININE 1.40* 1.26*  --  1.03* 0.90 0.88 0.95 0.87   EGFRNORACEVR 39 44  --  56 >60 >60 >60 >60   GLUCOSE 149* 174*  --  233* 225* 210* 227* 147*   BILITOT 0.8  --   --  0.7 0.6  --  0.4 0.6   ALKPHOS 79  --   --  78 67  --  63 63   ALT 30  --   --  21 16  --  12 13   AST 24  --   --  14 12  --  12 15   ALBUMIN 3.5  --   --  3.4 2.9*  --  2.6* 2.8*   WBC 12.94*  --  12.81* 14.86* 15.87*  --  15.30* 12.92*   HGB 11.2*  --  11.3* 12.0 11.3*  --  10.8* 11.2*   HCT 33.1*  --  34.2* 36.2* 34.4*  --  33.6* 34.8*     Nutrition Orders:  Diet Heart Healthy    Appetite/Oral Intake: good/% of meals  Factors Affecting Nutritional Intake: none identified  Social Needs Impacting Access to Food: none identified  Food/Restorationism/Cultural Preferences: none reported  Food Allergies: no known food  "allergies  Last Bowel Movement: 06/06/24  Wound(s):  none noted    Comments    6/3/24: Patient currently NPO on BiPAP/Vapotherm. Clinimix running via PICC. Patient reports good-fair oral intake prior to admit. Denies any unintentional wt loss.     6/7/24 Patient started on a diet, PPN discontinued, patient reports a good appetite, was not liking the food on full liquid diet this morning, agreeable to chocolate Boost, nurse reports patient eating about 75% of meals.    Anthropometrics    Height: 5' 2" (157.5 cm),    Last Weight: 77.1 kg (170 lb) (06/01/24 2230), Weight Method: Standard Scale  BMI (Calculated): 31.1  BMI Classification: obese grade I (BMI 30-34.9)     Ideal Body Weight (IBW), Female: 110 lb     % Ideal Body Weight, Female (lb): 154.55 %                             Usual Weight Provided By: patient denies unintentional weight loss    Wt Readings from Last 5 Encounters:   06/01/24 77.1 kg (170 lb)   02/27/23 77 kg (169 lb 12.1 oz)   02/15/23 74.8 kg (165 lb)   08/10/17 78.8 kg (173 lb 11.6 oz)   08/10/17 78.8 kg (173 lb 11.6 oz)     Weight Change(s) Since Admission:   (6/7) no new weights  Wt Readings from Last 1 Encounters:   06/01/24 2230 77.1 kg (170 lb)   Admit Weight: 77.1 kg (170 lb) (06/01/24 2230), Weight Method: Stated    Estimated Needs    Weight Used For Calorie Calculations: 77.1 kg (169 lb 15.6 oz)  Energy Calorie Requirements (kcal): 1467 kcal/day (mifflin st jeor x 1.2 SF)  Energy Need Method: [place]-St Jeor  Weight Used For Protein Calculations: 77.1 kg (169 lb 15.6 oz)  Protein Requirements: 85 g/day (1.1 g/kg/d)  Fluid Requirements (mL): 1467 mL/day (1mL/kcal)        Enteral Nutrition     Patient not receiving enteral nutrition at this time.    Parenteral Nutrition     Patient no longer receiving parenteral nutrition support.    Evaluation of Received Nutrient Intake    Calories: meeting estimated needs  Protein: meeting estimated needs    Patient Education     Not " applicable.    Nutrition Diagnosis     PES: Inadequate oral intake related to acute illness as evidenced by NPO due to BiPAP. (resolved)       Nutrition Interventions     Intervention(s): general/healthful diet, commercial beverage, and collaboration with other providers    Goal: Meet greater than 80% of nutritional needs by follow-up. (goal met)  Goal: Maintain weight throughout hospitalization. (goal progressing)    Nutrition Goals & Monitoring     Dietitian will monitor: food and beverage intake, energy intake, weight, electrolyte/renal panel, and glucose/endocrine profile  Discharge planning: too early to determine; pending clinical course and continue heart healthy diet  Nutrition Risk/Follow-Up: moderate (follow-up in 3-5 days)   Please consult if re-assessment needed sooner.

## 2024-06-07 NOTE — H&P
[facility] Hospital Medicine History & Physical Examination       Patient Name: Alanna Moya  MRN: [id number]  Patient Class: IP- Inpatient   Admission Date: 6/1/2024   Admitting Physician: TOM Service   Length of Stay: 5  Attending Physician: Dr. Marshal Alvarado  Primary Care Provider: Cornel Haddad MD  Face-to-Face encounter date: 06/06/2024  Code Status: DNR  Son POA  Chief Complaint: No chief complaint on file.        Screening for Social Drivers for health:  Patient screened for food insecurity, housing instability, transportation needs, utility difficulties, and interpersonal safety (select all that apply as identified as concern)  []Housing or Food  []Transportation Needs  []Utility Difficulties  []Interpersonal safety  [x]None    Patient information was obtained from patient, patient's family, past medical records and/or ER records.     HISTORY OF PRESENT ILLNESS:   Alanna Moya is a 78 y.o. female who PMH aortic stenosis s/p AVR, CAD, MI,  HTN, chronic back pain, HLD, CVA, CHF, GERD;, diverticular disease presents to [facility] on 6/1/2024  transferred from [facility]  for respiratory failure and needing pulmonary services. Her oxygen saturation on room air is around 83%.  She arrived on BiPAP at 14/8.  Currently saturating 92%.  Patient reports for the last 2 months she has had more shortness of breath.  She is a CHF patient who is on Lasix. Per chart review from transferring facility and medical record; CXR demonstrated bilateral airspace opacification and increase his right-sided pleural effusion suggestive of worsening pneumonia or possibly edema.  Echo from 5/16 with EF of 55-60%.  Patient states she lives at home with her son. Patient also reports she wishes to be DNR. It is reported she was hospitalized at [facility] about 2 weeks ago for facial weakness and hypertension.  Chest x-ray at that time not available for review but radiologist reports clear lung betts.   Patient also had autoimmune workup which was all negative.  Patient was admitted to the ICU unit on BiPAP therapy.  She was placed on antibiotic therapy and steroid therapy.  Patient was weaned off the BiPAP and has been on high-flow O2 therapy.  Patient has been cleared for transfer out of ICU to the regular floor.  Hospital medicine services have been consulted for assumption of care.    PAST MEDICAL HISTORY:     Past Medical History:   Diagnosis Date    Abdominal pain, unspecified site     Aortic stenosis     Coronary atherosclerosis of unspecified type of vessel, native or graft     Esophageal reflux     Fatigue     History of glaucoma     History of heart attack     Hypertension     Lumbosacral spondylosis without myelopathy     Other and unspecified hyperlipidemia     Rectocele     SOB (shortness of breath) on exertion     Unspecified essential hypertension     Weakness        PAST SURGICAL HISTORY:     Past Surgical History:   Procedure Laterality Date    CHOLECYSTECTOMY      CORONARY ARTERY BYPASS GRAFT      GLAUCOMA SURGERY Bilateral     HYSTERECTOMY      TRANSCATHETER AORTIC VALVE REPLACEMENT (TAVR) N/A 2/27/2023    Procedure: REPLACEMENT, AORTIC VALVE, TRANSCATHETER (TAVR);  Surgeon: Anselmo Arango MD;  Location: [facility] CATH LAB;  Service: Cardiology;  Laterality: N/A;  TAVR W/ ANEST.       ALLERGIES:   Patient has no known allergies.    FAMILY HISTORY:   Reviewed and negative    SOCIAL HISTORY:     Social History     Tobacco Use    Smoking status: Never    Smokeless tobacco: Never   Substance Use Topics    Alcohol use: No        HOME MEDICATIONS:   As documented  Prior to Admission medications    Medication Sig Start Date End Date Taking? Authorizing Provider   amLODIPine (NORVASC) 10 MG tablet Take 10 mg by mouth 2 (two) times daily.   Yes Provider, Historical   aspirin 325 MG tablet Take 1 tablet by mouth once daily.   Yes Provider, Historical   carvediloL (COREG) 12.5 MG tablet Take 1 tablet (12.5  mg total) by mouth 2 (two) times daily with meals.  Patient taking differently: Take 25 mg by mouth 2 (two) times daily with meals. 2/28/23  Yes Aliyah Rodríguez, STEF   cyanocobalamin, vitamin B-12, 2,000 mcg Tab Take 1 tablet by mouth once daily.   Yes Provider, Historical   cyclobenzaprine (FLEXERIL) 10 MG tablet Take 10 mg by mouth 3 (three) times daily.   Yes Provider, Historical   docusate sodium (STOOL SOFTENER ORAL) Take 2 capsules by mouth 2 (two) times a day.   Yes Provider, Historical   furosemide (LASIX) 40 MG tablet Take 40 mg by mouth once daily.   Yes Provider, Historical   L. acidophilus/Bifid. animalis (DAILY PROBIOTIC ORAL) Take 1 capsule by mouth once daily.   Yes Provider, Historical   rosuvastatin (CRESTOR) 40 MG Tab Take 40 mg by mouth every evening.   Yes Provider, Historical   sertraline (ZOLOFT) 50 MG tablet Take 100 mg by mouth every evening.   Yes Provider, Historical   traMADoL (ULTRAM) 50 mg tablet Take 50 mg by mouth 3 (three) times daily. 2/14/23  Yes Provider, Historical   cholecalciferol, vitamin D3, (VITAMIN D3) 50 mcg (2,000 unit) Cap capsule Take 1 capsule by mouth once daily.    Provider, Historical   hydrALAZINE (APRESOLINE) 25 MG tablet Take 25 mg by mouth every 8 (eight) hours.    Provider, Historical   potassium gluconate 600 mg (99 mg) Tab Take 650 mg by mouth once.    Provider, Historical       REVIEW OF SYSTEMS:   Except as documented, all other systems reviewed and negative     PHYSICAL EXAM:     VITAL SIGNS: 24 HRS MIN & MAX LAST   Temp  Min: 98.2 °F (36.8 °C)  Max: 99.2 °F (37.3 °C) 98.3 °F (36.8 °C)   BP  Min: 107/74  Max: 177/93 (!) 154/86   Pulse  Min: 75  Max: 140  78   Resp  Min: 12  Max: 34 (!) 25   SpO2  Min: 87 %  Max: 99 % (!) 90 %       General appearance: chronically ill appearing elderly female, moderate respiratory distress; non-toxic  HENT: Atraumatic head. Moist mucous membranes of oral cavity.  Eyes: PERRL  Lungs: diminished bilaterally, scattered  crackles. No wheezing present.   Heart: Regular rate and rhythm. S1 and S2 present cap refill brisk  Abdomen: Soft, non-distended, non-tender. No rebound tenderness/guarding. Bowel sounds are normal.   Extremities: No cyanosis, clubbing,cap refill brisk  Skin: warm and dry  Neuro: oriented x 3, no acute focal deficits  Psych/mental status:  flat affect. Cooperative    LABS AND IMAGING:     Recent Labs   Lab 06/04/24  0454 06/05/24  0258 06/06/24  0325   WBC 14.86* 15.87* 15.30*   RBC 3.99* 3.74* 3.63*   HGB 12.0 11.3* 10.8*   HCT 36.2* 34.4* 33.6*   MCV 90.7 92.0 92.6   MCH 30.1 30.2 29.8   MCHC 33.1 32.8* 32.1*   RDW 12.3 12.8 12.9    341 306   MPV 10.1 10.7* 10.8*       Recent Labs   Lab   0000 06/01/24  2314 06/02/24  0115 06/03/24  1029 06/04/24  0454 06/05/24  0258 06/05/24  2135 06/06/24  0325   NA  --   --  140   < > 143 142 141 140   K  --   --  3.8   < > 2.8* 3.4* 3.3* 3.7   CL  --   --  98   < > 96* 98 100 100   CO2  --   --  24   < > 34* 31 29 28   BUN  --   --  88.7*   < > 57.6* 46.9* 47.0* 55.5*   CREATININE  --   --  1.40*   < > 1.03* 0.90 0.88 0.95   CALCIUM  --   --  10.3*   < > 10.3* 10.1 10.2 10.0   PH  --  7.450  --   --   --   --   --   --    MG  --   --  2.60  --   --   --  2.10  --    ALBUMIN   < >  --  3.5  --  3.4 2.9*  --  2.6*   ALKPHOS   < >  --  79  --  78 67  --  63   ALT   < >  --  30  --  21 16  --  12   AST   < >  --  24  --  14 12  --  12   BILITOT   < >  --  0.8  --  0.7 0.6  --  0.4    < > = values in this interval not displayed.       Microbiology Results (last 7 days)       Procedure Component Value Units Date/Time    Blood Culture [[id number]]  (Normal) Collected: 06/02/24 0115    Order Status: Completed Specimen: Blood, Venous Updated: 06/06/24 0300     Blood Culture No Growth At 96 Hours    Blood Culture [[id number]]  (Normal) Collected: 06/02/24 0115    Order Status: Completed Specimen: Blood, Venous Updated: 06/06/24 0300     Blood Culture No Growth At 96 Hours     Respiratory Culture [[id number]]     Order Status: Sent Specimen: Sputum, Expectorated              X-Ray Chest 1 View  Narrative: EXAMINATION:  XR CHEST 1 VIEW    CLINICAL HISTORY:  Aspiration;    TECHNIQUE:  AP chest    COMPARISON:  Chest x-ray dated 06/03/2024    FINDINGS:  Right-sided PICC line has its tip over the superior vena cava.  The heart is normal in size.  There is a left retrocardiac opacity with layering pleural effusion, not significantly changed.  There is no definite visible pneumothorax.  Impression: Stable exam without significant interval change.    Electronically signed by: Dimple Sellers  Date:    06/06/2024  Time:    06:04        ASSESSMENT & PLAN:   ASSESSMENT:  Acute respiratory failure with hypoxia-requiring supplemental oxygen therapy-POA   Bilateral pneumonia/pulmonary infiltrates-bacteria-POA   JUN- POA  CHF with exacerbation and bilateral pleural effusions-POA   SVT-new onset, 06/06/2024  CAD-status post CABG-POA   Valvular heart disease-status post AVR-POA   Weakness-POA     PLAN:  Patient has been evaluated by Cardiology Services appreciate assistance and recommendations   Replete electrolytes  Wean oxygen as tolerated   Continue with IV antibiotic therapy   Continue with Clinimix  Telemetry monitoring remote okay   Continue with PT OT services   Repeat lab work in a.m.   Resume home medication as deemed necessary   Consult case management for discharge planning-awaiting LTAC placement    Patient is a DNR status     VTE Prophylaxis:  Lovenox for DVT prophylaxis and will be advised to be as mobile as possible and sit in a chair as tolerated    Patient condition:  Stable  __________________________________________________________________________  INPATIENT LIST OF MEDICATIONS     Scheduled Meds:   atorvastatin  40 mg Oral Daily    diltiaZEM  60 mg Oral Q8H    docusate  50 mg Oral Daily    enoxparin  1 mg/kg (Dosing Weight) Subcutaneous Q12H    furosemide (LASIX) injection  20 mg  Intravenous Q12H    gabapentin  600 mg Oral BID    hydrocortisone sodium succinate  100 mg Intravenous Q8H    metoprolol  5 mg Intravenous Once    piperacillin-tazobactam (Zosyn) IV (PEDS and ADULTS) (extended infusion is not appropriate)  4.5 g Intravenous Q8H    sertraline  100 mg Oral QHS    traMADoL  50 mg Oral TID     Continuous Infusions:  PRN Meds:.  Current Facility-Administered Medications:     sodium chloride 0.9%, , Intravenous, PRN    hydrALAZINE, 10 mg, Intravenous, Q6H PRN    HYDROmorphone, 0.2 mg, Intravenous, Q6H PRN    labetalol, 10 mg, Intravenous, Q4H PRN    metoprolol, 5 mg, Intravenous, Q4H PRN      IKarson FNP have reviewed and discussed the case with   Dr.Aamer Alvarado.  Please see the following addendum for further assessment and plan from their attending MD.  STEF Hill   06/06/2024    ________________________________________________________________________________    MD Addendum:  Dr. ANETA ---assumed care of this patient today at ---am/pm  For the patient encounter, I performed the substantive portion of the visit, I reviewed the NP/PA documentation, treatment plan, and medical decision making.  I had face to face time with this patient     A. History:    B. Physical exam:    C. Medical decision making:    Discharge Planning and Disposition: No mobility needs. Ambulating well. Good social support system.   Anticipated discharge    All diagnosis and differential diagnosis have been reviewed; assessment and plan has been documented; I have personally reviewed the labs and test results that are presently available; I have reviewed the patients medication list; I have reviewed the consulting providers response and recommendations. I have reviewed or attempted to review medical records based upon their availability.    All of the patient and family questions have been addressed and answered. Patient's is agreeable to the above stated plan. I will continue to monitor  closely and make adjustments to medical management as needed.

## 2024-06-07 NOTE — PT/OT/SLP PROGRESS
Physical Therapy Treatment    Patient Name:  Alanna Moya   MRN:  [id number]    Recommendations:     Discharge therapy intensity: Moderate Intensity Therapy   Discharge Equipment Recommendations: other (see comments) (TBD)  Barriers to discharge: Impaired mobility and Ongoing medical needs    Assessment:     Alanna Moya is a 78 y.o. female admitted with a medical diagnosis of SOB, respiratory failure, pneumonia, pleural effusions.  She presents with the following impairments/functional limitations: weakness, gait instability, impaired endurance, impaired balance, impaired self care skills, impaired functional mobility, impaired cardiopulmonary response to activity.    Pt just completed OT session, in good spirits and agreeable to PT session.     Rehab Prognosis: Good; patient would benefit from acute skilled PT services to address these deficits and reach maximum level of function.    Recent Surgery: * No surgery found *      Plan:     During this hospitalization, patient would benefit from acute PT services 5 x/week to address the identified rehab impairments via gait training, therapeutic activities, therapeutic exercises and progress toward the following goals:    Plan of Care Expires:  07/04/24    Subjective     Chief Complaint: no complaints  Patient/Family Comments/goals: to get better  Pain/Comfort:  Pain Rating 1: 0/10      Objective:     Communicated with nurse prior to session.  Patient found up in chair with blood pressure cuff, pulse ox (continuous), telemetry, oxygen upon PT entry to room.     General Precautions: Standard, fall  Orthopedic Precautions: N/A  Braces: N/A  Respiratory Status:  8L oxymizer   Blood Pressure: 147/74  HR: 109 at rest; 155 after ambulation   SPO2: 97%  Skin Integrity: Visible skin intact      Functional Mobility:  Transfers:    Sit<->stand with min assist; cues for hand placement with decreased carryover  Gait:   40', 50' with RW, slow alethea, chair following.  SPO2 remained stable, pulse ox reading elevated HR. RN notified.     Therapeutic Activities/Exercises:      Education:  Patient provided with verbal education education regarding PT role/goals/POC, fall prevention, safety awareness, and discharge/DME recommendations.  Understanding was verbalized.     Patient left up in chair with all lines intact, call button in reach, and nurse present    GOALS:   Multidisciplinary Problems       Physical Therapy Goals          Problem: Physical Therapy    Goal Priority Disciplines Outcome Goal Variances Interventions   Physical Therapy Goal     PT, PT/OT Progressing     Description: Goals to be met by: 24     Patient will increase functional independence with mobility by performin. Supine to sit with Stand-by Assistance  2. Sit to supine with Stand-by Assistance  3. Sit to stand transfer with Stand-by Assistance  4. Bed to chair transfer with Stand-by Assistance using Rolling Walker  5. Gait  x 150 feet with Stand-by Assistance using Rolling Walker.                          Time Tracking:     PT Received On: 24  PT Start Time: 1105     PT Stop Time: 1136  PT Total Time (min): 31 min     Billable Minutes: Gait Training 2 units    Treatment Type: Treatment  PT/PTA: PTA     Number of PTA visits since last PT visit: 3     2024

## 2024-06-08 LAB
ALBUMIN SERPL-MCNC: 2.7 G/DL (ref 3.4–4.8)
ALBUMIN/GLOB SERPL: 1 RATIO (ref 1.1–2)
ALP SERPL-CCNC: 69 UNIT/L (ref 40–150)
ALT SERPL-CCNC: 17 UNIT/L (ref 0–55)
ANION GAP SERPL CALC-SCNC: 12 MEQ/L
AST SERPL-CCNC: 18 UNIT/L (ref 5–34)
BASOPHILS # BLD AUTO: 0.03 X10(3)/MCL
BASOPHILS NFR BLD AUTO: 0.2 %
BILIRUB SERPL-MCNC: 0.6 MG/DL
BUN SERPL-MCNC: 35.2 MG/DL (ref 9.8–20.1)
CALCIUM SERPL-MCNC: 8.9 MG/DL (ref 8.4–10.2)
CHLORIDE SERPL-SCNC: 99 MMOL/L (ref 98–107)
CO2 SERPL-SCNC: 28 MMOL/L (ref 23–31)
CREAT SERPL-MCNC: 0.88 MG/DL (ref 0.55–1.02)
CREAT/UREA NIT SERPL: 40
EOSINOPHIL # BLD AUTO: 0.01 X10(3)/MCL (ref 0–0.9)
EOSINOPHIL NFR BLD AUTO: 0.1 %
ERYTHROCYTE [DISTWIDTH] IN BLOOD BY AUTOMATED COUNT: 12.9 % (ref 11.5–17)
GFR SERPLBLD CREATININE-BSD FMLA CKD-EPI: >60 ML/MIN/1.73/M2
GLOBULIN SER-MCNC: 2.8 GM/DL (ref 2.4–3.5)
GLUCOSE SERPL-MCNC: 166 MG/DL (ref 82–115)
HCT VFR BLD AUTO: 32.7 % (ref 37–47)
HGB BLD-MCNC: 10.7 G/DL (ref 12–16)
IMM GRANULOCYTES # BLD AUTO: 0.46 X10(3)/MCL (ref 0–0.04)
IMM GRANULOCYTES NFR BLD AUTO: 3.2 %
LYMPHOCYTES # BLD AUTO: 1.53 X10(3)/MCL (ref 0.6–4.6)
LYMPHOCYTES NFR BLD AUTO: 10.7 %
MAGNESIUM SERPL-MCNC: 2.1 MG/DL (ref 1.6–2.6)
MCH RBC QN AUTO: 30.1 PG (ref 27–31)
MCHC RBC AUTO-ENTMCNC: 32.7 G/DL (ref 33–36)
MCV RBC AUTO: 92.1 FL (ref 80–94)
MONOCYTES # BLD AUTO: 1.62 X10(3)/MCL (ref 0.1–1.3)
MONOCYTES NFR BLD AUTO: 11.4 %
NEUTROPHILS # BLD AUTO: 10.62 X10(3)/MCL (ref 2.1–9.2)
NEUTROPHILS NFR BLD AUTO: 74.4 %
NRBC BLD AUTO-RTO: 0 %
OHS QRS DURATION: 134 MS
OHS QTC CALCULATION: 528 MS
PHOSPHATE SERPL-MCNC: 2.1 MG/DL (ref 2.3–4.7)
PLATELET # BLD AUTO: 333 X10(3)/MCL (ref 130–400)
PMV BLD AUTO: 11.2 FL (ref 7.4–10.4)
POTASSIUM SERPL-SCNC: 3.7 MMOL/L (ref 3.5–5.1)
PROT SERPL-MCNC: 5.5 GM/DL (ref 5.8–7.6)
RBC # BLD AUTO: 3.55 X10(6)/MCL (ref 4.2–5.4)
SODIUM SERPL-SCNC: 139 MMOL/L (ref 136–145)
WBC # SPEC AUTO: 14.27 X10(3)/MCL (ref 4.5–11.5)

## 2024-06-08 PROCEDURE — 25000003 PHARM REV CODE 250: Performed by: INTERNAL MEDICINE

## 2024-06-08 PROCEDURE — 80053 COMPREHEN METABOLIC PANEL: CPT

## 2024-06-08 PROCEDURE — 99900035 HC TECH TIME PER 15 MIN (STAT)

## 2024-06-08 PROCEDURE — 99900031 HC PATIENT EDUCATION (STAT)

## 2024-06-08 PROCEDURE — 21400001 HC TELEMETRY ROOM

## 2024-06-08 PROCEDURE — 63600175 PHARM REV CODE 636 W HCPCS: Performed by: NURSE PRACTITIONER

## 2024-06-08 PROCEDURE — 93005 ELECTROCARDIOGRAM TRACING: CPT

## 2024-06-08 PROCEDURE — 25000003 PHARM REV CODE 250

## 2024-06-08 PROCEDURE — 63600175 PHARM REV CODE 636 W HCPCS: Performed by: INTERNAL MEDICINE

## 2024-06-08 PROCEDURE — 87899 AGENT NOS ASSAY W/OPTIC: CPT | Performed by: INTERNAL MEDICINE

## 2024-06-08 PROCEDURE — 36415 COLL VENOUS BLD VENIPUNCTURE: CPT

## 2024-06-08 PROCEDURE — 25000003 PHARM REV CODE 250: Performed by: NURSE PRACTITIONER

## 2024-06-08 PROCEDURE — 85025 COMPLETE CBC W/AUTO DIFF WBC: CPT

## 2024-06-08 PROCEDURE — 94660 CPAP INITIATION&MGMT: CPT

## 2024-06-08 PROCEDURE — 94760 N-INVAS EAR/PLS OXIMETRY 1: CPT

## 2024-06-08 PROCEDURE — 83735 ASSAY OF MAGNESIUM: CPT | Performed by: INTERNAL MEDICINE

## 2024-06-08 PROCEDURE — 27000221 HC OXYGEN, UP TO 24 HOURS

## 2024-06-08 PROCEDURE — 84100 ASSAY OF PHOSPHORUS: CPT | Performed by: INTERNAL MEDICINE

## 2024-06-08 PROCEDURE — 25000003 PHARM REV CODE 250: Performed by: HOSPITALIST

## 2024-06-08 PROCEDURE — 94799 UNLISTED PULMONARY SVC/PX: CPT

## 2024-06-08 PROCEDURE — 93010 ELECTROCARDIOGRAM REPORT: CPT | Mod: ,,, | Performed by: INTERNAL MEDICINE

## 2024-06-08 RX ORDER — DILTIAZEM HYDROCHLORIDE 120 MG/1
360 CAPSULE, COATED, EXTENDED RELEASE ORAL DAILY
Status: DISCONTINUED | OUTPATIENT
Start: 2024-06-09 | End: 2024-06-09

## 2024-06-08 RX ORDER — METOPROLOL TARTRATE 50 MG/1
50 TABLET ORAL 2 TIMES DAILY
Status: DISCONTINUED | OUTPATIENT
Start: 2024-06-08 | End: 2024-06-09

## 2024-06-08 RX ORDER — SODIUM,POTASSIUM PHOSPHATES 280-250MG
1 POWDER IN PACKET (EA) ORAL
Status: COMPLETED | OUTPATIENT
Start: 2024-06-08 | End: 2024-06-09

## 2024-06-08 RX ORDER — POTASSIUM CHLORIDE 20 MEQ/1
40 TABLET, EXTENDED RELEASE ORAL ONCE
Status: DISCONTINUED | OUTPATIENT
Start: 2024-06-08 | End: 2024-06-08

## 2024-06-08 RX ORDER — METOPROLOL TARTRATE 25 MG/1
25 TABLET, FILM COATED ORAL ONCE
Status: COMPLETED | OUTPATIENT
Start: 2024-06-08 | End: 2024-06-08

## 2024-06-08 RX ADMIN — DILTIAZEM HYDROCHLORIDE 10 MG/HR: 5 INJECTION INTRAVENOUS at 10:06

## 2024-06-08 RX ADMIN — FUROSEMIDE 20 MG: 10 INJECTION, SOLUTION INTRAMUSCULAR; INTRAVENOUS at 08:06

## 2024-06-08 RX ADMIN — POTASSIUM & SODIUM PHOSPHATES POWDER PACK 280-160-250 MG 1 PACKET: 280-160-250 PACK at 11:06

## 2024-06-08 RX ADMIN — POTASSIUM & SODIUM PHOSPHATES POWDER PACK 280-160-250 MG 1 PACKET: 280-160-250 PACK at 03:06

## 2024-06-08 RX ADMIN — GABAPENTIN 600 MG: 300 CAPSULE ORAL at 08:06

## 2024-06-08 RX ADMIN — POTASSIUM & SODIUM PHOSPHATES POWDER PACK 280-160-250 MG 1 PACKET: 280-160-250 PACK at 08:06

## 2024-06-08 RX ADMIN — ATORVASTATIN CALCIUM 40 MG: 40 TABLET, FILM COATED ORAL at 08:06

## 2024-06-08 RX ADMIN — DOCUSATE SODIUM LIQUID 50 MG: 100 LIQUID ORAL at 08:06

## 2024-06-08 RX ADMIN — METOPROLOL TARTRATE 50 MG: 50 TABLET, FILM COATED ORAL at 08:06

## 2024-06-08 RX ADMIN — TRAMADOL HYDROCHLORIDE 50 MG: 50 TABLET, COATED ORAL at 08:06

## 2024-06-08 RX ADMIN — METOPROLOL SUCCINATE 25 MG: 25 TABLET, EXTENDED RELEASE ORAL at 08:06

## 2024-06-08 RX ADMIN — AMIODARONE HYDROCHLORIDE 150 MG: 1.5 INJECTION, SOLUTION INTRAVENOUS at 03:06

## 2024-06-08 RX ADMIN — PIPERACILLIN SODIUM AND TAZOBACTAM SODIUM 4.5 G: 4; .5 INJECTION, POWDER, LYOPHILIZED, FOR SOLUTION INTRAVENOUS at 01:06

## 2024-06-08 RX ADMIN — AMIODARONE HYDROCHLORIDE 1 MG/MIN: 1.8 INJECTION, SOLUTION INTRAVENOUS at 03:06

## 2024-06-08 RX ADMIN — PIPERACILLIN SODIUM AND TAZOBACTAM SODIUM 4.5 G: 4; .5 INJECTION, POWDER, LYOPHILIZED, FOR SOLUTION INTRAVENOUS at 08:06

## 2024-06-08 RX ADMIN — SERTRALINE HYDROCHLORIDE 100 MG: 50 TABLET ORAL at 08:06

## 2024-06-08 RX ADMIN — APIXABAN 5 MG: 5 TABLET, FILM COATED ORAL at 08:06

## 2024-06-08 RX ADMIN — METOPROLOL TARTRATE 25 MG: 25 TABLET, FILM COATED ORAL at 12:06

## 2024-06-08 RX ADMIN — PIPERACILLIN SODIUM AND TAZOBACTAM SODIUM 4.5 G: 4; .5 INJECTION, POWDER, LYOPHILIZED, FOR SOLUTION INTRAVENOUS at 05:06

## 2024-06-08 RX ADMIN — DILTIAZEM HYDROCHLORIDE 300 MG: 300 CAPSULE, COATED, EXTENDED RELEASE ORAL at 08:06

## 2024-06-08 RX ADMIN — AMIODARONE HYDROCHLORIDE 0.5 MG/MIN: 1.8 INJECTION, SOLUTION INTRAVENOUS at 10:06

## 2024-06-08 RX ADMIN — TRAMADOL HYDROCHLORIDE 50 MG: 50 TABLET, COATED ORAL at 03:06

## 2024-06-08 NOTE — NURSING
Nurses Note -- 4 Eyes      6/7/2024   7:30 PM      Skin assessed during: Q Shift Change      [x] No Altered Skin Integrity Present    [x]Prevention Measures Documented      [] Yes- Altered Skin Integrity Present or Discovered   [] LDA Added if Not in Epic (Describe Wound)   [] New Altered Skin Integrity was Present on Admit and Documented in LDA   [] Wound Image Taken    Wound Care Consulted? No    Attending Nurse: Masoud SUMMERS    Second RN/Staff Member:  Gabriela BEVERLY

## 2024-06-08 NOTE — PLAN OF CARE
Problem: Adult Inpatient Plan of Care  Goal: Plan of Care Review  6/8/2024 0414 by Masoud Hong RN  Outcome: Progressing  6/7/2024 1932 by Masoud Hong RN  Outcome: Progressing  Goal: Patient-Specific Goal (Individualized)  6/8/2024 0414 by Masoud Hong RN  Outcome: Progressing  6/7/2024 1932 by Masoud Hong RN  Outcome: Progressing  Goal: Absence of Hospital-Acquired Illness or Injury  6/8/2024 0414 by Masoud Hong RN  Outcome: Progressing  6/7/2024 1932 by Masoud Hong RN  Outcome: Progressing  Goal: Optimal Comfort and Wellbeing  6/8/2024 0414 by Masoud Hong RN  Outcome: Progressing  6/7/2024 1932 by Masoud Hong RN  Outcome: Progressing  Goal: Readiness for Transition of Care  6/8/2024 0414 by Masoud Hong RN  Outcome: Progressing  6/7/2024 1932 by Masoud Hong RN  Outcome: Progressing     Problem: Infection  Goal: Absence of Infection Signs and Symptoms  6/8/2024 0414 by Masoud Hong RN  Outcome: Progressing  6/7/2024 1932 by Masoud Hong RN  Outcome: Progressing     Problem: Skin Injury Risk Increased  Goal: Skin Health and Integrity  6/8/2024 0414 by Masoud Hong RN  Outcome: Progressing  6/7/2024 1932 by Masoud Hong RN  Outcome: Progressing     Problem: Fall Injury Risk  Goal: Absence of Fall and Fall-Related Injury  6/8/2024 0414 by Masoud Hong RN  Outcome: Progressing  6/7/2024 1932 by Masoud Hong RN  Outcome: Progressing

## 2024-06-08 NOTE — PLAN OF CARE
Problem: Adult Inpatient Plan of Care  Goal: Plan of Care Review  6/8/2024 0428 by Masoud Hong RN  Outcome: Progressing  6/8/2024 0414 by Masoud Hong RN  Outcome: Progressing  6/7/2024 1932 by Masoud Hong RN  Outcome: Progressing  Goal: Patient-Specific Goal (Individualized)  6/8/2024 0428 by Masoud Hong RN  Outcome: Progressing  6/8/2024 0414 by Masoud Hong RN  Outcome: Progressing  6/7/2024 1932 by Masoud Hong RN  Outcome: Progressing  Goal: Absence of Hospital-Acquired Illness or Injury  6/8/2024 0428 by Masoud Hong RN  Outcome: Progressing  6/8/2024 0414 by Masoud Hong RN  Outcome: Progressing  6/7/2024 1932 by Masoud Hong RN  Outcome: Progressing  Goal: Optimal Comfort and Wellbeing  6/8/2024 0428 by Masoud Hong RN  Outcome: Progressing  6/8/2024 0414 by Masoud Hong RN  Outcome: Progressing  6/7/2024 1932 by Masoud Hong RN  Outcome: Progressing  Goal: Readiness for Transition of Care  6/8/2024 0428 by Masoud Hong RN  Outcome: Progressing  6/8/2024 0414 by Masoud Hong RN  Outcome: Progressing  6/7/2024 1932 by Masoud Hong RN  Outcome: Progressing     Problem: Infection  Goal: Absence of Infection Signs and Symptoms  6/8/2024 0428 by Masoud Hong RN  Outcome: Progressing  6/8/2024 0414 by Masoud Hong RN  Outcome: Progressing  6/7/2024 1932 by Masoud Hong RN  Outcome: Progressing     Problem: Skin Injury Risk Increased  Goal: Skin Health and Integrity  6/8/2024 0428 by Masoud Hong RN  Outcome: Progressing  6/8/2024 0414 by Masoud Hong RN  Outcome: Progressing  6/7/2024 1932 by Masoud Hong RN  Outcome: Progressing     Problem: Fall Injury Risk  Goal: Absence of Fall and Fall-Related Injury  6/8/2024 0428 by aMsoud Hong, RN  Outcome: Progressing  6/8/2024 0414 by Masoud Hong, RN  Outcome: Progressing  6/7/2024 1932 by Masoud Hong, RN  Outcome: Progressing

## 2024-06-08 NOTE — PROGRESS NOTES
[facility] Medicine Progress Note        Chief Complaint: Inpatient Follow-up    HPI:   78-year-old lady with PMH of AS s/p AVR, CAD, mi, HTN, chronic back pain, HLD, CVA, CHF, GERD, diverticulosis who was being downgraded from the ICU to Hospital Medicine after she was initially admitted for acute hypoxemic respiratory failure requiring BiPAP for adequate saturations. She was transferred from an outside facility to [facility] with complaints of worsening shortness of breath for the last few weeks with initial CXR at outside facility showing bilateral airspace opacities and increased right-sided pleural effusion. Her last echocardiogram from 05/16 showed EF 55-60%. She was placed on antibiotics, steroids as well as IV Lasix for diuresis for pulmonary congestion. She was able to be weaned down from BiPAP to Vapotherm and eventually Oxymizer 8 L. she was also noted to have new onset atrial fibrillation for which he was started on IV Cardizem which has since been transitioned to p.o. Cardizem. She is on full-dose Lovenox due to CHADS-VASc of 7. She reported feeling significantly better and endorsed improvement in her shortness of breath since admission. Denied having any fever, chills, chest pain, cough, sputum production, abdominal pain, nausea, vomiting, headache, numbness, weakness, dizziness/lightheadedness or loss of consciousness. Blood cultures from admission are negative. PT/OT on board; in recommending moderate intensity therapy. Cardiology on board.     Interval Hx:   No acute events reported overnight,seen at bedside this mrng, pt was sitting up in chair, family member in the room  She reported feeling improved ,pt on  high flow 12 L NC ,denied any cp  Pt on iv amio drip, cardizem drip   Discussed the ongoing care     Case was discussed with patient's nurse     Objective/physical exam:  General: no apparent distress, afebrile  Chest: unlabored breathing on high flow NC    Heart: irregular, rate in 120s during my rounds    Abdomen: Soft  Neurologic: Alert and responding appropriately     VITAL SIGNS: 24 HRS MIN & MAX LAST   Temp  Min: 97.2 °F (36.2 °C)  Max: 98.2 °F (36.8 °C) 97.8 °F (36.6 °C)   BP  Min: 100/75  Max: 149/94 125/87   Pulse  Min: 101  Max: 146  (!) 118   Resp  Min: 14  Max: 34 19   SpO2  Min: 86 %  Max: 98 % (!) 94 %     I have reviewed the following labs:  Recent Labs   Lab 06/06/24  0325 06/07/24 0645 06/08/24 0419   WBC 15.30* 12.92* 14.27*   RBC 3.63* 3.71* 3.55*   HGB 10.8* 11.2* 10.7*   HCT 33.6* 34.8* 32.7*   MCV 92.6 93.8 92.1   MCH 29.8 30.2 30.1   MCHC 32.1* 32.2* 32.7*   RDW 12.9 13.1 12.9    320 333   MPV 10.8* 10.7* 11.2*     Recent Labs   Lab 06/01/24  2314 06/02/24  0115 06/05/24  2135 06/06/24 0325 06/07/24 0645 06/07/24 2054 06/08/24 0419   NA  --    < > 141 140 140 135* 139   K  --    < > 3.3* 3.7 3.2* 3.4* 3.7   CL  --    < > 100 100 100 97* 99   CO2  --    < > 29 28 28 26 28   BUN  --    < > 47.0* 55.5* 43.5* 37.9* 35.2*   CREATININE  --    < > 0.88 0.95 0.87 0.89 0.88   CALCIUM  --    < > 10.2 10.0 9.3 9.0 8.9   PH 7.450  --   --   --   --   --   --    MG  --    < > 2.10  --  1.90 2.30  --    ALBUMIN  --    < >  --  2.6* 2.8*  --  2.7*   ALKPHOS  --    < >  --  63 63  --  69   ALT  --    < >  --  12 13  --  17   AST  --    < >  --  12 15  --  18   BILITOT  --    < >  --  0.4 0.6  --  0.6    < > = values in this interval not displayed.     Microbiology Results (last 7 days)       Procedure Component Value Units Date/Time    Blood Culture [[id number]]  (Normal) Collected: 06/02/24 0115    Order Status: Completed Specimen: Blood, Venous Updated: 06/07/24 0300     Blood Culture No Growth at 5 days    Blood Culture [[id number]]  (Normal) Collected: 06/02/24 0115    Order Status: Completed Specimen: Blood, Venous Updated: 06/07/24 0300     Blood Culture No Growth at 5 days    Respiratory Culture [[id number]]     Order Status: Sent Specimen:  Sputum, Expectorated              See below for Radiology    Scheduled Med:   apixaban  5 mg Oral BID    atorvastatin  40 mg Oral QHS    diltiaZEM  300 mg Oral Daily    docusate  50 mg Oral Daily    furosemide (LASIX) injection  20 mg Intravenous Q12H    gabapentin  600 mg Oral BID    metoprolol succinate  25 mg Oral Daily    piperacillin-tazobactam (Zosyn) IV (PEDS and ADULTS) (extended infusion is not appropriate)  4.5 g Intravenous Q8H    sertraline  100 mg Oral QHS    traMADoL  50 mg Oral TID      Continuous Infusions:   amiodarone in dextrose 5%  1 mg/min Intravenous Continuous 33.3 mL/hr at 06/08/24 0851 1 mg/min at 06/08/24 0851    amiodarone in dextrose 5%  0.5 mg/min Intravenous Continuous        dilTIAZem  0-15 mg/hr Intravenous Continuous 5 mL/hr at 06/08/24 0851 5 mg/hr at 06/08/24 0851      PRN Meds:    Current Facility-Administered Medications:     sodium chloride 0.9%, , Intravenous, PRN    hydrALAZINE, 10 mg, Intravenous, Q6H PRN    HYDROmorphone, 0.2 mg, Intravenous, Q6H PRN    labetalol, 10 mg, Intravenous, Q4H PRN    metoprolol, 5 mg, Intravenous, Q4H PRN     Assessment/Plan:  Multifocal pneumonia  HFpEF with exacerbation and bilateral pleural effusions  Acute respiratory failure with hypoxia requiring high-flow O2   Atrial Fibrillation with RVR (New Onset)   JUN- POA  DNR    Hx: CAD-status post CABG,Valvular heart disease-status post AVR    Monitor on tele  Cont supplemental o2 ,wean as tolerated , pt currently 12 L high flow cannula   Iv diuresis , strict out put   Pt on amio, cardizem drip, follow cis recommendations  Cont iv zosyn day 7 today, micro reviewed, blood cx neg,chlmyda,mycoplasma neg check legionella ,send resp cx if able   Labs showed wbc 14 K, hb 10.7, k 3.7, bun 35, cr 0.8  Check, mag,phos, replete if low  Repeat cxr tmrw AM for interval f/u  PT/OT  CM working on LTAC placement   Labs in AM , monitor lytes,renal function      VTE prophylaxis: eliquis     Patient condition:   Fair    Anticipated discharge and Disposition:   LTAC    Cc time spent: 35min  Cc dx: AHRF needing high flow o2, chf needing diuresis     _____________________________________________________________________    Nutrition Status:    Radiology:  I have personally reviewed the following imaging and agree with the radiologist.     X-Ray Chest 1 View  Narrative: EXAMINATION:  XR CHEST 1 VIEW    CLINICAL HISTORY:  Aspiration;    TECHNIQUE:  AP chest    COMPARISON:  Chest x-ray dated 06/03/2024    FINDINGS:  Right-sided PICC line has its tip over the superior vena cava.  The heart is normal in size.  There is a left retrocardiac opacity with layering pleural effusion, not significantly changed.  There is no definite visible pneumothorax.  Impression: Stable exam without significant interval change.    Electronically signed by: Dimple Sellers  Date:    06/06/2024  Time:    06:04      Sapna Garcia MD  Department of Hospital Medicine   [facility]   06/08/2024

## 2024-06-08 NOTE — NURSING
Nurses Note -- 4 Eyes      6/8/2024   7:31 AM      Skin assessed during: Daily Assessment      [x] No Altered Skin Integrity Present    [x]Prevention Measures Documented      [] Yes- Altered Skin Integrity Present or Discovered   [] LDA Added if Not in Epic (Describe Wound)   [] New Altered Skin Integrity was Present on Admit and Documented in LDA   [] Wound Image Taken    Wound Care Consulted? No    Attending Nurse:  Nata Pederson RN/Staff Member:  MELINA Saldana

## 2024-06-08 NOTE — PROGRESS NOTES
" [facility] - 7th Floor ICU    Cardiology  Progress Note    Patient Name: Alanna Moya  MRN: [id number]  Admission Date: 6/1/2024  Hospital Length of Stay: 7 days  Code Status: DNR   Attending Physician: Sapna Garcia MD   Primary Care Physician: Cornel Haddad MD  Expected Discharge Date:   Principal Problem:<principal problem not specified>    Subjective:     Reason for Consult: SVT     HPI:Ms. Moya is a 78 year old female, known to Dr. Ramon in [place], who presented to the hospital from [facility] as transfer due to respiratory failure. Upon arrival she was noted to be SOB. Noted hypoxia requiring BIPAP. Chesr Radiograph from outside facility revealed bilateral airspace opacification and increase his right-sided pleural effusion suggestive of worsening pneumonia or possibly edema. CT Chest revealed mixed picture of decompensated HF, pleural effusions and superimposed pneumonia. During this hospitalization patient with tachycardia concerning for AF RVR, with noted Left Bundle Branch Block. She was given IV Metoprolol and started on Cardizem Infusion for Acute HR Control. She does have history of PSVT. Echocardiogram on 6.2.24 revealed intact LV Function with EF 55-60% & Grade I DD. Electrolytes grossly stable. Troponin normal. Lactic Acid Normal. . CIS Consulted for AF Management.       Hospital Course:   6.7.24: Patient seen sitting in chair. She denies SOB, CP, or nausea. Remains in Afib RVR   6.8.24: NAD. VSS. No complaints of CP/SOB/Palps. "I feel okay" WBC 14.27    PMH: CAD/CABG, Hypertension, Dyslipidemia, MELCHOR, CEA, CVA, AS/TAVR, PSVT  PSH: LHC, CABG, TAVR, CEA, Hysterectomy, Cholecystectomy, Eye Surgery, Hand Surgery  Family History: Father- Old MI/CAD, Mother- HF, Son- Hypertension, Daughter- Cancer/Hypertension, Brother- Heart Disease, Brother- Old MI/CAD, Brother- Cancer  Social History: Tobacco- Negative, Alcohol- Negative, Substance Abuse- Negative    "   Previous Cardiac Diagnostics:   Echocardiogram (6.2.24):  Left Ventricle: The left ventricle is normal in size. Increased wall thickness. There is normal systolic function with a visually estimated ejection fraction of 55 - 60%. Grade I diastolic dysfunction.  Right Ventricle: Systolic function is reduced.TAPSE is 1.60 cm.  Aortic Valve: There is a transcatheter valve replacement in the aortic position. Aortic valve area by VTI is 1.56 cm². Aortic valve peak velocity is 1.94 m/s. Mean gradient is 8 mmHg. The dimensionless index is 0.50.  Mitral Valve: Mildly thickened leaflets. There is mitral annular calcification present. There is mild stenosis. The mean pressure gradient across the mitral valve is 5 mmHg at a heart rate of 87 bpm. There is mild regurgitation.  Tricuspid Valve: The tricuspid valve is structurally normal. There is mild regurgitation.  Pulmonic Valve: The pulmonic valve is structurally normal.  Pulmonary Artery: The estimated pulmonary artery systolic pressure is 47 mmHg.  IVC/SVC: Normal venous pressure at 3 mmHg.  Pericardium: Left pleural effusion.     Echocardiogram (5.15.24):  EF 55-60%. Grade I Diastolic Dysfunction. Normal LV Wall Motion.  Mild MAC with Mild MR and No MS. Mild to Moderate TR.  Mild to Moderate AI, No AS.     CT Angiogram Head/Neck (5.15.24):  Carotid arteries:   Calcified plaque at the siphons without significant narrowing.   Normal A1 and M1 segments.   Vertebrobasilar Circulation:   Vertebral arteries: Patent. Calcified plaque on the right without significant narrowing. Otherwise no abnormalities.   Basilar artery: Patent, no abnormalities.   Posterior cerebral arteries:  Patent. Right fetal origin. Otherwise no abnormalities.      Echocardiogram (4.21.23):  The study quality is good.   The left ventricle is normal in size. Global left ventricular systolic function is normal. The left ventricular ejection fraction by Power's is 62%. The left ventricle diastolic function  is impaired (Grade II) with an elevated left atrial pressure. Noted left ventricular hypertrophy. Concentric left ventricular hypertrophy is present. It is mild. LVSI=41ml/m2.    LVEDV=67.2ml and LVESV=25.7ml.  The left atrial diameter is moderately increased. Left atrial diameter is 4.4 cms. The left atrium is severely enlarged based on the left atrium volume index of 48.2ml/m².  S/P TAVR. Bioprosthetic aortic valve is well seated and functions normally with no evidence of insufficiency or perivalvular leaks. JACINTA=2.0cm2. The trans-aortic peak gradient is 15.8 mmHg. The trans-aortic mean gradient is 8.6 mmHg. DI=1.1.  Mild mitral annular calcification is noted. The mean trans mitral gradient is 1.9 mmHg.  Mild to moderate (1-2+) mitral regurgitation. Mild to moderate (1-2+) tricuspid regurgitation.  The estimated pulmonary artery systolic pressure is 41 mmHg assuming a right atrial pressure of 3 mmHg. Evidence of pulmonary hypertension is noted.      MCT Monitor (3.15.23):  Predominant Rhythm SR.  PSVT     TAVR (2.27.23):  TAVR  23  mm S3 ultra valve, nominal prep,  Right TF approach  Limited echo pre, post valve placement  Root angiography  Distal aortography  Temporary pacemaker placement removal  Manta closure  Right CFA access site.  U/S guided bilateral right groin access   Balloon angioplasty of right CFA with a 5 x 40 Napoleon.     LHC (1.26.23):  LM: Distal 50% Stenosis, LAD: Occluded Mid, LCX: Ostial 50% at Mid Portion, RCA: Dominant Patent Arter.  JENNINGS to LAD is Patent.  Impression: LIMA to LAD- Patent, 50% Stenosis LCX Artery  Medical Management.    Review of Systems   Constitutional: Negative for chills and fever.   Cardiovascular:  Negative for chest pain, palpitations and syncope.   Respiratory:  Negative for cough and shortness of breath.    Skin: Negative.    Musculoskeletal: Negative.    Gastrointestinal:  Negative for abdominal pain.     Objective:     Vital Signs (Most Recent):  Temp: 97.8 °F  (36.6 °C) (06/08/24 0800)  Pulse: (!) 118 (06/08/24 0900)  Resp: 19 (06/08/24 0900)  BP: 125/87 (06/08/24 0900)  SpO2: (!) 94 % (06/08/24 0900) Vital Signs (24h Range):  Temp:  [97.2 °F (36.2 °C)-98.2 °F (36.8 °C)] 97.8 °F (36.6 °C)  Pulse:  [101-146] 118  Resp:  [14-34] 19  SpO2:  [86 %-98 %] 94 %  BP: (100-149)/() 125/87   Weight: 77.1 kg (170 lb)  Body mass index is 31.09 kg/m².  SpO2: (!) 94 %       Intake/Output Summary (Last 24 hours) at 6/8/2024 1019  Last data filed at 6/8/2024 0851  Gross per 24 hour   Intake 1111.62 ml   Output 700 ml   Net 411.62 ml     Lines/Drains/Airways       Drain  Duration             Female External Urinary Catheter w/ Suction 06/02/24 0400 6 days              Peripheral Intravenous Line  Duration                  Peripheral IV - Single Lumen 06/07/24 0515 20 G Right Forearm 1 day         Peripheral IV - Single Lumen 06/07/24 1700 20 G Anterior;Proximal;Right Forearm <1 day         Peripheral IV - Single Lumen 06/08/24 0428 20 G Left Forearm <1 day                  Significant Labs:   Recent Results (from the past 72 hour(s))   EKG 12-lead    Collection Time: 06/05/24  9:13 PM   Result Value Ref Range    QRS Duration 140 ms    [facility] QTC Calculation 546 ms   Basic Metabolic Panel    Collection Time: 06/05/24  9:35 PM   Result Value Ref Range    Sodium 141 136 - 145 mmol/L    Potassium 3.3 (L) 3.5 - 5.1 mmol/L    Chloride 100 98 - 107 mmol/L    CO2 29 23 - 31 mmol/L    Glucose 210 (H) 82 - 115 mg/dL    Blood Urea Nitrogen 47.0 (H) 9.8 - 20.1 mg/dL    Creatinine 0.88 0.55 - 1.02 mg/dL    BUN/Creatinine Ratio 53     Calcium 10.2 8.4 - 10.2 mg/dL    Anion Gap 12.0 mEq/L    eGFR >60 mL/min/1.73/m2   Troponin I    Collection Time: 06/05/24  9:35 PM   Result Value Ref Range    Troponin-I 0.032 0.000 - 0.045 ng/mL   Magnesium    Collection Time: 06/05/24  9:35 PM   Result Value Ref Range    Magnesium Level 2.10 1.60 - 2.60 mg/dL   Comprehensive Metabolic Panel    Collection Time:  06/06/24  3:25 AM   Result Value Ref Range    Sodium 140 136 - 145 mmol/L    Potassium 3.7 3.5 - 5.1 mmol/L    Chloride 100 98 - 107 mmol/L    CO2 28 23 - 31 mmol/L    Glucose 227 (H) 82 - 115 mg/dL    Blood Urea Nitrogen 55.5 (H) 9.8 - 20.1 mg/dL    Creatinine 0.95 0.55 - 1.02 mg/dL    Calcium 10.0 8.4 - 10.2 mg/dL    Protein Total 6.3 5.8 - 7.6 gm/dL    Albumin 2.6 (L) 3.4 - 4.8 g/dL    Globulin 3.7 (H) 2.4 - 3.5 gm/dL    Albumin/Globulin Ratio 0.7 (L) 1.1 - 2.0 ratio    Bilirubin Total 0.4 <=1.5 mg/dL    ALP 63 40 - 150 unit/L    ALT 12 0 - 55 unit/L    AST 12 5 - 34 unit/L    eGFR >60 mL/min/1.73/m2    Anion Gap 12.0 mEq/L    BUN/Creatinine Ratio 58    CBC with Differential    Collection Time: 06/06/24  3:25 AM   Result Value Ref Range    WBC 15.30 (H) 4.50 - 11.50 x10(3)/mcL    RBC 3.63 (L) 4.20 - 5.40 x10(6)/mcL    Hgb 10.8 (L) 12.0 - 16.0 g/dL    Hct 33.6 (L) 37.0 - 47.0 %    MCV 92.6 80.0 - 94.0 fL    MCH 29.8 27.0 - 31.0 pg    MCHC 32.1 (L) 33.0 - 36.0 g/dL    RDW 12.9 11.5 - 17.0 %    Platelet 306 130 - 400 x10(3)/mcL    MPV 10.8 (H) 7.4 - 10.4 fL    Neut % 83.4 %    Lymph % 3.6 %    Mono % 10.1 %    Eos % 0.1 %    Basophil % 0.2 %    Lymph # 0.55 (L) 0.6 - 4.6 x10(3)/mcL    Neut # 12.77 (H) 2.1 - 9.2 x10(3)/mcL    Mono # 1.54 (H) 0.1 - 1.3 x10(3)/mcL    Eos # 0.01 0 - 0.9 x10(3)/mcL    Baso # 0.03 <=0.2 x10(3)/mcL    IG# 0.40 (H) 0 - 0.04 x10(3)/mcL    IG% 2.6 %    NRBC% 0.0 %   Comprehensive Metabolic Panel    Collection Time: 06/07/24  6:45 AM   Result Value Ref Range    Sodium 140 136 - 145 mmol/L    Potassium 3.2 (L) 3.5 - 5.1 mmol/L    Chloride 100 98 - 107 mmol/L    CO2 28 23 - 31 mmol/L    Glucose 147 (H) 82 - 115 mg/dL    Blood Urea Nitrogen 43.5 (H) 9.8 - 20.1 mg/dL    Creatinine 0.87 0.55 - 1.02 mg/dL    Calcium 9.3 8.4 - 10.2 mg/dL    Protein Total 6.5 5.8 - 7.6 gm/dL    Albumin 2.8 (L) 3.4 - 4.8 g/dL    Globulin 3.7 (H) 2.4 - 3.5 gm/dL    Albumin/Globulin Ratio 0.8 (L) 1.1 - 2.0 ratio     Bilirubin Total 0.6 <=1.5 mg/dL    ALP 63 40 - 150 unit/L    ALT 13 0 - 55 unit/L    AST 15 5 - 34 unit/L    eGFR >60 mL/min/1.73/m2    Anion Gap 12.0 mEq/L    BUN/Creatinine Ratio 50    CBC with Differential    Collection Time: 06/07/24  6:45 AM   Result Value Ref Range    WBC 12.92 (H) 4.50 - 11.50 x10(3)/mcL    RBC 3.71 (L) 4.20 - 5.40 x10(6)/mcL    Hgb 11.2 (L) 12.0 - 16.0 g/dL    Hct 34.8 (L) 37.0 - 47.0 %    MCV 93.8 80.0 - 94.0 fL    MCH 30.2 27.0 - 31.0 pg    MCHC 32.2 (L) 33.0 - 36.0 g/dL    RDW 13.1 11.5 - 17.0 %    Platelet 320 130 - 400 x10(3)/mcL    MPV 10.7 (H) 7.4 - 10.4 fL    Neut % 81.8 %    Lymph % 6.7 %    Mono % 8.4 %    Eos % 0.3 %    Basophil % 0.2 %    Lymph # 0.87 0.6 - 4.6 x10(3)/mcL    Neut # 10.57 (H) 2.1 - 9.2 x10(3)/mcL    Mono # 1.08 0.1 - 1.3 x10(3)/mcL    Eos # 0.04 0 - 0.9 x10(3)/mcL    Baso # 0.03 <=0.2 x10(3)/mcL    IG# 0.33 (H) 0 - 0.04 x10(3)/mcL    IG% 2.6 %    NRBC% 0.0 %   Magnesium    Collection Time: 06/07/24  6:45 AM   Result Value Ref Range    Magnesium Level 1.90 1.60 - 2.60 mg/dL   Basic Metabolic Panel    Collection Time: 06/07/24  8:54 PM   Result Value Ref Range    Sodium 135 (L) 136 - 145 mmol/L    Potassium 3.4 (L) 3.5 - 5.1 mmol/L    Chloride 97 (L) 98 - 107 mmol/L    CO2 26 23 - 31 mmol/L    Glucose 242 (H) 82 - 115 mg/dL    Blood Urea Nitrogen 37.9 (H) 9.8 - 20.1 mg/dL    Creatinine 0.89 0.55 - 1.02 mg/dL    BUN/Creatinine Ratio 43     Calcium 9.0 8.4 - 10.2 mg/dL    Anion Gap 12.0 mEq/L    eGFR >60 mL/min/1.73/m2   Magnesium    Collection Time: 06/07/24  8:54 PM   Result Value Ref Range    Magnesium Level 2.30 1.60 - 2.60 mg/dL   Comprehensive Metabolic Panel    Collection Time: 06/08/24  4:19 AM   Result Value Ref Range    Sodium 139 136 - 145 mmol/L    Potassium 3.7 3.5 - 5.1 mmol/L    Chloride 99 98 - 107 mmol/L    CO2 28 23 - 31 mmol/L    Glucose 166 (H) 82 - 115 mg/dL    Blood Urea Nitrogen 35.2 (H) 9.8 - 20.1 mg/dL    Creatinine 0.88 0.55 - 1.02 mg/dL     Calcium 8.9 8.4 - 10.2 mg/dL    Protein Total 5.5 (L) 5.8 - 7.6 gm/dL    Albumin 2.7 (L) 3.4 - 4.8 g/dL    Globulin 2.8 2.4 - 3.5 gm/dL    Albumin/Globulin Ratio 1.0 (L) 1.1 - 2.0 ratio    Bilirubin Total 0.6 <=1.5 mg/dL    ALP 69 40 - 150 unit/L    ALT 17 0 - 55 unit/L    AST 18 5 - 34 unit/L    eGFR >60 mL/min/1.73/m2    Anion Gap 12.0 mEq/L    BUN/Creatinine Ratio 40    CBC with Differential    Collection Time: 06/08/24  4:19 AM   Result Value Ref Range    WBC 14.27 (H) 4.50 - 11.50 x10(3)/mcL    RBC 3.55 (L) 4.20 - 5.40 x10(6)/mcL    Hgb 10.7 (L) 12.0 - 16.0 g/dL    Hct 32.7 (L) 37.0 - 47.0 %    MCV 92.1 80.0 - 94.0 fL    MCH 30.1 27.0 - 31.0 pg    MCHC 32.7 (L) 33.0 - 36.0 g/dL    RDW 12.9 11.5 - 17.0 %    Platelet 333 130 - 400 x10(3)/mcL    MPV 11.2 (H) 7.4 - 10.4 fL    Neut % 74.4 %    Lymph % 10.7 %    Mono % 11.4 %    Eos % 0.1 %    Basophil % 0.2 %    Lymph # 1.53 0.6 - 4.6 x10(3)/mcL    Neut # 10.62 (H) 2.1 - 9.2 x10(3)/mcL    Mono # 1.62 (H) 0.1 - 1.3 x10(3)/mcL    Eos # 0.01 0 - 0.9 x10(3)/mcL    Baso # 0.03 <=0.2 x10(3)/mcL    IG# 0.46 (H) 0 - 0.04 x10(3)/mcL    IG% 3.2 %    NRBC% 0.0 %     Telemetry:  Afib RVR    Physical Exam  HENT:      Head: Normocephalic.      Mouth/Throat:      Mouth: Mucous membranes are moist.   Cardiovascular:      Rate and Rhythm: Tachycardia present. Rhythm irregular.   Pulmonary:      Effort: Pulmonary effort is normal. No respiratory distress.      Breath sounds: Normal breath sounds.   Abdominal:      General: Abdomen is flat.   Skin:     General: Skin is warm.   Neurological:      Mental Status: She is alert and oriented to person, place, and time.   Psychiatric:         Mood and Affect: Mood normal.         Behavior: Behavior normal.         Judgment: Judgment normal.       Current Inpatient Medications:    Current Facility-Administered Medications:     0.9%  NaCl infusion, , Intravenous, PRN, Vishnu Stevenson MD, Stopped at 06/08/24 0133    amiodarone 360 mg/200 mL  (1.8 mg/mL) infusion, 0.5 mg/min, Intravenous, Continuous, Joselyn Daniel NP, Last Rate: 16.7 mL/hr at 06/08/24 1001, 0.5 mg/min at 06/08/24 1001    apixaban tablet 5 mg, 5 mg, Oral, BID, Jennifer Lynch, NP, 5 mg at 06/08/24 0834    atorvastatin tablet 40 mg, 40 mg, Oral, QHS, Augustus Barreto MD    diltiaZEM 125 mg in dextrose 5 % 125 mL IVPB (ready to mix) (titrating), 0-15 mg/hr, Intravenous, Continuous, Jennifer Lynch NP, Last Rate: 5 mL/hr at 06/08/24 0851, 5 mg/hr at 06/08/24 0851    diltiaZEM 24 hr capsule 300 mg, 300 mg, Oral, Daily, Jennifer Lynch, NP, 300 mg at 06/08/24 0834    docusate 50 mg/5 mL liquid 50 mg, 50 mg, Oral, Daily, Marga Smith MD, 50 mg at 06/08/24 0833    furosemide injection 20 mg, 20 mg, Intravenous, Q12H, DOUGIE Brown MD, 20 mg at 06/08/24 0834    gabapentin capsule 600 mg, 600 mg, Oral, BID, Marga Smith MD, 600 mg at 06/08/24 0834    hydrALAZINE injection 10 mg, 10 mg, Intravenous, Q6H PRN, Eleno Abbasi MD, 10 mg at 06/06/24 1631    HYDROmorphone (PF) injection 0.2 mg, 0.2 mg, Intravenous, Q6H PRN, Ky Linn DO, 0.2 mg at 06/05/24 0029    labetaloL injection 10 mg, 10 mg, Intravenous, Q4H PRN, Baldo Finney DO, 10 mg at 06/06/24 1719    metoprolol injection 5 mg, 5 mg, Intravenous, Q4H PRN, Nataly Mead FNP, 5 mg at 06/07/24 1140    metoprolol succinate (TOPROL-XL) 24 hr tablet 25 mg, 25 mg, Oral, Daily, Jennifer Lynch NP, 25 mg at 06/08/24 0834    piperacillin-tazobactam (ZOSYN) 4.5 g in dextrose 5 % in water (D5W) 100 mL IVPB (MB+), 4.5 g, Intravenous, Q8H, DOUGIE Brown MD, Last Rate: 25 mL/hr at 06/08/24 0851, Rate Verify at 06/08/24 0851    sertraline tablet 100 mg, 100 mg, Oral, QHS, Marga Smith MD, 100 mg at 06/07/24 2023    traMADoL tablet 50 mg, 50 mg, Oral, TID, Vishnu Stevenson MD, 50 mg at 06/08/24 0834  VTE Risk Mitigation (From admission, onward)           Ordered     apixaban tablet 5 mg  2 times daily         06/07/24 1320     IP VTE HIGH  RISK PATIENT  Once         06/01/24 0264                  Assessment:   Atrial Fibrillation with RVR (New Onset)- Now RVR    - [id number]: 7    - History of PSVT  Acute on Chronic Diastolic HF    - EF 55-60% with Grade I Diastolic Dysfunction  Valvular Heart Disease    - AS: Status Post TAVR  23  mm S3 ultra valve, nominal prep,  Right TF approach (2.27.23)  CAD/CABG    - LIMA to LAD (Patent) with 50% Stenosis Native LCX  Hypertension  Hyperlipidemia  Chronic Left Bundle Branch Block ([place] TAVR)  MELCHOR/CEA  Pneumonia  History of CVA  DNR Status   No known history of GI Bleed     Plan:   Stop Amiodarone IV, can use Cardizem gtt ir needed  Increase Cardizem CD to 360 mg  Switch Metoprolol XL to Tartrate 50mg oral BID   Eliquis 5mg PO BID for Stroke Risk Reduction   Continue Diuretics as Clinically Indicated  K+ Goal 4 Mag Goal 2  Accurate I&O/Daily Weight   Antibiotic Management as per Primary Team  Lopressor as needed for Sustained HR > 120   Keep Mag > 2 and Potassium > 4  Labs in AM: CBC, CMP, and Mag     STEF Corral  Cardiology  [facility] - 7th Floor ICU  06/08/2024

## 2024-06-09 LAB
ALBUMIN SERPL-MCNC: 2.7 G/DL (ref 3.4–4.8)
ALBUMIN/GLOB SERPL: 1 RATIO (ref 1.1–2)
ALP SERPL-CCNC: 76 UNIT/L (ref 40–150)
ALT SERPL-CCNC: 15 UNIT/L (ref 0–55)
ANION GAP SERPL CALC-SCNC: 14 MEQ/L
AST SERPL-CCNC: 18 UNIT/L (ref 5–34)
BACTERIA #/AREA URNS AUTO: ABNORMAL /HPF
BASOPHILS # BLD AUTO: 0.03 X10(3)/MCL
BASOPHILS NFR BLD AUTO: 0.2 %
BILIRUB SERPL-MCNC: 0.5 MG/DL
BILIRUB UR QL STRIP.AUTO: NEGATIVE
BUN SERPL-MCNC: 30 MG/DL (ref 9.8–20.1)
CALCIUM SERPL-MCNC: 8.6 MG/DL (ref 8.4–10.2)
CHLORIDE SERPL-SCNC: 97 MMOL/L (ref 98–107)
CLARITY UR: CLEAR
CO2 SERPL-SCNC: 25 MMOL/L (ref 23–31)
COLOR UR AUTO: ABNORMAL
CREAT SERPL-MCNC: 0.9 MG/DL (ref 0.55–1.02)
CREAT/UREA NIT SERPL: 33
EOSINOPHIL # BLD AUTO: 0.02 X10(3)/MCL (ref 0–0.9)
EOSINOPHIL NFR BLD AUTO: 0.1 %
ERYTHROCYTE [DISTWIDTH] IN BLOOD BY AUTOMATED COUNT: 12.9 % (ref 11.5–17)
GFR SERPLBLD CREATININE-BSD FMLA CKD-EPI: >60 ML/MIN/1.73/M2
GLOBULIN SER-MCNC: 2.8 GM/DL (ref 2.4–3.5)
GLUCOSE SERPL-MCNC: 138 MG/DL (ref 82–115)
GLUCOSE UR QL STRIP: NORMAL
HCT VFR BLD AUTO: 32.7 % (ref 37–47)
HGB BLD-MCNC: 10.7 G/DL (ref 12–16)
HGB UR QL STRIP: NEGATIVE
IMM GRANULOCYTES # BLD AUTO: 0.56 X10(3)/MCL (ref 0–0.04)
IMM GRANULOCYTES NFR BLD AUTO: 3.5 %
KETONES UR QL STRIP: NEGATIVE
LEUKOCYTE ESTERASE UR QL STRIP: NEGATIVE
LYMPHOCYTES # BLD AUTO: 1.49 X10(3)/MCL (ref 0.6–4.6)
LYMPHOCYTES NFR BLD AUTO: 9.2 %
MAGNESIUM SERPL-MCNC: 1.8 MG/DL (ref 1.6–2.6)
MCH RBC QN AUTO: 30.1 PG (ref 27–31)
MCHC RBC AUTO-ENTMCNC: 32.7 G/DL (ref 33–36)
MCV RBC AUTO: 92.1 FL (ref 80–94)
MONOCYTES # BLD AUTO: 1.97 X10(3)/MCL (ref 0.1–1.3)
MONOCYTES NFR BLD AUTO: 12.2 %
MUCOUS THREADS URNS QL MICRO: ABNORMAL /LPF
NEUTROPHILS # BLD AUTO: 12.14 X10(3)/MCL (ref 2.1–9.2)
NEUTROPHILS NFR BLD AUTO: 74.8 %
NITRITE UR QL STRIP: NEGATIVE
NRBC BLD AUTO-RTO: 0 %
PH UR STRIP: 5.5 [PH]
PLATELET # BLD AUTO: 316 X10(3)/MCL (ref 130–400)
PMV BLD AUTO: 11 FL (ref 7.4–10.4)
POTASSIUM SERPL-SCNC: 3.7 MMOL/L (ref 3.5–5.1)
PROT SERPL-MCNC: 5.5 GM/DL (ref 5.8–7.6)
PROT UR QL STRIP: NEGATIVE
RBC # BLD AUTO: 3.55 X10(6)/MCL (ref 4.2–5.4)
RBC #/AREA URNS AUTO: ABNORMAL /HPF
SODIUM SERPL-SCNC: 136 MMOL/L (ref 136–145)
SP GR UR STRIP.AUTO: 1.01 (ref 1–1.03)
SQUAMOUS #/AREA URNS LPF: ABNORMAL /HPF
UROBILINOGEN UR STRIP-ACNC: NORMAL
WBC # SPEC AUTO: 16.21 X10(3)/MCL (ref 4.5–11.5)
WBC #/AREA URNS AUTO: ABNORMAL /HPF
YEAST BUDDING URNS QL: ABNORMAL /HPF

## 2024-06-09 PROCEDURE — 63600175 PHARM REV CODE 636 W HCPCS: Performed by: INTERNAL MEDICINE

## 2024-06-09 PROCEDURE — 63600175 PHARM REV CODE 636 W HCPCS

## 2024-06-09 PROCEDURE — 99900031 HC PATIENT EDUCATION (STAT)

## 2024-06-09 PROCEDURE — 80053 COMPREHEN METABOLIC PANEL: CPT

## 2024-06-09 PROCEDURE — 99900035 HC TECH TIME PER 15 MIN (STAT)

## 2024-06-09 PROCEDURE — 81001 URINALYSIS AUTO W/SCOPE: CPT | Performed by: INTERNAL MEDICINE

## 2024-06-09 PROCEDURE — 51702 INSERT TEMP BLADDER CATH: CPT

## 2024-06-09 PROCEDURE — 94799 UNLISTED PULMONARY SVC/PX: CPT

## 2024-06-09 PROCEDURE — 25000003 PHARM REV CODE 250

## 2024-06-09 PROCEDURE — 25000003 PHARM REV CODE 250: Performed by: INTERNAL MEDICINE

## 2024-06-09 PROCEDURE — 85025 COMPLETE CBC W/AUTO DIFF WBC: CPT

## 2024-06-09 PROCEDURE — 25000003 PHARM REV CODE 250: Performed by: HOSPITALIST

## 2024-06-09 PROCEDURE — 94760 N-INVAS EAR/PLS OXIMETRY 1: CPT

## 2024-06-09 PROCEDURE — 25000003 PHARM REV CODE 250: Performed by: NURSE PRACTITIONER

## 2024-06-09 PROCEDURE — 94761 N-INVAS EAR/PLS OXIMETRY MLT: CPT

## 2024-06-09 PROCEDURE — 21400001 HC TELEMETRY ROOM

## 2024-06-09 PROCEDURE — 25000242 PHARM REV CODE 250 ALT 637 W/ HCPCS: Performed by: INTERNAL MEDICINE

## 2024-06-09 PROCEDURE — 94660 CPAP INITIATION&MGMT: CPT

## 2024-06-09 PROCEDURE — 94640 AIRWAY INHALATION TREATMENT: CPT

## 2024-06-09 PROCEDURE — 27100171 HC OXYGEN HIGH FLOW UP TO 24 HOURS

## 2024-06-09 PROCEDURE — 83735 ASSAY OF MAGNESIUM: CPT

## 2024-06-09 PROCEDURE — 36415 COLL VENOUS BLD VENIPUNCTURE: CPT

## 2024-06-09 RX ORDER — LEVALBUTEROL INHALATION SOLUTION 1.25 MG/3ML
1.25 SOLUTION RESPIRATORY (INHALATION) EVERY 6 HOURS PRN
Status: DISCONTINUED | OUTPATIENT
Start: 2024-06-09 | End: 2024-06-23 | Stop reason: HOSPADM

## 2024-06-09 RX ORDER — ENOXAPARIN SODIUM 100 MG/ML
1 INJECTION SUBCUTANEOUS EVERY 12 HOURS
Status: DISCONTINUED | OUTPATIENT
Start: 2024-06-09 | End: 2024-06-09

## 2024-06-09 RX ORDER — DIGOXIN 0.25 MG/ML
500 INJECTION INTRAMUSCULAR; INTRAVENOUS ONCE
Status: COMPLETED | OUTPATIENT
Start: 2024-06-09 | End: 2024-06-09

## 2024-06-09 RX ORDER — ENOXAPARIN SODIUM 100 MG/ML
1 INJECTION SUBCUTANEOUS EVERY 12 HOURS
Status: DISCONTINUED | OUTPATIENT
Start: 2024-06-09 | End: 2024-06-10

## 2024-06-09 RX ORDER — DIGOXIN 0.25 MG/ML
500 INJECTION INTRAMUSCULAR; INTRAVENOUS ONCE
Status: DISCONTINUED | OUTPATIENT
Start: 2024-06-09 | End: 2024-06-09

## 2024-06-09 RX ORDER — SODIUM CHLORIDE, SODIUM LACTATE, POTASSIUM CHLORIDE, CALCIUM CHLORIDE 600; 310; 30; 20 MG/100ML; MG/100ML; MG/100ML; MG/100ML
INJECTION, SOLUTION INTRAVENOUS CONTINUOUS
Status: DISCONTINUED | OUTPATIENT
Start: 2024-06-09 | End: 2024-06-11

## 2024-06-09 RX ORDER — FUROSEMIDE 10 MG/ML
20 INJECTION INTRAMUSCULAR; INTRAVENOUS ONCE
Status: COMPLETED | OUTPATIENT
Start: 2024-06-09 | End: 2024-06-09

## 2024-06-09 RX ORDER — METOPROLOL TARTRATE 1 MG/ML
5 INJECTION, SOLUTION INTRAVENOUS EVERY 4 HOURS PRN
Status: DISCONTINUED | OUTPATIENT
Start: 2024-06-09 | End: 2024-06-23 | Stop reason: HOSPADM

## 2024-06-09 RX ORDER — DIGOXIN 0.25 MG/ML
125 INJECTION INTRAMUSCULAR; INTRAVENOUS DAILY
Status: DISCONTINUED | OUTPATIENT
Start: 2024-06-10 | End: 2024-06-10

## 2024-06-09 RX ORDER — MAGNESIUM SULFATE HEPTAHYDRATE 40 MG/ML
2 INJECTION, SOLUTION INTRAVENOUS ONCE
Status: COMPLETED | OUTPATIENT
Start: 2024-06-09 | End: 2024-06-09

## 2024-06-09 RX ORDER — DIGOXIN 0.25 MG/ML
250 INJECTION INTRAMUSCULAR; INTRAVENOUS ONCE
Status: COMPLETED | OUTPATIENT
Start: 2024-06-09 | End: 2024-06-09

## 2024-06-09 RX ADMIN — FUROSEMIDE 20 MG: 10 INJECTION, SOLUTION INTRAMUSCULAR; INTRAVENOUS at 08:06

## 2024-06-09 RX ADMIN — DOCUSATE SODIUM LIQUID 50 MG: 100 LIQUID ORAL at 08:06

## 2024-06-09 RX ADMIN — LEVALBUTEROL HYDROCHLORIDE 1.25 MG: 1.25 SOLUTION RESPIRATORY (INHALATION) at 12:06

## 2024-06-09 RX ADMIN — TRAMADOL HYDROCHLORIDE 50 MG: 50 TABLET, COATED ORAL at 08:06

## 2024-06-09 RX ADMIN — PIPERACILLIN SODIUM AND TAZOBACTAM SODIUM 4.5 G: 4; .5 INJECTION, POWDER, LYOPHILIZED, FOR SOLUTION INTRAVENOUS at 05:06

## 2024-06-09 RX ADMIN — DILTIAZEM HYDROCHLORIDE 360 MG: 120 CAPSULE, COATED, EXTENDED RELEASE ORAL at 08:06

## 2024-06-09 RX ADMIN — DIGOXIN 500 MCG: 0.25 INJECTION INTRAMUSCULAR; INTRAVENOUS at 11:06

## 2024-06-09 RX ADMIN — PIPERACILLIN SODIUM AND TAZOBACTAM SODIUM 4.5 G: 4; .5 INJECTION, POWDER, LYOPHILIZED, FOR SOLUTION INTRAVENOUS at 12:06

## 2024-06-09 RX ADMIN — METOPROLOL TARTRATE 75 MG: 50 TABLET, FILM COATED ORAL at 08:06

## 2024-06-09 RX ADMIN — MAGNESIUM SULFATE HEPTAHYDRATE 2 G: 40 INJECTION, SOLUTION INTRAVENOUS at 11:06

## 2024-06-09 RX ADMIN — SODIUM CHLORIDE, POTASSIUM CHLORIDE, SODIUM LACTATE AND CALCIUM CHLORIDE: 600; 310; 30; 20 INJECTION, SOLUTION INTRAVENOUS at 05:06

## 2024-06-09 RX ADMIN — FUROSEMIDE 20 MG: 10 INJECTION, SOLUTION INTRAMUSCULAR; INTRAVENOUS at 10:06

## 2024-06-09 RX ADMIN — POTASSIUM & SODIUM PHOSPHATES POWDER PACK 280-160-250 MG 1 PACKET: 280-160-250 PACK at 06:06

## 2024-06-09 RX ADMIN — DIGOXIN 250 MCG: 0.25 INJECTION INTRAMUSCULAR; INTRAVENOUS at 05:06

## 2024-06-09 RX ADMIN — ENOXAPARIN SODIUM 80 MG: 80 INJECTION SUBCUTANEOUS at 08:06

## 2024-06-09 RX ADMIN — GABAPENTIN 600 MG: 300 CAPSULE ORAL at 08:06

## 2024-06-09 RX ADMIN — APIXABAN 5 MG: 5 TABLET, FILM COATED ORAL at 08:06

## 2024-06-09 RX ADMIN — PIPERACILLIN SODIUM AND TAZOBACTAM SODIUM 4.5 G: 4; .5 INJECTION, POWDER, LYOPHILIZED, FOR SOLUTION INTRAVENOUS at 08:06

## 2024-06-09 NOTE — PROGRESS NOTES
"    [facility] - 7th Floor ICU    Cardiology  Progress Note    Patient Name: Alanna Moya  MRN: [id number]  Admission Date: 6/1/2024  Hospital Length of Stay: 8 days  Code Status: DNR   Attending Physician: Shanna Montiel MD   Primary Care Physician: Cornel Haddad MD  Expected Discharge Date:   Principal Problem:<principal problem not specified>    Subjective:     Reason for Consult: SVT     HPI:Ms. Moya is a 78 year old female, known to Dr. Ramon in [place], who presented to the hospital from [facility] as transfer due to respiratory failure. Upon arrival she was noted to be SOB. Noted hypoxia requiring BIPAP. Chesr Radiograph from outside facility revealed bilateral airspace opacification and increase his right-sided pleural effusion suggestive of worsening pneumonia or possibly edema. CT Chest revealed mixed picture of decompensated HF, pleural effusions and superimposed pneumonia. During this hospitalization patient with tachycardia concerning for AF RVR, with noted Left Bundle Branch Block. She was given IV Metoprolol and started on Cardizem Infusion for Acute HR Control. She does have history of PSVT. Echocardiogram on 6.2.24 revealed intact LV Function with EF 55-60% & Grade I DD. Electrolytes grossly stable. Troponin normal. Lactic Acid Normal. . CIS Consulted for AF Management.       Hospital Course:   6.7.24: Patient seen sitting in chair. She denies SOB, CP, or nausea. Remains in Afib RVR   6.8.24: NAD. VSS. No complaints of CP/SOB/Palps. "I feel okay" WBC 14.27  6.9.24: NAD. VSS. AFIB RVR on telemetry. No complaints CP/SOB/Palps. "I feel okay" possible aspiration - awaiting Speech Evaluation. K 3.7, Mag 1.8, WBC 16.21     PMH: CAD/CABG, Hypertension, Dyslipidemia, MELCHOR, CEA, CVA, AS/TAVR, PSVT  PSH: LHC, CABG, TAVR, CEA, Hysterectomy, Cholecystectomy, Eye Surgery, Hand Surgery  Family History: Father- Old MI/CAD, Mother- HF, Son- Hypertension, Daughter- " Cancer/Hypertension, Brother- Heart Disease, Brother- Old MI/CAD, Brother- Cancer  Social History: Tobacco- Negative, Alcohol- Negative, Substance Abuse- Negative      Previous Cardiac Diagnostics:   Echocardiogram (6.2.24):  Left Ventricle: The left ventricle is normal in size. Increased wall thickness. There is normal systolic function with a visually estimated ejection fraction of 55 - 60%. Grade I diastolic dysfunction.  Right Ventricle: Systolic function is reduced.TAPSE is 1.60 cm.  Aortic Valve: There is a transcatheter valve replacement in the aortic position. Aortic valve area by VTI is 1.56 cm². Aortic valve peak velocity is 1.94 m/s. Mean gradient is 8 mmHg. The dimensionless index is 0.50.  Mitral Valve: Mildly thickened leaflets. There is mitral annular calcification present. There is mild stenosis. The mean pressure gradient across the mitral valve is 5 mmHg at a heart rate of 87 bpm. There is mild regurgitation.  Tricuspid Valve: The tricuspid valve is structurally normal. There is mild regurgitation.  Pulmonic Valve: The pulmonic valve is structurally normal.  Pulmonary Artery: The estimated pulmonary artery systolic pressure is 47 mmHg.  IVC/SVC: Normal venous pressure at 3 mmHg.  Pericardium: Left pleural effusion.     Echocardiogram (5.15.24):  EF 55-60%. Grade I Diastolic Dysfunction. Normal LV Wall Motion.  Mild MAC with Mild MR and No MS. Mild to Moderate TR.  Mild to Moderate AI, No AS.     CT Angiogram Head/Neck (5.15.24):  Carotid arteries:   Calcified plaque at the siphons without significant narrowing.   Normal A1 and M1 segments.   Vertebrobasilar Circulation:   Vertebral arteries: Patent. Calcified plaque on the right without significant narrowing. Otherwise no abnormalities.   Basilar artery: Patent, no abnormalities.   Posterior cerebral arteries:  Patent. Right fetal origin. Otherwise no abnormalities.      Echocardiogram (4.21.23):  The study quality is good.   The left ventricle is  normal in size. Global left ventricular systolic function is normal. The left ventricular ejection fraction by Power's is 62%. The left ventricle diastolic function is impaired (Grade II) with an elevated left atrial pressure. Noted left ventricular hypertrophy. Concentric left ventricular hypertrophy is present. It is mild. LVSI=41ml/m2.    LVEDV=67.2ml and LVESV=25.7ml.  The left atrial diameter is moderately increased. Left atrial diameter is 4.4 cms. The left atrium is severely enlarged based on the left atrium volume index of 48.2ml/m².  S/P TAVR. Bioprosthetic aortic valve is well seated and functions normally with no evidence of insufficiency or perivalvular leaks. JACINTA=2.0cm2. The trans-aortic peak gradient is 15.8 mmHg. The trans-aortic mean gradient is 8.6 mmHg. DI=1.1.  Mild mitral annular calcification is noted. The mean trans mitral gradient is 1.9 mmHg.  Mild to moderate (1-2+) mitral regurgitation. Mild to moderate (1-2+) tricuspid regurgitation.  The estimated pulmonary artery systolic pressure is 41 mmHg assuming a right atrial pressure of 3 mmHg. Evidence of pulmonary hypertension is noted.      MCT Monitor (3.15.23):  Predominant Rhythm SR.  PSVT     TAVR (2.27.23):  TAVR  23  mm S3 ultra valve, nominal prep,  Right TF approach  Limited echo pre, post valve placement  Root angiography  Distal aortography  Temporary pacemaker placement removal  Manta closure  Right CFA access site.  U/S guided bilateral right groin access   Balloon angioplasty of right CFA with a 5 x 40 Napoleon.     LHC (1.26.23):  LM: Distal 50% Stenosis, LAD: Occluded Mid, LCX: Ostial 50% at Mid Portion, RCA: Dominant Patent Arter.  JENNINGS to LAD is Patent.  Impression: LIMA to LAD- Patent, 50% Stenosis LCX Artery  Medical Management.    Review of Systems   Constitutional: Negative for chills and fever.   Cardiovascular:  Negative for chest pain, palpitations and syncope.   Respiratory:  Negative for cough and shortness of  breath.    Skin: Negative.    Musculoskeletal: Negative.    Gastrointestinal:  Negative for abdominal pain.     Objective:     Vital Signs (Most Recent):  Temp: 98.3 °F (36.8 °C) (06/09/24 0800)  Pulse: 109 (06/09/24 1000)  Resp: 15 (06/09/24 1000)  BP: 108/78 (06/09/24 1000)  SpO2: (!) 92 % (06/09/24 1000) Vital Signs (24h Range):  Temp:  [97.2 °F (36.2 °C)-98.3 °F (36.8 °C)] 98.3 °F (36.8 °C)  Pulse:  [] 109  Resp:  [12-33] 15  SpO2:  [91 %-98 %] 92 %  BP: (104-141)/() 108/78   Weight: 77.1 kg (170 lb)  Body mass index is 31.09 kg/m².  SpO2: (!) 92 %       Intake/Output Summary (Last 24 hours) at 6/9/2024 1027  Last data filed at 6/9/2024 1019  Gross per 24 hour   Intake 1466.08 ml   Output 100 ml   Net 1366.08 ml     Lines/Drains/Airways       Drain  Duration             Female External Urinary Catheter w/ Suction 06/02/24 0400 7 days              Peripheral Intravenous Line  Duration                  Peripheral IV - Single Lumen 06/07/24 0515 20 G Right Forearm 2 days         Peripheral IV - Single Lumen 06/07/24 1700 20 G Anterior;Proximal;Right Forearm 1 day         Peripheral IV - Single Lumen 06/08/24 0428 20 G Left Forearm 1 day                  Significant Labs:   Recent Results (from the past 72 hour(s))   Comprehensive Metabolic Panel    Collection Time: 06/07/24  6:45 AM   Result Value Ref Range    Sodium 140 136 - 145 mmol/L    Potassium 3.2 (L) 3.5 - 5.1 mmol/L    Chloride 100 98 - 107 mmol/L    CO2 28 23 - 31 mmol/L    Glucose 147 (H) 82 - 115 mg/dL    Blood Urea Nitrogen 43.5 (H) 9.8 - 20.1 mg/dL    Creatinine 0.87 0.55 - 1.02 mg/dL    Calcium 9.3 8.4 - 10.2 mg/dL    Protein Total 6.5 5.8 - 7.6 gm/dL    Albumin 2.8 (L) 3.4 - 4.8 g/dL    Globulin 3.7 (H) 2.4 - 3.5 gm/dL    Albumin/Globulin Ratio 0.8 (L) 1.1 - 2.0 ratio    Bilirubin Total 0.6 <=1.5 mg/dL    ALP 63 40 - 150 unit/L    ALT 13 0 - 55 unit/L    AST 15 5 - 34 unit/L    eGFR >60 mL/min/1.73/m2    Anion Gap 12.0 mEq/L     BUN/Creatinine Ratio 50    CBC with Differential    Collection Time: 06/07/24  6:45 AM   Result Value Ref Range    WBC 12.92 (H) 4.50 - 11.50 x10(3)/mcL    RBC 3.71 (L) 4.20 - 5.40 x10(6)/mcL    Hgb 11.2 (L) 12.0 - 16.0 g/dL    Hct 34.8 (L) 37.0 - 47.0 %    MCV 93.8 80.0 - 94.0 fL    MCH 30.2 27.0 - 31.0 pg    MCHC 32.2 (L) 33.0 - 36.0 g/dL    RDW 13.1 11.5 - 17.0 %    Platelet 320 130 - 400 x10(3)/mcL    MPV 10.7 (H) 7.4 - 10.4 fL    Neut % 81.8 %    Lymph % 6.7 %    Mono % 8.4 %    Eos % 0.3 %    Basophil % 0.2 %    Lymph # 0.87 0.6 - 4.6 x10(3)/mcL    Neut # 10.57 (H) 2.1 - 9.2 x10(3)/mcL    Mono # 1.08 0.1 - 1.3 x10(3)/mcL    Eos # 0.04 0 - 0.9 x10(3)/mcL    Baso # 0.03 <=0.2 x10(3)/mcL    IG# 0.33 (H) 0 - 0.04 x10(3)/mcL    IG% 2.6 %    NRBC% 0.0 %   Magnesium    Collection Time: 06/07/24  6:45 AM   Result Value Ref Range    Magnesium Level 1.90 1.60 - 2.60 mg/dL   Basic Metabolic Panel    Collection Time: 06/07/24  8:54 PM   Result Value Ref Range    Sodium 135 (L) 136 - 145 mmol/L    Potassium 3.4 (L) 3.5 - 5.1 mmol/L    Chloride 97 (L) 98 - 107 mmol/L    CO2 26 23 - 31 mmol/L    Glucose 242 (H) 82 - 115 mg/dL    Blood Urea Nitrogen 37.9 (H) 9.8 - 20.1 mg/dL    Creatinine 0.89 0.55 - 1.02 mg/dL    BUN/Creatinine Ratio 43     Calcium 9.0 8.4 - 10.2 mg/dL    Anion Gap 12.0 mEq/L    eGFR >60 mL/min/1.73/m2   Magnesium    Collection Time: 06/07/24  8:54 PM   Result Value Ref Range    Magnesium Level 2.30 1.60 - 2.60 mg/dL   EKG 12-lead    Collection Time: 06/08/24 12:57 AM   Result Value Ref Range    QRS Duration 134 ms    [facility] QTC Calculation 528 ms   Comprehensive Metabolic Panel    Collection Time: 06/08/24  4:19 AM   Result Value Ref Range    Sodium 139 136 - 145 mmol/L    Potassium 3.7 3.5 - 5.1 mmol/L    Chloride 99 98 - 107 mmol/L    CO2 28 23 - 31 mmol/L    Glucose 166 (H) 82 - 115 mg/dL    Blood Urea Nitrogen 35.2 (H) 9.8 - 20.1 mg/dL    Creatinine 0.88 0.55 - 1.02 mg/dL    Calcium 8.9 8.4 - 10.2 mg/dL     Protein Total 5.5 (L) 5.8 - 7.6 gm/dL    Albumin 2.7 (L) 3.4 - 4.8 g/dL    Globulin 2.8 2.4 - 3.5 gm/dL    Albumin/Globulin Ratio 1.0 (L) 1.1 - 2.0 ratio    Bilirubin Total 0.6 <=1.5 mg/dL    ALP 69 40 - 150 unit/L    ALT 17 0 - 55 unit/L    AST 18 5 - 34 unit/L    eGFR >60 mL/min/1.73/m2    Anion Gap 12.0 mEq/L    BUN/Creatinine Ratio 40    CBC with Differential    Collection Time: 06/08/24  4:19 AM   Result Value Ref Range    WBC 14.27 (H) 4.50 - 11.50 x10(3)/mcL    RBC 3.55 (L) 4.20 - 5.40 x10(6)/mcL    Hgb 10.7 (L) 12.0 - 16.0 g/dL    Hct 32.7 (L) 37.0 - 47.0 %    MCV 92.1 80.0 - 94.0 fL    MCH 30.1 27.0 - 31.0 pg    MCHC 32.7 (L) 33.0 - 36.0 g/dL    RDW 12.9 11.5 - 17.0 %    Platelet 333 130 - 400 x10(3)/mcL    MPV 11.2 (H) 7.4 - 10.4 fL    Neut % 74.4 %    Lymph % 10.7 %    Mono % 11.4 %    Eos % 0.1 %    Basophil % 0.2 %    Lymph # 1.53 0.6 - 4.6 x10(3)/mcL    Neut # 10.62 (H) 2.1 - 9.2 x10(3)/mcL    Mono # 1.62 (H) 0.1 - 1.3 x10(3)/mcL    Eos # 0.01 0 - 0.9 x10(3)/mcL    Baso # 0.03 <=0.2 x10(3)/mcL    IG# 0.46 (H) 0 - 0.04 x10(3)/mcL    IG% 3.2 %    NRBC% 0.0 %   Magnesium    Collection Time: 06/08/24  4:19 AM   Result Value Ref Range    Magnesium Level 2.10 1.60 - 2.60 mg/dL   Phosphorus    Collection Time: 06/08/24  4:19 AM   Result Value Ref Range    Phosphorus Level 2.1 (L) 2.3 - 4.7 mg/dL   Comprehensive Metabolic Panel    Collection Time: 06/09/24  3:01 AM   Result Value Ref Range    Sodium 136 136 - 145 mmol/L    Potassium 3.7 3.5 - 5.1 mmol/L    Chloride 97 (L) 98 - 107 mmol/L    CO2 25 23 - 31 mmol/L    Glucose 138 (H) 82 - 115 mg/dL    Blood Urea Nitrogen 30.0 (H) 9.8 - 20.1 mg/dL    Creatinine 0.90 0.55 - 1.02 mg/dL    Calcium 8.6 8.4 - 10.2 mg/dL    Protein Total 5.5 (L) 5.8 - 7.6 gm/dL    Albumin 2.7 (L) 3.4 - 4.8 g/dL    Globulin 2.8 2.4 - 3.5 gm/dL    Albumin/Globulin Ratio 1.0 (L) 1.1 - 2.0 ratio    Bilirubin Total 0.5 <=1.5 mg/dL    ALP 76 40 - 150 unit/L    ALT 15 0 - 55 unit/L    AST 18 5  - 34 unit/L    eGFR >60 mL/min/1.73/m2    Anion Gap 14.0 mEq/L    BUN/Creatinine Ratio 33    Magnesium    Collection Time: 06/09/24  3:01 AM   Result Value Ref Range    Magnesium Level 1.80 1.60 - 2.60 mg/dL   CBC with Differential    Collection Time: 06/09/24  3:01 AM   Result Value Ref Range    WBC 16.21 (H) 4.50 - 11.50 x10(3)/mcL    RBC 3.55 (L) 4.20 - 5.40 x10(6)/mcL    Hgb 10.7 (L) 12.0 - 16.0 g/dL    Hct 32.7 (L) 37.0 - 47.0 %    MCV 92.1 80.0 - 94.0 fL    MCH 30.1 27.0 - 31.0 pg    MCHC 32.7 (L) 33.0 - 36.0 g/dL    RDW 12.9 11.5 - 17.0 %    Platelet 316 130 - 400 x10(3)/mcL    MPV 11.0 (H) 7.4 - 10.4 fL    Neut % 74.8 %    Lymph % 9.2 %    Mono % 12.2 %    Eos % 0.1 %    Basophil % 0.2 %    Lymph # 1.49 0.6 - 4.6 x10(3)/mcL    Neut # 12.14 (H) 2.1 - 9.2 x10(3)/mcL    Mono # 1.97 (H) 0.1 - 1.3 x10(3)/mcL    Eos # 0.02 0 - 0.9 x10(3)/mcL    Baso # 0.03 <=0.2 x10(3)/mcL    IG# 0.56 (H) 0 - 0.04 x10(3)/mcL    IG% 3.5 %    NRBC% 0.0 %     Telemetry:  Afib RVR    Physical Exam  Vitals and nursing note reviewed.   HENT:      Head: Normocephalic.      Mouth/Throat:      Mouth: Mucous membranes are moist.   Cardiovascular:      Rate and Rhythm: Tachycardia present. Rhythm irregular.   Pulmonary:      Effort: Pulmonary effort is normal. No respiratory distress.      Comments: Diminished Lung Sounds    On High Flow NC  Abdominal:      General: Abdomen is flat.   Musculoskeletal:         General: Normal range of motion.   Skin:     General: Skin is warm.   Neurological:      Mental Status: She is alert and oriented to person, place, and time.   Psychiatric:         Mood and Affect: Mood normal.         Behavior: Behavior normal.         Judgment: Judgment normal.       Current Inpatient Medications:  Current Facility-Administered Medications:     0.9%  NaCl infusion, , Intravenous, PRN, Vishnu Stevenson MD, Stopped at 06/08/24 0133    apixaban tablet 5 mg, 5 mg, Oral, BID, Jennifer Lynch NP, 5 mg at 06/09/24 0817     atorvastatin tablet 40 mg, 40 mg, Oral, QHS, Aguustus Barreto MD, 40 mg at 06/08/24 2000    digoxin injection 500 mcg, 500 mcg, Intravenous, Once, Justyn Landaverde MD    diltiaZEM 125 mg in dextrose 5 % 125 mL IVPB (ready to mix) (titrating), 0-15 mg/hr, Intravenous, Continuous, Jennifer Lynch, CHRISTIANO, Last Rate: 5 mL/hr at 06/09/24 1019, 5 mg/hr at 06/09/24 1019    diltiaZEM 24 hr capsule 360 mg, 360 mg, Oral, Daily, Anne Oneil FNP, 360 mg at 06/09/24 0817    docusate 50 mg/5 mL liquid 50 mg, 50 mg, Oral, Daily, Marga Smith MD, 50 mg at 06/09/24 0817    furosemide injection 20 mg, 20 mg, Intravenous, Q12H, DOUGIE Brown MD, 20 mg at 06/09/24 0817    gabapentin capsule 600 mg, 600 mg, Oral, BID, Marga Smith MD, 600 mg at 06/09/24 0817    hydrALAZINE injection 10 mg, 10 mg, Intravenous, Q6H PRN, Eleno Abbasi MD, 10 mg at 06/06/24 1631    HYDROmorphone (PF) injection 0.2 mg, 0.2 mg, Intravenous, Q6H PRN, Ky Linn DO, 0.2 mg at 06/05/24 0029    labetaloL injection 10 mg, 10 mg, Intravenous, Q4H PRN, Baldo Finney DO, 10 mg at 06/06/24 1719    metoprolol injection 5 mg, 5 mg, Intravenous, Q4H PRN, Justyn Landaverde MD    metoprolol tartrate tablet 75 mg, 75 mg, Oral, BID, Anne Oneil FNP, 75 mg at 06/09/24 0817    piperacillin-tazobactam (ZOSYN) 4.5 g in dextrose 5 % in water (D5W) 100 mL IVPB (MB+), 4.5 g, Intravenous, Q8H, DOUGIE Brown MD, Last Rate: 25 mL/hr at 06/09/24 1019, Rate Verify at 06/09/24 1019    sertraline tablet 100 mg, 100 mg, Oral, QHS, Marga Smith MD, 100 mg at 06/08/24 2000    traMADoL tablet 50 mg, 50 mg, Oral, TID, Vishnu Stevenson MD, 50 mg at 06/09/24 0817  VTE Risk Mitigation (From admission, onward)           Ordered     apixaban tablet 5 mg  2 times daily         06/07/24 1320     IP VTE HIGH RISK PATIENT  Once         06/01/24 2247                  Assessment:   Atrial Fibrillation with RVR (New Onset)- Now RVR    - [id number]: 7    - History of PSVT  Acute on  Chronic Diastolic HF    - EF 55-60% with Grade I Diastolic Dysfunction  ? Aspiration Pneumonia  Acute Hypoxemic Respiratory Failure Requiring Oxygenation - on High Flow NC   Valvular Heart Disease    - AS: Status Post TAVR  23  mm S3 ultra valve, nominal prep,  Right TF approach (2.27.23)  CAD/CABG    - LIMA to LAD (Patent) with 50% Stenosis Native LCX  Hypertension  Hyperlipidemia  Chronic Left Bundle Branch Block (Sena TAVR)  MELCHOR/CEA  History of CVA  DNR Status   No known history of GI Bleed     Plan:   Continue Cardizem gtt for HR Control  Start FD Lovenox for Stroke Risk Reduction; Eliquis 5mg PO BID Closer to Discharge   Digoxin IV Load Today: Digoxin 0.125 mg IV Daily   - Check Dig Level in 3 days  Lopressor 5 mg q4h PRN for Sustained HR > 120   Awaiting Speech Evaluation for possible Aspiration PNA   Continue Diuretics as Clinically Indicated  Antibiotic Management as per Primary Team  Accurate I&O/Daily Weight   Keep Mag > 2 and Potassium > 4; Replete Lytes Today   Labs in AM: CBC, CMP, and Mag     STEF Corral  Cardiology  [facility] - 7th Floor ICU  06/09/2024

## 2024-06-09 NOTE — PROGRESS NOTES
[facility] Medicine Progress Note        Chief Complaint: Inpatient Follow-up for multifocal pneumonia    HPI:   78-year-old lady with PMH of AS s/p AVR, CAD, mi, HTN, chronic back pain, HLD, CVA, CHF, GERD, diverticulosis who was being downgraded from the ICU to Hospital Medicine after she was initially admitted for acute hypoxemic respiratory failure requiring BiPAP for adequate saturations. She was transferred from an outside facility to [facility] with complaints of worsening shortness of breath for the last few weeks with initial CXR at outside facility showing bilateral airspace opacities and increased right-sided pleural effusion. Her last echocardiogram from 05/16 showed EF 55-60%. She was placed on antibiotics, steroids as well as IV Lasix for diuresis for pulmonary congestion. She was able to be weaned down from BiPAP to Vapotherm and eventually Oxymizer 8 L. she was also noted to have new onset atrial fibrillation for which he was started on IV Cardizem which has since been transitioned to p.o. Cardizem. She is on full-dose Lovenox due to CHADS-VASc of 7. She reported feeling significantly better and endorsed improvement in her shortness of breath since admission. Denied having any fever, chills, chest pain, cough, sputum production, abdominal pain, nausea, vomiting, headache, numbness, weakness, dizziness/lightheadedness or loss of consciousness. Blood cultures from admission are negative. PT/OT on board; in recommending moderate intensity therapy. Cardiology on board.     Interval Hx:   The patient was seen and examined.  Needed to get started back on the Cardizem drip with heart rate in the 140s at the time of my visit.  Also on Vapotherm.  Portable chest x-ray was ordered.  No complaints of fever.    Case was discussed with patient's nurse and  on the floor.    Objective/physical exam:  General: In no acute distress, afebrile  Chest:  On Vapotherm, minimal  expiratory wheeze   Heart: RRR, +S1, S2, no appreciable murmur  Abdomen: Soft, nontender, BS +  MSK: Warm, no lower extremity edema, no clubbing or cyanosis  Neurologic: Alert , generalized weakness     VITAL SIGNS: 24 HRS MIN & MAX LAST   Temp  Min: 97.2 °F (36.2 °C)  Max: 98.3 °F (36.8 °C) 98 °F (36.7 °C)   BP  Min: 104/65  Max: 130/83 124/89   Pulse  Min: 80  Max: 142  (!) 142   Resp  Min: 18  Max: 33 20   SpO2  Min: 91 %  Max: 98 % (!) 92 %     I have reviewed the following labs:  Recent Labs   Lab 06/07/24 0645 06/08/24 0419 06/09/24  0301   WBC 12.92* 14.27* 16.21*   RBC 3.71* 3.55* 3.55*   HGB 11.2* 10.7* 10.7*   HCT 34.8* 32.7* 32.7*   MCV 93.8 92.1 92.1   MCH 30.2 30.1 30.1   MCHC 32.2* 32.7* 32.7*   RDW 13.1 12.9 12.9    333 316   MPV 10.7* 11.2* 11.0*     Recent Labs   Lab 06/07/24 0645 06/07/24 2054 06/08/24 0419 06/09/24  0301    135* 139 136   K 3.2* 3.4* 3.7 3.7    97* 99 97*   CO2 28 26 28 25   BUN 43.5* 37.9* 35.2* 30.0*   CREATININE 0.87 0.89 0.88 0.90   CALCIUM 9.3 9.0 8.9 8.6   MG 1.90 2.30 2.10 1.80   ALBUMIN 2.8*  --  2.7* 2.7*   ALKPHOS 63  --  69 76   ALT 13  --  17 15   AST 15  --  18 18   BILITOT 0.6  --  0.6 0.5     Microbiology Results (last 7 days)       Procedure Component Value Units Date/Time    Blood Culture [[id number]]  (Normal) Collected: 06/02/24 0115    Order Status: Completed Specimen: Blood, Venous Updated: 06/07/24 0300     Blood Culture No Growth at 5 days    Blood Culture [[id number]]  (Normal) Collected: 06/02/24 0115    Order Status: Completed Specimen: Blood, Venous Updated: 06/07/24 0300     Blood Culture No Growth at 5 days             See below for Radiology    Scheduled Med:   apixaban  5 mg Oral BID    atorvastatin  40 mg Oral QHS    diltiaZEM  360 mg Oral Daily    docusate  50 mg Oral Daily    furosemide (LASIX) injection  20 mg Intravenous Q12H    gabapentin  600 mg Oral BID    metoprolol tartrate  75 mg Oral BID    piperacillin-tazobactam  (Zosyn) IV (PEDS and ADULTS) (extended infusion is not appropriate)  4.5 g Intravenous Q8H    sertraline  100 mg Oral QHS    traMADoL  50 mg Oral TID      Continuous Infusions:   dilTIAZem  0-15 mg/hr Intravenous Continuous 10 mL/hr at 06/09/24 0731 10 mg/hr at 06/09/24 0731      PRN Meds:    Current Facility-Administered Medications:     sodium chloride 0.9%, , Intravenous, PRN    hydrALAZINE, 10 mg, Intravenous, Q6H PRN    HYDROmorphone, 0.2 mg, Intravenous, Q6H PRN    labetalol, 10 mg, Intravenous, Q4H PRN    metoprolol, 5 mg, Intravenous, Q4H PRN     Assessment/Plan:  Multifocal pneumonia  HFpEF with exacerbation and bilateral pleural effusions  Acute respiratory failure with hypoxia requiring high-flow O2   Atrial Fibrillation with RVR (New Onset)   JUN- POA  DNR     Hx: CAD-status post CABG,Valvular heart disease-status post AVR     Monitor on tele  Currently on Vapotherm, x-ray ordered with progression of congestive changes noted  P.r.n. Xopenex nebs   Currently on Lasix 20 mg IV b.i.d., will add another 20 mg this morning   Pt on amio, cardizem drip, follow cis recommendations  Cont iv zosyn day 8 today, micro reviewed, blood cx neg,chlmyda,mycoplasma neg check legionella   PT/OT  CM working on LTAC placement   Labs in AM      VTE prophylaxis:  Eliquis    Patient condition:  Stable   Anticipated discharge and Disposition:   LTAC placement      All diagnosis and differential diagnosis have been reviewed; assessment and plan has been documented; I have personally reviewed the labs and test results that are presently available; I have reviewed the patients medication list; I have reviewed the consulting providers response and recommendations. I have reviewed or attempted to review medical records based upon their availability    All of the patient's questions have been  addressed and answered. Patient's is agreeable to the above stated plan. I will continue to monitor closely and make adjustments to medical  management as needed.  _____________________________________________________________________    Nutrition Status:    Radiology:  I have personally reviewed the following imaging and agree with the radiologist.     X-Ray Chest 1 View  Narrative: EXAMINATION:  XR CHEST 1 VIEW    CLINICAL HISTORY:  increased oxygen requirment;    TECHNIQUE:  One    COMPARISON:  June 6, 2025.    FINDINGS:  Cardiopericardial silhouette is within normal limits.  Sternotomy changes.  Interval progression of lungs congestive changes.  Right lower lung lobe opacifications are denser and can not exclude the possibility of associated infiltrates related to infection.  There are also increased size of pleural effusions.  No pneumothorax  Impression: As above.    Electronically signed by: Juan Luis Hendrix  Date:    06/09/2024  Time:    07:58      Shanna Montiel MD  Department of Hospital Medicine   [facility]   06/09/2024

## 2024-06-09 NOTE — NURSING
Nurses Note -- 4 Eyes      6/9/2024   7:30 AM      Skin assessed during: Daily Assessment      [x] No Altered Skin Integrity Present    [x]Prevention Measures Documented      [] Yes- Altered Skin Integrity Present or Discovered   [] LDA Added if Not in Epic (Describe Wound)   [] New Altered Skin Integrity was Present on Admit and Documented in LDA   [] Wound Image Taken    Wound Care Consulted? No    Attending Nurse:  Nata Pederson RN/Staff Member:  MELINA Saldana

## 2024-06-09 NOTE — PT/OT/SLP PROGRESS
Order received, chart reviewed and nursing and pt consulted.  RN reports pt O2 desatting with breakfast meal this morning.  CXR from today (6/9) reported right lower lung lobe opacifications are denser and can not exclude the possibility of associated infiltrates related to infection. Currently on vapotherm at 90% with 35L.  Pt desatted below 90 when SLP elevated bed to upright position; she also reported she needed to have a BM.  MELINA Peace will contact SLP after pt has BM and if she can maintain sats above 90 in an upright position.

## 2024-06-10 LAB
ALBUMIN SERPL-MCNC: 2.6 G/DL (ref 3.4–4.8)
ALBUMIN/GLOB SERPL: 0.8 RATIO (ref 1.1–2)
ALP SERPL-CCNC: 72 UNIT/L (ref 40–150)
ALT SERPL-CCNC: 17 UNIT/L (ref 0–55)
ANION GAP SERPL CALC-SCNC: 15 MEQ/L
AST SERPL-CCNC: 20 UNIT/L (ref 5–34)
BASOPHILS # BLD AUTO: 0.03 X10(3)/MCL
BASOPHILS NFR BLD AUTO: 0.2 %
BILIRUB SERPL-MCNC: 0.8 MG/DL
BUN SERPL-MCNC: 24.4 MG/DL (ref 9.8–20.1)
CALCIUM SERPL-MCNC: 8.8 MG/DL (ref 8.4–10.2)
CHLORIDE SERPL-SCNC: 97 MMOL/L (ref 98–107)
CO2 SERPL-SCNC: 27 MMOL/L (ref 23–31)
CREAT SERPL-MCNC: 0.87 MG/DL (ref 0.55–1.02)
CREAT/UREA NIT SERPL: 28
EOSINOPHIL # BLD AUTO: 0.01 X10(3)/MCL (ref 0–0.9)
EOSINOPHIL NFR BLD AUTO: 0.1 %
ERYTHROCYTE [DISTWIDTH] IN BLOOD BY AUTOMATED COUNT: 12.8 % (ref 11.5–17)
GFR SERPLBLD CREATININE-BSD FMLA CKD-EPI: >60 ML/MIN/1.73/M2
GLOBULIN SER-MCNC: 3.3 GM/DL (ref 2.4–3.5)
GLUCOSE SERPL-MCNC: 128 MG/DL (ref 82–115)
HCT VFR BLD AUTO: 31.5 % (ref 37–47)
HGB BLD-MCNC: 10.2 G/DL (ref 12–16)
IMM GRANULOCYTES # BLD AUTO: 0.39 X10(3)/MCL (ref 0–0.04)
IMM GRANULOCYTES NFR BLD AUTO: 2.6 %
LYMPHOCYTES # BLD AUTO: 1 X10(3)/MCL (ref 0.6–4.6)
LYMPHOCYTES NFR BLD AUTO: 6.7 %
MCH RBC QN AUTO: 29.8 PG (ref 27–31)
MCHC RBC AUTO-ENTMCNC: 32.4 G/DL (ref 33–36)
MCV RBC AUTO: 92.1 FL (ref 80–94)
MONOCYTES # BLD AUTO: 1.84 X10(3)/MCL (ref 0.1–1.3)
MONOCYTES NFR BLD AUTO: 12.4 %
MRSA PCR SCRN (OHS): NOT DETECTED
NEUTROPHILS # BLD AUTO: 11.6 X10(3)/MCL (ref 2.1–9.2)
NEUTROPHILS NFR BLD AUTO: 78 %
NRBC BLD AUTO-RTO: 0 %
PLATELET # BLD AUTO: 280 X10(3)/MCL (ref 130–400)
PMV BLD AUTO: 11.4 FL (ref 7.4–10.4)
POTASSIUM SERPL-SCNC: 3 MMOL/L (ref 3.5–5.1)
PROT SERPL-MCNC: 5.9 GM/DL (ref 5.8–7.6)
RBC # BLD AUTO: 3.42 X10(6)/MCL (ref 4.2–5.4)
SODIUM SERPL-SCNC: 139 MMOL/L (ref 136–145)
WBC # SPEC AUTO: 14.87 X10(3)/MCL (ref 4.5–11.5)

## 2024-06-10 PROCEDURE — 63600175 PHARM REV CODE 636 W HCPCS: Performed by: INTERNAL MEDICINE

## 2024-06-10 PROCEDURE — 99900031 HC PATIENT EDUCATION (STAT)

## 2024-06-10 PROCEDURE — 27100171 HC OXYGEN HIGH FLOW UP TO 24 HOURS

## 2024-06-10 PROCEDURE — 21400001 HC TELEMETRY ROOM

## 2024-06-10 PROCEDURE — 36415 COLL VENOUS BLD VENIPUNCTURE: CPT

## 2024-06-10 PROCEDURE — 94760 N-INVAS EAR/PLS OXIMETRY 1: CPT

## 2024-06-10 PROCEDURE — 25000003 PHARM REV CODE 250: Performed by: INTERNAL MEDICINE

## 2024-06-10 PROCEDURE — 25000003 PHARM REV CODE 250

## 2024-06-10 PROCEDURE — 94761 N-INVAS EAR/PLS OXIMETRY MLT: CPT

## 2024-06-10 PROCEDURE — 92611 MOTION FLUOROSCOPY/SWALLOW: CPT

## 2024-06-10 PROCEDURE — 99900035 HC TECH TIME PER 15 MIN (STAT)

## 2024-06-10 PROCEDURE — 94660 CPAP INITIATION&MGMT: CPT

## 2024-06-10 PROCEDURE — 92610 EVALUATE SWALLOWING FUNCTION: CPT

## 2024-06-10 PROCEDURE — 25000003 PHARM REV CODE 250: Performed by: HOSPITALIST

## 2024-06-10 PROCEDURE — 85025 COMPLETE CBC W/AUTO DIFF WBC: CPT

## 2024-06-10 PROCEDURE — 80053 COMPREHEN METABOLIC PANEL: CPT

## 2024-06-10 PROCEDURE — 87641 MR-STAPH DNA AMP PROBE: CPT | Performed by: INTERNAL MEDICINE

## 2024-06-10 PROCEDURE — A9698 NON-RAD CONTRAST MATERIALNOC: HCPCS | Performed by: INTERNAL MEDICINE

## 2024-06-10 PROCEDURE — 25500020 PHARM REV CODE 255: Performed by: INTERNAL MEDICINE

## 2024-06-10 PROCEDURE — 97530 THERAPEUTIC ACTIVITIES: CPT

## 2024-06-10 PROCEDURE — 25000003 PHARM REV CODE 250: Performed by: NURSE PRACTITIONER

## 2024-06-10 PROCEDURE — 63600175 PHARM REV CODE 636 W HCPCS

## 2024-06-10 RX ORDER — FUROSEMIDE 10 MG/ML
40 INJECTION INTRAMUSCULAR; INTRAVENOUS EVERY 12 HOURS
Status: DISCONTINUED | OUTPATIENT
Start: 2024-06-10 | End: 2024-06-12

## 2024-06-10 RX ORDER — POTASSIUM CHLORIDE 14.9 MG/ML
40 INJECTION INTRAVENOUS ONCE
Status: COMPLETED | OUTPATIENT
Start: 2024-06-10 | End: 2024-06-10

## 2024-06-10 RX ORDER — DIGOXIN 125 MCG
0.12 TABLET ORAL DAILY
Status: DISCONTINUED | OUTPATIENT
Start: 2024-06-11 | End: 2024-06-14

## 2024-06-10 RX ORDER — MAGNESIUM SULFATE HEPTAHYDRATE 40 MG/ML
2 INJECTION, SOLUTION INTRAVENOUS ONCE
Status: COMPLETED | OUTPATIENT
Start: 2024-06-10 | End: 2024-06-10

## 2024-06-10 RX ORDER — POTASSIUM CHLORIDE 20 MEQ/1
60 TABLET, EXTENDED RELEASE ORAL ONCE
Status: COMPLETED | OUTPATIENT
Start: 2024-06-10 | End: 2024-06-10

## 2024-06-10 RX ORDER — ACETAMINOPHEN 325 MG/1
650 TABLET ORAL EVERY 6 HOURS PRN
Status: DISCONTINUED | OUTPATIENT
Start: 2024-06-10 | End: 2024-06-23 | Stop reason: HOSPADM

## 2024-06-10 RX ORDER — LIDOCAINE 50 MG/G
2 PATCH TOPICAL
Status: DISCONTINUED | OUTPATIENT
Start: 2024-06-10 | End: 2024-06-23 | Stop reason: HOSPADM

## 2024-06-10 RX ORDER — DILTIAZEM HYDROCHLORIDE 120 MG/1
240 CAPSULE, COATED, EXTENDED RELEASE ORAL DAILY
Status: DISCONTINUED | OUTPATIENT
Start: 2024-06-10 | End: 2024-06-12

## 2024-06-10 RX ORDER — FUROSEMIDE 10 MG/ML
40 INJECTION INTRAMUSCULAR; INTRAVENOUS ONCE
Status: COMPLETED | OUTPATIENT
Start: 2024-06-10 | End: 2024-06-10

## 2024-06-10 RX ADMIN — MAGNESIUM SULFATE HEPTAHYDRATE 2 G: 40 INJECTION, SOLUTION INTRAVENOUS at 11:06

## 2024-06-10 RX ADMIN — LIDOCAINE 2 PATCH: 700 PATCH TOPICAL at 03:06

## 2024-06-10 RX ADMIN — PIPERACILLIN SODIUM AND TAZOBACTAM SODIUM 4.5 G: 4; .5 INJECTION, POWDER, LYOPHILIZED, FOR SOLUTION INTRAVENOUS at 12:06

## 2024-06-10 RX ADMIN — TRAMADOL HYDROCHLORIDE 50 MG: 50 TABLET, COATED ORAL at 08:06

## 2024-06-10 RX ADMIN — POTASSIUM CHLORIDE 40 MEQ: 14.9 INJECTION, SOLUTION INTRAVENOUS at 09:06

## 2024-06-10 RX ADMIN — METOPROLOL TARTRATE 5 MG: 1 INJECTION, SOLUTION INTRAVENOUS at 11:06

## 2024-06-10 RX ADMIN — ACETAMINOPHEN 650 MG: 325 TABLET, FILM COATED ORAL at 11:06

## 2024-06-10 RX ADMIN — FUROSEMIDE 40 MG: 10 INJECTION, SOLUTION INTRAMUSCULAR; INTRAVENOUS at 10:06

## 2024-06-10 RX ADMIN — GABAPENTIN 600 MG: 300 CAPSULE ORAL at 08:06

## 2024-06-10 RX ADMIN — DIGOXIN 125 MCG: 0.25 INJECTION INTRAMUSCULAR; INTRAVENOUS at 08:06

## 2024-06-10 RX ADMIN — FUROSEMIDE 20 MG: 10 INJECTION, SOLUTION INTRAMUSCULAR; INTRAVENOUS at 08:06

## 2024-06-10 RX ADMIN — BARIUM SULFATE 10 ML: 0.81 POWDER, FOR SUSPENSION ORAL at 02:06

## 2024-06-10 RX ADMIN — METOPROLOL TARTRATE 5 MG: 1 INJECTION, SOLUTION INTRAVENOUS at 04:06

## 2024-06-10 RX ADMIN — SERTRALINE HYDROCHLORIDE 100 MG: 50 TABLET ORAL at 08:06

## 2024-06-10 RX ADMIN — METOPROLOL TARTRATE 5 MG: 1 INJECTION, SOLUTION INTRAVENOUS at 08:06

## 2024-06-10 RX ADMIN — ENOXAPARIN SODIUM 80 MG: 80 INJECTION SUBCUTANEOUS at 08:06

## 2024-06-10 RX ADMIN — APIXABAN 5 MG: 5 TABLET, FILM COATED ORAL at 08:06

## 2024-06-10 RX ADMIN — PIPERACILLIN SODIUM AND TAZOBACTAM SODIUM 4.5 G: 4; .5 INJECTION, POWDER, LYOPHILIZED, FOR SOLUTION INTRAVENOUS at 04:06

## 2024-06-10 RX ADMIN — PIPERACILLIN SODIUM AND TAZOBACTAM SODIUM 4.5 G: 4; .5 INJECTION, POWDER, LYOPHILIZED, FOR SOLUTION INTRAVENOUS at 08:06

## 2024-06-10 RX ADMIN — DILTIAZEM HYDROCHLORIDE 240 MG: 120 CAPSULE, COATED, EXTENDED RELEASE ORAL at 11:06

## 2024-06-10 RX ADMIN — FUROSEMIDE 40 MG: 10 INJECTION, SOLUTION INTRAMUSCULAR; INTRAVENOUS at 08:06

## 2024-06-10 RX ADMIN — POTASSIUM CHLORIDE 60 MEQ: 1500 TABLET, EXTENDED RELEASE ORAL at 10:06

## 2024-06-10 NOTE — PT/OT/SLP EVAL
[facility]  Speech Language Pathology Department  Clinical Swallow Evaluation    Patient Name:  Alanna Moya   MRN:  [id number]    Recommendations     General recommendations:  Modified Barium Swallow Study pending improvement in respiratory status  Diet texture/consistency recommendations:  NPO  Medications: crushed in puree  Precautions: Standard, fall    History     Alanna Moya is a 78 y.o. female with PMH of AS s/p AVR, CAD, mi, HTN, chronic back pain, HLD, CVA, CHF, GERD, diverticulosis who was being downgraded from the ICU to Hospital Medicine after she was initially admitted for acute hypoxemic respiratory failure requiring BiPAP for adequate saturations. Patient observed desatting with PO intake and ST was consulted for bedside swallow evaluation. Now tolerating HFNC as of 6/10.    Past Medical History:   Diagnosis Date    Abdominal pain, unspecified site     Aortic stenosis     Coronary atherosclerosis of unspecified type of vessel, native or graft     Esophageal reflux     Fatigue     History of glaucoma     History of heart attack     Hypertension     Lumbosacral spondylosis without myelopathy     Other and unspecified hyperlipidemia     Rectocele     SOB (shortness of breath) on exertion     Unspecified essential hypertension     Weakness      Past Surgical History:   Procedure Laterality Date    CHOLECYSTECTOMY      CORONARY ARTERY BYPASS GRAFT      GLAUCOMA SURGERY Bilateral     HYSTERECTOMY      TRANSCATHETER AORTIC VALVE REPLACEMENT (TAVR) N/A 2/27/2023    Procedure: REPLACEMENT, AORTIC VALVE, TRANSCATHETER (TAVR);  Surgeon: Anselmo Arango MD;  Location: [facility] CATH LAB;  Service: Cardiology;  Laterality: N/A;  TAVR W/ ANEST.     Home diet texture/consistency: Regular and thin liquids    Current method of nutrition: NPO    Imaging   Results for orders placed during the hospital encounter of 06/01/24    X-Ray Chest 1 View    Narrative  EXAMINATION:  XR  CHEST 1 VIEW    CLINICAL HISTORY:  increased oxygen requirment;    TECHNIQUE:  One    COMPARISON:  June 6, 2025.    FINDINGS:  Cardiopericardial silhouette is within normal limits.  Sternotomy changes.  Interval progression of lungs congestive changes.  Right lower lung lobe opacifications are denser and can not exclude the possibility of associated infiltrates related to infection.  There are also increased size of pleural effusions.  No pneumothorax    Impression  As above.      Electronically signed by: Juan Luis Hendrix  Date:    06/09/2024  Time:    07:58    No results found for this or any previous visit.    No results found for this or any previous visit.    Subjective     Patient awake, alert, and cooperative.    Spiritual/Cultural/Religion Beliefs/Practices that affect care: no    Pain/Comfort: Pain Rating 1: 0/10    Objective     Vapotherm in place at 30L 100% FiO2. O2 saturations remained at 92-95% during evaluation.    ORAL MUSCULATURE  Dentition: own teeth  Secretion Management: adequate  Mucosal Quality: good  Facial Movement: WFL  Buccal Strength & Mobility: WFL  Mandibular Strength & Mobility: WFL  Oral Labial Strength & Mobility: WFL  Lingual Strength & Mobility: WFL  Vocal Quality: adequate    Consistency Fed By Oral Symptoms Pharyngeal Symptoms   Thin liquid by spoon SLP None None   Thin liquid by straw SLP None None   Puree SLP None None     Assessment     Recommend patient continue NPO at this time due to high respiratory requirements and risk for aspiration. Plan to complete MBS with further improvement in respiratory status. Discussed POC with RNCielo WILSON to follow up as appropriate.     Goals     Multidisciplinary Problems       SLP Goals       Not on file                  Education     Patient provided with verbal education regarding ST POC.  Understanding was verbalized.    Plan     SLP Follow-Up:  Yes   Patient to be seen:  5 x/week   Plan of Care expires:  06/17/24  Plan of Care reviewed with:   patient     Time Tracking     SLP Treatment Date:   06/10/24  Speech Start Time:  0920  Speech Stop Time:  0930     Speech Total Time (min):  10 min    Billable minutes:  Swallow and Oral Function Evaluation, 10 minutes     06/10/2024

## 2024-06-10 NOTE — NURSING
Nurses Note -- 4 Eyes      6/10/2024   10:54 AM      Skin assessed during: Daily Assessment      [x] No Altered Skin Integrity Present    [x]Prevention Measures Documented      [] Yes- Altered Skin Integrity Present or Discovered   [] LDA Added if Not in Epic (Describe Wound)   [] New Altered Skin Integrity was Present on Admit and Documented in LDA   [] Wound Image Taken    Wound Care Consulted? No    Attending Nurse:  Ronnell Moya RN    Second RN/Staff Member:  Marvin Franklin RN

## 2024-06-10 NOTE — CONSULTS
Inpatient consult to Palliative Care  Consult performed by: Kirsty Lynn FNP  Consult ordered by: Naa Chin MD      Patient Name: lAanna Moya   MRN: [id number]   Admission Date: 6/1/2024   Hospital Length of Stay: 9   Attending Provider: Naa Chin MD   Consulting Provider: STEF Haynes  Reason for Consult: Goals of Care  Primary Care Physician: Cornel Haddad MD     Principal Problem: <principal problem not specified>     Patient information was obtained from patient, relative(s), and ER records.      Final diagnoses:  [J96.90] Respiratory failure     Assessment/Plan:     I reviewed the patient and family's understanding of the seriousness of the illness and its expected prognosis. We discussed the patient's goals of care and treatment preferences. I clarified current code status. I identified the surrogate decision maker or health care POA.  I answered all questions and we formulated a plan including recommendations for symptom management and how to best achieve goals of care.    Patient is on Vapotherm sitting up in chair.  She endorses some sciatica pain to bilateral hips, for which she uses Salonpas and tramadol at home.  She verbalizes fear that she will become addicted to the tramadol.  Explained that as long as she utilizing medication for pain this is not an addiction issue.  She verbalizes understanding.    I discussed current code status of do not resuscitate.  I clarified her understanding and she verbalizes that she does not want to receive CPR or mechanical ventilation.  She further explains that she does not to be a burden to her children, as they are already dividing their time to come and take care her.    I did discuss her current status with acute on chronic cardiac issues and concerns for infectious process/volume overload.  At present she is responding to diuretic therapy and has weaned from BiPAP back down to Vapotherm.  Currently on antibiotics.  I engaged  her in a discussion regarding her current goals of care, to which she reports that if her illness is fixable she would like to continue current treatments and does not wish to return home still sick, as she feels this would be burdensome to her children.    I discussed who would make medical decisions for her in the event that she can not make medical decisions for herself.  She reports her son, Yonis Alvarez, has medical power of .  Informed her that we will discuss with him and asked him to bring documentation to be placed in her chart.    I spoke with son, Yonis, via telephone.  Updated him on his mother's current clinical status.  He verbalizes understanding.  He reports that medical power of  was completed previously with her PCP at [facility], and that he does not have a physical copy to present.  Informed him that if no documentation is available, her legal next of kin and decision makers would be the majority of her children.  He verbalizes understanding.  Offered support.  Encouraged to call with any questions or concerns.  Discussed with case management to attempt to retrieve copy of medical power of  from PCP.        Advance Care Planning     Date: 06/10/2024    [facility]  I engaged the patient in a voluntary conversation about advance care planning and we specifically addressed what the goals of care would be moving forward, in light of the patient's change in clinical status, specifically current condition.  We did specifically address the patient's likely prognosis, which is fair .  We explored the patient's values and preferences for future care.  The patient endorses that what is most important right now is to focus on improvement in condition but with limits to invasive therapies    Accordingly, we have decided that the best plan to meet the patient's goals includes continuing with treatment                Recommendations:     Lidocaine patch to bilateral  hips  Consult spiritual care      History of Present Illness:     Patient is a 78-year-old female with PMH of AS s/p AVR, CAD, MI, HTN, chronic back pain, HLD, CVA, CHF, GERD, diverticulosis who initially presented to the ICU for acute hypoxemic respiratory failure requiring BiPAP, after which she was transferred to Hospital Medicine Services.  She had presented to an outside facility with complaints of worsening shortness of breath for the last few weeks with initial chest x-ray at outside facility showing bilateral airspace opacities increased right-sided pleural effusion.  Last echocardiogram on 05/16/24 showed EF 55-60%.  Patient was weaned from BiPAP to Vapotherm and eventually Oxymizer.  Was noted to have new onset atrial fibrillation for which cardiology consulted.  Patient required Bipap placement again and voiced anger that she could not eat and wanted to go home.  Palliative Medicine consulted to discuss goals of care.      Active Ambulatory Problems     Diagnosis Date Noted    Unresponsive episode 02/08/2019    Aortic stenosis 02/28/2023     Resolved Ambulatory Problems     Diagnosis Date Noted    No Resolved Ambulatory Problems     Past Medical History:   Diagnosis Date    Abdominal pain, unspecified site     Coronary atherosclerosis of unspecified type of vessel, native or graft     Esophageal reflux     Fatigue     History of glaucoma     History of heart attack     Hypertension     Lumbosacral spondylosis without myelopathy     Other and unspecified hyperlipidemia     Rectocele     SOB (shortness of breath) on exertion     Unspecified essential hypertension     Weakness         Past Surgical History:   Procedure Laterality Date    CHOLECYSTECTOMY      CORONARY ARTERY BYPASS GRAFT      GLAUCOMA SURGERY Bilateral     HYSTERECTOMY      TRANSCATHETER AORTIC VALVE REPLACEMENT (TAVR) N/A 2/27/2023    Procedure: REPLACEMENT, AORTIC VALVE, TRANSCATHETER (TAVR);  Surgeon: Anselmo Arango MD;  Location: [facility]  CATH LAB;  Service: Cardiology;  Laterality: N/A;  TAVR W/ ANEST.        Review of patient's allergies indicates:  No Known Allergies       Current Facility-Administered Medications:     0.9%  NaCl infusion, , Intravenous, PRN, Vishnu Stevenson MD, Stopped at 06/08/24 0133    digoxin injection 125 mcg, 125 mcg, Intravenous, Daily, Anne Oneil, FNP, 125 mcg at 06/10/24 0811    diltiaZEM 125 mg in dextrose 5 % 125 mL IVPB (ready to mix) (titrating), 0-15 mg/hr, Intravenous, Continuous, Jennifer Lynch NP, Stopped at 06/09/24 1248    docusate 50 mg/5 mL liquid 50 mg, 50 mg, Oral, Daily, Marga Smith MD, 50 mg at 06/09/24 0817    enoxaparin injection 80 mg, 1 mg/kg, Subcutaneous, Q12H (prophylaxis, 0900/2100), Anne Oneil FNP, 80 mg at 06/10/24 0811    furosemide injection 40 mg, 40 mg, Intravenous, Q12H, Naa Chin MD    gabapentin capsule 600 mg, 600 mg, Oral, BID, Marga Smith MD, 600 mg at 06/09/24 0817    hydrALAZINE injection 10 mg, 10 mg, Intravenous, Q6H PRN, Eleno Abbasi MD, 10 mg at 06/06/24 1631    HYDROmorphone (PF) injection 0.2 mg, 0.2 mg, Intravenous, Q6H PRN, Ky Linn DO, 0.2 mg at 06/05/24 0029    labetaloL injection 10 mg, 10 mg, Intravenous, Q4H PRN, Baldo Finney DO, 10 mg at 06/06/24 1719    lactated ringers infusion, , Intravenous, Continuous, Shanna Montiel MD, Last Rate: 50 mL/hr at 06/09/24 1833, Rate Verify at 06/09/24 1833    levalbuterol nebulizer solution 1.25 mg, 1.25 mg, Nebulization, Q6H PRN, Shanna Montiel MD, 1.25 mg at 06/09/24 1200    metoprolol injection 5 mg, 5 mg, Intravenous, Q4H PRN, Justyn Landaverde MD, 5 mg at 06/10/24 0850    piperacillin-tazobactam (ZOSYN) 4.5 g in dextrose 5 % in water (D5W) 100 mL IVPB (MB+), 4.5 g, Intravenous, Q8H, DOUGIE Brown MD, Last Rate: 100 mL/hr at 06/10/24 0813, 4.5 g at 06/10/24 0813    sertraline tablet 100 mg, 100 mg, Oral, QHS, Marga Smith MD, 100 mg at 06/08/24 2000    traMADoL tablet 50 mg, 50 mg,  "Oral, TID, Vishnu Stevenson MD, 50 mg at 06/09/24 0817       Current Facility-Administered Medications:     sodium chloride 0.9%, , Intravenous, PRN    hydrALAZINE, 10 mg, Intravenous, Q6H PRN    HYDROmorphone, 0.2 mg, Intravenous, Q6H PRN    labetalol, 10 mg, Intravenous, Q4H PRN    levalbuterol, 1.25 mg, Nebulization, Q6H PRN    metoprolol, 5 mg, Intravenous, Q4H PRN     No family history on file.     Review of Systems   Constitutional:  Positive for fatigue.   HENT: Negative.     Respiratory:  Positive for shortness of breath.    Cardiovascular: Negative.    Gastrointestinal: Negative.    Genitourinary: Negative.    Musculoskeletal:         Sciatica pain to bilateral hips/legs   Neurological:  Positive for weakness.   Psychiatric/Behavioral: Negative.              Objective:   BP (!) 146/100   Pulse 100   Temp 97.9 °F (36.6 °C)   Resp (!) 24   Ht 5' 2" (1.575 m)   Wt 77.1 kg (170 lb)   SpO2 100%   BMI 31.09 kg/m²      Physical Exam  Constitutional:       General: She is not in acute distress.     Appearance: She is ill-appearing.   HENT:      Head: Normocephalic.      Mouth/Throat:      Mouth: Mucous membranes are moist.   Eyes:      Extraocular Movements: Extraocular movements intact.   Cardiovascular:      Rate and Rhythm: Tachycardia present. Rhythm irregular.      Heart sounds: Normal heart sounds. No murmur heard.  Pulmonary:      Effort: Pulmonary effort is normal. No respiratory distress.   Abdominal:      General: There is no distension.      Palpations: Abdomen is soft.   Musculoskeletal:      Cervical back: Normal range of motion.      Right lower leg: No edema.      Left lower leg: No edema.   Skin:     General: Skin is warm and dry.   Neurological:      General: No focal deficit present.      Mental Status: She is alert and oriented to person, place, and time.   Psychiatric:         Behavior: Behavior normal.             Review of Symptoms      Symptom Assessment (ESAS 0-10 Scale)  Pain:  " 0  Dyspnea:  0  Anxiety:  0  Nausea:  0  Depression:  0  Anorexia:  0  Fatigue:  0  Insomnia:  0  Restlessness:  0  Agitation:  0         Psychosocial/Cultural:   See Palliative Psychosocial Note: Yes  . Had 7 children, 2 are . 1 son, who she lives with. 4 daughters, all live within the state of [place]. 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 Retired at the age of 62. Lives with her son, Yonis, 1 dog and 1 cat. She is able to do for herself at home, with the assistance of a walker, shower chair, and bedside commode.  **Primary  to Follow**  Palliative Care  Consult: No    Spiritual:  F - Adela and Belief:  Sabianist  I - Importance:  Yes      Advance Care Planning   Advance Directives:     Decision Making:  Patient answered questions  Goals of Care: The patient endorses that what is most important right now is to focus on improvement in condition but with limits to invasive therapies    Accordingly, we have decided that the best plan to meet the patient's goals includes continuing with treatment          PAINAD: NA    Caregiver burden formerly assessed: Yes        > 50% of 70 min of encounter was spent in chart review, face to face discussion of goals of care, symptom assessment, coordination of care and emotional support.    STEF Haynes-BC  Palliative Medicine  [facility]

## 2024-06-10 NOTE — PROGRESS NOTES
[facility] - 7th [facility] ICU  Pulmonary Critical Care Note    Patient Name: Alanna Moya  MRN: [id number]  Admission Date: 6/1/2024  Hospital Length of Stay: 9 days  Code Status: DNR  Attending Provider: Naa Chin MD  Primary Care Provider: Cornel Haddad MD     Subjective:     HPI:   78F presented as a transfer from outside hospital with BiPAP requirements, initially admitted to intensive care unit but downgraded to floor following robust response to diuretic therapy.  Has been managed for volume overload and pneumonia on hospital medicine service.  Patient has elected for DNR code status.  Pulmonary medicine consult this morning for worsening hypoxemia.    Worsening hypoxemia with 90% to BiPAP requirements this morning.  Repeat chest x-ray 06/09/2024 with changes concerning for worsening volume overload.  Diuretics increased this morning.  Palliative Medicine has been consulted for assistance with goals of care.        Past Medical History:   Diagnosis Date    Abdominal pain, unspecified site     Aortic stenosis     Coronary atherosclerosis of unspecified type of vessel, native or graft     Esophageal reflux     Fatigue     History of glaucoma     History of heart attack     Hypertension     Lumbosacral spondylosis without myelopathy     Other and unspecified hyperlipidemia     Rectocele     SOB (shortness of breath) on exertion     Unspecified essential hypertension     Weakness        Past Surgical History:   Procedure Laterality Date    CHOLECYSTECTOMY      CORONARY ARTERY BYPASS GRAFT      GLAUCOMA SURGERY Bilateral     HYSTERECTOMY      TRANSCATHETER AORTIC VALVE REPLACEMENT (TAVR) N/A 2/27/2023    Procedure: REPLACEMENT, AORTIC VALVE, TRANSCATHETER (TAVR);  Surgeon: Anselmo Arango MD;  Location: [facility] CATH LAB;  Service: Cardiology;  Laterality: N/A;  TAVR W/ ANEST.       Social History     Socioeconomic History    Marital status:    Tobacco Use    Smoking status: Never     Smokeless tobacco: Never   Substance and Sexual Activity    Alcohol use: No    Drug use: No    Sexual activity: Never     Birth control/protection: Surgical     Social Determinants of Health     Financial Resource Strain: Low Risk  (5/3/2021)    Received from [facility]    Overall Financial Resource Strain (CARDIA)     Difficulty of Paying Living Expenses: Not hard at all   Food Insecurity: No Food Insecurity (5/3/2021)    Received from [facility]    Hunger Vital Sign     Worried About Running Out of Food in the Last Year: Never true     Ran Out of Food in the Last Year: Never true   Transportation Needs: No Transportation Needs (5/3/2021)    Received from [facility]    PRAPARE - Transportation     Lack of Transportation (Medical): No     Lack of Transportation (Non-Medical): No   Physical Activity: Sufficiently Active (5/3/2021)    Received from [facility]    Exercise Vital Sign     Days of Exercise per Week: 2 days     Minutes of Exercise per Session: 130 min   Stress: Stress Concern Present (5/3/2021)    Received from [facility]    [place] [employer] - Occupational Stress Questionnaire     Feeling of Stress : Very much       Current Outpatient Medications   Medication Instructions    amLODIPine (NORVASC) 10 mg, Oral, 2 times daily    aspirin 325 MG tablet 1 tablet, Oral, Daily    carvediloL (COREG) 12.5 mg, Oral, 2 times daily with meals    cholecalciferol, vitamin D3, (VITAMIN D3) 50 mcg (2,000 unit) Cap capsule 1 capsule, Oral, Daily    cyanocobalamin, vitamin B-12, 2,000 mcg Tab 1 tablet, Oral, Daily    cyclobenzaprine (FLEXERIL) 10 mg, Oral, 3 times daily    docusate sodium (STOOL SOFTENER ORAL) 2 capsules, Oral, 2 times daily    furosemide (LASIX) 40 mg, Oral, Daily    hydrALAZINE (APRESOLINE) 25 mg, Oral, Every 8 hours    L. acidophilus/Bifid. animalis (DAILY PROBIOTIC ORAL) 1 capsule, Oral, Daily    potassium gluconate 650 mg, Oral, Once    rosuvastatin (CRESTOR) 40 mg, Oral,  Nightly    sertraline (ZOLOFT) 100 mg, Oral, Nightly    traMADoL (ULTRAM) 50 mg, Oral, 3 times daily       Current Inpatient Medications   digoxin  125 mcg Intravenous Daily    docusate  50 mg Oral Daily    enoxparin  1 mg/kg Subcutaneous Q12H (prophylaxis, 0900/2100)    furosemide (LASIX) injection  40 mg Intravenous Q12H    gabapentin  600 mg Oral BID    piperacillin-tazobactam (Zosyn) IV (PEDS and ADULTS) (extended infusion is not appropriate)  4.5 g Intravenous Q8H    sertraline  100 mg Oral QHS    traMADoL  50 mg Oral TID     Current Intravenous Infusions   dilTIAZem  0-15 mg/hr Intravenous Continuous   Stopped at 06/09/24 1248    lactated ringers   Intravenous Continuous 50 mL/hr at 06/09/24 1833 Rate Verify at 06/09/24 1833         ROS negative unless documented in the history of present illness.        Objective:       Intake/Output Summary (Last 24 hours) at 6/10/2024 0948  Last data filed at 6/10/2024 0800  Gross per 24 hour   Intake 225.54 ml   Output 2750 ml   Net -2524.46 ml       Vital Signs (Most Recent):  Temp: 97.9 °F (36.6 °C) (06/10/24 0800)  Pulse: 100 (06/10/24 0726)  Resp: (!) 24 (06/10/24 0726)  BP: (!) 146/100 (06/10/24 0800)  SpO2: 100 % (06/10/24 0726)  Body mass index is 31.09 kg/m².  Weight: 77.1 kg (170 lb) Vital Signs (24h Range):  Temp:  [97.9 °F (36.6 °C)-98.7 °F (37.1 °C)] 97.9 °F (36.6 °C)  Pulse:  [] 100  Resp:  [14-28] 24  SpO2:  [91 %-100 %] 100 %  BP: (108-153)/() 146/100       Physical Exam:  Gen- A/O, NAD  HENT- ATNC, MMM  CV- irregularly irregular rhythm with periods of nonsustained SVT  Resp-scattered crackles bilaterally, normal work of breathing, oxygen saturations 93% on 90% Vapotherm  MSK- WWP, no lower extremity edema  Neuro- A/Ox3, DARLENE, no gross deficits   Psych- appropriate mod and affect       Lines/Drains/Airways       Drain  Duration                  Urethral Catheter 06/09/24 1754 <1 day              Peripheral Intravenous Line  Duration                   Peripheral IV - Single Lumen 06/07/24 0515 20 G Right Forearm 3 days         Peripheral IV - Single Lumen 06/07/24 1700 20 G Anterior;Proximal;Right Forearm 2 days         Peripheral IV - Single Lumen 06/08/24 0428 20 G Left Forearm 2 days                    Significant Labs:  Lab Results   Component Value Date    WBC 14.87 (H) 06/10/2024    HGB 10.2 (L) 06/10/2024    HCT 31.5 (L) 06/10/2024    MCV 92.1 06/10/2024     06/10/2024       BMP  Lab Results   Component Value Date     06/10/2024    K 3.0 (L) 06/10/2024    CHLORIDE 97 05/04/2021    CO2 27 06/10/2024    BUN 24.4 (H) 06/10/2024    CREATININE 0.87 06/10/2024    CALCIUM 8.8 06/10/2024    AGAP 15.0 06/10/2024    ESTGFRAFRICA 92 05/16/2024    EGFRNONAA 28.1 (A) 08/10/2017         Assessment/Plan:     Assessment  Acute hypoxic respiratory failure  Diastolic heart failure  Right ventricular failure with potential pulmonary hypertension  Atrial fibrillation      Plan  -Primary service has sent respiratory cultures and patient remains on Zosyn, reasonable to cover for infectious sources of respiratory failure but low suspicion for pneumonia  -repeat chest x-ray 06/09/2024 with bilateral pleural effusions with bilateral airspace disease, fluid within the fissure on the right most consistent with volume overload given history of diastolic heart failure  -on 20 mg IV Lasix daily as of today, increased to 40 mg IV BID by primary service, with next dose due this afternoon--> will give 40 mg IV x1 now  -intermittent periods of nonsustained SVT most consistent with atrial fibrillation with some aberrant conduction, primary service ordered 40 mg IV potassium today, will give additional 60 mg oral (K 3.0 this AM)  -give additional 2 g magnesium today  -aggressive electrolyte repletion for target K>4, Mg>2   -discuss goals of care with patient today, she reaffirmed her desire for code status to remain DO NOT RESUSCITATE/DO NOT INTUBATE               Gregory TOBIN  MD [place]  Pulmonary Critical Care Medicine  [facility] - 7th Floor ICU  DOS: 06/10/2024

## 2024-06-10 NOTE — PT/OT/SLP PROGRESS
Physical Therapy Treatment    Patient Name:  Alanna Moya   MRN:  [id number]    Recommendations:     Discharge therapy intensity: Moderate Intensity Therapy   Discharge Equipment Recommendations: other (see comments) (TBD)  Barriers to discharge:  medical dx, impaired mobility, decreased endurance     Assessment:     Alanna Moya is a 78 y.o. female admitted with a medical diagnosis of  SOB, respiratory failure, pneumonia, pleural effusions.    She presents with the following impairments/functional limitations: weakness, gait instability, decreased upper extremity function, impaired endurance, impaired balance, decreased lower extremity function, impaired cardiopulmonary response to activity, impaired self care skills, impaired functional mobility.    Rehab Prognosis: Good; patient would benefit from acute skilled PT services to address these deficits and reach maximum level of function.    Recent Surgery: * No surgery found *      Plan:     During this hospitalization, patient would benefit from acute PT services 5 x/week to address the identified rehab impairments via gait training, therapeutic exercises, therapeutic activities and progress toward the following goals:    Plan of Care Expires:  07/04/24    Subjective     Chief Complaint: n/a  Patient/Family Comments/goals: to get stronger and go home   Pain/Comfort:  Pain Rating 1: 0/10      Objective:     Communicated with NSG prior to session.  Patient found HOB elevated with blood pressure cuff, pulse ox (continuous), oxygen, telemetry, peripheral IV, melton catheter upon PT entry to room.     General Precautions: Standard, fall  Orthopedic Precautions: N/A  Braces: N/A  Respiratory Status:  vapotherm 30 LPM, 90% fiO2  SpO2: 98% at rest, 94% wtith activity  HR: 105 at rest, 140 with activity  Skin Integrity: Visible skin intact      Functional Mobility:  Bed Mobility:     Supine to Sit: minimum assistance  Transfers:     Sit to Stand:  contact guard  assistance with rolling walker; x5 trials     Therapeutic Activities/Exercises:  Pt performs static marches with use of RW and CGA; x10 reps; 2 trials with a seated rest break in btw     Education:  Patient provided with verbal education  regarding PT role/goals/POC, fall prevention, safety awareness, and discharge/DME recommendations.  Understanding was verbalized.     Patient left up in chair with all lines intact, call button in reach, geomat cushion, and RN and sibling present    GOALS:   Multidisciplinary Problems       Physical Therapy Goals          Problem: Physical Therapy    Goal Priority Disciplines Outcome Goal Variances Interventions   Physical Therapy Goal     PT, PT/OT Progressing     Description: Goals to be met by: 24     Patient will increase functional independence with mobility by performin. Supine to sit with Stand-by Assistance  2. Sit to supine with Stand-by Assistance  3. Sit to stand transfer with Stand-by Assistance  4. Bed to chair transfer with Stand-by Assistance using Rolling Walker  5. Gait  x 150 feet with Stand-by Assistance using Rolling Walker.                          Time Tracking:     PT Received On: 06/10/24  PT Start Time: 1011     PT Stop Time: 1038  PT Total Time (min): 27 min     Billable Minutes: Therapeutic Activity 2    Treatment Type: Treatment  PT/PTA: PT     Number of PTA visits since last PT visit: 4     06/10/2024

## 2024-06-10 NOTE — PROGRESS NOTES
"    [facility] - 7th Floor ICU    Cardiology  Progress Note    Patient Name: Alanna Moya  MRN: [id number]  Admission Date: 6/1/2024  Hospital Length of Stay: 9 days  Code Status: DNR   Attending Physician: Naa Chin MD   Primary Care Physician: Cornel Haddad MD  Expected Discharge Date:   Principal Problem:<principal problem not specified>    Subjective:   Reason for Consult: SVT     HPI:Ms. Moya is a 78 year old female, known to Dr. Ramon in [place], who presented to the hospital from [facility] as transfer due to respiratory failure. Upon arrival she was noted to be SOB. Noted hypoxia requiring BIPAP. Chesr Radiograph from outside facility revealed bilateral airspace opacification and increase his right-sided pleural effusion suggestive of worsening pneumonia or possibly edema. CT Chest revealed mixed picture of decompensated HF, pleural effusions and superimposed pneumonia. During this hospitalization patient with tachycardia concerning for AF RVR, with noted Left Bundle Branch Block. She was given IV Metoprolol and started on Cardizem Infusion for Acute HR Control. She does have history of PSVT. Echocardiogram on 6.2.24 revealed intact LV Function with EF 55-60% & Grade I DD. Electrolytes grossly stable. Troponin normal. Lactic Acid Normal. . CIS Consulted for AF Management.     Hospital Course:   6.7.24: Patient seen sitting in chair. She denies SOB, CP, or nausea. Remains in Afib RVR   6.8.24: NAD. VSS. No complaints of CP/SOB/Palps. "I feel okay" WBC 14.27  6.9.24: NAD. VSS. AFIB RVR on telemetry. No complaints CP/SOB/Palps. "I feel okay" possible aspiration - awaiting Speech Evaluation. K 3.7, Mag 1.8, WBC 16.21.  6.10.24: NAD Noted. Sitting up in chair. AF RVR. Requiring Vapotherm Support. Denies CP. Reports exertional SOB, however, functional capacity is limited as she is deconditioned. Hypertension Noted. Diuresis noted, - 1604 ML/24 Hours.     PMH: CAD/CABG, " Hypertension, Dyslipidemia, MELCHOR, CEA, CVA, AS/TAVR, PSVT  PSH: LHC, CABG, TAVR, CEA, Hysterectomy, Cholecystectomy, Eye Surgery, Hand Surgery  Family History: Father- Old MI/CAD, Mother- HF, Son- Hypertension, Daughter- Cancer/Hypertension, Brother- Heart Disease, Brother- Old MI/CAD, Brother- Cancer  Social History: Tobacco- Negative, Alcohol- Negative, Substance Abuse- Negative      Previous Cardiac Diagnostics:   Echocardiogram (6.2.24):  Left Ventricle: The left ventricle is normal in size. Increased wall thickness. There is normal systolic function with a visually estimated ejection fraction of 55 - 60%. Grade I diastolic dysfunction.  Right Ventricle: Systolic function is reduced.TAPSE is 1.60 cm.  Aortic Valve: There is a transcatheter valve replacement in the aortic position. Aortic valve area by VTI is 1.56 cm². Aortic valve peak velocity is 1.94 m/s. Mean gradient is 8 mmHg. The dimensionless index is 0.50.  Mitral Valve: Mildly thickened leaflets. There is mitral annular calcification present. There is mild stenosis. The mean pressure gradient across the mitral valve is 5 mmHg at a heart rate of 87 bpm. There is mild regurgitation.  Tricuspid Valve: The tricuspid valve is structurally normal. There is mild regurgitation.  Pulmonic Valve: The pulmonic valve is structurally normal.  Pulmonary Artery: The estimated pulmonary artery systolic pressure is 47 mmHg.  IVC/SVC: Normal venous pressure at 3 mmHg.  Pericardium: Left pleural effusion.     Echocardiogram (5.15.24):  EF 55-60%. Grade I Diastolic Dysfunction. Normal LV Wall Motion.  Mild MAC with Mild MR and No MS. Mild to Moderate TR.  Mild to Moderate AI, No AS.     CT Angiogram Head/Neck (5.15.24):  Carotid arteries:   Calcified plaque at the siphons without significant narrowing.   Normal A1 and M1 segments.   Vertebrobasilar Circulation:   Vertebral arteries: Patent. Calcified plaque on the right without significant narrowing. Otherwise no  abnormalities.   Basilar artery: Patent, no abnormalities.   Posterior cerebral arteries:  Patent. Right fetal origin. Otherwise no abnormalities.      Echocardiogram (4.21.23):  The study quality is good.   The left ventricle is normal in size. Global left ventricular systolic function is normal. The left ventricular ejection fraction by Power's is 62%. The left ventricle diastolic function is impaired (Grade II) with an elevated left atrial pressure. Noted left ventricular hypertrophy. Concentric left ventricular hypertrophy is present. It is mild. LVSI=41ml/m2.    LVEDV=67.2ml and LVESV=25.7ml.  The left atrial diameter is moderately increased. Left atrial diameter is 4.4 cms. The left atrium is severely enlarged based on the left atrium volume index of 48.2ml/m².  S/P TAVR. Bioprosthetic aortic valve is well seated and functions normally with no evidence of insufficiency or perivalvular leaks. JACINTA=2.0cm2. The trans-aortic peak gradient is 15.8 mmHg. The trans-aortic mean gradient is 8.6 mmHg. DI=1.1.  Mild mitral annular calcification is noted. The mean trans mitral gradient is 1.9 mmHg.  Mild to moderate (1-2+) mitral regurgitation. Mild to moderate (1-2+) tricuspid regurgitation.  The estimated pulmonary artery systolic pressure is 41 mmHg assuming a right atrial pressure of 3 mmHg. Evidence of pulmonary hypertension is noted.      MCT Monitor (3.15.23):  Predominant Rhythm SR.  PSVT     TAVR (2.27.23):  TAVR  23  mm S3 ultra valve, nominal prep,  Right TF approach  Limited echo pre, post valve placement  Root angiography  Distal aortography  Temporary pacemaker placement removal  Manta closure  Right CFA access site.  U/S guided bilateral right groin access   Balloon angioplasty of right CFA with a 5 x 40 Napoleon.     LHC (1.26.23):  LM: Distal 50% Stenosis, LAD: Occluded Mid, LCX: Ostial 50% at Mid Portion, RCA: Dominant Patent Arter.  JENNINGS to LAD is Patent.  Impression: LIMA to LAD- Patent, 50% Stenosis  LCX Artery  Medical Management.    Review of Systems   Cardiovascular:  Positive for dyspnea on exertion. Negative for chest pain.   Respiratory:  Negative for shortness of breath.    All other systems reviewed and are negative.    Objective:     Vital Signs (Most Recent):  Temp: 97.9 °F (36.6 °C) (06/10/24 0800)  Pulse: (!) 111 (06/10/24 1000)  Resp: 18 (06/10/24 1000)  BP: (!) 146/100 (06/10/24 0800)  SpO2: (!) 93 % (06/10/24 1000) Vital Signs (24h Range):  Temp:  [97.9 °F (36.6 °C)-98.7 °F (37.1 °C)] 97.9 °F (36.6 °C)  Pulse:  [] 111  Resp:  [14-28] 18  SpO2:  [91 %-100 %] 93 %  BP: (115-153)/() 146/100   Weight: 77.1 kg (170 lb)  Body mass index is 31.09 kg/m².  SpO2: (!) 93 %       Intake/Output Summary (Last 24 hours) at 6/10/2024 1059  Last data filed at 6/10/2024 1000  Gross per 24 hour   Intake 963.52 ml   Output 3100 ml   Net -2136.48 ml     Lines/Drains/Airways       Drain  Duration                  Urethral Catheter 06/09/24 1754 <1 day              Peripheral Intravenous Line  Duration                  Peripheral IV - Single Lumen 06/07/24 0515 20 G Right Forearm 3 days         Peripheral IV - Single Lumen 06/07/24 1700 20 G Anterior;Proximal;Right Forearm 2 days         Peripheral IV - Single Lumen 06/08/24 0428 20 G Left Forearm 2 days                  Significant Labs:   Recent Results (from the past 72 hour(s))   Basic Metabolic Panel    Collection Time: 06/07/24  8:54 PM   Result Value Ref Range    Sodium 135 (L) 136 - 145 mmol/L    Potassium 3.4 (L) 3.5 - 5.1 mmol/L    Chloride 97 (L) 98 - 107 mmol/L    CO2 26 23 - 31 mmol/L    Glucose 242 (H) 82 - 115 mg/dL    Blood Urea Nitrogen 37.9 (H) 9.8 - 20.1 mg/dL    Creatinine 0.89 0.55 - 1.02 mg/dL    BUN/Creatinine Ratio 43     Calcium 9.0 8.4 - 10.2 mg/dL    Anion Gap 12.0 mEq/L    eGFR >60 mL/min/1.73/m2   Magnesium    Collection Time: 06/07/24  8:54 PM   Result Value Ref Range    Magnesium Level 2.30 1.60 - 2.60 mg/dL   EKG 12-lead     Collection Time: 06/08/24 12:57 AM   Result Value Ref Range    QRS Duration 134 ms    [facility] QTC Calculation 528 ms   Comprehensive Metabolic Panel    Collection Time: 06/08/24  4:19 AM   Result Value Ref Range    Sodium 139 136 - 145 mmol/L    Potassium 3.7 3.5 - 5.1 mmol/L    Chloride 99 98 - 107 mmol/L    CO2 28 23 - 31 mmol/L    Glucose 166 (H) 82 - 115 mg/dL    Blood Urea Nitrogen 35.2 (H) 9.8 - 20.1 mg/dL    Creatinine 0.88 0.55 - 1.02 mg/dL    Calcium 8.9 8.4 - 10.2 mg/dL    Protein Total 5.5 (L) 5.8 - 7.6 gm/dL    Albumin 2.7 (L) 3.4 - 4.8 g/dL    Globulin 2.8 2.4 - 3.5 gm/dL    Albumin/Globulin Ratio 1.0 (L) 1.1 - 2.0 ratio    Bilirubin Total 0.6 <=1.5 mg/dL    ALP 69 40 - 150 unit/L    ALT 17 0 - 55 unit/L    AST 18 5 - 34 unit/L    eGFR >60 mL/min/1.73/m2    Anion Gap 12.0 mEq/L    BUN/Creatinine Ratio 40    CBC with Differential    Collection Time: 06/08/24  4:19 AM   Result Value Ref Range    WBC 14.27 (H) 4.50 - 11.50 x10(3)/mcL    RBC 3.55 (L) 4.20 - 5.40 x10(6)/mcL    Hgb 10.7 (L) 12.0 - 16.0 g/dL    Hct 32.7 (L) 37.0 - 47.0 %    MCV 92.1 80.0 - 94.0 fL    MCH 30.1 27.0 - 31.0 pg    MCHC 32.7 (L) 33.0 - 36.0 g/dL    RDW 12.9 11.5 - 17.0 %    Platelet 333 130 - 400 x10(3)/mcL    MPV 11.2 (H) 7.4 - 10.4 fL    Neut % 74.4 %    Lymph % 10.7 %    Mono % 11.4 %    Eos % 0.1 %    Basophil % 0.2 %    Lymph # 1.53 0.6 - 4.6 x10(3)/mcL    Neut # 10.62 (H) 2.1 - 9.2 x10(3)/mcL    Mono # 1.62 (H) 0.1 - 1.3 x10(3)/mcL    Eos # 0.01 0 - 0.9 x10(3)/mcL    Baso # 0.03 <=0.2 x10(3)/mcL    IG# 0.46 (H) 0 - 0.04 x10(3)/mcL    IG% 3.2 %    NRBC% 0.0 %   Magnesium    Collection Time: 06/08/24  4:19 AM   Result Value Ref Range    Magnesium Level 2.10 1.60 - 2.60 mg/dL   Phosphorus    Collection Time: 06/08/24  4:19 AM   Result Value Ref Range    Phosphorus Level 2.1 (L) 2.3 - 4.7 mg/dL   Comprehensive Metabolic Panel    Collection Time: 06/09/24  3:01 AM   Result Value Ref Range    Sodium 136 136 - 145 mmol/L    Potassium  3.7 3.5 - 5.1 mmol/L    Chloride 97 (L) 98 - 107 mmol/L    CO2 25 23 - 31 mmol/L    Glucose 138 (H) 82 - 115 mg/dL    Blood Urea Nitrogen 30.0 (H) 9.8 - 20.1 mg/dL    Creatinine 0.90 0.55 - 1.02 mg/dL    Calcium 8.6 8.4 - 10.2 mg/dL    Protein Total 5.5 (L) 5.8 - 7.6 gm/dL    Albumin 2.7 (L) 3.4 - 4.8 g/dL    Globulin 2.8 2.4 - 3.5 gm/dL    Albumin/Globulin Ratio 1.0 (L) 1.1 - 2.0 ratio    Bilirubin Total 0.5 <=1.5 mg/dL    ALP 76 40 - 150 unit/L    ALT 15 0 - 55 unit/L    AST 18 5 - 34 unit/L    eGFR >60 mL/min/1.73/m2    Anion Gap 14.0 mEq/L    BUN/Creatinine Ratio 33    Magnesium    Collection Time: 06/09/24  3:01 AM   Result Value Ref Range    Magnesium Level 1.80 1.60 - 2.60 mg/dL   CBC with Differential    Collection Time: 06/09/24  3:01 AM   Result Value Ref Range    WBC 16.21 (H) 4.50 - 11.50 x10(3)/mcL    RBC 3.55 (L) 4.20 - 5.40 x10(6)/mcL    Hgb 10.7 (L) 12.0 - 16.0 g/dL    Hct 32.7 (L) 37.0 - 47.0 %    MCV 92.1 80.0 - 94.0 fL    MCH 30.1 27.0 - 31.0 pg    MCHC 32.7 (L) 33.0 - 36.0 g/dL    RDW 12.9 11.5 - 17.0 %    Platelet 316 130 - 400 x10(3)/mcL    MPV 11.0 (H) 7.4 - 10.4 fL    Neut % 74.8 %    Lymph % 9.2 %    Mono % 12.2 %    Eos % 0.1 %    Basophil % 0.2 %    Lymph # 1.49 0.6 - 4.6 x10(3)/mcL    Neut # 12.14 (H) 2.1 - 9.2 x10(3)/mcL    Mono # 1.97 (H) 0.1 - 1.3 x10(3)/mcL    Eos # 0.02 0 - 0.9 x10(3)/mcL    Baso # 0.03 <=0.2 x10(3)/mcL    IG# 0.56 (H) 0 - 0.04 x10(3)/mcL    IG% 3.5 %    NRBC% 0.0 %   Urinalysis, Reflex to Urine Culture    Collection Time: 06/09/24  5:49 PM    Specimen: Urine   Result Value Ref Range    Color, UA Light-Yellow Yellow, Light-Yellow, Colorless, Straw, Dark-Yellow    Appearance, UA Clear Clear    Specific Gravity, UA 1.014 1.005 - 1.030    pH, UA 5.5 5.0 - 8.5    Protein, UA Negative Negative    Glucose, UA Normal Negative, Normal    Ketones, UA Negative Negative    Blood, UA Negative Negative    Bilirubin, UA Negative Negative    Urobilinogen, UA Normal 0.2, 1.0, Normal     Nitrites, UA Negative Negative    Leukocyte Esterase, UA Negative Negative    WBC, UA 0-5 None Seen, 0-2, 3-5, 0-5 /HPF    Bacteria, UA Trace None Seen, Trace /HPF    Budding Yeast, UA Many (A) None Seen /HPF    Squamous Epithelial Cells, UA Trace None Seen /HPF    Mucous, UA Trace (A) None Seen /LPF    RBC, UA 0-5 None Seen, 0-2, 3-5, 0-5 /HPF   Comprehensive Metabolic Panel    Collection Time: 06/10/24  3:15 AM   Result Value Ref Range    Sodium 139 136 - 145 mmol/L    Potassium 3.0 (L) 3.5 - 5.1 mmol/L    Chloride 97 (L) 98 - 107 mmol/L    CO2 27 23 - 31 mmol/L    Glucose 128 (H) 82 - 115 mg/dL    Blood Urea Nitrogen 24.4 (H) 9.8 - 20.1 mg/dL    Creatinine 0.87 0.55 - 1.02 mg/dL    Calcium 8.8 8.4 - 10.2 mg/dL    Protein Total 5.9 5.8 - 7.6 gm/dL    Albumin 2.6 (L) 3.4 - 4.8 g/dL    Globulin 3.3 2.4 - 3.5 gm/dL    Albumin/Globulin Ratio 0.8 (L) 1.1 - 2.0 ratio    Bilirubin Total 0.8 <=1.5 mg/dL    ALP 72 40 - 150 unit/L    ALT 17 0 - 55 unit/L    AST 20 5 - 34 unit/L    eGFR >60 mL/min/1.73/m2    Anion Gap 15.0 mEq/L    BUN/Creatinine Ratio 28    CBC with Differential    Collection Time: 06/10/24  3:15 AM   Result Value Ref Range    WBC 14.87 (H) 4.50 - 11.50 x10(3)/mcL    RBC 3.42 (L) 4.20 - 5.40 x10(6)/mcL    Hgb 10.2 (L) 12.0 - 16.0 g/dL    Hct 31.5 (L) 37.0 - 47.0 %    MCV 92.1 80.0 - 94.0 fL    MCH 29.8 27.0 - 31.0 pg    MCHC 32.4 (L) 33.0 - 36.0 g/dL    RDW 12.8 11.5 - 17.0 %    Platelet 280 130 - 400 x10(3)/mcL    MPV 11.4 (H) 7.4 - 10.4 fL    Neut % 78.0 %    Lymph % 6.7 %    Mono % 12.4 %    Eos % 0.1 %    Basophil % 0.2 %    Lymph # 1.00 0.6 - 4.6 x10(3)/mcL    Neut # 11.60 (H) 2.1 - 9.2 x10(3)/mcL    Mono # 1.84 (H) 0.1 - 1.3 x10(3)/mcL    Eos # 0.01 0 - 0.9 x10(3)/mcL    Baso # 0.03 <=0.2 x10(3)/mcL    IG# 0.39 (H) 0 - 0.04 x10(3)/mcL    IG% 2.6 %    NRBC% 0.0 %     Telemetry:  AF RVR    Physical Exam  Vitals and nursing note reviewed.   Constitutional:       General: She is not in acute  distress.     Appearance: She is ill-appearing.   HENT:      Head: Normocephalic.      Mouth/Throat:      Mouth: Mucous membranes are moist.   Cardiovascular:      Rate and Rhythm: Tachycardia present. Rhythm irregular.   Pulmonary:      Effort: Pulmonary effort is normal. No respiratory distress.      Comments: Vapotherm 90% with 30 L/Min  Skin:     General: Skin is warm and dry.   Neurological:      Mental Status: She is alert. Mental status is at baseline.   Psychiatric:         Behavior: Behavior normal.       Current Inpatient Medications:  Current Facility-Administered Medications:     0.9%  NaCl infusion, , Intravenous, PRN, Vishnu Stevenson MD, Stopped at 06/08/24 0133    digoxin injection 125 mcg, 125 mcg, Intravenous, Daily, Anne Oneil FNP, 125 mcg at 06/10/24 0811    diltiaZEM 125 mg in dextrose 5 % 125 mL IVPB (ready to mix) (titrating), 0-15 mg/hr, Intravenous, Continuous, Jennifer Lynch NP, Stopped at 06/09/24 1248    docusate 50 mg/5 mL liquid 50 mg, 50 mg, Oral, Daily, Marga Smith MD, 50 mg at 06/09/24 0817    enoxaparin injection 80 mg, 1 mg/kg, Subcutaneous, Q12H (prophylaxis, 0900/2100), Anne Oneil FNP, 80 mg at 06/10/24 0811    furosemide injection 40 mg, 40 mg, Intravenous, Q12H, Naa Chin MD    gabapentin capsule 600 mg, 600 mg, Oral, BID, Marga Smith MD, 600 mg at 06/09/24 0817    hydrALAZINE injection 10 mg, 10 mg, Intravenous, Q6H PRN, Eleno Abbasi MD, 10 mg at 06/06/24 1631    HYDROmorphone (PF) injection 0.2 mg, 0.2 mg, Intravenous, Q6H PRN, Ky Linn DO, 0.2 mg at 06/05/24 0029    labetaloL injection 10 mg, 10 mg, Intravenous, Q4H PRN, Baldo Finney DO, 10 mg at 06/06/24 1719    lactated ringers infusion, , Intravenous, Continuous, Shanna Montiel MD, Last Rate: 50 mL/hr at 06/10/24 0645, Rate Verify at 06/10/24 0645    levalbuterol nebulizer solution 1.25 mg, 1.25 mg, Nebulization, Q6H PRN, Shanna Montiel MD, 1.25 mg at 06/09/24 1200    magnesium  sulfate 2g in water 50mL IVPB (premix), 2 g, Intravenous, Once, Gregory Gandhi MD    metoprolol injection 5 mg, 5 mg, Intravenous, Q4H PRN, Justyn Landaverde MD, 5 mg at 06/10/24 0850    piperacillin-tazobactam (ZOSYN) 4.5 g in dextrose 5 % in water (D5W) 100 mL IVPB (MB+), 4.5 g, Intravenous, Q8H, Gregory Gandhi MD, Last Rate: 100 mL/hr at 06/10/24 0813, 4.5 g at 06/10/24 0813    sertraline tablet 100 mg, 100 mg, Oral, QHS, Marga Smith MD, 100 mg at 06/08/24 2000    traMADoL tablet 50 mg, 50 mg, Oral, TID, Vishnu Stevenson MD, 50 mg at 06/09/24 0817  VTE Risk Mitigation (From admission, onward)           Ordered     enoxaparin injection 80 mg  Every 12 hours         06/09/24 1102     IP VTE HIGH RISK PATIENT  Once         06/01/24 2247                  Assessment:   Atrial Fibrillation with RVR (New Onset)- Now AF RVR    - [id number]: 7    - History of PSVT    - On FD Lovenox for Stroke Risk Reduction  Acute on Chronic Diastolic HF    - EF 55-60% with Grade I Diastolic Dysfunction  Aspiration Pneumonia    - Cleared Swallowing Study  Acute Hypoxemic Respiratory Failure Requiring Oxygenation - on High Flow NC/Vapotherm  Valvular Heart Disease    - AS: Status Post TAVR  23  mm S3 ultra valve, nominal prep,  Right TF approach (2.27.23)  CAD/CABG    - LIMA to LAD (Patent) with 50% Stenosis Native LCX  Hypertension (Above Goal)  Hyperlipidemia  Chronic Left Bundle Branch Block ([place] TAVR)  MELCHOR/CEA  History of CVA  Hypokalemia  DNR Status   No known history of GI Bleed     Plan:   Start Cardizem  Mg PO Daily   Discontinue Lovenox. Start Eliquis 5 Mg PO BID this Evening  Continue Digoxin 0.125 Mg PO Daily  Continue Diuretics as Clinically Indicated; Ensure Accurate I/O  K+ Goal 4, Mag Goal 2; Replete K+ & Mag Today (Done)  Lopressor IV PRN HR > 120  Mobilize as Able with PT  Supportive Care as per ICU Team     STEF Iqbal  Cardiology  [facility] - 7th Floor ICU  06/10/2024

## 2024-06-10 NOTE — PROCEDURES
[facility]  Speech Language Pathology Department  Modified Barium Swallow (MBS) Study    Patient Name:  Alanna Moya   MRN:  [id number]    Recommendations     General recommendations:  SLP follow up x1 for diet tolerance  Repeat MBS study: Not indicated  Diet texture/consistency recommendations:  Regular solids (IDDSI 7) and thin liquids (IDDSI 0)  Medications: crushed in puree  Swallow strategies/precautions: small bites/sips, slow rate, upright for PO intake, assist with feeding as needed, and monitor respiratory status during meals (take breaks if O2 sats decrease or WOB/RR increases)  General precautions: Standard, fall    History     Alanna Moya is a 78 y.o. female with PMH of AS s/p AVR, CAD, mi, HTN, chronic back pain, HLD, CVA, CHF, GERD, diverticulosis who was being downgraded from the ICU to Hospital Medicine after she was initially admitted for acute hypoxemic respiratory failure requiring BiPAP for adequate saturations. Patient observed desatting with PO intake and ST was consulted for bedside swallow evaluation. Now tolerating HFNC as of 6/10.     Past Medical History:   Diagnosis Date    Abdominal pain, unspecified site     Aortic stenosis     Coronary atherosclerosis of unspecified type of vessel, native or graft     Esophageal reflux     Fatigue     History of glaucoma     History of heart attack     Hypertension     Lumbosacral spondylosis without myelopathy     Other and unspecified hyperlipidemia     Rectocele     SOB (shortness of breath) on exertion     Unspecified essential hypertension     Weakness      Past Surgical History:   Procedure Laterality Date    CHOLECYSTECTOMY      CORONARY ARTERY BYPASS GRAFT      GLAUCOMA SURGERY Bilateral     HYSTERECTOMY      TRANSCATHETER AORTIC VALVE REPLACEMENT (TAVR) N/A 2/27/2023    Procedure: REPLACEMENT, AORTIC VALVE, TRANSCATHETER (TAVR);  Surgeon: Anselmo Arango MD;  Location: [facility] CATH LAB;  Service:  Cardiology;  Laterality: N/A;  TAVR W/ ANEST.     A MBS Study was completed to assess the efficiency of her swallow function, rule out aspiration and make recommendations regarding safe dietary consistencies, effective compensatory strategies, and safe eating environment.     Home diet texture/consistency: Regular and thin liquids  Current Method of Nutrition: NPO    Imaging   Results for orders placed during the hospital encounter of 06/01/24    X-Ray Chest 1 View    Narrative  EXAMINATION:  XR CHEST 1 VIEW    CLINICAL HISTORY:  increased oxygen requirment;    TECHNIQUE:  One    COMPARISON:  June 6, 2025.    FINDINGS:  Cardiopericardial silhouette is within normal limits.  Sternotomy changes.  Interval progression of lungs congestive changes.  Right lower lung lobe opacifications are denser and can not exclude the possibility of associated infiltrates related to infection.  There are also increased size of pleural effusions.  No pneumothorax    Impression  As above.      Electronically signed by: Juan Luis Hendrix  Date:    06/09/2024  Time:    07:58    No results found for this or any previous visit.    No results found for this or any previous visit.    Subjective     Patient awake, alert, and cooperative.    Spiritual/Cultural/Orthodox Beliefs/Practices that affect care: no  Pain/Comfort: Pain Rating 1: 0/10    Respiratory Status: Vapotherm 20L 50% FiO2    Fluoroscopic Findings     Setup  Upright in recliner  Able to self feed  Adequate head control    Visualization  Lateral view    Oral Phase:   Adequate lip closure  Adequate bolus formation  Adequate mastication  Adequate bolus cohesion  Adequate anterior-posterior transport    Pharyngeal Phase:   Adequate base of tongue retraction  Reduced epiglottic deflection  Reduced hyolaryngeal excursion  Adequate airway protection  Laryngeal penetration with thin and mildly thick liquids  Functional UES opening    Consistency Fed by Laryngeal Penetration Aspiration Residue    Thin liquid by spoon SLP During the swallow  Cleared spontaneously None None   Thin liquid by cup Self During the swallow  Cleared spontaneously None Trace   Thin liquid by straw Self During the swallow  Cleared spontaneously None Trace   Mildly thick liquid by straw Self During the swallow  Cleared spontaneously None Trace   Puree SLP None None None   Chewable solid SLP None None None     Cervical Esophageal Phase:   UES appeared to accommodate all bolus types without stasis or retrograde movement visualized    Assessment     Oropharyngeal swallow WFL with no aspiration visualized during this study    Patient appears to be at moderate risk for aspiration related pneumonia when considering complicated medical status, reduced mobility, and increased respiratory requirements . RN and patient were educated on slow rate of intake and swallow precautions in order to reduce patient's risk of aspiration. Good understanding verbalized.    Goals     Multidisciplinary Problems       SLP Goals       Not on file                  Education     Patient provided with verbal education and video regarding MBS results.  Understanding was verbalized.    Plan     SLP Follow-Up:  Yes    Patient to be seen:  5 x/week   Plan of Care expires:  06/17/24  Plan of Care reviewed with:  patient     Time Tracking     SLP Treatment Date:   06/10/24  Speech Start Time:  1410  Speech Stop Time:  1440     Speech Total Time (min):  30 min    Billable minutes:   Motion Fluoroscopic Evaluation, Video Recording, 30 minutes     06/10/2024

## 2024-06-10 NOTE — PLAN OF CARE
Problem: Adult Inpatient Plan of Care  Goal: Plan of Care Review  Outcome: Progressing  Goal: Patient-Specific Goal (Individualized)  Outcome: Progressing  Goal: Absence of Hospital-Acquired Illness or Injury  Outcome: Progressing  Goal: Optimal Comfort and Wellbeing  Outcome: Progressing  Goal: Readiness for Transition of Care  Outcome: Progressing     Problem: Infection  Goal: Absence of Infection Signs and Symptoms  Outcome: Progressing     Problem: Skin Injury Risk Increased  Goal: Skin Health and Integrity  Outcome: Progressing     Problem: Fall Injury Risk  Goal: Absence of Fall and Fall-Related Injury  Outcome: Progressing     Problem: Coping Ineffective  Goal: Effective Coping  Outcome: Progressing

## 2024-06-10 NOTE — PROGRESS NOTES
[facility] Medicine Progress Note        Chief Complaint: Inpatient Follow-up for multifocal pneumonia    HPI:   78-year-old lady with PMH of AS s/p AVR, CAD, mi, HTN, chronic back pain, HLD, CVA, CHF, GERD, diverticulosis who was being downgraded from the ICU to Hospital Medicine after she was initially admitted for acute hypoxemic respiratory failure requiring BiPAP for adequate saturations. She was transferred from an outside facility to [facility] with complaints of worsening shortness of breath for the last few weeks with initial CXR at outside facility showing bilateral airspace opacities and increased right-sided pleural effusion. Her last echocardiogram from 05/16 showed EF 55-60%. She was placed on antibiotics, steroids as well as IV Lasix for diuresis for pulmonary congestion. She was able to be weaned down from BiPAP to Vapotherm and eventually Oxymizer 8 L. she was also noted to have new onset atrial fibrillation for which he was started on IV Cardizem which has since been transitioned to p.o. Cardizem. She is on full-dose Lovenox due to CHADS-VASc of 7. She reported feeling significantly better and endorsed improvement in her shortness of breath since admission. Denied having any fever, chills, chest pain, cough, sputum production, abdominal pain, nausea, vomiting, headache, numbness, weakness, dizziness/lightheadedness or loss of consciousness. Blood cultures from admission are negative. PT/OT on board; in recommending moderate intensity therapy. Cardiology on board.   Patient had repeat chest x-ray done on June 9th and that showed denser consolidation patient continues to be on Zosyn now has been put on BiPAP with FiO2 of 90%  Interval Hx:     Patient seen and examined this morning on BiPAP FiO2 of 90% slightly tachypneic still remains NPO patient is really angry that she can not eat did talk to her that we have consulted speech.  She told me she really wants to go  home  Case was discussed with patient's nurse and  on the floor.    Objective/physical exam:  General: In no acute distress, afebrile  Chest:  On Vapotherm, minimal expiratory wheeze   Heart: RRR, +S1, S2, no appreciable murmur  Abdomen: Soft, nontender, BS +  MSK: Warm, no lower extremity edema, no clubbing or cyanosis  Neurologic: Alert , generalized weakness     VITAL SIGNS: 24 HRS MIN & MAX LAST   Temp  Min: 98.2 °F (36.8 °C)  Max: 98.7 °F (37.1 °C) 98.2 °F (36.8 °C)   BP  Min: 108/78  Max: 153/76 (!) 137/101   Pulse  Min: 75  Max: 142  88   Resp  Min: 12  Max: 28 (!) 22   SpO2  Min: 91 %  Max: 99 % 96 %     I have reviewed the following labs:  Recent Labs   Lab 06/08/24 0419 06/09/24  0301 06/10/24  0315   WBC 14.27* 16.21* 14.87*   RBC 3.55* 3.55* 3.42*   HGB 10.7* 10.7* 10.2*   HCT 32.7* 32.7* 31.5*   MCV 92.1 92.1 92.1   MCH 30.1 30.1 29.8   MCHC 32.7* 32.7* 32.4*   RDW 12.9 12.9 12.8    316 280   MPV 11.2* 11.0* 11.4*     Recent Labs   Lab 06/07/24 2054 06/08/24 0419 06/09/24  0301 06/10/24  0315   * 139 136 139   K 3.4* 3.7 3.7 3.0*   CL 97* 99 97* 97*   CO2 26 28 25 27   BUN 37.9* 35.2* 30.0* 24.4*   CREATININE 0.89 0.88 0.90 0.87   CALCIUM 9.0 8.9 8.6 8.8   MG 2.30 2.10 1.80  --    ALBUMIN  --  2.7* 2.7* 2.6*   ALKPHOS  --  69 76 72   ALT  --  17 15 17   AST  --  18 18 20   BILITOT  --  0.6 0.5 0.8     Microbiology Results (last 7 days)       Procedure Component Value Units Date/Time    Blood Culture [[id number]]  (Normal) Collected: 06/02/24 0115    Order Status: Completed Specimen: Blood, Venous Updated: 06/07/24 0300     Blood Culture No Growth at 5 days    Blood Culture [[id number]]  (Normal) Collected: 06/02/24 0115    Order Status: Completed Specimen: Blood, Venous Updated: 06/07/24 0300     Blood Culture No Growth at 5 days             See below for Radiology    Scheduled Med:   digoxin  125 mcg Intravenous Daily    docusate  50 mg Oral Daily    enoxparin  1 mg/kg  Subcutaneous Q12H (prophylaxis, 0900/2100)    furosemide (LASIX) injection  20 mg Intravenous Q12H    gabapentin  600 mg Oral BID    piperacillin-tazobactam (Zosyn) IV (PEDS and ADULTS) (extended infusion is not appropriate)  4.5 g Intravenous Q8H    sertraline  100 mg Oral QHS    traMADoL  50 mg Oral TID      Continuous Infusions:   dilTIAZem  0-15 mg/hr Intravenous Continuous   Stopped at 06/09/24 1248    lactated ringers   Intravenous Continuous 50 mL/hr at 06/09/24 1833 Rate Verify at 06/09/24 1833      PRN Meds:    Current Facility-Administered Medications:     sodium chloride 0.9%, , Intravenous, PRN    hydrALAZINE, 10 mg, Intravenous, Q6H PRN    HYDROmorphone, 0.2 mg, Intravenous, Q6H PRN    labetalol, 10 mg, Intravenous, Q4H PRN    levalbuterol, 1.25 mg, Nebulization, Q6H PRN    metoprolol, 5 mg, Intravenous, Q4H PRN     Assessment/Plan:  Multifocal pneumonia greater in the right lower lobe  HFpEF with exacerbation and bilateral pleural effusions getting worse as per x-ray on June 9th  Acute respiratory failure with hypoxia requiring high-flow O2   Atrial Fibrillation with RVR (New Onset)   JUN- POA  DNR     Hx: CAD-status post CABG,Valvular heart disease-status post AVR       Chest x-ray done on June 9 showed worsening in pleural effusion we will go up on the dose of Lasix to 40 IV q.12 strict input and output  Continue with Zosyn repeat MRSA and respiratory PCR panel   Continue with BiPAP will consult pulmonology as patient is on 90% FiO2   Patient is a DNR I will also go ahead and consult palliative Care as she told me she really wants to eat and wants to go home  Speech following  Continue with BiPAP continue with p.r.n. Xopenex   Still tachycardic and patient is NPO cardiology has put the order for IV digoxin and patient is on full dose of Lovenox for thromboembolic prophylaxis  Continue with speech PT and OT  PT/OT  CM working on LTAC placement   Review labs from this morning potassium is low we will  give 40 of IV potassium   White cell count is trending down      Critical care note:  Critical care diagnosis:  Acute hypoxemic respiratory failure secondary to right-sided pneumonia and bilateral pleural effusions on BiPAP, IV Lasix and IV antibiotics  Critical care interventions: Hands-on evaluation, review of labs/radiographs/records and discussion with patient and family if present  Critical care time spent: 35 minutes   VTE prophylaxis:  Lovenox  Patient condition:  Stable   Anticipated discharge and Disposition:   LTAC placement      All diagnosis and differential diagnosis have been reviewed; assessment and plan has been documented; I have personally reviewed the labs and test results that are presently available; I have reviewed the patients medication list; I have reviewed the consulting providers response and recommendations. I have reviewed or attempted to review medical records based upon their availability    All of the patient's questions have been  addressed and answered. Patient's is agreeable to the above stated plan. I will continue to monitor closely and make adjustments to medical management as needed.  _____________________________________________________________________    Nutrition Status:    Radiology:  I have personally reviewed the following imaging and agree with the radiologist.     X-Ray Chest 1 View  Narrative: EXAMINATION:  XR CHEST 1 VIEW    CLINICAL HISTORY:  increased oxygen requirment;    TECHNIQUE:  One    COMPARISON:  June 6, 2025.    FINDINGS:  Cardiopericardial silhouette is within normal limits.  Sternotomy changes.  Interval progression of lungs congestive changes.  Right lower lung lobe opacifications are denser and can not exclude the possibility of associated infiltrates related to infection.  There are also increased size of pleural effusions.  No pneumothorax  Impression: As above.    Electronically signed by: Juan Luis  Simeon  Date:    06/09/2024  Time:    07:58      Naa Chin MD  Department of Hospital Medicine   [facility]   06/10/2024

## 2024-06-11 LAB
ABS NEUT (OLG): 12.96 X10(3)/MCL (ref 2.1–9.2)
ALBUMIN SERPL-MCNC: 2.6 G/DL (ref 3.4–4.8)
ALBUMIN/GLOB SERPL: 0.8 RATIO (ref 1.1–2)
ALP SERPL-CCNC: 81 UNIT/L (ref 40–150)
ALT SERPL-CCNC: 13 UNIT/L (ref 0–55)
ANION GAP SERPL CALC-SCNC: 12 MEQ/L
ANISOCYTOSIS BLD QL SMEAR: SLIGHT
AST SERPL-CCNC: 17 UNIT/L (ref 5–34)
BILIRUB SERPL-MCNC: 0.6 MG/DL
BUN SERPL-MCNC: 19.5 MG/DL (ref 9.8–20.1)
CALCIUM SERPL-MCNC: 8.8 MG/DL (ref 8.4–10.2)
CHLORIDE SERPL-SCNC: 97 MMOL/L (ref 98–107)
CO2 SERPL-SCNC: 29 MMOL/L (ref 23–31)
CREAT SERPL-MCNC: 0.85 MG/DL (ref 0.55–1.02)
CREAT/UREA NIT SERPL: 23
ERYTHROCYTE [DISTWIDTH] IN BLOOD BY AUTOMATED COUNT: 13 % (ref 11.5–17)
GFR SERPLBLD CREATININE-BSD FMLA CKD-EPI: >60 ML/MIN/1.73/M2
GLOBULIN SER-MCNC: 3.4 GM/DL (ref 2.4–3.5)
GLUCOSE SERPL-MCNC: 152 MG/DL (ref 82–115)
HCT VFR BLD AUTO: 31.1 % (ref 37–47)
HGB BLD-MCNC: 10.2 G/DL (ref 12–16)
INSTRUMENT WBC (OLG): 15.43 X10(3)/MCL
LYMPHOCYTES NFR BLD MANUAL: 0.93 X10(3)/MCL
LYMPHOCYTES NFR BLD MANUAL: 6 %
MACROCYTES BLD QL SMEAR: SLIGHT
MAGNESIUM SERPL-MCNC: 1.7 MG/DL (ref 1.6–2.6)
MCH RBC QN AUTO: 30.2 PG (ref 27–31)
MCHC RBC AUTO-ENTMCNC: 32.8 G/DL (ref 33–36)
MCV RBC AUTO: 92 FL (ref 80–94)
MONOCYTES NFR BLD MANUAL: 1.54 X10(3)/MCL (ref 0.1–1.3)
MONOCYTES NFR BLD MANUAL: 10 %
NEUTROPHILS NFR BLD MANUAL: 84 %
NRBC BLD AUTO-RTO: 0 %
PHOSPHATE SERPL-MCNC: 2 MG/DL (ref 2.3–4.7)
PLATELET # BLD AUTO: 247 X10(3)/MCL (ref 130–400)
PLATELET # BLD EST: NORMAL 10*3/UL
PMV BLD AUTO: 11.6 FL (ref 7.4–10.4)
POLYCHROMASIA BLD QL SMEAR: SLIGHT
POTASSIUM SERPL-SCNC: 3.4 MMOL/L (ref 3.5–5.1)
PROT SERPL-MCNC: 6 GM/DL (ref 5.8–7.6)
RBC # BLD AUTO: 3.38 X10(6)/MCL (ref 4.2–5.4)
RBC MORPH BLD: ABNORMAL
SODIUM SERPL-SCNC: 138 MMOL/L (ref 136–145)
WBC # SPEC AUTO: 15.43 X10(3)/MCL (ref 4.5–11.5)

## 2024-06-11 PROCEDURE — 99900031 HC PATIENT EDUCATION (STAT)

## 2024-06-11 PROCEDURE — 25000003 PHARM REV CODE 250: Performed by: NURSE PRACTITIONER

## 2024-06-11 PROCEDURE — 94799 UNLISTED PULMONARY SVC/PX: CPT

## 2024-06-11 PROCEDURE — 83735 ASSAY OF MAGNESIUM: CPT | Performed by: INTERNAL MEDICINE

## 2024-06-11 PROCEDURE — 25000003 PHARM REV CODE 250: Performed by: HOSPITALIST

## 2024-06-11 PROCEDURE — 63600175 PHARM REV CODE 636 W HCPCS

## 2024-06-11 PROCEDURE — 63600175 PHARM REV CODE 636 W HCPCS: Performed by: INTERNAL MEDICINE

## 2024-06-11 PROCEDURE — 27100171 HC OXYGEN HIGH FLOW UP TO 24 HOURS

## 2024-06-11 PROCEDURE — 92526 ORAL FUNCTION THERAPY: CPT

## 2024-06-11 PROCEDURE — 63600175 PHARM REV CODE 636 W HCPCS: Performed by: STUDENT IN AN ORGANIZED HEALTH CARE EDUCATION/TRAINING PROGRAM

## 2024-06-11 PROCEDURE — 36415 COLL VENOUS BLD VENIPUNCTURE: CPT

## 2024-06-11 PROCEDURE — 63600175 PHARM REV CODE 636 W HCPCS: Performed by: NURSE PRACTITIONER

## 2024-06-11 PROCEDURE — 25000003 PHARM REV CODE 250

## 2024-06-11 PROCEDURE — 94660 CPAP INITIATION&MGMT: CPT

## 2024-06-11 PROCEDURE — 84100 ASSAY OF PHOSPHORUS: CPT | Performed by: INTERNAL MEDICINE

## 2024-06-11 PROCEDURE — 94760 N-INVAS EAR/PLS OXIMETRY 1: CPT

## 2024-06-11 PROCEDURE — 97530 THERAPEUTIC ACTIVITIES: CPT | Mod: CO

## 2024-06-11 PROCEDURE — 25000003 PHARM REV CODE 250: Performed by: INTERNAL MEDICINE

## 2024-06-11 PROCEDURE — 21400001 HC TELEMETRY ROOM

## 2024-06-11 PROCEDURE — 80053 COMPREHEN METABOLIC PANEL: CPT

## 2024-06-11 PROCEDURE — 99900035 HC TECH TIME PER 15 MIN (STAT)

## 2024-06-11 PROCEDURE — 97530 THERAPEUTIC ACTIVITIES: CPT

## 2024-06-11 PROCEDURE — 85027 COMPLETE CBC AUTOMATED: CPT

## 2024-06-11 RX ORDER — ONDANSETRON HYDROCHLORIDE 2 MG/ML
4 INJECTION, SOLUTION INTRAVENOUS EVERY 4 HOURS PRN
Status: DISCONTINUED | OUTPATIENT
Start: 2024-06-11 | End: 2024-06-23 | Stop reason: HOSPADM

## 2024-06-11 RX ORDER — POTASSIUM CHLORIDE 20 MEQ/1
40 TABLET, EXTENDED RELEASE ORAL ONCE
Status: COMPLETED | OUTPATIENT
Start: 2024-06-11 | End: 2024-06-11

## 2024-06-11 RX ORDER — MORPHINE SULFATE 4 MG/ML
2 INJECTION, SOLUTION INTRAMUSCULAR; INTRAVENOUS EVERY 4 HOURS PRN
Status: DISCONTINUED | OUTPATIENT
Start: 2024-06-11 | End: 2024-06-13

## 2024-06-11 RX ORDER — TALC
6 POWDER (GRAM) TOPICAL NIGHTLY PRN
Status: DISCONTINUED | OUTPATIENT
Start: 2024-06-11 | End: 2024-06-23 | Stop reason: HOSPADM

## 2024-06-11 RX ORDER — MAGNESIUM SULFATE HEPTAHYDRATE 40 MG/ML
2 INJECTION, SOLUTION INTRAVENOUS ONCE
Status: COMPLETED | OUTPATIENT
Start: 2024-06-11 | End: 2024-06-11

## 2024-06-11 RX ADMIN — DILTIAZEM HYDROCHLORIDE 240 MG: 120 CAPSULE, COATED, EXTENDED RELEASE ORAL at 08:06

## 2024-06-11 RX ADMIN — AMIODARONE HYDROCHLORIDE 1 MG/MIN: 1.8 INJECTION, SOLUTION INTRAVENOUS at 05:06

## 2024-06-11 RX ADMIN — LABETALOL HYDROCHLORIDE 10 MG: 5 INJECTION, SOLUTION INTRAVENOUS at 02:06

## 2024-06-11 RX ADMIN — APIXABAN 5 MG: 5 TABLET, FILM COATED ORAL at 08:06

## 2024-06-11 RX ADMIN — ONDANSETRON 4 MG: 2 INJECTION INTRAMUSCULAR; INTRAVENOUS at 04:06

## 2024-06-11 RX ADMIN — TRAMADOL HYDROCHLORIDE 50 MG: 50 TABLET, COATED ORAL at 03:06

## 2024-06-11 RX ADMIN — POTASSIUM CHLORIDE 40 MEQ: 1500 TABLET, EXTENDED RELEASE ORAL at 09:06

## 2024-06-11 RX ADMIN — APIXABAN 5 MG: 5 TABLET, FILM COATED ORAL at 09:06

## 2024-06-11 RX ADMIN — SERTRALINE HYDROCHLORIDE 100 MG: 50 TABLET ORAL at 09:06

## 2024-06-11 RX ADMIN — Medication 6 MG: at 09:06

## 2024-06-11 RX ADMIN — TRAMADOL HYDROCHLORIDE 50 MG: 50 TABLET, COATED ORAL at 08:06

## 2024-06-11 RX ADMIN — GABAPENTIN 600 MG: 300 CAPSULE ORAL at 08:06

## 2024-06-11 RX ADMIN — Medication 1 EACH: at 09:06

## 2024-06-11 RX ADMIN — METOPROLOL TARTRATE 5 MG: 1 INJECTION, SOLUTION INTRAVENOUS at 07:06

## 2024-06-11 RX ADMIN — FUROSEMIDE 40 MG: 10 INJECTION, SOLUTION INTRAMUSCULAR; INTRAVENOUS at 08:06

## 2024-06-11 RX ADMIN — AMIODARONE HYDROCHLORIDE 1 MG/MIN: 1.8 INJECTION, SOLUTION INTRAVENOUS at 11:06

## 2024-06-11 RX ADMIN — DIGOXIN 0.12 MG: 125 TABLET ORAL at 08:06

## 2024-06-11 RX ADMIN — PIPERACILLIN SODIUM AND TAZOBACTAM SODIUM 4.5 G: 4; .5 INJECTION, POWDER, LYOPHILIZED, FOR SOLUTION INTRAVENOUS at 08:06

## 2024-06-11 RX ADMIN — GABAPENTIN 600 MG: 300 CAPSULE ORAL at 09:06

## 2024-06-11 RX ADMIN — PIPERACILLIN SODIUM AND TAZOBACTAM SODIUM 4.5 G: 4; .5 INJECTION, POWDER, LYOPHILIZED, FOR SOLUTION INTRAVENOUS at 12:06

## 2024-06-11 RX ADMIN — METOPROLOL TARTRATE 5 MG: 1 INJECTION, SOLUTION INTRAVENOUS at 04:06

## 2024-06-11 RX ADMIN — TRAMADOL HYDROCHLORIDE 50 MG: 50 TABLET, COATED ORAL at 09:06

## 2024-06-11 RX ADMIN — FUROSEMIDE 40 MG: 10 INJECTION, SOLUTION INTRAMUSCULAR; INTRAVENOUS at 09:06

## 2024-06-11 RX ADMIN — MAGNESIUM SULFATE HEPTAHYDRATE 2 G: 40 INJECTION, SOLUTION INTRAVENOUS at 09:06

## 2024-06-11 RX ADMIN — HYDROMORPHONE HYDROCHLORIDE 0.2 MG: 2 INJECTION, SOLUTION INTRAMUSCULAR; INTRAVENOUS; SUBCUTANEOUS at 12:06

## 2024-06-11 RX ADMIN — METOPROLOL TARTRATE 5 MG: 1 INJECTION, SOLUTION INTRAVENOUS at 11:06

## 2024-06-11 RX ADMIN — LIDOCAINE 2 PATCH: 700 PATCH TOPICAL at 03:06

## 2024-06-11 RX ADMIN — AMIODARONE HYDROCHLORIDE 150 MG: 1.5 INJECTION, SOLUTION INTRAVENOUS at 10:06

## 2024-06-11 NOTE — NURSING
Nurses Note -- 4 Eyes      6/11/2024   11:31 AM      Skin assessed during: Daily Assessment      [] No Altered Skin Integrity Present    [x]Prevention Measures Documented      [x] Yes- Altered Skin Integrity Present or Discovered   [] LDA Added if Not in Epic (Describe Wound)   [] New Altered Skin Integrity was Present on Admit and Documented in LDA   [] Wound Image Taken    Wound Care Consulted? No    Attending Nurse:  Ronnell Moya RN     Second RN/Staff Member:  Alie Gifford RN

## 2024-06-11 NOTE — PLAN OF CARE
Insurance has denied authorization for acceptance to LTAC. Notified hospitalist, Dr. Chin and nurse. Notified patient's daughter who was at bedside and patient.

## 2024-06-11 NOTE — PROGRESS NOTES
"Patient Name: Alanna Moya   MRN: [id number]   Admission Date: 2024   Hospital Length of Stay: 10   Attending Provider: Naa Chin MD   Consulting Provider: STEF Haynes  Reason for Consult: Goals of Care  Primary Care Physician:  Cornel Haddad MD     Principal Problem: <principal problem not specified>     Patient information was obtained from patient and ER records.     Final diagnoses:  [J96.90] Respiratory failure      Assessment/Plan:     I reviewed the patient and family's understanding of the seriousness of the illness and its expected prognosis. We discussed the patient's goals of care and treatment preferences. I identified the surrogate decision maker or health care POA. I answered all questions and we formulated a plan including recommendations for symptom management and how to best achieve goals of care.    She is sitting up in chair on Vapotherm, appears to be uncomfortable. Complains of generalized pain/aches from the "top of her head to tips of her toes" rated at 10/10 today with no improvement after tramadol this morning.  She does endorse improvement in sciatica pain with lidocaine patches.  Discussed that we will add p.r.n. medication for pain.  She verbalizes understanding.    She endorses some nausea intermittently, without vomiting.  Her last bowel movement was this morning.    Case management was able to locate healthcare power of  from [facility], which designates son (Yonis) as surrogate decision maker.  I confirmed the physical document patient.  She reports her secondary healthcare agent currently listed (Lindsey) is now .  Encouraged her to consider alternate and offered to complete new healthcare power of  document during hospitalization if she wishes.  She verbalizes understanding and states she will consider this.    Discussed her current clinical condition, largely unchanged since yesterday.  Goals remain to continue current " treatment.  Offered support.  Encouraged to call with any questions or concerns.  Palliative Medicine will continue to follow.    Advance Care Planning     Date: 06/11/2024    [facility]  I engaged the patient in a voluntary conversation about advance care planning and we specifically addressed what the goals of care would be moving forward, in light of the patient's change in clinical status, specifically current condition.  We did specifically address the patient's likely prognosis, which is good .  We explored the patient's values and preferences for future care.  The patient endorses that what is most important right now is to focus on improvement in condition but with limits to invasive therapies    Accordingly, we have decided that the best plan to meet the patient's goals includes continuing with treatment             Recommendations:     Morphine 2 mg q.4 hours p.r.n. for severe pain  Continue Zofran p.r.n. for nausea      Interval History:     No acute events overnight.  She remains on Vapotherm this morning, tachycardic, with complaints of diarrhea.  Continues to diurese.  Initiated on amiodarone.  Antibiotics have been discontinued with a low suspicion for infection.    Active Ambulatory Problems     Diagnosis Date Noted    Unresponsive episode 02/08/2019    Aortic stenosis 02/28/2023     Resolved Ambulatory Problems     Diagnosis Date Noted    No Resolved Ambulatory Problems     Past Medical History:   Diagnosis Date    Abdominal pain, unspecified site     Coronary atherosclerosis of unspecified type of vessel, native or graft     Esophageal reflux     Fatigue     History of glaucoma     History of heart attack     Hypertension     Lumbosacral spondylosis without myelopathy     Other and unspecified hyperlipidemia     Rectocele     SOB (shortness of breath) on exertion     Unspecified essential hypertension     Weakness         Past Surgical History:   Procedure Laterality Date    CHOLECYSTECTOMY      CORONARY  ARTERY BYPASS GRAFT      GLAUCOMA SURGERY Bilateral     HYSTERECTOMY      TRANSCATHETER AORTIC VALVE REPLACEMENT (TAVR) N/A 2/27/2023    Procedure: REPLACEMENT, AORTIC VALVE, TRANSCATHETER (TAVR);  Surgeon: Anselmo Arango MD;  Location: [facility] CATH LAB;  Service: Cardiology;  Laterality: N/A;  TAVR W/ ANEST.        Review of patient's allergies indicates:  No Known Allergies       Current Facility-Administered Medications:     0.9%  NaCl infusion, , Intravenous, PRN, Vishnu Stevenson MD, Stopped at 06/08/24 0133    acetaminophen tablet 650 mg, 650 mg, Oral, Q6H PRN, Naa Chin MD, 650 mg at 06/10/24 1115    amiodarone 360 mg/200 mL (1.8 mg/mL) infusion, 1 mg/min, Intravenous, Continuous, Alyce, Rein T, FNP, Last Rate: 33.3 mL/hr at 06/11/24 1145, 1 mg/min at 06/11/24 1145    amiodarone 360 mg/200 mL (1.8 mg/mL) infusion, 0.5 mg/min, Intravenous, Continuous, Alyce, Rein T, FNP    apixaban tablet 5 mg, 5 mg, Oral, BID, Alyce, Rein T, FNP, 5 mg at 06/11/24 0813    digoxin tablet 0.125 mg, 0.125 mg, Oral, Daily, Alyce, Rein T, FNP, 0.125 mg at 06/11/24 0812    diltiaZEM 24 hr capsule 240 mg, 240 mg, Oral, Daily, Alyce, Rein T, FNP, 240 mg at 06/11/24 0813    docusate 50 mg/5 mL liquid 50 mg, 50 mg, Oral, Daily, Marga Smith MD, 50 mg at 06/09/24 0817    furosemide injection 40 mg, 40 mg, Intravenous, Q12H, Naa Chni MD, 40 mg at 06/11/24 0812    gabapentin capsule 600 mg, 600 mg, Oral, BID, Marga Smith MD, 600 mg at 06/11/24 0813    hydrALAZINE injection 10 mg, 10 mg, Intravenous, Q6H PRN, Eleno Abbasi MD, 10 mg at 06/06/24 1631    HYDROmorphone (PF) injection 0.2 mg, 0.2 mg, Intravenous, Q6H PRN, Ky Linn DO, 0.2 mg at 06/05/24 0029    labetaloL injection 10 mg, 10 mg, Intravenous, Q4H PRN, Baldo Finney DO, 10 mg at 06/11/24 0257    Lactobacillus rhamnosus GG 5 billion cell packet (PEDS) 1 each, 1 packet, Oral, BID, Naa Chin MD, 1 each at 06/11/24 0911    levalbuterol nebulizer  "solution 1.25 mg, 1.25 mg, Nebulization, Q6H PRN, Shanna Montiel MD, 1.25 mg at 06/09/24 1200    LIDOcaine 5 % patch 2 patch, 2 patch, Transdermal, Q24H, Sheri Carson, NP, 2 patch at 06/10/24 1535    melatonin tablet 6 mg, 6 mg, Oral, Nightly PRN, Naa Chin MD    metoprolol injection 5 mg, 5 mg, Intravenous, Q4H PRN, Justyn Landaverde MD, 5 mg at 06/11/24 0728    ondansetron injection 4 mg, 4 mg, Intravenous, Q4H PRN, Julissa Rinaldi, FNP, 4 mg at 06/11/24 0405    sertraline tablet 100 mg, 100 mg, Oral, QHS, Marga Smith MD, 100 mg at 06/10/24 2034    traMADoL tablet 50 mg, 50 mg, Oral, TID, Vishnu Stevenson MD, 50 mg at 06/11/24 0813       Current Facility-Administered Medications:     sodium chloride 0.9%, , Intravenous, PRN    acetaminophen, 650 mg, Oral, Q6H PRN    hydrALAZINE, 10 mg, Intravenous, Q6H PRN    HYDROmorphone, 0.2 mg, Intravenous, Q6H PRN    labetalol, 10 mg, Intravenous, Q4H PRN    levalbuterol, 1.25 mg, Nebulization, Q6H PRN    melatonin, 6 mg, Oral, Nightly PRN    metoprolol, 5 mg, Intravenous, Q4H PRN    ondansetron, 4 mg, Intravenous, Q4H PRN     No family history on file.       Review of Systems   Constitutional:  Positive for fatigue.   HENT: Negative.     Respiratory:  Positive for shortness of breath.    Cardiovascular: Negative.    Gastrointestinal:  Positive for nausea. Negative for vomiting.   Genitourinary: Negative.    Musculoskeletal:  Positive for myalgias.   Skin: Negative.    Neurological: Negative.             Objective:   BP (!) 124/92   Pulse 99   Temp 98.5 °F (36.9 °C)   Resp (!) 25   Ht 5' 2" (1.575 m)   Wt 77.1 kg (170 lb)   SpO2 (!) 91%   BMI 31.09 kg/m²      Physical Exam   Constitutional: She is oriented to person, place, and time. No distress. She appears ill.   HENT:   Head: Normocephalic.   Mouth/Throat: Mucous membranes are moist.   Cardiovascular: Normal heart sounds. An irregular rhythm present. Tachycardia present.   No murmur " heard.Pulmonary:      Effort: Pulmonary effort is normal. No respiratory distress.     Abdominal: Soft. She exhibits no distension.   Musculoskeletal:      Cervical back: Normal range of motion.      Right lower leg: No edema.      Left lower leg: No edema.   Neurological: She is alert and oriented to person, place, and time.   Skin: Skin is warm and dry.   Psychiatric: Her behavior is normal.          Review of Symptoms  Review of Symptoms      Symptom Assessment (ESAS 0-10 Scale)  Pain:  0  Dyspnea:  0  Anxiety:  0  Nausea:  0  Depression:  0  Anorexia:  0  Fatigue:  0  Insomnia:  0  Restlessness:  0  Agitation:  0         Bowel Management Plan (BMP):  Yes      Living Arrangements:  Lives with family    Psychosocial/Cultural:   See Palliative Psychosocial Note: Yes  **Primary  to Follow**  Palliative Care  Consult: No      Advance Care Planning   Advance Directives:     Decision Making:  Patient answered questions  Goals of Care: What is most important right now is to focus on improvement in condition but with limits to invasive therapies. Accordingly, we have decided that the best plan to meet the patient's goals includes continuing with treatment.          PAINAD: NA    Caregiver burden formerly assessed: Yes        > 50% of 30 min of encounter was spent in chart review, face to face discussion of goals of care, symptom assessment, coordination of care and emotional support.    Sheri Carson [facility]-BC  Palliative Medicine  [facility]

## 2024-06-11 NOTE — PLAN OF CARE
[facility] states they do have a Healthcare Power of  on file. I called sonYonis and encouraged him to go there and obtain copy and bring here for our records. He verbalized understanding.

## 2024-06-11 NOTE — NURSING
Nurses Note -- 4 Eyes      6/11/2024   5:39 AM      Skin assessed during: Daily Assessment      [x] No Altered Skin Integrity Present    [x]Prevention Measures Documented      [] Yes- Altered Skin Integrity Present or Discovered   [] LDA Added if Not in Epic (Describe Wound)   [] New Altered Skin Integrity was Present on Admit and Documented in LDA   [] Wound Image Taken    Wound Care Consulted? No    Attending Nurse:  Nata Pederson RN/Staff Member:  william SUMMERS

## 2024-06-11 NOTE — PROGRESS NOTES
[facility] Medicine Progress Note        Chief Complaint: Inpatient Follow-up for multifocal pneumonia    HPI:   78-year-old lady with PMH of AS s/p AVR, CAD, mi, HTN, chronic back pain, HLD, CVA, CHF, GERD, diverticulosis who was being downgraded from the ICU to Hospital Medicine after she was initially admitted for acute hypoxemic respiratory failure requiring BiPAP for adequate saturations. She was transferred from an outside facility to [facility] with complaints of worsening shortness of breath for the last few weeks with initial CXR at outside facility showing bilateral airspace opacities and increased right-sided pleural effusion. Her last echocardiogram from 05/16 showed EF 55-60%. She was placed on antibiotics, steroids as well as IV Lasix for diuresis for pulmonary congestion. She was able to be weaned down from BiPAP to Vapotherm and eventually Oxymizer 8 L. she was also noted to have new onset atrial fibrillation for which he was started on IV Cardizem which has since been transitioned to p.o. Cardizem. She is on full-dose Lovenox due to CHADS-VASc of 7. She reported feeling significantly better and endorsed improvement in her shortness of breath since admission. Denied having any fever, chills, chest pain, cough, sputum production, abdominal pain, nausea, vomiting, headache, numbness, weakness, dizziness/lightheadedness or loss of consciousness. Blood cultures from admission are negative. PT/OT on board; in recommending moderate intensity therapy. Cardiology on board.   Patient had repeat chest x-ray done on June 9th and that showed denser consolidation patient continues to be on Zosyn now has been put on BiPAP with FiO2 of 90%  Interval Hx:   Patient seen and examined this morning on 25 L Vapotherm FiO2 of 90% complains of diarrhea still tachycardic  Case was discussed with patient's nurse and  on the floor.    Objective/physical exam:  General: In no acute  distress, afebrile  Chest:  On Vapotherm, minimal expiratory wheeze   Heart: RRR, +S1, S2, no appreciable murmur  Abdomen: Soft, nontender, BS +  MSK: Warm, no lower extremity edema, no clubbing or cyanosis  Neurologic: Alert , generalized weakness     VITAL SIGNS: 24 HRS MIN & MAX LAST   Temp  Min: 97 °F (36.1 °C)  Max: 98.9 °F (37.2 °C) 97 °F (36.1 °C)   BP  Min: 120/51  Max: 145/92 (!) 124/92   Pulse  Min: 99  Max: 124  99   Resp  Min: 18  Max: 32 (!) 25   SpO2  Min: 89 %  Max: 98 % (!) 91 %     I have reviewed the following labs:  Recent Labs   Lab 06/09/24  0301 06/10/24  0315 06/11/24  0319   WBC 16.21* 14.87* 15.43  15.43*   RBC 3.55* 3.42* 3.38*   HGB 10.7* 10.2* 10.2*   HCT 32.7* 31.5* 31.1*   MCV 92.1 92.1 92.0   MCH 30.1 29.8 30.2   MCHC 32.7* 32.4* 32.8*   RDW 12.9 12.8 13.0    280 247   MPV 11.0* 11.4* 11.6*     Recent Labs   Lab 06/08/24  0419 06/09/24  0301 06/10/24  0315 06/11/24  0319    136 139 138   K 3.7 3.7 3.0* 3.4*   CL 99 97* 97* 97*   CO2 28 25 27 29   BUN 35.2* 30.0* 24.4* 19.5   CREATININE 0.88 0.90 0.87 0.85   CALCIUM 8.9 8.6 8.8 8.8   MG 2.10 1.80  --  1.70   ALBUMIN 2.7* 2.7* 2.6* 2.6*   ALKPHOS 69 76 72 81   ALT 17 15 17 13   AST 18 18 20 17   BILITOT 0.6 0.5 0.8 0.6     Microbiology Results (last 7 days)       Procedure Component Value Units Date/Time    Respiratory Culture [[id number]]     Order Status: Sent Specimen: Respiratory from Sputum, Expectorated     Blood Culture [[id number]]  (Normal) Collected: 06/02/24 0115    Order Status: Completed Specimen: Blood, Venous Updated: 06/07/24 0300     Blood Culture No Growth at 5 days    Blood Culture [[id number]]  (Normal) Collected: 06/02/24 0115    Order Status: Completed Specimen: Blood, Venous Updated: 06/07/24 0300     Blood Culture No Growth at 5 days             See below for Radiology    Scheduled Med:   apixaban  5 mg Oral BID    digoxin  0.125 mg Oral Daily    diltiaZEM  240 mg Oral Daily    docusate  50 mg Oral  Daily    furosemide (LASIX) injection  40 mg Intravenous Q12H    gabapentin  600 mg Oral BID    Lactobacillus rhamnosus GG  1 packet Oral BID    LIDOcaine  2 patch Transdermal Q24H    magnesium sulfate IVPB  2 g Intravenous Once    potassium chloride  40 mEq Oral Once    sertraline  100 mg Oral QHS    traMADoL  50 mg Oral TID      Continuous Infusions:   lactated ringers   Intravenous Continuous 50 mL/hr at 06/10/24 2307 Rate Verify at 06/10/24 2307      PRN Meds:    Current Facility-Administered Medications:     sodium chloride 0.9%, , Intravenous, PRN    acetaminophen, 650 mg, Oral, Q6H PRN    hydrALAZINE, 10 mg, Intravenous, Q6H PRN    HYDROmorphone, 0.2 mg, Intravenous, Q6H PRN    labetalol, 10 mg, Intravenous, Q4H PRN    levalbuterol, 1.25 mg, Nebulization, Q6H PRN    metoprolol, 5 mg, Intravenous, Q4H PRN    ondansetron, 4 mg, Intravenous, Q4H PRN     Assessment/Plan:  HFpEF with exacerbation and bilateral pleural effusions getting worse as per x-ray on June 9th  Acute respiratory failure with hypoxia requiring high-flow O2   Atrial Fibrillation with RVR (New Onset)   JUN- POA  DNR     Hx: CAD-status post CABG,Valvular heart disease-status post AVR     Continue with Lasix 40 IV q.12 strict input and output  Pulmonology was consulted yesterday they think the suspicion for infection is low  Patient has diarrhea possibly secondary to Zosyn we have started on probiotics and some banana flakes wait for pulmonology to say if they want a DC antibiotics  Continue with Vapotherm  Palliative care consulted and following  continue with p.r.n. Xopenex   On Cardizem, digoxin, Eliquis sulfate   Patient will be given 2 g of Mag sulfate and p.o. potassium  Continue with speech PT and OT  PT/OT      Critical care note:  Critical care diagnosis:  Acute hypoxemic respiratory failure secondary tod bilateral pleural effusions on Vapotherm  Critical care interventions: Hands-on evaluation, review of labs/radiographs/records and  discussion with patient and family if present  Critical care time spent: 35 minutes   VTE prophylaxis:  Lovenox  Patient condition:  Stable   Anticipated discharge and Disposition:   LTAC placement      All diagnosis and differential diagnosis have been reviewed; assessment and plan has been documented; I have personally reviewed the labs and test results that are presently available; I have reviewed the patients medication list; I have reviewed the consulting providers response and recommendations. I have reviewed or attempted to review medical records based upon their availability    All of the patient's questions have been  addressed and answered. Patient's is agreeable to the above stated plan. I will continue to monitor closely and make adjustments to medical management as needed.  _____________________________________________________________________    Nutrition Status:    Radiology:  I have personally reviewed the following imaging and agree with the radiologist.     Fl Modified Barium Swallow Speech  See procedure notes from Speech Pathologist.    This procedure was auto-finalized.      Naa Chin MD  Department of Hospital Medicine   [facility]   06/11/2024

## 2024-06-11 NOTE — PT/OT/SLP PROGRESS
Physical Therapy Treatment    Patient Name:  Alanna Moya   MRN:  [id number]    Recommendations:     Discharge therapy intensity: Moderate Intensity Therapy   Discharge Equipment Recommendations: to be determined by next level of care  Barriers to discharge:  medical dx, impaired mobility, decreased endurance     Assessment:     Alanna Moya is a 78 y.o. female admitted with a medical diagnosis of SOB, respiratory failure, pneumonia, pleural effusions.   She presents with the following impairments/functional limitations: weakness, gait instability, decreased upper extremity function, impaired cardiopulmonary response to activity, impaired endurance, impaired balance, decreased lower extremity function, impaired self care skills, impaired functional mobility.  Still with elevated HR. RN ok'd pt to mobilize. Somewhat limited in tolerance to activity today; per pt lack of sleep last night    Rehab Prognosis: Good; patient would benefit from acute skilled PT services to address these deficits and reach maximum level of function.    Recent Surgery: * No surgery found *      Plan:     During this hospitalization, patient would benefit from acute PT services 5 x/week to address the identified rehab impairments via gait training, therapeutic activities, therapeutic exercises and progress toward the following goals:    Plan of Care Expires:  07/04/24    Subjective     Chief Complaint: n/a  Patient/Family Comments/goals: to get stronger   Pain/Comfort:  Pain Rating 1: 0/10      Objective:     Communicated with NSG prior to session.  Patient found HOB elevated with blood pressure cuff, pulse ox (continuous), oxygen, telemetry, peripheral IV, melton catheter upon PT entry to room.     General Precautions: Standard, fall  Orthopedic Precautions: N/A  Braces: N/A  Respiratory Status:  vapotherm 75% fiO2, 30 LPM  Blood Pressure: 108/76  HR:123- 157 bpm  SpO2: 94-87%  Skin Integrity: Visible skin intact      Functional  Mobility:  Bed Mobility:     Rolling Left:  stand by assistance  Rolling Right: stand by assistance  Performed at beginning of session for kirk-care 2/2 +BM  Supine to Sit: moderate assistance  Transfers:     Sit to Stand:  minimum assistance with rolling walker  Bed to Chair: contact guard assistance with  rolling walker  using  Step Transfer      Education:  Patient provided with verbal education  regarding PT role/goals/POC, fall prevention, safety awareness, discharge/DME recommendations, and pressure ulcer prevention.  Understanding was verbalized.     Patient left up in chair with all lines intact, call button in reach, geomat cushion, and RN notified    GOALS:   Multidisciplinary Problems       Physical Therapy Goals          Problem: Physical Therapy    Goal Priority Disciplines Outcome Goal Variances Interventions   Physical Therapy Goal     PT, PT/OT Progressing     Description: Goals to be met by: 24     Patient will increase functional independence with mobility by performin. Supine to sit with Stand-by Assistance  2. Sit to supine with Stand-by Assistance  3. Sit to stand transfer with Stand-by Assistance  4. Bed to chair transfer with Stand-by Assistance using Rolling Walker  5. Gait  x 150 feet with Stand-by Assistance using Rolling Walker.                          Time Tracking:     PT Received On: 24  PT Start Time: 1013     PT Stop Time: 1042  PT Total Time (min): 29 min     Billable Minutes: Therapeutic Activity 2    Treatment Type: Treatment  PT/PTA: PT     Number of PTA visits since last PT visit: 2024

## 2024-06-11 NOTE — PT/OT/SLP PROGRESS
[facility]  Speech Language Pathology Department  Dysphagia Therapy Progress Note    Patient Name:  Alanna Moya   MRN:  [id number]  Admitting Diagnosis: acute hypoxemic respiratory failure     Recommendations     General recommendations:  SLP follow up for diet tolerance x1 given fluctuating respiratory requirements  Diet texture/consistency recommendations:  Regular solids (IDDSI 7) and thin liquids (IDDSI 0)  Medications: crushed in puree  Aspiration precautions: small bites/sips, slow rate, upright for PO intake, assist with feeding as needed, monitor respiratory status during meals    Barriers to safe discharge:  acuity of illness    Subjective     Patient awake, alert, and cooperative.  Spiritual/Cultural/Christian Beliefs/Practices that affect care: no    Pain/Comfort: Pain Rating 1: 0/10  Respiratory Status: Vapotherm at 30L 75% FiO2    Objective     ST following up for dysphagia tx this AM given increased Vapotherm settings from yesterday. Patient currently on regular diet and thin liquids (per ST recommendations s/p MBS 6/10).     O2 saturations remained at 93-95% during PO trials. Patient able to utilize pacing strategies independently. No signs/sx of respiratory distress noted.    Therapeutic PO Trials:  Consistency Amount Fed By Oral Symptoms Pharyngeal Symptoms   Thin liquid by straw ~4 oz Self None None   Puree Multiple trials SLP None None   Chewable solid Multiple trials Self Prolonged bolus formation/mastication None     Assessment     Pt tolerating PO diet without signs/sx of aspiration or respiratory distress. Discussed swallow precautions and aspiration risk given increased O2 requirements. Patient verbalized good understanding    Goals     Multidisciplinary Problems       SLP Goals       Not on file                  Patient Education     Patient provided with verbal education regarding ST recommendations.  Understanding was verbalized.    Plan     Will  continue to follow and tx as appropriate.    SLP Follow-Up:  Yes   Patient to be seen:  5 x/week   Plan of Care expires:  06/17/24  Plan of Care reviewed with:  patient       Time Tracking     SLP Treatment Date:   06/11/24  Speech Start Time:  0845  Speech Stop Time:  0900     Speech Total Time (min):  15 min    Billable minutes:  Treatment of Swallow Dysfunction, 15 minutes       06/11/2024

## 2024-06-11 NOTE — PT/OT/SLP PROGRESS
Occupational Therapy   Treatment    Name: Alanna Moya  MRN: [id number]  Admitting Diagnosis:  SOB       Recommendations:     Recommended therapy intensity at discharge: Moderate Intensity Therapy   Discharge Equipment Recommendations:  to be determined by next level of care  Barriers to discharge:       Assessment:     Alanna Moya is a 78 y.o. female with a medical diagnosis of SOB.  She presents with increased pain and uncomfortable in BS chair upon entry, Pt. Requiring overall Mod-Min A for mobility during session, poor endurance noted. Recommending Mod intensity therapy. Performance deficits affecting function are weakness, impaired endurance, impaired self care skills, impaired functional mobility, impaired balance.     Rehab Prognosis:  Good; patient would benefit from acute skilled OT services to address these deficits and reach maximum level of function.       Plan:     Patient to be seen 4 x/week to address the above listed problems via self-care/home management  Plan of Care Expires: 07/04/24  Plan of Care Reviewed with: patient    Subjective     Pain/Comfort:       Objective:     Communicated with: RN prior to session.  Patient found up in chair with   upon OT entry to room.    General Precautions: Standard, fall    Orthopedic Precautions:N/A  Braces: N/A  Respiratory Status:  Vapotherm 30 LPM, 75fio2  Vital Signs: Blood Pressure: 126/75  Sp02: 104     Occupational Performance:   Grooming task performed in BS chair during oral hygiene task, and washing face.  (Sit to stand- Mod A) From BS chair.  Pt. Requiring Min A during stand step t/f from BS chair to EOB using RW for UE support with balance.   Rolling performed in bed with Min A for doffing brief and toileting activity.       Therapeutic Positioning    OT interventions performed during the course of today's session in an effort to prevent and/or reduce acquired pressure injuries:   Therapeutic positioning was provided at the conclusion  of session to offload all bony prominences for the prevention and/or reduction of pressure injuries      [facility] 6 Click ADL:      Patient Education:  Patient provided with verbal education education regarding fall prevention and safety awareness.  Additional teaching is warranted.      Patient left HOB elevated with all lines intact and call button in reach.    GOALS:   Multidisciplinary Problems       Occupational Therapy Goals          Problem: Occupational Therapy    Goal Priority Disciplines Outcome Interventions   Occupational Therapy Goal     OT, PT/OT Progressing    Description: Goals to be met by: 7/4/24     Patient will increase functional independence with ADLs by performing:    UE Dressing with Supervision.  LE Dressing with Supervision.  Grooming while standing at sink with Supervision.  Toileting from toilet with Supervision for hygiene and clothing management.   Toilet transfer to toilet with Supervision.                         Time Tracking:     OT Date of Treatment: 06/11/24  OT Start Time: 1320  OT Stop Time: 1343  OT Total Time (min): 23 min    Billable Minutes:Therapeutic Activity 2    OT/MARY: MARY     Number of MARY visits since last OT visit: 3    6/11/2024

## 2024-06-11 NOTE — PROGRESS NOTES
[facility] - 7th Floor ICU  Pulmonary Critical Care Note    Patient Name: Alanna Moya  MRN: [id number]  Admission Date: 6/1/2024  Hospital Length of Stay: 10 days  Code Status: DNR  Attending Provider: Naa Chin MD  Primary Care Provider: Cornel Haddad MD     Subjective:     HPI:   78F presented as a transfer from outside hospital with BiPAP requirements, initially admitted to intensive care unit but downgraded to floor following robust response to diuretic therapy.  Has been managed for volume overload and pneumonia on hospital medicine service.  Patient has elected for DNR code status.  Pulmonary medicine consult for worsening hypoxemia. Repeat chest x-ray 06/09/2024 with changes concerning for worsening volume overload.        Responding well to diuretics. Output 2144mLs over the past 24 hours. Currently on vapotherm 30L, 75% FIO2. Complaints of diarrhea otherwise doing well. MBS negative for aspiration.       Past Medical History:   Diagnosis Date    Abdominal pain, unspecified site     Aortic stenosis     Coronary atherosclerosis of unspecified type of vessel, native or graft     Esophageal reflux     Fatigue     History of glaucoma     History of heart attack     Hypertension     Lumbosacral spondylosis without myelopathy     Other and unspecified hyperlipidemia     Rectocele     SOB (shortness of breath) on exertion     Unspecified essential hypertension     Weakness        Past Surgical History:   Procedure Laterality Date    CHOLECYSTECTOMY      CORONARY ARTERY BYPASS GRAFT      GLAUCOMA SURGERY Bilateral     HYSTERECTOMY      TRANSCATHETER AORTIC VALVE REPLACEMENT (TAVR) N/A 2/27/2023    Procedure: REPLACEMENT, AORTIC VALVE, TRANSCATHETER (TAVR);  Surgeon: Anselmo Arango MD;  Location: [facility] CATH LAB;  Service: Cardiology;  Laterality: N/A;  TAVR W/ ANEST.       Social History     Socioeconomic History    Marital status:    Tobacco Use    Smoking status: Never     Smokeless tobacco: Never   Substance and Sexual Activity    Alcohol use: No    Drug use: No    Sexual activity: Never     Birth control/protection: Surgical     Social Determinants of Health     Financial Resource Strain: Low Risk  (5/3/2021)    Received from [facility]    Overall Financial Resource Strain (CARDIA)     Difficulty of Paying Living Expenses: Not hard at all   Food Insecurity: No Food Insecurity (5/3/2021)    Received from [facility]    Hunger Vital Sign     Worried About Running Out of Food in the Last Year: Never true     Ran Out of Food in the Last Year: Never true   Transportation Needs: No Transportation Needs (5/3/2021)    Received from [facility]    PRAPARE - Transportation     Lack of Transportation (Medical): No     Lack of Transportation (Non-Medical): No   Physical Activity: Sufficiently Active (5/3/2021)    Received from [facility]    Exercise Vital Sign     Days of Exercise per Week: 2 days     Minutes of Exercise per Session: 130 min   Stress: Stress Concern Present (5/3/2021)    Received from [facility]    [employer] [place] [employer] - Occupational Stress Questionnaire     Feeling of Stress : Very much       Current Outpatient Medications   Medication Instructions    amLODIPine (NORVASC) 10 mg, Oral, 2 times daily    aspirin 325 MG tablet 1 tablet, Oral, Daily    carvediloL (COREG) 12.5 mg, Oral, 2 times daily with meals    cholecalciferol, vitamin D3, (VITAMIN D3) 50 mcg (2,000 unit) Cap capsule 1 capsule, Oral, Daily    cyanocobalamin, vitamin B-12, 2,000 mcg Tab 1 tablet, Oral, Daily    cyclobenzaprine (FLEXERIL) 10 mg, Oral, 3 times daily    docusate sodium (STOOL SOFTENER ORAL) 2 capsules, Oral, 2 times daily    furosemide (LASIX) 40 mg, Oral, Daily    hydrALAZINE (APRESOLINE) 25 mg, Oral, Every 8 hours    L. acidophilus/Bifid. animalis (DAILY PROBIOTIC ORAL) 1 capsule, Oral, Daily    potassium gluconate 650 mg, Oral, Once    rosuvastatin (CRESTOR) 40 mg, Oral,  Nightly    sertraline (ZOLOFT) 100 mg, Oral, Nightly    traMADoL (ULTRAM) 50 mg, Oral, 3 times daily       Current Inpatient Medications   apixaban  5 mg Oral BID    digoxin  0.125 mg Oral Daily    diltiaZEM  240 mg Oral Daily    docusate  50 mg Oral Daily    furosemide (LASIX) injection  40 mg Intravenous Q12H    gabapentin  600 mg Oral BID    Lactobacillus rhamnosus GG  1 packet Oral BID    LIDOcaine  2 patch Transdermal Q24H    magnesium sulfate IVPB  2 g Intravenous Once    piperacillin-tazobactam (Zosyn) IV (PEDS and ADULTS) (extended infusion is not appropriate)  4.5 g Intravenous Q8H    potassium chloride  40 mEq Oral Once    sertraline  100 mg Oral QHS    traMADoL  50 mg Oral TID     Current Intravenous Infusions   lactated ringers   Intravenous Continuous 50 mL/hr at 06/10/24 2307 Rate Verify at 06/10/24 2307         ROS negative unless documented in the history of present illness.        Objective:       Intake/Output Summary (Last 24 hours) at 6/11/2024 0857  Last data filed at 6/11/2024 0400  Gross per 24 hour   Intake 1156.07 ml   Output 3200 ml   Net -2043.93 ml       Vital Signs (Most Recent):  Temp: 97 °F (36.1 °C) (06/11/24 0400)  Pulse: 99 (06/11/24 0800)  Resp: (!) 25 (06/11/24 0813)  BP: (!) 124/92 (06/11/24 0800)  SpO2: (!) 91 % (06/11/24 0800)  Body mass index is 31.09 kg/m².  Weight: 77.1 kg (170 lb) Vital Signs (24h Range):  Temp:  [97 °F (36.1 °C)-98.9 °F (37.2 °C)] 97 °F (36.1 °C)  Pulse:  [] 99  Resp:  [18-32] 25  SpO2:  [89 %-98 %] 91 %  BP: (120-145)/(51-92) 124/92       Physical Exam:  Gen- A/O, NAD  HENT- ATNC, MMM  CV- irregularly irregular rhythm with periods of nonsustained SVT  Resp-scattered crackles bilaterally, normal work of breathing, oxygen saturations 93% on 90% Vapotherm  MSK- WWP, no lower extremity edema  Neuro- A/Ox3, DARLENE, no gross deficits   Psych- appropriate mod and affect       Lines/Drains/Airways       Drain  Duration                  Urethral Catheter  06/09/24 1754 1 day              Peripheral Intravenous Line  Duration                  Peripheral IV - Single Lumen 06/07/24 0515 20 G Right Forearm 4 days         Peripheral IV - Single Lumen 06/07/24 1700 20 G Anterior;Proximal;Right Forearm 3 days         Peripheral IV - Single Lumen 06/08/24 0428 20 G Left Forearm 3 days                    Significant Labs:  Lab Results   Component Value Date    WBC 15.43 (H) 06/11/2024    WBC 15.43 06/11/2024    HGB 10.2 (L) 06/11/2024    HCT 31.1 (L) 06/11/2024    MCV 92.0 06/11/2024     06/11/2024       BMP  Lab Results   Component Value Date     06/11/2024    K 3.4 (L) 06/11/2024    CHLORIDE 97 05/04/2021    CO2 29 06/11/2024    BUN 19.5 06/11/2024    CREATININE 0.85 06/11/2024    CALCIUM 8.8 06/11/2024    AGAP 12.0 06/11/2024    ESTGFRAFRICA 92 05/16/2024    EGFRNONAA 28.1 (A) 08/10/2017         Assessment/Plan:     Assessment  Acute hypoxic respiratory failure  Diastolic heart failure  Right ventricular failure with potential pulmonary hypertension  Atrial fibrillation      Plan  -low suspicion for infection therefore can stop antibiotics and hopefully this will help diarrhea  -continue diuresis  -wean o2 as tolerated  -patient wishes to remain DO NOT RESUSCITATE/DO NOT INTUBATE         Sary Moreira, ROSELIAP  Pulmonary Critical Care Medicine  [facility] - 7th Floor ICU  DOS: 06/11/2024

## 2024-06-11 NOTE — PROGRESS NOTES
"    [facility] - 7th Floor ICU    Cardiology  Progress Note    Patient Name: Alanna Moya  MRN: [id number]  Admission Date: 6/1/2024  Hospital Length of Stay: 10 days  Code Status: DNR   Attending Physician: Naa Chin MD   Primary Care Physician: Cornel Haddad MD  Expected Discharge Date:   Principal Problem:<principal problem not specified>    Subjective:   Reason for Consult: SVT     HPI:Ms. Moya is a 78 year old female, known to Dr. Ramon in [place], who presented to the hospital from [facility] as transfer due to respiratory failure. Upon arrival she was noted to be SOB. Noted hypoxia requiring BIPAP. Chesr Radiograph from outside facility revealed bilateral airspace opacification and increase his right-sided pleural effusion suggestive of worsening pneumonia or possibly edema. CT Chest revealed mixed picture of decompensated HF, pleural effusions and superimposed pneumonia. During this hospitalization patient with tachycardia concerning for AF RVR, with noted Left Bundle Branch Block. She was given IV Metoprolol and started on Cardizem Infusion for Acute HR Control. She does have history of PSVT. Echocardiogram on 6.2.24 revealed intact LV Function with EF 55-60% & Grade I DD. Electrolytes grossly stable. Troponin normal. Lactic Acid Normal. . CIS Consulted for AF Management.     Hospital Course:   6.7.24: Patient seen sitting in chair. She denies SOB, CP, or nausea. Remains in Afib RVR   6.8.24: NAD. VSS. No complaints of CP/SOB/Palps. "I feel okay" WBC 14.27  6.9.24: NAD. VSS. AFIB RVR on telemetry. No complaints CP/SOB/Palps. "I feel okay" possible aspiration - awaiting Speech Evaluation. K 3.7, Mag 1.8, WBC 16.21.  6.10.24: NAD Noted. Sitting up in chair. AF RVR. Requiring Vapotherm Support. Denies CP. Reports exertional SOB, however, functional capacity is limited as she is deconditioned. Hypertension Noted. Diuresis noted, - 1604 ML/24 Hours.   6.11.24: BP " Improved. AF RVR. Diuresis noted, - 2144 ML/24 Hours. Vapotherm oxygen support- 75% FIO2.     PMH: CAD/CABG, Hypertension, Dyslipidemia, MELCHOR, CEA, CVA, AS/TAVR, PSVT  PSH: LHC, CABG, TAVR, CEA, Hysterectomy, Cholecystectomy, Eye Surgery, Hand Surgery  Family History: Father- Old MI/CAD, Mother- HF, Son- Hypertension, Daughter- Cancer/Hypertension, Brother- Heart Disease, Brother- Old MI/CAD, Brother- Cancer  Social History: Tobacco- Negative, Alcohol- Negative, Substance Abuse- Negative      Previous Cardiac Diagnostics:   Echocardiogram (6.2.24):  Left Ventricle: The left ventricle is normal in size. Increased wall thickness. There is normal systolic function with a visually estimated ejection fraction of 55 - 60%. Grade I diastolic dysfunction.  Right Ventricle: Systolic function is reduced.TAPSE is 1.60 cm.  Aortic Valve: There is a transcatheter valve replacement in the aortic position. Aortic valve area by VTI is 1.56 cm². Aortic valve peak velocity is 1.94 m/s. Mean gradient is 8 mmHg. The dimensionless index is 0.50.  Mitral Valve: Mildly thickened leaflets. There is mitral annular calcification present. There is mild stenosis. The mean pressure gradient across the mitral valve is 5 mmHg at a heart rate of 87 bpm. There is mild regurgitation.  Tricuspid Valve: The tricuspid valve is structurally normal. There is mild regurgitation.  Pulmonic Valve: The pulmonic valve is structurally normal.  Pulmonary Artery: The estimated pulmonary artery systolic pressure is 47 mmHg.  IVC/SVC: Normal venous pressure at 3 mmHg.  Pericardium: Left pleural effusion.     Echocardiogram (5.15.24):  EF 55-60%. Grade I Diastolic Dysfunction. Normal LV Wall Motion.  Mild MAC with Mild MR and No MS. Mild to Moderate TR.  Mild to Moderate AI, No AS.     CT Angiogram Head/Neck (5.15.24):  Carotid arteries:   Calcified plaque at the siphons without significant narrowing.   Normal A1 and M1 segments.   Vertebrobasilar Circulation:    Vertebral arteries: Patent. Calcified plaque on the right without significant narrowing. Otherwise no abnormalities.   Basilar artery: Patent, no abnormalities.   Posterior cerebral arteries:  Patent. Right fetal origin. Otherwise no abnormalities.      Echocardiogram (4.21.23):  The study quality is good.   The left ventricle is normal in size. Global left ventricular systolic function is normal. The left ventricular ejection fraction by Power's is 62%. The left ventricle diastolic function is impaired (Grade II) with an elevated left atrial pressure. Noted left ventricular hypertrophy. Concentric left ventricular hypertrophy is present. It is mild. LVSI=41ml/m2.    LVEDV=67.2ml and LVESV=25.7ml.  The left atrial diameter is moderately increased. Left atrial diameter is 4.4 cms. The left atrium is severely enlarged based on the left atrium volume index of 48.2ml/m².  S/P TAVR. Bioprosthetic aortic valve is well seated and functions normally with no evidence of insufficiency or perivalvular leaks. JACINTA=2.0cm2. The trans-aortic peak gradient is 15.8 mmHg. The trans-aortic mean gradient is 8.6 mmHg. DI=1.1.  Mild mitral annular calcification is noted. The mean trans mitral gradient is 1.9 mmHg.  Mild to moderate (1-2+) mitral regurgitation. Mild to moderate (1-2+) tricuspid regurgitation.  The estimated pulmonary artery systolic pressure is 41 mmHg assuming a right atrial pressure of 3 mmHg. Evidence of pulmonary hypertension is noted.      MCT Monitor (3.15.23):  Predominant Rhythm SR.  PSVT     TAVR (2.27.23):  TAVR  23  mm S3 ultra valve, nominal prep,  Right TF approach  Limited echo pre, post valve placement  Root angiography  Distal aortography  Temporary pacemaker placement removal  Manta closure  Right CFA access site.  U/S guided bilateral right groin access   Balloon angioplasty of right CFA with a 5 x 40 Napoleon.     LHC (1.26.23):  LM: Distal 50% Stenosis, LAD: Occluded Mid, LCX: Ostial 50% at Mid  Portion, RCA: Dominant Patent Arter.  JENNINGS to LAD is Patent.  Impression: LIMA to LAD- Patent, 50% Stenosis LCX Artery  Medical Management.    Review of Systems   Cardiovascular:  Positive for dyspnea on exertion. Negative for chest pain.   Respiratory:  Negative for shortness of breath.    All other systems reviewed and are negative.    Objective:     Vital Signs (Most Recent):  Temp: 97 °F (36.1 °C) (06/11/24 0400)  Pulse: 99 (06/11/24 0800)  Resp: (!) 25 (06/11/24 0813)  BP: (!) 124/92 (06/11/24 0800)  SpO2: (!) 91 % (06/11/24 0800) Vital Signs (24h Range):  Temp:  [97 °F (36.1 °C)-98.9 °F (37.2 °C)] 97 °F (36.1 °C)  Pulse:  [] 99  Resp:  [18-32] 25  SpO2:  [89 %-98 %] 91 %  BP: (120-145)/(51-92) 124/92   Weight: 77.1 kg (170 lb)  Body mass index is 31.09 kg/m².  SpO2: (!) 91 %       Intake/Output Summary (Last 24 hours) at 6/11/2024 0833  Last data filed at 6/11/2024 0400  Gross per 24 hour   Intake 1156.07 ml   Output 3200 ml   Net -2043.93 ml     Lines/Drains/Airways       Drain  Duration                  Urethral Catheter 06/09/24 1754 1 day              Peripheral Intravenous Line  Duration                  Peripheral IV - Single Lumen 06/07/24 0515 20 G Right Forearm 4 days         Peripheral IV - Single Lumen 06/07/24 1700 20 G Anterior;Proximal;Right Forearm 3 days         Peripheral IV - Single Lumen 06/08/24 0428 20 G Left Forearm 3 days                  Significant Labs:   Recent Results (from the past 72 hour(s))   Comprehensive Metabolic Panel    Collection Time: 06/09/24  3:01 AM   Result Value Ref Range    Sodium 136 136 - 145 mmol/L    Potassium 3.7 3.5 - 5.1 mmol/L    Chloride 97 (L) 98 - 107 mmol/L    CO2 25 23 - 31 mmol/L    Glucose 138 (H) 82 - 115 mg/dL    Blood Urea Nitrogen 30.0 (H) 9.8 - 20.1 mg/dL    Creatinine 0.90 0.55 - 1.02 mg/dL    Calcium 8.6 8.4 - 10.2 mg/dL    Protein Total 5.5 (L) 5.8 - 7.6 gm/dL    Albumin 2.7 (L) 3.4 - 4.8 g/dL    Globulin 2.8 2.4 - 3.5 gm/dL     Albumin/Globulin Ratio 1.0 (L) 1.1 - 2.0 ratio    Bilirubin Total 0.5 <=1.5 mg/dL    ALP 76 40 - 150 unit/L    ALT 15 0 - 55 unit/L    AST 18 5 - 34 unit/L    eGFR >60 mL/min/1.73/m2    Anion Gap 14.0 mEq/L    BUN/Creatinine Ratio 33    Magnesium    Collection Time: 06/09/24  3:01 AM   Result Value Ref Range    Magnesium Level 1.80 1.60 - 2.60 mg/dL   CBC with Differential    Collection Time: 06/09/24  3:01 AM   Result Value Ref Range    WBC 16.21 (H) 4.50 - 11.50 x10(3)/mcL    RBC 3.55 (L) 4.20 - 5.40 x10(6)/mcL    Hgb 10.7 (L) 12.0 - 16.0 g/dL    Hct 32.7 (L) 37.0 - 47.0 %    MCV 92.1 80.0 - 94.0 fL    MCH 30.1 27.0 - 31.0 pg    MCHC 32.7 (L) 33.0 - 36.0 g/dL    RDW 12.9 11.5 - 17.0 %    Platelet 316 130 - 400 x10(3)/mcL    MPV 11.0 (H) 7.4 - 10.4 fL    Neut % 74.8 %    Lymph % 9.2 %    Mono % 12.2 %    Eos % 0.1 %    Basophil % 0.2 %    Lymph # 1.49 0.6 - 4.6 x10(3)/mcL    Neut # 12.14 (H) 2.1 - 9.2 x10(3)/mcL    Mono # 1.97 (H) 0.1 - 1.3 x10(3)/mcL    Eos # 0.02 0 - 0.9 x10(3)/mcL    Baso # 0.03 <=0.2 x10(3)/mcL    IG# 0.56 (H) 0 - 0.04 x10(3)/mcL    IG% 3.5 %    NRBC% 0.0 %   Urinalysis, Reflex to Urine Culture    Collection Time: 06/09/24  5:49 PM    Specimen: Urine   Result Value Ref Range    Color, UA Light-Yellow Yellow, Light-Yellow, Colorless, Straw, Dark-Yellow    Appearance, UA Clear Clear    Specific Gravity, UA 1.014 1.005 - 1.030    pH, UA 5.5 5.0 - 8.5    Protein, UA Negative Negative    Glucose, UA Normal Negative, Normal    Ketones, UA Negative Negative    Blood, UA Negative Negative    Bilirubin, UA Negative Negative    Urobilinogen, UA Normal 0.2, 1.0, Normal    Nitrites, UA Negative Negative    Leukocyte Esterase, UA Negative Negative    WBC, UA 0-5 None Seen, 0-2, 3-5, 0-5 /HPF    Bacteria, UA Trace None Seen, Trace /HPF    Budding Yeast, UA Many (A) None Seen /HPF    Squamous Epithelial Cells, UA Trace None Seen /HPF    Mucous, UA Trace (A) None Seen /LPF    RBC, UA 0-5 None Seen, 0-2, 3-5,  0-5 /HPF   Comprehensive Metabolic Panel    Collection Time: 06/10/24  3:15 AM   Result Value Ref Range    Sodium 139 136 - 145 mmol/L    Potassium 3.0 (L) 3.5 - 5.1 mmol/L    Chloride 97 (L) 98 - 107 mmol/L    CO2 27 23 - 31 mmol/L    Glucose 128 (H) 82 - 115 mg/dL    Blood Urea Nitrogen 24.4 (H) 9.8 - 20.1 mg/dL    Creatinine 0.87 0.55 - 1.02 mg/dL    Calcium 8.8 8.4 - 10.2 mg/dL    Protein Total 5.9 5.8 - 7.6 gm/dL    Albumin 2.6 (L) 3.4 - 4.8 g/dL    Globulin 3.3 2.4 - 3.5 gm/dL    Albumin/Globulin Ratio 0.8 (L) 1.1 - 2.0 ratio    Bilirubin Total 0.8 <=1.5 mg/dL    ALP 72 40 - 150 unit/L    ALT 17 0 - 55 unit/L    AST 20 5 - 34 unit/L    eGFR >60 mL/min/1.73/m2    Anion Gap 15.0 mEq/L    BUN/Creatinine Ratio 28    CBC with Differential    Collection Time: 06/10/24  3:15 AM   Result Value Ref Range    WBC 14.87 (H) 4.50 - 11.50 x10(3)/mcL    RBC 3.42 (L) 4.20 - 5.40 x10(6)/mcL    Hgb 10.2 (L) 12.0 - 16.0 g/dL    Hct 31.5 (L) 37.0 - 47.0 %    MCV 92.1 80.0 - 94.0 fL    MCH 29.8 27.0 - 31.0 pg    MCHC 32.4 (L) 33.0 - 36.0 g/dL    RDW 12.8 11.5 - 17.0 %    Platelet 280 130 - 400 x10(3)/mcL    MPV 11.4 (H) 7.4 - 10.4 fL    Neut % 78.0 %    Lymph % 6.7 %    Mono % 12.4 %    Eos % 0.1 %    Basophil % 0.2 %    Lymph # 1.00 0.6 - 4.6 x10(3)/mcL    Neut # 11.60 (H) 2.1 - 9.2 x10(3)/mcL    Mono # 1.84 (H) 0.1 - 1.3 x10(3)/mcL    Eos # 0.01 0 - 0.9 x10(3)/mcL    Baso # 0.03 <=0.2 x10(3)/mcL    IG# 0.39 (H) 0 - 0.04 x10(3)/mcL    IG% 2.6 %    NRBC% 0.0 %   MRSA PCR    Collection Time: 06/10/24  7:18 AM   Result Value Ref Range    MRSA PCR Screen Not Detected Not Detected   Comprehensive Metabolic Panel    Collection Time: 06/11/24  3:19 AM   Result Value Ref Range    Sodium 138 136 - 145 mmol/L    Potassium 3.4 (L) 3.5 - 5.1 mmol/L    Chloride 97 (L) 98 - 107 mmol/L    CO2 29 23 - 31 mmol/L    Glucose 152 (H) 82 - 115 mg/dL    Blood Urea Nitrogen 19.5 9.8 - 20.1 mg/dL    Creatinine 0.85 0.55 - 1.02 mg/dL    Calcium 8.8 8.4 -  10.2 mg/dL    Protein Total 6.0 5.8 - 7.6 gm/dL    Albumin 2.6 (L) 3.4 - 4.8 g/dL    Globulin 3.4 2.4 - 3.5 gm/dL    Albumin/Globulin Ratio 0.8 (L) 1.1 - 2.0 ratio    Bilirubin Total 0.6 <=1.5 mg/dL    ALP 81 40 - 150 unit/L    ALT 13 0 - 55 unit/L    AST 17 5 - 34 unit/L    eGFR >60 mL/min/1.73/m2    Anion Gap 12.0 mEq/L    BUN/Creatinine Ratio 23    Magnesium    Collection Time: 06/11/24  3:19 AM   Result Value Ref Range    Magnesium Level 1.70 1.60 - 2.60 mg/dL   Phosphorus    Collection Time: 06/11/24  3:19 AM   Result Value Ref Range    Phosphorus Level 2.0 (L) 2.3 - 4.7 mg/dL   CBC with Differential    Collection Time: 06/11/24  3:19 AM   Result Value Ref Range    WBC 15.43 (H) 4.50 - 11.50 x10(3)/mcL    RBC 3.38 (L) 4.20 - 5.40 x10(6)/mcL    Hgb 10.2 (L) 12.0 - 16.0 g/dL    Hct 31.1 (L) 37.0 - 47.0 %    MCV 92.0 80.0 - 94.0 fL    MCH 30.2 27.0 - 31.0 pg    MCHC 32.8 (L) 33.0 - 36.0 g/dL    RDW 13.0 11.5 - 17.0 %    Platelet 247 130 - 400 x10(3)/mcL    MPV 11.6 (H) 7.4 - 10.4 fL    NRBC% 0.0 %   Manual Differential    Collection Time: 06/11/24  3:19 AM   Result Value Ref Range    WBC 15.43 x10(3)/mcL    Neutrophils % 84 %    Lymphs % 6 %    Monocytes % 10 %    Neutrophils Abs 12.9612 (H) 2.1 - 9.2 x10(3)/mcL    Lymphs Abs 0.9258 0.6 - 4.6 x10(3)/mcL    Monocytes Abs 1.543 (H) 0.1 - 1.3 x10(3)/mcL    Platelets Normal Normal, Adequate    RBC Morph Abnormal (A) Normal    Polychromasia Slight (A) (none)    Anisocytosis Slight (A) (none)    Macrocytosis Slight (A) (none)     Telemetry:  AF RVR    Physical Exam  Vitals and nursing note reviewed.   Constitutional:       General: She is not in acute distress.     Appearance: She is ill-appearing.   HENT:      Head: Normocephalic.      Mouth/Throat:      Mouth: Mucous membranes are moist.   Cardiovascular:      Rate and Rhythm: Tachycardia present. Rhythm irregular.   Pulmonary:      Effort: Pulmonary effort is normal. No respiratory distress.      Comments: Vapotherm  75%   Skin:     General: Skin is warm and dry.   Neurological:      Mental Status: She is alert. Mental status is at baseline.   Psychiatric:         Behavior: Behavior normal.       Current Inpatient Medications:  Current Facility-Administered Medications:     0.9%  NaCl infusion, , Intravenous, PRN, Vishnu Stevenson MD, Stopped at 06/08/24 0133    acetaminophen tablet 650 mg, 650 mg, Oral, Q6H PRN, Naa Chin MD, 650 mg at 06/10/24 1115    apixaban tablet 5 mg, 5 mg, Oral, BID, Alyce, Rein T, FNP, 5 mg at 06/11/24 0813    digoxin tablet 0.125 mg, 0.125 mg, Oral, Daily, Alyce, Rein T, FNP, 0.125 mg at 06/11/24 0812    diltiaZEM 24 hr capsule 240 mg, 240 mg, Oral, Daily, Alyce, Rein T, FNP, 240 mg at 06/11/24 0813    docusate 50 mg/5 mL liquid 50 mg, 50 mg, Oral, Daily, Marga Smith MD, 50 mg at 06/09/24 0817    furosemide injection 40 mg, 40 mg, Intravenous, Q12H, Naa Chin MD, 40 mg at 06/11/24 0812    gabapentin capsule 600 mg, 600 mg, Oral, BID, Marga Smith MD, 600 mg at 06/11/24 0813    hydrALAZINE injection 10 mg, 10 mg, Intravenous, Q6H PRN, Eleno Abbasi MD, 10 mg at 06/06/24 1631    HYDROmorphone (PF) injection 0.2 mg, 0.2 mg, Intravenous, Q6H PRN, Ky Linn DO, 0.2 mg at 06/05/24 0029    labetaloL injection 10 mg, 10 mg, Intravenous, Q4H PRN, Baldo Finney DO, 10 mg at 06/11/24 0257    lactated ringers infusion, , Intravenous, Continuous, Shanna Montiel MD, Last Rate: 50 mL/hr at 06/10/24 2307, Rate Verify at 06/10/24 2307    Lactobacillus rhamnosus GG 5 billion cell packet (PEDS) 1 each, 1 packet, Oral, BID, Naa Chin MD    levalbuterol nebulizer solution 1.25 mg, 1.25 mg, Nebulization, Q6H PRN, Shanna Montiel MD, 1.25 mg at 06/09/24 1200    LIDOcaine 5 % patch 2 patch, 2 patch, Transdermal, Q24H, Sheri Carson, NP, 2 patch at 06/10/24 1535    magnesium sulfate 2g in water 50mL IVPB (premix), 2 g, Intravenous, Once, Nataly Mead, FNP    metoprolol injection  5 mg, 5 mg, Intravenous, Q4H PRN, Justyn Landaverde MD, 5 mg at 06/11/24 0728    ondansetron injection 4 mg, 4 mg, Intravenous, Q4H PRN, Julissa Rinaldi FNP, 4 mg at 06/11/24 0405    piperacillin-tazobactam (ZOSYN) 4.5 g in dextrose 5 % in water (D5W) 100 mL IVPB (MB+), 4.5 g, Intravenous, Q8H, Gregory Gandhi MD, Stopped at 06/11/24 0422    potassium chloride SA CR tablet 40 mEq, 40 mEq, Oral, Once, Nataly Mead, FNP    sertraline tablet 100 mg, 100 mg, Oral, QHS, Marga Smith MD, 100 mg at 06/10/24 2034    traMADoL tablet 50 mg, 50 mg, Oral, TID, Vishnu Stevenson MD, 50 mg at 06/11/24 0813  VTE Risk Mitigation (From admission, onward)           Ordered     apixaban tablet 5 mg  2 times daily         06/10/24 1106     IP VTE HIGH RISK PATIENT  Once         06/01/24 2247                  Assessment:   Atrial Fibrillation with RVR (New Onset)- Now AF RVR    - [id number]: 7    - History of PSVT    - On FD Eliquis for Stroke Risk Reduction  Acute on Chronic Diastolic HF    - EF 55-60% with Grade I Diastolic Dysfunction  Aspiration Pneumonia    - Cleared Swallowing Study  Acute Hypoxemic Respiratory Failure Requiring Oxygenation - on High Flow NC/Vapotherm  Valvular Heart Disease    - AS: Status Post TAVR  23  mm S3 ultra valve, nominal prep,  Right TF approach (2.27.23)  CAD/CABG    - LIMA to LAD (Patent) with 50% Stenosis Native LCX  Hypertension   Hyperlipidemia  Chronic Left Bundle Branch Block ([place] TAVR)  MELCHOR/CEA  History of CVA  Hypokalemia  DNR Status   No known history of GI Bleed     Plan:   Start Amiodarone Infusion per Protocol Re: AF RVR- Utilize for HR Control.  Continue Cardizem  Mg PO Daily   Continue Eliquis 5 Mg PO BID Re: AF/Stroke Risk Reduction  Continue Digoxin 0.125 Mg PO Daily  Continue Diuretics as Clinically Indicated; Ensure Accurate I/O  K+ Goal 4, Mag Goal 2; Replete K+ & Mag Today  Lopressor IV PRN HR > 120  Mobilize as Able with PT    Nataly Mead,  P  Cardiology  [facility] - 7th Floor ICU  06/11/2024    I agree with the findings of the complexity of problems addressed and take responsibility for the plan's risks and complications. I approved the plan documented by Nataly Mead NP.  We will start amiodarone infusion rate control

## 2024-06-12 LAB
ALBUMIN SERPL-MCNC: 2.8 G/DL (ref 3.4–4.8)
ALBUMIN/GLOB SERPL: 0.9 RATIO (ref 1.1–2)
ALP SERPL-CCNC: 84 UNIT/L (ref 40–150)
ALT SERPL-CCNC: 11 UNIT/L (ref 0–55)
ANION GAP SERPL CALC-SCNC: 10 MEQ/L
AST SERPL-CCNC: 15 UNIT/L (ref 5–34)
BASOPHILS # BLD AUTO: 0.03 X10(3)/MCL
BASOPHILS NFR BLD AUTO: 0.2 %
BILIRUB SERPL-MCNC: 0.4 MG/DL
BUN SERPL-MCNC: 15.9 MG/DL (ref 9.8–20.1)
CALCIUM SERPL-MCNC: 8.9 MG/DL (ref 8.4–10.2)
CHLORIDE SERPL-SCNC: 95 MMOL/L (ref 98–107)
CO2 SERPL-SCNC: 32 MMOL/L (ref 23–31)
CREAT SERPL-MCNC: 0.82 MG/DL (ref 0.55–1.02)
CREAT/UREA NIT SERPL: 19
DIGOXIN SERPL-MCNC: 1.7 NG/ML (ref 0.8–2)
EOSINOPHIL # BLD AUTO: 0.01 X10(3)/MCL (ref 0–0.9)
EOSINOPHIL NFR BLD AUTO: 0.1 %
ERYTHROCYTE [DISTWIDTH] IN BLOOD BY AUTOMATED COUNT: 13.6 % (ref 11.5–17)
GFR SERPLBLD CREATININE-BSD FMLA CKD-EPI: >60 ML/MIN/1.73/M2
GLOBULIN SER-MCNC: 3 GM/DL (ref 2.4–3.5)
GLUCOSE SERPL-MCNC: 126 MG/DL (ref 82–115)
HCT VFR BLD AUTO: 30.8 % (ref 37–47)
HGB BLD-MCNC: 9.9 G/DL (ref 12–16)
IMM GRANULOCYTES # BLD AUTO: 0.35 X10(3)/MCL (ref 0–0.04)
IMM GRANULOCYTES NFR BLD AUTO: 2.5 %
LEGIONELLA AG UR QL: NEGATIVE
LYMPHOCYTES # BLD AUTO: 1.2 X10(3)/MCL (ref 0.6–4.6)
LYMPHOCYTES NFR BLD AUTO: 8.5 %
MCH RBC QN AUTO: 29.9 PG (ref 27–31)
MCHC RBC AUTO-ENTMCNC: 32.1 G/DL (ref 33–36)
MCV RBC AUTO: 93.1 FL (ref 80–94)
MONOCYTES # BLD AUTO: 1.99 X10(3)/MCL (ref 0.1–1.3)
MONOCYTES NFR BLD AUTO: 14 %
NEUTROPHILS # BLD AUTO: 10.59 X10(3)/MCL (ref 2.1–9.2)
NEUTROPHILS NFR BLD AUTO: 74.7 %
NRBC BLD AUTO-RTO: 0 %
PLATELET # BLD AUTO: 275 X10(3)/MCL (ref 130–400)
PMV BLD AUTO: 11.6 FL (ref 7.4–10.4)
POTASSIUM SERPL-SCNC: 3.8 MMOL/L (ref 3.5–5.1)
PROT SERPL-MCNC: 5.8 GM/DL (ref 5.8–7.6)
RBC # BLD AUTO: 3.31 X10(6)/MCL (ref 4.2–5.4)
SODIUM SERPL-SCNC: 137 MMOL/L (ref 136–145)
WBC # SPEC AUTO: 14.17 X10(3)/MCL (ref 4.5–11.5)

## 2024-06-12 PROCEDURE — 25000003 PHARM REV CODE 250: Performed by: INTERNAL MEDICINE

## 2024-06-12 PROCEDURE — 63600175 PHARM REV CODE 636 W HCPCS: Performed by: INTERNAL MEDICINE

## 2024-06-12 PROCEDURE — 97164 PT RE-EVAL EST PLAN CARE: CPT

## 2024-06-12 PROCEDURE — 63600175 PHARM REV CODE 636 W HCPCS: Performed by: NURSE PRACTITIONER

## 2024-06-12 PROCEDURE — 21400001 HC TELEMETRY ROOM

## 2024-06-12 PROCEDURE — 97530 THERAPEUTIC ACTIVITIES: CPT | Mod: CO

## 2024-06-12 PROCEDURE — 99900031 HC PATIENT EDUCATION (STAT)

## 2024-06-12 PROCEDURE — 94760 N-INVAS EAR/PLS OXIMETRY 1: CPT

## 2024-06-12 PROCEDURE — 80162 ASSAY OF DIGOXIN TOTAL: CPT

## 2024-06-12 PROCEDURE — 85025 COMPLETE CBC W/AUTO DIFF WBC: CPT

## 2024-06-12 PROCEDURE — 80053 COMPREHEN METABOLIC PANEL: CPT

## 2024-06-12 PROCEDURE — 25000003 PHARM REV CODE 250: Performed by: HOSPITALIST

## 2024-06-12 PROCEDURE — 27100171 HC OXYGEN HIGH FLOW UP TO 24 HOURS

## 2024-06-12 PROCEDURE — 63600175 PHARM REV CODE 636 W HCPCS

## 2024-06-12 PROCEDURE — 99900035 HC TECH TIME PER 15 MIN (STAT)

## 2024-06-12 PROCEDURE — 94660 CPAP INITIATION&MGMT: CPT

## 2024-06-12 PROCEDURE — 92526 ORAL FUNCTION THERAPY: CPT

## 2024-06-12 PROCEDURE — 36415 COLL VENOUS BLD VENIPUNCTURE: CPT

## 2024-06-12 PROCEDURE — 25000003 PHARM REV CODE 250

## 2024-06-12 PROCEDURE — 25000003 PHARM REV CODE 250: Performed by: NURSE PRACTITIONER

## 2024-06-12 RX ORDER — METOPROLOL TARTRATE 50 MG/1
50 TABLET ORAL 2 TIMES DAILY
Status: DISCONTINUED | OUTPATIENT
Start: 2024-06-12 | End: 2024-06-13

## 2024-06-12 RX ORDER — FUROSEMIDE 10 MG/ML
60 INJECTION INTRAMUSCULAR; INTRAVENOUS EVERY 12 HOURS
Status: DISCONTINUED | OUTPATIENT
Start: 2024-06-12 | End: 2024-06-15

## 2024-06-12 RX ORDER — DILTIAZEM HYDROCHLORIDE 120 MG/1
360 CAPSULE, COATED, EXTENDED RELEASE ORAL DAILY
Status: DISCONTINUED | OUTPATIENT
Start: 2024-06-13 | End: 2024-06-15

## 2024-06-12 RX ORDER — POTASSIUM CHLORIDE 14.9 MG/ML
20 INJECTION INTRAVENOUS ONCE
Status: DISCONTINUED | OUTPATIENT
Start: 2024-06-12 | End: 2024-06-12

## 2024-06-12 RX ORDER — DILTIAZEM HYDROCHLORIDE 60 MG/1
120 TABLET, FILM COATED ORAL ONCE
Status: COMPLETED | OUTPATIENT
Start: 2024-06-12 | End: 2024-06-12

## 2024-06-12 RX ORDER — FUROSEMIDE 10 MG/ML
20 INJECTION INTRAMUSCULAR; INTRAVENOUS ONCE
Status: COMPLETED | OUTPATIENT
Start: 2024-06-12 | End: 2024-06-12

## 2024-06-12 RX ORDER — POTASSIUM CHLORIDE 20 MEQ/1
20 TABLET, EXTENDED RELEASE ORAL ONCE
Status: COMPLETED | OUTPATIENT
Start: 2024-06-12 | End: 2024-06-12

## 2024-06-12 RX ORDER — PROPOFOL 10 MG/ML
INJECTION, EMULSION INTRAVENOUS
Status: COMPLETED
Start: 2024-06-12 | End: 2024-06-12

## 2024-06-12 RX ADMIN — MORPHINE SULFATE 2 MG: 4 INJECTION INTRAVENOUS at 03:06

## 2024-06-12 RX ADMIN — GABAPENTIN 600 MG: 300 CAPSULE ORAL at 08:06

## 2024-06-12 RX ADMIN — TRAMADOL HYDROCHLORIDE 50 MG: 50 TABLET, COATED ORAL at 08:06

## 2024-06-12 RX ADMIN — TRAMADOL HYDROCHLORIDE 50 MG: 50 TABLET, COATED ORAL at 03:06

## 2024-06-12 RX ADMIN — METOPROLOL TARTRATE 50 MG: 50 TABLET, FILM COATED ORAL at 10:06

## 2024-06-12 RX ADMIN — FUROSEMIDE 60 MG: 10 INJECTION, SOLUTION INTRAMUSCULAR; INTRAVENOUS at 08:06

## 2024-06-12 RX ADMIN — Medication 1 EACH: at 08:06

## 2024-06-12 RX ADMIN — DILTIAZEM HYDROCHLORIDE 120 MG: 60 TABLET ORAL at 10:06

## 2024-06-12 RX ADMIN — AMIODARONE HYDROCHLORIDE 0.5 MG/MIN: 1.8 INJECTION, SOLUTION INTRAVENOUS at 05:06

## 2024-06-12 RX ADMIN — FUROSEMIDE 20 MG: 10 INJECTION, SOLUTION INTRAMUSCULAR; INTRAVENOUS at 10:06

## 2024-06-12 RX ADMIN — APIXABAN 5 MG: 5 TABLET, FILM COATED ORAL at 08:06

## 2024-06-12 RX ADMIN — SERTRALINE HYDROCHLORIDE 100 MG: 50 TABLET ORAL at 08:06

## 2024-06-12 RX ADMIN — METOPROLOL TARTRATE 5 MG: 1 INJECTION, SOLUTION INTRAVENOUS at 05:06

## 2024-06-12 RX ADMIN — DILTIAZEM HYDROCHLORIDE 240 MG: 120 CAPSULE, COATED, EXTENDED RELEASE ORAL at 08:06

## 2024-06-12 RX ADMIN — METOPROLOL TARTRATE 50 MG: 50 TABLET, FILM COATED ORAL at 08:06

## 2024-06-12 RX ADMIN — DIGOXIN 0.12 MG: 125 TABLET ORAL at 08:06

## 2024-06-12 RX ADMIN — POTASSIUM CHLORIDE 20 MEQ: 1500 TABLET, EXTENDED RELEASE ORAL at 10:06

## 2024-06-12 RX ADMIN — FUROSEMIDE 40 MG: 10 INJECTION, SOLUTION INTRAMUSCULAR; INTRAVENOUS at 08:06

## 2024-06-12 NOTE — PT/OT/SLP PROGRESS
Occupational Therapy   Treatment    Name: Alanna Moya  MRN: [id number]  Admitting Diagnosis:  SOB       Recommendations:     Recommended therapy intensity at discharge: Moderate Intensity Therapy   Discharge Equipment Recommendations:  to be determined by next level of care  Barriers to discharge:       Assessment:     Alanna Moya is a 78 y.o. female with a medical diagnosis of SOB.  She presents with increased vapotherm settings, SOB, and elevated HR, cleared by RN for OT session, improvement noted in O2 in upright position. Poor endurance noted recommending Mod intensity therapy pending progress. Performance deficits affecting function are weakness, impaired endurance, impaired self care skills, impaired functional mobility, impaired balance.     Rehab Prognosis:  Fair; patient would benefit from acute skilled OT services to address these deficits and reach maximum level of function.       Plan:     Patient to be seen 4 x/week to address the above listed problems via self-care/home management  Plan of Care Expires: 07/04/24  Plan of Care Reviewed with: patient    Subjective     Pain/Comfort:  No pain    Objective:     Communicated with: RN prior to session.  Patient found HOB elevated with   upon OT entry to room.    General Precautions: Standard, fall    Orthopedic Precautions:N/A  Braces: N/A  Respiratory Status: High flow, flow 40 L/min, concentration 100%  Vital Signs: Blood Pressure: 143/82  HR: 120-140  Sp02: 92-94     Occupational Performance:   (Bed Mobility- Min A)  (Rolling-CGA)   Total A required for pericare and brief management.  (Sitting balance- SBA)  Pt. Donning/doffing socks in a figure 4 position mod I.  (Sit to stand- Min A)  Pt. Requiring Min A during stand step t/f from EOB to BS chair using RW for UE support with balance, assistance required for safe descend onto low surface.   Vitals stable post t/f to chair.     Therapeutic Positioning    OT interventions performed during the  course of today's session in an effort to prevent and/or reduce acquired pressure injuries:   Therapeutic positioning was provided at the conclusion of session to offload all bony prominences for the prevention and/or reduction of pressure injuries      [facility] 6 Click ADL:      Patient Education:  Patient provided with verbal education education regarding fall prevention and safety awareness.  Additional teaching is warranted.      Patient left up in chair with all lines intact and call button in reach.    GOALS:   Multidisciplinary Problems       Occupational Therapy Goals          Problem: Occupational Therapy    Goal Priority Disciplines Outcome Interventions   Occupational Therapy Goal     OT, PT/OT Progressing    Description: Goals to be met by: 7/4/24     Patient will increase functional independence with ADLs by performing:    UE Dressing with Supervision.  LE Dressing with Supervision.  Grooming while standing at sink with Supervision.  Toileting from toilet with Supervision for hygiene and clothing management.   Toilet transfer to toilet with Supervision.                         Time Tracking:     OT Date of Treatment: 06/12/24  OT Start Time: 0810  OT Stop Time: 0840  OT Total Time (min): 30 min    Billable Minutes:Therapeutic Activity 2    OT/MARY: MARY     Number of MARY visits since last OT visit: 4    6/12/2024

## 2024-06-12 NOTE — PT/OT/SLP PROGRESS
[facility]  Speech Language Pathology Department  Diet Tolerance Follow-up    Patient Name:  Alanna Moya   MRN:  [id number]    Recommendations     General recommendations:  SLP intervention not indicated  Solid texture recommendation:  Regular Diet - IDDSI Level 7  Liquid consistency recommendation: Thin liquids - IDDSI Level 0   Medications: crushed in puree  Swallow strategies/precautions: small bites/sips, slow rate, upright for PO intake, assist with feeding as needed, monitor respiratory status during meals   Precautions: fall, aspiration    Diet Tolerance     Nursing reports no difficulty regarding diet tolerance. Vapotherm settings continue to fluctuate at this time; patient currently on 35L 95% FiO2. RN denied increase in coughing/WOB during PO intake. Discussed diet recommendations, precautions, and aspiration risk with RN and patient. Good understanding verbalized.     Patient Education     Patient provided with verbal education regarding ST POC.  Understanding was verbalized.    Plan     SLP Follow-Up:  No    Plan of Care reviewed with:  patient and RN    Time Tracking     SLP Treatment Date:   06/12/24  Speech Start Time:  1010  Speech Stop Time:  1025     Speech Total Time (min):  15 min    Billable minutes:   Treatment of Swallow Dysfunction, 15 minutes     06/12/2024

## 2024-06-12 NOTE — PT/OT/SLP RE-EVAL
Physical Therapy Re-Evaluation    Patient Name:  Alanna Moya   MRN:  [id number]    Recommendations:     Discharge therapy intensity: Moderate Intensity Therapy   Discharge Equipment Recommendations: to be determined by next level of care   Barriers to discharge:  medical dx, impaired mobility, decreased independence, decreased endurance     Assessment:     Alanna Moya is a 78 y.o. female admitted with a medical diagnosis of SOB, respiratory failure, pneumonia, pleural effusions.   She presents with the following impairments/functional limitations: weakness, gait instability, decreased upper extremity function, impaired endurance, impaired balance, decreased lower extremity function, impaired self care skills, impaired functional mobility.  Patient with fair tolerance to PT re-eval. Pt sitting UIC upon arrival; pt on increased vapotherm settings with O2 sats in the upper 80s. RN present during session to assist with monitoring. Pt requires Yobany x2 for sit<>stand and to take a few steps in order to transfer from the chair to the bed. Over the past few days, pt has declined in functional mobility tolerance. RN reports about to place pt back on bipap. Will continue progressing mobility as able.     Rehab Prognosis: Good; patient would benefit from acute skilled PT services to address these deficits and reach maximum level of function.    Recent Surgery: * No surgery found *      Plan:     During this hospitalization, patient would benefit from acute PT services 5 x/week to address the identified rehab impairments via gait training, therapeutic activities, therapeutic exercises and progress toward the following goals:    Plan of Care Expires:  07/04/24    PT/PTA conference to discuss PT POC and patient's progression towards goals held with Adriana Cotton PTA.     Subjective     Chief Complaint: wanting to eat   Patient/Family Comments/goals: to get stronger and go home   Pain/Comfort:  Pain Rating 1:  0/10    Patients cultural, spiritual, Yarsani conflicts given the current situation: no    Objective:     Communicated with NSG prior to session.  Patient found up in chair with blood pressure cuff, pulse ox (continuous), oxygen, telemetry, peripheral IV, melton catheter  upon PT entry to room.    General Precautions: Standard, fall  Orthopedic Precautions:N/A   Braces: N/A  Respiratory Status:  vapotherm 40 LPM, 100% fiO2  Heart Rate: 132  SpO2: 88%      Exams:  Cognitive Exam:  Patient is oriented to Person, Place, Time, and Situation  RLE Strength: WFL  LLE Strength: WFL  Skin integrity: Visible skin intact      Functional Mobility:  Bed Mobility:     Sit to Supine: moderate assistance  Transfers:     Sit to Stand:  minimum assistance and of 2 persons with hand-held assist  Bed to Chair: minimum assistance and of 2 persons with  hand-held assist  using  Step Transfer  Pt able to take a few steps however slightly unsteady and appearing with generalized weakness and decreased tolerance to activity.      Treatment & Education:  Patient provided with verbal education  regarding PT role/goals/POC, fall prevention, safety awareness, discharge/DME recommendations, and pressure ulcer prevention.  Understanding was verbalized.     Patient left HOB elevated with all lines intact, call button in reach, RN notified, RN present, and foam heel reliefs, food tray in front .    GOALS:   Multidisciplinary Problems       Physical Therapy Goals          Problem: Physical Therapy    Goal Priority Disciplines Outcome Goal Variances Interventions   Physical Therapy Goal     PT, PT/OT Progressing     Description: Goals to be met by: 24     Patient will increase functional independence with mobility by performin. Supine to sit with Stand-by Assistance  2. Sit to supine with Stand-by Assistance  3. Sit to stand transfer with Stand-by Assistance  4. Bed to chair transfer with Stand-by Assistance using Rolling Walker  5. Gait   x 150 feet with Stand-by Assistance using Rolling Walker.                          History:     Past Medical History:   Diagnosis Date    Abdominal pain, unspecified site     Aortic stenosis     Coronary atherosclerosis of unspecified type of vessel, native or graft     Esophageal reflux     Fatigue     History of glaucoma     History of heart attack     Hypertension     Lumbosacral spondylosis without myelopathy     Other and unspecified hyperlipidemia     Rectocele     SOB (shortness of breath) on exertion     Unspecified essential hypertension     Weakness        Past Surgical History:   Procedure Laterality Date    CHOLECYSTECTOMY      CORONARY ARTERY BYPASS GRAFT      GLAUCOMA SURGERY Bilateral     HYSTERECTOMY      TRANSCATHETER AORTIC VALVE REPLACEMENT (TAVR) N/A 2/27/2023    Procedure: REPLACEMENT, AORTIC VALVE, TRANSCATHETER (TAVR);  Surgeon: Anselmo Arango MD;  Location: [facility] CATH LAB;  Service: Cardiology;  Laterality: N/A;  TAVR W/ ANEST.       Time Tracking:     PT Received On: 06/12/24  PT Start Time: 1119     PT Stop Time: 1132  PT Total Time (min): 13 min     Billable Minutes: Re-eval 1      06/12/2024

## 2024-06-12 NOTE — PROGRESS NOTES
Advance Care Planning     Date: 06/12/2024    Power of   I initiated the process of voluntary advance care planning today and explained the importance of this process to the patient.  I introduced the concept of advance directives to the patient, as well. Then the patient received detailed information about the importance of designating a Health Care Power of  (HCPOA). She was also instructed to communicate with this person about their wishes for future healthcare, should she become sick and lose decision-making capacity. The patient has previously appointed a HCPOA. After our discussion, the patient has decided to complete a HCPOA and has appointed her son and daughter, health care agent:  Nakia Aguirre (daughter) [phone], Yonis Burks (Son) [phone], Archana Johnson (Daughter) [phone] . Copies provided to patient, copy place on patient nurse's chart and copy scanned to HIM to placed in patient's electronic chart.     Advance Care Planning     Date: 06/12/2024    Code Status  In light of the patients advanced and life limiting illness,I engaged the the patient in a voluntary conversation about the patient's preferences for care  at the very end of life. The patient wishes to have a natural, peaceful death.  Along those lines, the patient does not wish to have CPR or other invasive treatments performed when her heart and/or breathing stops. I communicated to the patient that a DNR order would be placed in her medical record to reflect this preference, DNR form was completed and will be scanned into EPIC, and LaPOST form was completed to reflect other EOL preferences of the patient such as A. DNR, B. Selective Treatment and C. No Tube Feedings. A Electronic LaPOST was completed with the patient and reviewed by Dr. Claudy Sellers confirming patient's medical wishes.

## 2024-06-12 NOTE — PROGRESS NOTES
[facility] Progress [facility]        Chief Complaint: Inpatient Follow-up for multifocal pneumonia    HPI:   78-year-old lady with PMH of AS s/p AVR, CAD, mi, HTN, chronic back pain, HLD, CVA, CHF, GERD, diverticulosis who was being downgraded from the ICU to Hospital Medicine after she was initially admitted for acute hypoxemic respiratory failure requiring BiPAP for adequate saturations. She was transferred from an outside facility to [facility] with complaints of worsening shortness of breath for the last few weeks with initial CXR at outside facility showing bilateral airspace opacities and increased right-sided pleural effusion. Her last echocardiogram from 05/16 showed EF 55-60%. She was placed on antibiotics, steroids as well as IV Lasix for diuresis for pulmonary congestion. She was able to be weaned down from BiPAP to Vapotherm and eventually Oxymizer 8 L. she was also noted to have new onset atrial fibrillation for which he was started on IV Cardizem which has since been transitioned to p.o. Cardizem. She is on full-dose Lovenox due to CHADS-VASc of 7. She reported feeling significantly better and endorsed improvement in her shortness of breath since admission. Denied having any fever, chills, chest pain, cough, sputum production, abdominal pain, nausea, vomiting, headache, numbness, weakness, dizziness/lightheadedness or loss of consciousness. Blood cultures from admission are negative. PT/OT on board; in recommending moderate intensity therapy. Cardiology on board.   Patient had repeat chest x-ray done on June 9th and that showed denser consolidation patient continues to be on Zosyn now has been put on BiPAP with FiO2 of 90%  Interval Hx:   Patient seen and examined this morning on  35 L Vapotherm FiO2 of 95% sitting comfortably in the chair reports diarrhea has resolved Case was discussed with patient's nurse and  on the floor.    Objective/physical  exam:  General: In no acute distress, afebrile  Chest:  On Vapotherm, minimal expiratory wheeze   Heart: RRR, +S1, S2, no appreciable murmur  Abdomen: Soft, nontender, BS +  MSK: Warm, no lower extremity edema, no clubbing or cyanosis  Neurologic: Alert , generalized weakness     VITAL SIGNS: 24 HRS MIN & MAX LAST   Temp  Min: 98.2 °F (36.8 °C)  Max: 98.9 °F (37.2 °C) 98.2 °F (36.8 °C)   BP  Min: 117/80  Max: 144/85 (!) 143/82   Pulse  Min: 94  Max: 136  (!) 133   Resp  Min: 18  Max: 26 18   SpO2  Min: 86 %  Max: 95 % (!) 93 %     I have reviewed the following labs:  Recent Labs   Lab 06/10/24  0315 06/11/24  0319 06/12/24  0447   WBC 14.87* 15.43  15.43* 14.17*   RBC 3.42* 3.38* 3.31*   HGB 10.2* 10.2* 9.9*   HCT 31.5* 31.1* 30.8*   MCV 92.1 92.0 93.1   MCH 29.8 30.2 29.9   MCHC 32.4* 32.8* 32.1*   RDW 12.8 13.0 13.6    247 275   MPV 11.4* 11.6* 11.6*     Recent Labs   Lab 06/08/24  0419 06/09/24  0301 06/10/24  0315 06/11/24  0319 06/12/24  0447    136 139 138 137   K 3.7 3.7 3.0* 3.4* 3.8   CL 99 97* 97* 97* 95*   CO2 28 25 27 29 32*   BUN 35.2* 30.0* 24.4* 19.5 15.9   CREATININE 0.88 0.90 0.87 0.85 0.82   CALCIUM 8.9 8.6 8.8 8.8 8.9   MG 2.10 1.80  --  1.70  --    ALBUMIN 2.7* 2.7* 2.6* 2.6* 2.8*   ALKPHOS 69 76 72 81 84   ALT 17 15 17 13 11   AST 18 18 20 17 15   BILITOT 0.6 0.5 0.8 0.6 0.4     Microbiology Results (last 7 days)       Procedure Component Value Units Date/Time    Respiratory Culture [[id number]]     Order Status: Sent Specimen: Respiratory from Sputum, Expectorated     Blood Culture [[id number]]  (Normal) Collected: 06/02/24 0115    Order Status: Completed Specimen: Blood, Venous Updated: 06/07/24 0300     Blood Culture No Growth at 5 days    Blood Culture [[id number]]  (Normal) Collected: 06/02/24 0115    Order Status: Completed Specimen: Blood, Venous Updated: 06/07/24 0300     Blood Culture No Growth at 5 days             See below for Radiology    Scheduled Med:   apixaban  5  mg Oral BID    digoxin  0.125 mg Oral Daily    [START ON 6/13/2024] diltiaZEM  360 mg Oral Daily    docusate  50 mg Oral Daily    furosemide (LASIX) injection  40 mg Intravenous Q12H    gabapentin  600 mg Oral BID    Lactobacillus rhamnosus GG  1 packet Oral BID    LIDOcaine  2 patch Transdermal Q24H    metoprolol tartrate  50 mg Oral BID    sertraline  100 mg Oral QHS    traMADoL  50 mg Oral TID      Continuous Infusions:   amiodarone in dextrose 5%  0.5 mg/min Intravenous Continuous 16.7 mL/hr at 06/12/24 0550 0.5 mg/min at 06/12/24 0550      PRN Meds:    Current Facility-Administered Medications:     sodium chloride 0.9%, , Intravenous, PRN    acetaminophen, 650 mg, Oral, Q6H PRN    hydrALAZINE, 10 mg, Intravenous, Q6H PRN    labetalol, 10 mg, Intravenous, Q4H PRN    levalbuterol, 1.25 mg, Nebulization, Q6H PRN    melatonin, 6 mg, Oral, Nightly PRN    metoprolol, 5 mg, Intravenous, Q4H PRN    morphine, 2 mg, Intravenous, Q4H PRN    ondansetron, 4 mg, Intravenous, Q4H PRN     Assessment/Plan:  HFpEF with exacerbation and bilateral pleural effusions   Acute respiratory failure with hypoxia requiring high-flow O2   Atrial Fibrillation with RVR (New Onset)   JUN- POA  DNR     Hx: CAD-status post CABG,Valvular heart disease-status post AVR     Continue with Lasix 40 IV q.12 strict input and output maybe we can increase the dose of Lasix to 60 q.12 as patient just had a negative balance of 830 yesterday  Antibiotics were discontinued repeat chest x-ray done today still shows bilateral pleural effusions right greater than left white cell count is improving  Continue with amiodarone drip  Diarrhea is better  Continue with Vapotherm on 35 L FiO2 of 95%  Potassium is 3.8 Will give 20 of p.o. potassium today     Palliative care consulted and following  continue with p.r.n. Xopenex   On Cardizem, digoxin, Eliquis sulfate   Continue with speech PT and OT  PT/OT  Patient was denied by LTAC    Critical care note:  Critical care  diagnosis:  Acute hypoxemic respiratory failure secondary tod bilateral pleural effusions on Vapotherm, AFib with RVR on amiodarone drip  Critical care interventions: Hands-on evaluation, review of labs/radiographs/records and discussion with patient and family if present  Critical care time spent: 35 minutes   VTE prophylaxis:  Lovenox  Patient condition:  Stable   Anticipated discharge and Disposition:   LTAC placement      All diagnosis and differential diagnosis have been reviewed; assessment and plan has been documented; I have personally reviewed the labs and test results that are presently available; I have reviewed the patients medication list; I have reviewed the consulting providers response and recommendations. I have reviewed or attempted to review medical records based upon their availability    All of the patient's questions have been  addressed and answered. Patient's is agreeable to the above stated plan. I will continue to monitor closely and make adjustments to medical management as needed.  _____________________________________________________________________    Nutrition Status:    Radiology:  I have personally reviewed the following imaging and agree with the radiologist.     X-Ray Chest 1 View  Narrative: EXAMINATION:  XR CHEST 1 VIEW    CLINICAL HISTORY:  hypoxia;    TECHNIQUE:  Single view of the chest    COMPARISON:  06/09/2024    FINDINGS:  Cardiomegaly with postsurgical changes of median sternotomy.  Right greater than left pleural effusion remain with right lower lung zone opacification.  Recommend continued follow-up.  Impression: As above.    Electronically signed by: Sukhdev Chavez  Date:    06/12/2024  Time:    07:00      Naa Chin MD  Department of Hospital Medicine   [facility]   06/12/2024

## 2024-06-12 NOTE — PLAN OF CARE
Problem: Adult Inpatient Plan of Care  Goal: Plan of Care Review  6/12/2024 1025 by Ronnell Moya RN  Outcome: Not Progressing  6/12/2024 1025 by Ronnell Moya RN  Outcome: Progressing  Goal: Patient-Specific Goal (Individualized)  6/12/2024 1025 by Ronnell Moya RN  Outcome: Not Progressing  6/12/2024 1025 by Ronnell Moya RN  Outcome: Progressing  Goal: Absence of Hospital-Acquired Illness or Injury  6/12/2024 1025 by Ronnell Moya RN  Outcome: Not Progressing  6/12/2024 1025 by Ronnell Moya RN  Outcome: Progressing  Goal: Optimal Comfort and Wellbeing  6/12/2024 1025 by Ronnell Moya RN  Outcome: Not Progressing  6/12/2024 1025 by Ronnell Moya RN  Outcome: Progressing  Goal: Readiness for Transition of Care  6/12/2024 1025 by Ronnell Moya RN  Outcome: Not Progressing  6/12/2024 1025 by Ronnell Moya RN  Outcome: Progressing     Problem: Infection  Goal: Absence of Infection Signs and Symptoms  6/12/2024 1025 by Ronnell Moya RN  Outcome: Not Progressing  6/12/2024 1025 by Ronnell Moya RN  Outcome: Progressing     Problem: Skin Injury Risk Increased  Goal: Skin Health and Integrity  6/12/2024 1025 by Ronnell Moya RN  Outcome: Not Progressing  6/12/2024 1025 by Ronnell Moya RN  Outcome: Progressing     Problem: Fall Injury Risk  Goal: Absence of Fall and Fall-Related Injury  6/12/2024 1025 by Ronnell Moya RN  Outcome: Not Progressing  6/12/2024 1025 by Ronnell Moya RN  Outcome: Progressing     Problem: Coping Ineffective  Goal: Effective Coping  6/12/2024 1025 by Ronnell Moya RN  Outcome: Not Progressing  6/12/2024 1025 by Ronnell Moya RN  Outcome: Progressing

## 2024-06-12 NOTE — PROGRESS NOTES
"    [facility] - 7th Floor ICU    Cardiology  Progress Note    Patient Name: Alanna Moya  MRN: [id number]  Admission Date: 6/1/2024  Hospital Length of Stay: 11 days  Code Status: DNR   Attending Physician: Naa Chin MD   Primary Care Physician: Cornel Haddad MD  Expected Discharge Date:   Principal Problem:<principal problem not specified>    Subjective:   Reason for Consult: SVT     HPI:Ms. Moya is a 78 year old female, known to Dr. Ramon in [place], who presented to the hospital from [facility] as transfer due to respiratory failure. Upon arrival she was noted to be SOB. Noted hypoxia requiring BIPAP. Chesr Radiograph from outside facility revealed bilateral airspace opacification and increase his right-sided pleural effusion suggestive of worsening pneumonia or possibly edema. CT Chest revealed mixed picture of decompensated HF, pleural effusions and superimposed pneumonia. During this hospitalization patient with tachycardia concerning for AF RVR, with noted Left Bundle Branch Block. She was given IV Metoprolol and started on Cardizem Infusion for Acute HR Control. She does have history of PSVT. Echocardiogram on 6.2.24 revealed intact LV Function with EF 55-60% & Grade I DD. Electrolytes grossly stable. Troponin normal. Lactic Acid Normal. . CIS Consulted for AF Management.     Hospital Course:   6.7.24: Patient seen sitting in chair. She denies SOB, CP, or nausea. Remains in Afib RVR   6.8.24: NAD. VSS. No complaints of CP/SOB/Palps. "I feel okay" WBC 14.27  6.9.24: NAD. VSS. AFIB RVR on telemetry. No complaints CP/SOB/Palps. "I feel okay" possible aspiration - awaiting Speech Evaluation. K 3.7, Mag 1.8, WBC 16.21.  6.10.24: NAD Noted. Sitting up in chair. AF RVR. Requiring Vapotherm Support. Denies CP. Reports exertional SOB, however, functional capacity is limited as she is deconditioned. Hypertension Noted. Diuresis noted, - 1604 ML/24 Hours.   6.11.24: BP " Improved. AF RVR. Diuresis noted, - 2144 ML/24 Hours. Vapotherm oxygen support- 75% FIO2.   6.12.24: NAD Noted. Remains AF RVR. BP Stable. Requiring Significant Oxygen Support. Continues to Diurese well.     PMH: CAD/CABG, Hypertension, Dyslipidemia, MELCHOR, CEA, CVA, AS/TAVR, PSVT  PSH: LHC, CABG, TAVR, CEA, Hysterectomy, Cholecystectomy, Eye Surgery, Hand Surgery  Family History: Father- Old MI/CAD, Mother- HF, Son- Hypertension, Daughter- Cancer/Hypertension, Brother- Heart Disease, Brother- Old MI/CAD, Brother- Cancer  Social History: Tobacco- Negative, Alcohol- Negative, Substance Abuse- Negative      Previous Cardiac Diagnostics:   Echocardiogram (6.2.24):  Left Ventricle: The left ventricle is normal in size. Increased wall thickness. There is normal systolic function with a visually estimated ejection fraction of 55 - 60%. Grade I diastolic dysfunction.  Right Ventricle: Systolic function is reduced.TAPSE is 1.60 cm.  Aortic Valve: There is a transcatheter valve replacement in the aortic position. Aortic valve area by VTI is 1.56 cm². Aortic valve peak velocity is 1.94 m/s. Mean gradient is 8 mmHg. The dimensionless index is 0.50.  Mitral Valve: Mildly thickened leaflets. There is mitral annular calcification present. There is mild stenosis. The mean pressure gradient across the mitral valve is 5 mmHg at a heart rate of 87 bpm. There is mild regurgitation.  Tricuspid Valve: The tricuspid valve is structurally normal. There is mild regurgitation.  Pulmonic Valve: The pulmonic valve is structurally normal.  Pulmonary Artery: The estimated pulmonary artery systolic pressure is 47 mmHg.  IVC/SVC: Normal venous pressure at 3 mmHg.  Pericardium: Left pleural effusion.     Echocardiogram (5.15.24):  EF 55-60%. Grade I Diastolic Dysfunction. Normal LV Wall Motion.  Mild MAC with Mild MR and No MS. Mild to Moderate TR.  Mild to Moderate AI, No AS.     CT Angiogram Head/Neck (5.15.24):  Carotid arteries:   Calcified  plaque at the siphons without significant narrowing.   Normal A1 and M1 segments.   Vertebrobasilar Circulation:   Vertebral arteries: Patent. Calcified plaque on the right without significant narrowing. Otherwise no abnormalities.   Basilar artery: Patent, no abnormalities.   Posterior cerebral arteries:  Patent. Right fetal origin. Otherwise no abnormalities.      Echocardiogram (4.21.23):  The study quality is good.   The left ventricle is normal in size. Global left ventricular systolic function is normal. The left ventricular ejection fraction by Power's is 62%. The left ventricle diastolic function is impaired (Grade II) with an elevated left atrial pressure. Noted left ventricular hypertrophy. Concentric left ventricular hypertrophy is present. It is mild. LVSI=41ml/m2.    LVEDV=67.2ml and LVESV=25.7ml.  The left atrial diameter is moderately increased. Left atrial diameter is 4.4 cms. The left atrium is severely enlarged based on the left atrium volume index of 48.2ml/m².  S/P TAVR. Bioprosthetic aortic valve is well seated and functions normally with no evidence of insufficiency or perivalvular leaks. JACINTA=2.0cm2. The trans-aortic peak gradient is 15.8 mmHg. The trans-aortic mean gradient is 8.6 mmHg. DI=1.1.  Mild mitral annular calcification is noted. The mean trans mitral gradient is 1.9 mmHg.  Mild to moderate (1-2+) mitral regurgitation. Mild to moderate (1-2+) tricuspid regurgitation.  The estimated pulmonary artery systolic pressure is 41 mmHg assuming a right atrial pressure of 3 mmHg. Evidence of pulmonary hypertension is noted.      MCT Monitor (3.15.23):  Predominant Rhythm SR.  PSVT     TAVR (2.27.23):  TAVR  23  mm S3 ultra valve, nominal prep,  Right TF approach  Limited echo pre, post valve placement  Root angiography  Distal aortography  Temporary pacemaker placement removal  Manta closure  Right CFA access site.  U/S guided bilateral right groin access   Balloon angioplasty of right CFA  with a 5 x 40 Napoleon.     [facility] (1.26.23):  LM: Distal 50% Stenosis, LAD: Occluded Mid, LCX: Ostial 50% at Mid Portion, RCA: Dominant Patent Arter.  JENNINGS to LAD is Patent.  Impression: LIMA to LAD- Patent, 50% Stenosis LCX Artery  Medical Management.    Review of Systems   Cardiovascular:  Positive for dyspnea on exertion. Negative for chest pain.   Respiratory:  Negative for shortness of breath.    All other systems reviewed and are negative.    Objective:     Vital Signs (Most Recent):  Temp: 98.2 °F (36.8 °C) (06/12/24 0800)  Pulse: (!) 133 (06/12/24 1015)  Resp: 18 (06/12/24 0840)  BP: (!) 143/82 (06/12/24 0801)  SpO2: (!) 93 % (06/12/24 0900) Vital Signs (24h Range):  Temp:  [98.2 °F (36.8 °C)-98.9 °F (37.2 °C)] 98.2 °F (36.8 °C)  Pulse:  [] 133  Resp:  [18-26] 18  SpO2:  [86 %-95 %] 93 %  BP: (117-144)/() 143/82   Weight: 77.1 kg (170 lb)  Body mass index is 31.09 kg/m².  SpO2: (!) 93 %       Intake/Output Summary (Last 24 hours) at 6/12/2024 1138  Last data filed at 6/12/2024 0800  Gross per 24 hour   Intake 734.03 ml   Output 1790 ml   Net -1055.97 ml     Lines/Drains/Airways       Drain  Duration                  Urethral Catheter 06/09/24 1754 2 days              Peripheral Intravenous Line  Duration                  Peripheral IV - Single Lumen 06/07/24 0515 20 G Right Forearm 5 days         Peripheral IV - Single Lumen 06/07/24 1700 20 G Anterior;Proximal;Right Forearm 4 days         Peripheral IV - Single Lumen 06/08/24 0428 20 G Left Forearm 4 days                  Significant Labs:   Recent Results (from the past 72 hour(s))   Urinalysis, Reflex to Urine Culture    Collection Time: 06/09/24  5:49 PM    Specimen: Urine   Result Value Ref Range    Color, UA Light-Yellow Yellow, Light-Yellow, Colorless, Straw, Dark-Yellow    Appearance, UA Clear Clear    Specific Gravity, UA 1.014 1.005 - 1.030    pH, UA 5.5 5.0 - 8.5    Protein, UA Negative Negative    Glucose, UA Normal Negative, Normal     Ketones, UA Negative Negative    Blood, UA Negative Negative    Bilirubin, UA Negative Negative    Urobilinogen, UA Normal 0.2, 1.0, Normal    Nitrites, UA Negative Negative    Leukocyte Esterase, UA Negative Negative    WBC, UA 0-5 None Seen, 0-2, 3-5, 0-5 /HPF    Bacteria, UA Trace None Seen, Trace /HPF    Budding Yeast, UA Many (A) None Seen /HPF    Squamous Epithelial Cells, UA Trace None Seen /HPF    Mucous, UA Trace (A) None Seen /LPF    RBC, UA 0-5 None Seen, 0-2, 3-5, 0-5 /HPF   Comprehensive Metabolic Panel    Collection Time: 06/10/24  3:15 AM   Result Value Ref Range    Sodium 139 136 - 145 mmol/L    Potassium 3.0 (L) 3.5 - 5.1 mmol/L    Chloride 97 (L) 98 - 107 mmol/L    CO2 27 23 - 31 mmol/L    Glucose 128 (H) 82 - 115 mg/dL    Blood Urea Nitrogen 24.4 (H) 9.8 - 20.1 mg/dL    Creatinine 0.87 0.55 - 1.02 mg/dL    Calcium 8.8 8.4 - 10.2 mg/dL    Protein Total 5.9 5.8 - 7.6 gm/dL    Albumin 2.6 (L) 3.4 - 4.8 g/dL    Globulin 3.3 2.4 - 3.5 gm/dL    Albumin/Globulin Ratio 0.8 (L) 1.1 - 2.0 ratio    Bilirubin Total 0.8 <=1.5 mg/dL    ALP 72 40 - 150 unit/L    ALT 17 0 - 55 unit/L    AST 20 5 - 34 unit/L    eGFR >60 mL/min/1.73/m2    Anion Gap 15.0 mEq/L    BUN/Creatinine Ratio 28    CBC with Differential    Collection Time: 06/10/24  3:15 AM   Result Value Ref Range    WBC 14.87 (H) 4.50 - 11.50 x10(3)/mcL    RBC 3.42 (L) 4.20 - 5.40 x10(6)/mcL    Hgb 10.2 (L) 12.0 - 16.0 g/dL    Hct 31.5 (L) 37.0 - 47.0 %    MCV 92.1 80.0 - 94.0 fL    MCH 29.8 27.0 - 31.0 pg    MCHC 32.4 (L) 33.0 - 36.0 g/dL    RDW 12.8 11.5 - 17.0 %    Platelet 280 130 - 400 x10(3)/mcL    MPV 11.4 (H) 7.4 - 10.4 fL    Neut % 78.0 %    Lymph % 6.7 %    Mono % 12.4 %    Eos % 0.1 %    Basophil % 0.2 %    Lymph # 1.00 0.6 - 4.6 x10(3)/mcL    Neut # 11.60 (H) 2.1 - 9.2 x10(3)/mcL    Mono # 1.84 (H) 0.1 - 1.3 x10(3)/mcL    Eos # 0.01 0 - 0.9 x10(3)/mcL    Baso # 0.03 <=0.2 x10(3)/mcL    IG# 0.39 (H) 0 - 0.04 x10(3)/mcL    IG% 2.6 %    NRBC% 0.0  %   MRSA PCR    Collection Time: 06/10/24  7:18 AM   Result Value Ref Range    MRSA PCR Screen Not Detected Not Detected   Comprehensive Metabolic Panel    Collection Time: 06/11/24  3:19 AM   Result Value Ref Range    Sodium 138 136 - 145 mmol/L    Potassium 3.4 (L) 3.5 - 5.1 mmol/L    Chloride 97 (L) 98 - 107 mmol/L    CO2 29 23 - 31 mmol/L    Glucose 152 (H) 82 - 115 mg/dL    Blood Urea Nitrogen 19.5 9.8 - 20.1 mg/dL    Creatinine 0.85 0.55 - 1.02 mg/dL    Calcium 8.8 8.4 - 10.2 mg/dL    Protein Total 6.0 5.8 - 7.6 gm/dL    Albumin 2.6 (L) 3.4 - 4.8 g/dL    Globulin 3.4 2.4 - 3.5 gm/dL    Albumin/Globulin Ratio 0.8 (L) 1.1 - 2.0 ratio    Bilirubin Total 0.6 <=1.5 mg/dL    ALP 81 40 - 150 unit/L    ALT 13 0 - 55 unit/L    AST 17 5 - 34 unit/L    eGFR >60 mL/min/1.73/m2    Anion Gap 12.0 mEq/L    BUN/Creatinine Ratio 23    Magnesium    Collection Time: 06/11/24  3:19 AM   Result Value Ref Range    Magnesium Level 1.70 1.60 - 2.60 mg/dL   Phosphorus    Collection Time: 06/11/24  3:19 AM   Result Value Ref Range    Phosphorus Level 2.0 (L) 2.3 - 4.7 mg/dL   CBC with Differential    Collection Time: 06/11/24  3:19 AM   Result Value Ref Range    WBC 15.43 (H) 4.50 - 11.50 x10(3)/mcL    RBC 3.38 (L) 4.20 - 5.40 x10(6)/mcL    Hgb 10.2 (L) 12.0 - 16.0 g/dL    Hct 31.1 (L) 37.0 - 47.0 %    MCV 92.0 80.0 - 94.0 fL    MCH 30.2 27.0 - 31.0 pg    MCHC 32.8 (L) 33.0 - 36.0 g/dL    RDW 13.0 11.5 - 17.0 %    Platelet 247 130 - 400 x10(3)/mcL    MPV 11.6 (H) 7.4 - 10.4 fL    NRBC% 0.0 %   Manual Differential    Collection Time: 06/11/24  3:19 AM   Result Value Ref Range    WBC 15.43 x10(3)/mcL    Neutrophils % 84 %    Lymphs % 6 %    Monocytes % 10 %    Neutrophils Abs 12.9612 (H) 2.1 - 9.2 x10(3)/mcL    Lymphs Abs 0.9258 0.6 - 4.6 x10(3)/mcL    Monocytes Abs 1.543 (H) 0.1 - 1.3 x10(3)/mcL    Platelets Normal Normal, Adequate    RBC Morph Abnormal (A) Normal    Polychromasia Slight (A) (none)    Anisocytosis Slight (A) (none)     Macrocytosis Slight (A) (none)   Comprehensive Metabolic Panel    Collection Time: 06/12/24  4:47 AM   Result Value Ref Range    Sodium 137 136 - 145 mmol/L    Potassium 3.8 3.5 - 5.1 mmol/L    Chloride 95 (L) 98 - 107 mmol/L    CO2 32 (H) 23 - 31 mmol/L    Glucose 126 (H) 82 - 115 mg/dL    Blood Urea Nitrogen 15.9 9.8 - 20.1 mg/dL    Creatinine 0.82 0.55 - 1.02 mg/dL    Calcium 8.9 8.4 - 10.2 mg/dL    Protein Total 5.8 5.8 - 7.6 gm/dL    Albumin 2.8 (L) 3.4 - 4.8 g/dL    Globulin 3.0 2.4 - 3.5 gm/dL    Albumin/Globulin Ratio 0.9 (L) 1.1 - 2.0 ratio    Bilirubin Total 0.4 <=1.5 mg/dL    ALP 84 40 - 150 unit/L    ALT 11 0 - 55 unit/L    AST 15 5 - 34 unit/L    eGFR >60 mL/min/1.73/m2    Anion Gap 10.0 mEq/L    BUN/Creatinine Ratio 19    Digoxin Level    Collection Time: 06/12/24  4:47 AM   Result Value Ref Range    Digoxin Level 1.7 0.8 - 2.0 ng/mL   CBC with Differential    Collection Time: 06/12/24  4:47 AM   Result Value Ref Range    WBC 14.17 (H) 4.50 - 11.50 x10(3)/mcL    RBC 3.31 (L) 4.20 - 5.40 x10(6)/mcL    Hgb 9.9 (L) 12.0 - 16.0 g/dL    Hct 30.8 (L) 37.0 - 47.0 %    MCV 93.1 80.0 - 94.0 fL    MCH 29.9 27.0 - 31.0 pg    MCHC 32.1 (L) 33.0 - 36.0 g/dL    RDW 13.6 11.5 - 17.0 %    Platelet 275 130 - 400 x10(3)/mcL    MPV 11.6 (H) 7.4 - 10.4 fL    Neut % 74.7 %    Lymph % 8.5 %    Mono % 14.0 %    Eos % 0.1 %    Basophil % 0.2 %    Lymph # 1.20 0.6 - 4.6 x10(3)/mcL    Neut # 10.59 (H) 2.1 - 9.2 x10(3)/mcL    Mono # 1.99 (H) 0.1 - 1.3 x10(3)/mcL    Eos # 0.01 0 - 0.9 x10(3)/mcL    Baso # 0.03 <=0.2 x10(3)/mcL    IG# 0.35 (H) 0 - 0.04 x10(3)/mcL    IG% 2.5 %    NRBC% 0.0 %     Telemetry:  AF RVR    Physical Exam  Vitals and nursing note reviewed.   Constitutional:       General: She is not in acute distress.     Appearance: She is ill-appearing.   HENT:      Head: Normocephalic.      Mouth/Throat:      Mouth: Mucous membranes are moist.   Cardiovascular:      Rate and Rhythm: Tachycardia present. Rhythm  irregular.   Pulmonary:      Effort: Pulmonary effort is normal. No respiratory distress.      Comments: Vapotherm 95% 35 L/Min; Bilateral Diminished Breath Sounds Posteriorly   Skin:     General: Skin is warm and dry.   Neurological:      Mental Status: She is alert. Mental status is at baseline.   Psychiatric:         Behavior: Behavior normal.       Current Inpatient Medications:  Current Facility-Administered Medications:     0.9%  NaCl infusion, , Intravenous, PRN, Vishnu Stevenson MD, Stopped at 06/08/24 0133    acetaminophen tablet 650 mg, 650 mg, Oral, Q6H PRN, Naa Chin MD, 650 mg at 06/10/24 1115    amiodarone 360 mg/200 mL (1.8 mg/mL) infusion, 0.5 mg/min, Intravenous, Continuous, Nataly Mead, FNP, Last Rate: 16.7 mL/hr at 06/12/24 0550, 0.5 mg/min at 06/12/24 0550    digoxin tablet 0.125 mg, 0.125 mg, Oral, Daily, Nataly Mead, FNP, 0.125 mg at 06/12/24 0840    [START ON 6/13/2024] diltiaZEM 24 hr capsule 360 mg, 360 mg, Oral, Daily, Sukhdev Hoskins MD    docusate 50 mg/5 mL liquid 50 mg, 50 mg, Oral, Daily, Marga Smith MD, 50 mg at 06/09/24 0817    furosemide injection 60 mg, 60 mg, Intravenous, Q12H, Naa Chin MD    gabapentin capsule 600 mg, 600 mg, Oral, BID, Marga Smith MD, 600 mg at 06/12/24 0840    hydrALAZINE injection 10 mg, 10 mg, Intravenous, Q6H PRN, Eleno Abbasi MD, 10 mg at 06/06/24 1631    labetaloL injection 10 mg, 10 mg, Intravenous, Q4H PRN, Baldo Finney DO, 10 mg at 06/11/24 0257    Lactobacillus rhamnosus GG 5 billion cell packet (PEDS) 1 each, 1 packet, Oral, BID, Naa Chin MD, 1 each at 06/12/24 0840    levalbuterol nebulizer solution 1.25 mg, 1.25 mg, Nebulization, Q6H PRN, Shanna Montiel MD, 1.25 mg at 06/09/24 1200    LIDOcaine 5 % patch 2 patch, 2 patch, Transdermal, Q24H, Sheri Carson, NP, 2 patch at 06/11/24 1548    melatonin tablet 6 mg, 6 mg, Oral, Nightly PRN, Naa Chin MD, 6 mg at 06/11/24 6324    metoprolol injection 5  mg, 5 mg, Intravenous, Q4H PRN, Justyn Landaverde MD, 5 mg at 06/12/24 0549    metoprolol tartrate (LOPRESSOR) tablet 50 mg, 50 mg, Oral, BID, Sukhdev Hoskins MD, 50 mg at 06/12/24 1015    morphine injection 2 mg, 2 mg, Intravenous, Q4H PRN, Sheri Carson, NP    ondansetron injection 4 mg, 4 mg, Intravenous, Q4H PRN, Julissa Rinaldi FNP, 4 mg at 06/11/24 0405    sertraline tablet 100 mg, 100 mg, Oral, QHS, Marga Smith MD, 100 mg at 06/11/24 2133    traMADoL tablet 50 mg, 50 mg, Oral, TID, Vishnu Stevenson MD, 50 mg at 06/12/24 0840  VTE Risk Mitigation (From admission, onward)           Ordered     IP VTE HIGH RISK PATIENT  Once         06/01/24 2247                  Assessment:   Atrial Fibrillation with RVR (New Onset)- Now AF RVR    - [id number]: 7    - History of PSVT    - On FD Eliquis for Stroke Risk Reduction  Acute on Chronic Diastolic HF    - EF 55-60% with Grade I Diastolic Dysfunction  Aspiration Pneumonia    - Cleared Swallowing Study  Acute Hypoxemic Respiratory Failure Requiring Oxygenation - on High Flow NC/Vapotherm  Valvular Heart Disease    - AS: Status Post TAVR  23  mm S3 ultra valve, nominal prep,  Right TF approach (2.27.23)  CAD/CABG    - LIMA to LAD (Patent) with 50% Stenosis Native LCX  Hypertension   Hyperlipidemia  Chronic Left Bundle Branch Block ([place] TAVR)  MELCHOR/CEA  History of CVA  Anemia  DNR Status   No known history of GI Bleed     Plan:   Continue Amiodarone Infusion at Set Rate 0.5 Mg/Min   Advance Cardizem CD to 360 Mg PO Daily   Start Metoprolol Tartrate 50 Mg PO BID  Continue Digoxin 0.125 Mg PO Daily  Continue Eliquis 5 Mg PO BID Re: AF/Stroke Risk Reduction  Continue Diuretics as Clinically Indicated; Ensure Accurate I/O  K+ Goal 4, Mag Goal 2; Replete K+ & Mag Today  Lopressor IV PRN HR > 120  Consult EP in AM for AF Management    STEF Iqbal  Cardiology  [facility] - 7th Floor ICU  06/12/2024    I agree with the findings of the complexity  of problems addressed and take responsibility for the plan's risks and complications. I approved the plan documented by Nataly Mead NP.  Needs EP eval can't control  a-fib

## 2024-06-12 NOTE — MEDICAL/APP STUDENT
[facility] - 7th Floor ICU  Pulmonary Critical Care Note    Patient Name: Alanna Moya  MRN: [id number]  Admission Date: 6/1/2024  Hospital Length of Stay: 11 days  Code Status: DNR  Attending Provider: Naa Chin MD  Primary Care Provider: Cornel Haddad MD     Subjective:     HPI:   78F presented as a transfer from outside hospital with BiPAP requirements, initially admitted to intensive care unit but downgraded to floor following robust response to diuretic therapy.  Has been managed for volume overload and pneumonia on hospital medicine service.  Patient has elected for DNR code status.  Pulmonary medicine consult for worsening hypoxemia. Repeat chest x-ray 06/09/2024 with changes concerning for worsening volume overload.     24 Hour Interval History:  Currently on vapotherm 40L 100% FiO2, reports SOB during the night requiring bipap. O2 sat 86% while using IS, increased to 92% at rest. Output -830.5 mL over the past 24 hours. Afebrile, mild leukocytosis which is stable, diarrhea has resolved. Was started on amiodarone infusion for afib with RVR. CXR this AM as follows:     EXAMINATION:  XR CHEST 1 VIEW     CLINICAL HISTORY:  hypoxia;     TECHNIQUE:  Single view of the chest     COMPARISON:  06/09/2024     FINDINGS:  Cardiomegaly with postsurgical changes of median sternotomy.  Right greater than left pleural effusion remain with right lower lung zone opacification.  Recommend continued follow-up.     Impression:     As above.        Electronically signed by:Sukhdev Chavez  Date:                                            06/12/2024  Time:                                           07:00    Past Medical History:   Diagnosis Date    Abdominal pain, unspecified site     Aortic stenosis     Coronary atherosclerosis of unspecified type of vessel, native or graft     Esophageal reflux     Fatigue     History of glaucoma     History of heart attack     Hypertension     Lumbosacral  spondylosis without myelopathy     Other and unspecified hyperlipidemia     Rectocele     SOB (shortness of breath) on exertion     Unspecified essential hypertension     Weakness        Past Surgical History:   Procedure Laterality Date    CHOLECYSTECTOMY      CORONARY ARTERY BYPASS GRAFT      GLAUCOMA SURGERY Bilateral     HYSTERECTOMY      TRANSCATHETER AORTIC VALVE REPLACEMENT (TAVR) N/A 2/27/2023    Procedure: REPLACEMENT, AORTIC VALVE, TRANSCATHETER (TAVR);  Surgeon: Anselmo Arango MD;  Location: [facility] CATH LAB;  Service: Cardiology;  Laterality: N/A;  TAVR W/ ANEST.       Social History     Socioeconomic History    Marital status:    Tobacco Use    Smoking status: Never    Smokeless tobacco: Never   Substance and Sexual Activity    Alcohol use: No    Drug use: No    Sexual activity: Never     Birth control/protection: Surgical     Social Determinants of Health     Financial Resource Strain: Low Risk  (5/3/2021)    Received from [facility]    Overall Financial Resource Strain (CARDIA)     Difficulty of Paying Living Expenses: Not hard at all   Food Insecurity: No Food Insecurity (5/3/2021)    Received from [facility]    Hunger Vital Sign     Worried About Running Out of Food in the Last Year: Never true     Ran Out of Food in the Last Year: Never true   Transportation Needs: No Transportation Needs (5/3/2021)    Received from [facility]    PRAPARE - Transportation     Lack of Transportation (Medical): No     Lack of Transportation (Non-Medical): No   Physical Activity: Sufficiently Active (5/3/2021)    Received from [facility]    Exercise Vital Sign     Days of Exercise per Week: 2 days     Minutes of Exercise per Session: 130 min   Stress: Stress Concern Present (5/3/2021)    Received from [facility]    [employer] [place] [employer] - Occupational Stress Questionnaire     Feeling of Stress : Very much           Current Outpatient Medications   Medication Instructions    amLODIPine  (NORVASC) 10 mg, Oral, 2 times daily    aspirin 325 MG tablet 1 tablet, Oral, Daily    carvediloL (COREG) 12.5 mg, Oral, 2 times daily with meals    cholecalciferol, vitamin D3, (VITAMIN D3) 50 mcg (2,000 unit) Cap capsule 1 capsule, Oral, Daily    cyanocobalamin, vitamin B-12, 2,000 mcg Tab 1 tablet, Oral, Daily    cyclobenzaprine (FLEXERIL) 10 mg, Oral, 3 times daily    docusate sodium (STOOL SOFTENER ORAL) 2 capsules, Oral, 2 times daily    furosemide (LASIX) 40 mg, Oral, Daily    hydrALAZINE (APRESOLINE) 25 mg, Oral, Every 8 hours    L. acidophilus/Bifid. animalis (DAILY PROBIOTIC ORAL) 1 capsule, Oral, Daily    potassium gluconate 650 mg, Oral, Once    rosuvastatin (CRESTOR) 40 mg, Oral, Nightly    sertraline (ZOLOFT) 100 mg, Oral, Nightly    traMADoL (ULTRAM) 50 mg, Oral, 3 times daily       Current Inpatient Medications   apixaban  5 mg Oral BID    digoxin  0.125 mg Oral Daily    diltiaZEM  240 mg Oral Daily    docusate  50 mg Oral Daily    furosemide (LASIX) injection  40 mg Intravenous Q12H    gabapentin  600 mg Oral BID    Lactobacillus rhamnosus GG  1 packet Oral BID    LIDOcaine  2 patch Transdermal Q24H    sertraline  100 mg Oral QHS    traMADoL  50 mg Oral TID       Current Intravenous Infusions   amiodarone in dextrose 5%  0.5 mg/min Intravenous Continuous 16.7 mL/hr at 06/12/24 0550 0.5 mg/min at 06/12/24 0550         Review of Systems   Constitutional:  Negative for chills, diaphoresis and fever.   Respiratory:  Positive for shortness of breath.    All other systems reviewed and are negative.         Objective:       Intake/Output Summary (Last 24 hours) at 6/12/2024 0818  Last data filed at 6/12/2024 0601  Gross per 24 hour   Intake 904.45 ml   Output 1690 ml   Net -785.55 ml         Vital Signs (Most Recent):  Temp: 98.3 °F (36.8 °C) (06/12/24 0400)  Pulse: (!) 118 (06/12/24 0400)  Resp: 19 (06/12/24 0400)  BP: 118/81 (06/12/24 0549)  SpO2: (!) 92 % (06/12/24 0260)  Body mass index is 31.09  kg/m².  Weight: 77.1 kg (170 lb) Vital Signs (24h Range):  Temp:  [98.2 °F (36.8 °C)-98.9 °F (37.2 °C)] 98.3 °F (36.8 °C)  Pulse:  [] 118  Resp:  [18-26] 19  SpO2:  [88 %-95 %] 92 %  BP: (117-144)/() 118/81     Physical Exam  Vitals reviewed.   Constitutional:       Appearance: Normal appearance.   HENT:      Head: Normocephalic and atraumatic.      Mouth/Throat:      Mouth: Mucous membranes are moist.      Pharynx: Oropharynx is clear.   Eyes:      Extraocular Movements: Extraocular movements intact.   Cardiovascular:      Rate and Rhythm: Normal rate and regular rhythm.      Heart sounds: Normal heart sounds.   Pulmonary:      Breath sounds: Rales present.   Musculoskeletal:      Cervical back: Normal range of motion.   Skin:     General: Skin is warm and dry.   Neurological:      General: No focal deficit present.      Mental Status: She is alert and oriented to person, place, and time.   Psychiatric:         Mood and Affect: Mood normal.         Behavior: Behavior normal.           Lines/Drains/Airways       Drain  Duration                  Urethral Catheter 06/09/24 1754 2 days              Peripheral Intravenous Line  Duration                  Peripheral IV - Single Lumen 06/07/24 0515 20 G Right Forearm 5 days         Peripheral IV - Single Lumen 06/07/24 1700 20 G Anterior;Proximal;Right Forearm 4 days         Peripheral IV - Single Lumen 06/08/24 0428 20 G Left Forearm 4 days                    Significant Labs:    Lab Results   Component Value Date    WBC 14.17 (H) 06/12/2024    HGB 9.9 (L) 06/12/2024    HCT 30.8 (L) 06/12/2024    MCV 93.1 06/12/2024     06/12/2024           BMP  Lab Results   Component Value Date     06/12/2024    K 3.8 06/12/2024    CHLORIDE 97 05/04/2021    CO2 32 (H) 06/12/2024    BUN 15.9 06/12/2024    CREATININE 0.82 06/12/2024    CALCIUM 8.9 06/12/2024    AGAP 10.0 06/12/2024    ESTGFRAFRICA 92 05/16/2024    EGFRNONAA 28.1 (A) 08/10/2017         ABG  No  "results for input(s): "PH", "PO2", "PCO2", "HCO3", "POCBASEDEF" in the last 168 hours.    Mechanical Ventilation Support:  Oxygen Concentration (%): 90 (06/12/24 0325)      Significant Imaging:  I have reviewed the pertinent imaging within the past 24 hours.        Assessment/Plan:     Assessment  Acute hypoxic respiratory failure  Diastolic heart failure  Right ventricular failure with potential pulmonary hypertension  Atrial fibrillation    Plan  -continue diuresis -- CXR this AM with changes of evolving R.sided pleural effusion  -continue HR control per cardiology   -wean o2 as tolerated  -patient wishes to remain DO NOT RESUSCITATE/DO NOT INTUBATE      HARSHIL Yen-S3  Pulmonary Critical Care Medicine  [facility] - 7th Floor ICU  DOS: 06/12/2024    "

## 2024-06-12 NOTE — PROGRESS NOTES
Inpatient Nutrition Assessment    Admit Date: 6/1/2024   Total duration of encounter: 11 days   Patient Age: 78 y.o.    Nutrition Recommendation/Prescription     Continue current diet (Diet Adult Regular Cardiac (Low Na/Chol)) as tolerated.   Discontinue Boost Plus (provides 360 kcal, 14 g protein per serving) BID.    Communication of Recommendations: reviewed with nurse    Nutrition Assessment     Malnutrition Assessment/Nutrition-Focused Physical Exam    Malnutrition Context: other (see comments) (does not meet criteria) (06/03/24 1413)  Malnutrition Level: other (see comments) (does not meet criteria) (06/03/24 1413)  Energy Intake (Malnutrition): other (see comments) (does not meet criteria) (06/03/24 1413)  Weight Loss (Malnutrition): other (see comments) (does not meet criteria) (06/03/24 1413)  Subcutaneous Fat (Malnutrition): other (see comments) (does not meet criteria) (06/03/24 1413)           Muscle Mass (Malnutrition): other (see comments) (does not meet criteria) (06/03/24 1413)                          Fluid Accumulation (Malnutrition): other (see comments) (does not meet criteria) (06/03/24 1413)        A minimum of two characteristics is recommended for diagnosis of either severe or non-severe malnutrition.    Chart Review    Reason Seen: follow-up    Malnutrition Screening Tool Results   Have you recently lost weight without trying?: No  Have you been eating poorly because of a decreased appetite?: No   MST Score: 0   Diagnosis:  Acute respiratory failure alternating between BiPAP and Vapotherm   CHF exacerbation  Pulmonary Edema vs Pneumonia  HTN  Aortic Valve replacement     Relevant Medical History:    Abdominal pain      Aortic stenosis      Coronary atherosclerosis      Esophageal reflux      Fatigue      Glaucoma      Heart attack      Hypertension      Lumbosacral spondylosis without myelopathy      Other and unspecified hyperlipidemia      Rectocele      SOB (shortness of breath) on  exertion      CABG and AVR       Scheduled Medications:  apixaban, 5 mg, BID  digoxin, 0.125 mg, Daily  [START ON 6/13/2024] diltiaZEM, 360 mg, Daily  diltiaZEM, 120 mg, Once  docusate, 50 mg, Daily  furosemide (LASIX) injection, 40 mg, Q12H  gabapentin, 600 mg, BID  Lactobacillus rhamnosus GG, 1 packet, BID  LIDOcaine, 2 patch, Q24H  metoprolol tartrate, 50 mg, BID  sertraline, 100 mg, QHS  traMADoL, 50 mg, TID    Continuous Infusions: none  amiodarone in dextrose 5%, Last Rate: 0.5 mg/min (06/12/24 0550)    PRN Medications:  sodium chloride 0.9%, , PRN  acetaminophen, 650 mg, Q6H PRN  hydrALAZINE, 10 mg, Q6H PRN  labetalol, 10 mg, Q4H PRN  levalbuterol, 1.25 mg, Q6H PRN  melatonin, 6 mg, Nightly PRN  metoprolol, 5 mg, Q4H PRN  morphine, 2 mg, Q4H PRN  ondansetron, 4 mg, Q4H PRN    Calorie Containing IV Medications: no significant kcals from medications at this time    Recent Labs   Lab 06/05/24  2135 06/05/24  2135 06/06/24  0325 06/07/24  0645 06/07/24  2054 06/08/24  0419 06/09/24  0301 06/10/24  0315 06/11/24  0319 06/12/24  0447     --  140 140 135* 139 136 139 138 137   K 3.3*  --  3.7 3.2* 3.4* 3.7 3.7 3.0* 3.4* 3.8   CALCIUM 10.2  --  10.0 9.3 9.0 8.9 8.6 8.8 8.8 8.9   PHOS  --   --   --   --   --  2.1*  --   --  2.0*  --    MG 2.10  --   --  1.90 2.30 2.10 1.80  --  1.70  --    CO2 29  --  28 28 26 28 25 27 29 32*   BUN 47.0*  --  55.5* 43.5* 37.9* 35.2* 30.0* 24.4* 19.5 15.9   CREATININE 0.88  --  0.95 0.87 0.89 0.88 0.90 0.87 0.85 0.82   EGFRNORACEVR >60  --  >60 >60 >60 >60 >60 >60 >60 >60   GLUCOSE 210*  --  227* 147* 242* 166* 138* 128* 152* 126*   BILITOT  --   --  0.4 0.6  --  0.6 0.5 0.8 0.6 0.4   ALKPHOS  --   --  63 63  --  69 76 72 81 84   ALT  --   --  12 13  --  17 15 17 13 11   AST  --   --  12 15  --  18 18 20 17 15   ALBUMIN  --   --  2.6* 2.8*  --  2.7* 2.7* 2.6* 2.6* 2.8*   WBC  --    < > 15.30* 12.92*  --  14.27* 16.21* 14.87* 15.43  15.43* 14.17*   HGB  --   --  10.8* 11.2*  --   "10.7* 10.7* 10.2* 10.2* 9.9*   HCT  --   --  33.6* 34.8*  --  32.7* 32.7* 31.5* 31.1* 30.8*    < > = values in this interval not displayed.     Nutrition Orders:  Diet Adult Regular Cardiac (Low Na/Chol)  Dietary nutrition supplements Boost Glucose Control - Chocolate; BID    Appetite/Oral Intake: good/% of meals  Factors Affecting Nutritional Intake: none identified  Social Needs Impacting Access to Food: none identified  Food/Scientologist/Cultural Preferences: none reported  Food Allergies: no known food allergies  Last Bowel Movement: 06/12/24  Wound(s):  none noted    Comments    6/3/24: Patient currently NPO on BiPAP/Vapotherm. Clinimix running via PICC. Patient reports good-fair oral intake prior to admit. Denies any unintentional wt loss.     6/7/24 Patient started on a diet, PPN discontinued, patient reports a good appetite, was not liking the food on full liquid diet this morning, agreeable to chocolate Boost, nurse reports patient eating about 75% of meals.    6/12/24 Good appetite/intake continues, not drinking much Boost, will discontinue.    Anthropometrics    Height: 5' 2" (157.5 cm),    Last Weight: 77.1 kg (170 lb) (06/01/24 2230), Weight Method: Standard Scale  BMI (Calculated): 31.1  BMI Classification: obese grade I (BMI 30-34.9)     Ideal Body Weight (IBW), Female: 110 lb     % Ideal Body Weight, Female (lb): 154.55 %                             Usual Weight Provided By: patient denies unintentional weight loss    Wt Readings from Last 5 Encounters:   06/01/24 77.1 kg (170 lb)   02/27/23 77 kg (169 lb 12.1 oz)   02/15/23 74.8 kg (165 lb)   08/10/17 78.8 kg (173 lb 11.6 oz)   08/10/17 78.8 kg (173 lb 11.6 oz)     Weight Change(s) Since Admission:   (6/7) no new weights  Wt Readings from Last 1 Encounters:   06/01/24 2230 77.1 kg (170 lb)   Admit Weight: 77.1 kg (170 lb) (06/01/24 5690), Weight Method: Stated    Estimated Needs    Weight Used For Calorie Calculations: 77.1 kg (169 lb 15.6 " oz)  Energy Calorie Requirements (kcal): 1467 kcal/day (mifflin st jeor x 1.2 SF)  Energy Need Method: [place]-St Jeor  Weight Used For Protein Calculations: 77.1 kg (169 lb 15.6 oz)  Protein Requirements: 85 g/day (1.1 g/kg/d)  Fluid Requirements (mL): 1467 mL/day (1mL/kcal)        Enteral Nutrition     Patient not receiving enteral nutrition at this time.    Parenteral Nutrition     Patient no longer receiving parenteral nutrition support.    Evaluation of Received Nutrient Intake    Calories: meeting estimated needs  Protein: meeting estimated needs    Patient Education     Not applicable.    Nutrition Diagnosis     PES: Inadequate oral intake related to acute illness as evidenced by NPO due to BiPAP. (resolved)     Nutrition Interventions     Intervention(s): general/healthful diet, commercial beverage, and collaboration with other providers    Goal: Meet greater than 80% of nutritional needs by follow-up. (goal met)  Goal: Maintain weight throughout hospitalization. (goal progressing)    Nutrition Goals & Monitoring     Dietitian will monitor: food and beverage intake, energy intake, weight, electrolyte/renal panel, and glucose/endocrine profile  Discharge planning: continue heart healthy diet  Nutrition Risk/Follow-Up: low (follow-up in 5-7 days)   Please consult if re-assessment needed sooner.

## 2024-06-13 LAB
ALBUMIN SERPL-MCNC: 2.5 G/DL (ref 3.4–4.8)
ALBUMIN/GLOB SERPL: 0.7 RATIO (ref 1.1–2)
ALP SERPL-CCNC: 77 UNIT/L (ref 40–150)
ALT SERPL-CCNC: 9 UNIT/L (ref 0–55)
ANION GAP SERPL CALC-SCNC: 12 MEQ/L
AST SERPL-CCNC: 16 UNIT/L (ref 5–34)
BASOPHILS # BLD AUTO: 0.02 X10(3)/MCL
BASOPHILS NFR BLD AUTO: 0.2 %
BILIRUB SERPL-MCNC: 0.4 MG/DL
BUN SERPL-MCNC: 20.7 MG/DL (ref 9.8–20.1)
CALCIUM SERPL-MCNC: 9 MG/DL (ref 8.4–10.2)
CHLORIDE SERPL-SCNC: 94 MMOL/L (ref 98–107)
CO2 SERPL-SCNC: 28 MMOL/L (ref 23–31)
CREAT SERPL-MCNC: 0.98 MG/DL (ref 0.55–1.02)
CREAT/UREA NIT SERPL: 21
EOSINOPHIL # BLD AUTO: 0.02 X10(3)/MCL (ref 0–0.9)
EOSINOPHIL NFR BLD AUTO: 0.2 %
ERYTHROCYTE [DISTWIDTH] IN BLOOD BY AUTOMATED COUNT: 13.9 % (ref 11.5–17)
GFR SERPLBLD CREATININE-BSD FMLA CKD-EPI: 60 ML/MIN/1.73/M2
GLOBULIN SER-MCNC: 3.6 GM/DL (ref 2.4–3.5)
GLUCOSE SERPL-MCNC: 103 MG/DL (ref 82–115)
HCT VFR BLD AUTO: 28.4 % (ref 37–47)
HGB BLD-MCNC: 9 G/DL (ref 12–16)
IMM GRANULOCYTES # BLD AUTO: 0.35 X10(3)/MCL (ref 0–0.04)
IMM GRANULOCYTES NFR BLD AUTO: 3 %
LYMPHOCYTES # BLD AUTO: 1.12 X10(3)/MCL (ref 0.6–4.6)
LYMPHOCYTES NFR BLD AUTO: 9.7 %
MAGNESIUM SERPL-MCNC: 1.6 MG/DL (ref 1.6–2.6)
MCH RBC QN AUTO: 29.7 PG (ref 27–31)
MCHC RBC AUTO-ENTMCNC: 31.7 G/DL (ref 33–36)
MCV RBC AUTO: 93.7 FL (ref 80–94)
MONOCYTES # BLD AUTO: 1.69 X10(3)/MCL (ref 0.1–1.3)
MONOCYTES NFR BLD AUTO: 14.6 %
NEUTROPHILS # BLD AUTO: 8.35 X10(3)/MCL (ref 2.1–9.2)
NEUTROPHILS NFR BLD AUTO: 72.3 %
NRBC BLD AUTO-RTO: 0 %
PHOSPHATE SERPL-MCNC: 3.3 MG/DL (ref 2.3–4.7)
PLATELET # BLD AUTO: 255 X10(3)/MCL (ref 130–400)
PMV BLD AUTO: 11.7 FL (ref 7.4–10.4)
POTASSIUM SERPL-SCNC: 3.8 MMOL/L (ref 3.5–5.1)
PROT SERPL-MCNC: 6.1 GM/DL (ref 5.8–7.6)
RBC # BLD AUTO: 3.03 X10(6)/MCL (ref 4.2–5.4)
SODIUM SERPL-SCNC: 134 MMOL/L (ref 136–145)
WBC # SPEC AUTO: 11.55 X10(3)/MCL (ref 4.5–11.5)

## 2024-06-13 PROCEDURE — 85025 COMPLETE CBC W/AUTO DIFF WBC: CPT

## 2024-06-13 PROCEDURE — 63600175 PHARM REV CODE 636 W HCPCS

## 2024-06-13 PROCEDURE — 25000003 PHARM REV CODE 250: Performed by: HOSPITALIST

## 2024-06-13 PROCEDURE — 99900031 HC PATIENT EDUCATION (STAT)

## 2024-06-13 PROCEDURE — 25000003 PHARM REV CODE 250

## 2024-06-13 PROCEDURE — 36415 COLL VENOUS BLD VENIPUNCTURE: CPT

## 2024-06-13 PROCEDURE — 63600175 PHARM REV CODE 636 W HCPCS: Performed by: INTERNAL MEDICINE

## 2024-06-13 PROCEDURE — 94640 AIRWAY INHALATION TREATMENT: CPT

## 2024-06-13 PROCEDURE — 99900035 HC TECH TIME PER 15 MIN (STAT)

## 2024-06-13 PROCEDURE — 84100 ASSAY OF PHOSPHORUS: CPT | Performed by: INTERNAL MEDICINE

## 2024-06-13 PROCEDURE — 80053 COMPREHEN METABOLIC PANEL: CPT

## 2024-06-13 PROCEDURE — 94660 CPAP INITIATION&MGMT: CPT

## 2024-06-13 PROCEDURE — 25000003 PHARM REV CODE 250: Performed by: INTERNAL MEDICINE

## 2024-06-13 PROCEDURE — 83735 ASSAY OF MAGNESIUM: CPT | Performed by: INTERNAL MEDICINE

## 2024-06-13 PROCEDURE — 27100171 HC OXYGEN HIGH FLOW UP TO 24 HOURS

## 2024-06-13 PROCEDURE — 21400001 HC TELEMETRY ROOM

## 2024-06-13 PROCEDURE — 94760 N-INVAS EAR/PLS OXIMETRY 1: CPT

## 2024-06-13 PROCEDURE — 25000003 PHARM REV CODE 250: Performed by: NURSE PRACTITIONER

## 2024-06-13 RX ORDER — AMIODARONE HYDROCHLORIDE 200 MG/1
400 TABLET ORAL 2 TIMES DAILY
Status: COMPLETED | OUTPATIENT
Start: 2024-06-13 | End: 2024-06-15

## 2024-06-13 RX ORDER — AMIODARONE HYDROCHLORIDE 200 MG/1
200 TABLET ORAL DAILY
Status: DISCONTINUED | OUTPATIENT
Start: 2024-06-19 | End: 2024-06-16

## 2024-06-13 RX ORDER — POTASSIUM CHLORIDE 20 MEQ/1
40 TABLET, EXTENDED RELEASE ORAL ONCE
Status: COMPLETED | OUTPATIENT
Start: 2024-06-13 | End: 2024-06-13

## 2024-06-13 RX ORDER — METOPROLOL TARTRATE 25 MG/1
25 TABLET, FILM COATED ORAL ONCE
Status: COMPLETED | OUTPATIENT
Start: 2024-06-13 | End: 2024-06-13

## 2024-06-13 RX ORDER — AMIODARONE HYDROCHLORIDE 200 MG/1
200 TABLET ORAL 2 TIMES DAILY
Status: DISCONTINUED | OUTPATIENT
Start: 2024-06-16 | End: 2024-06-16

## 2024-06-13 RX ORDER — LORAZEPAM 0.5 MG/1
0.5 TABLET ORAL EVERY 4 HOURS PRN
Status: DISCONTINUED | OUTPATIENT
Start: 2024-06-13 | End: 2024-06-23 | Stop reason: HOSPADM

## 2024-06-13 RX ORDER — MORPHINE SULFATE 4 MG/ML
2 INJECTION, SOLUTION INTRAMUSCULAR; INTRAVENOUS EVERY 4 HOURS PRN
Status: DISCONTINUED | OUTPATIENT
Start: 2024-06-13 | End: 2024-06-19

## 2024-06-13 RX ORDER — MAGNESIUM SULFATE HEPTAHYDRATE 40 MG/ML
4 INJECTION, SOLUTION INTRAVENOUS ONCE
Status: COMPLETED | OUTPATIENT
Start: 2024-06-13 | End: 2024-06-13

## 2024-06-13 RX ADMIN — TRAMADOL HYDROCHLORIDE 50 MG: 50 TABLET, COATED ORAL at 04:06

## 2024-06-13 RX ADMIN — GABAPENTIN 600 MG: 300 CAPSULE ORAL at 09:06

## 2024-06-13 RX ADMIN — METOPROLOL TARTRATE 25 MG: 25 TABLET, FILM COATED ORAL at 11:06

## 2024-06-13 RX ADMIN — MORPHINE SULFATE 2 MG: 4 INJECTION INTRAVENOUS at 12:06

## 2024-06-13 RX ADMIN — Medication 6 MG: at 09:06

## 2024-06-13 RX ADMIN — TRAMADOL HYDROCHLORIDE 50 MG: 50 TABLET, COATED ORAL at 09:06

## 2024-06-13 RX ADMIN — AMIODARONE HYDROCHLORIDE 400 MG: 200 TABLET ORAL at 11:06

## 2024-06-13 RX ADMIN — TRAMADOL HYDROCHLORIDE 50 MG: 50 TABLET, COATED ORAL at 08:06

## 2024-06-13 RX ADMIN — Medication 6 MG: at 12:06

## 2024-06-13 RX ADMIN — DIGOXIN 0.12 MG: 125 TABLET ORAL at 08:06

## 2024-06-13 RX ADMIN — Medication 1 EACH: at 09:06

## 2024-06-13 RX ADMIN — METOPROLOL TARTRATE 50 MG: 50 TABLET, FILM COATED ORAL at 08:06

## 2024-06-13 RX ADMIN — LIDOCAINE 2 PATCH: 700 PATCH TOPICAL at 02:06

## 2024-06-13 RX ADMIN — AMIODARONE HYDROCHLORIDE 400 MG: 200 TABLET ORAL at 09:06

## 2024-06-13 RX ADMIN — FUROSEMIDE 60 MG: 10 INJECTION, SOLUTION INTRAMUSCULAR; INTRAVENOUS at 08:06

## 2024-06-13 RX ADMIN — MAGNESIUM SULFATE HEPTAHYDRATE 4 G: 40 INJECTION, SOLUTION INTRAVENOUS at 11:06

## 2024-06-13 RX ADMIN — MORPHINE SULFATE 2 MG: 4 INJECTION INTRAVENOUS at 11:06

## 2024-06-13 RX ADMIN — GABAPENTIN 600 MG: 300 CAPSULE ORAL at 08:06

## 2024-06-13 RX ADMIN — LORAZEPAM 0.5 MG: 0.5 TABLET ORAL at 09:06

## 2024-06-13 RX ADMIN — POTASSIUM CHLORIDE 40 MEQ: 1500 TABLET, EXTENDED RELEASE ORAL at 11:06

## 2024-06-13 RX ADMIN — SERTRALINE HYDROCHLORIDE 100 MG: 50 TABLET ORAL at 09:06

## 2024-06-13 RX ADMIN — DILTIAZEM HYDROCHLORIDE 360 MG: 120 CAPSULE, COATED, EXTENDED RELEASE ORAL at 08:06

## 2024-06-13 RX ADMIN — METOPROLOL TARTRATE 75 MG: 50 TABLET, FILM COATED ORAL at 09:06

## 2024-06-13 RX ADMIN — FUROSEMIDE 60 MG: 10 INJECTION, SOLUTION INTRAMUSCULAR; INTRAVENOUS at 09:06

## 2024-06-13 NOTE — PROGRESS NOTES
"    [facility] - 7th Floor ICU    Cardiology  Progress Note    Patient Name: Alanna Moya  MRN: [id number]  Admission Date: 6/1/2024  Hospital Length of Stay: 12 days  Code Status: DNR   Attending Physician: Naa Chin MD   Primary Care Physician: Cornel Haddad MD  Expected Discharge Date:   Principal Problem:<principal problem not specified>    Subjective:   Reason for Consult: SVT     HPI:Ms. Moya is a 78 year old female, known to Dr. Ramon in [place], who presented to the hospital from [facility] as transfer due to respiratory failure. Upon arrival she was noted to be SOB. Noted hypoxia requiring BIPAP. Chesr Radiograph from outside facility revealed bilateral airspace opacification and increase his right-sided pleural effusion suggestive of worsening pneumonia or possibly edema. CT Chest revealed mixed picture of decompensated HF, pleural effusions and superimposed pneumonia. During this hospitalization patient with tachycardia concerning for AF RVR, with noted Left Bundle Branch Block. She was given IV Metoprolol and started on Cardizem Infusion for Acute HR Control. She does have history of PSVT. Echocardiogram on 6.2.24 revealed intact LV Function with EF 55-60% & Grade I DD. Electrolytes grossly stable. Troponin normal. Lactic Acid Normal. . CIS Consulted for AF Management.     Hospital Course:   6.7.24: Patient seen sitting in chair. She denies SOB, CP, or nausea. Remains in Afib RVR   6.8.24: NAD. VSS. No complaints of CP/SOB/Palps. "I feel okay" WBC 14.27  6.9.24: NAD. VSS. AFIB RVR on telemetry. No complaints CP/SOB/Palps. "I feel okay" possible aspiration - awaiting Speech Evaluation. K 3.7, Mag 1.8, WBC 16.21.  6.10.24: NAD Noted. Sitting up in chair. AF RVR. Requiring Vapotherm Support. Denies CP. Reports exertional SOB, however, functional capacity is limited as she is deconditioned. Hypertension Noted. Diuresis noted, - 1604 ML/24 Hours.   6.11.24: BP " Improved. AF RVR. Diuresis noted, - 2144 ML/24 Hours. Vapotherm oxygen support- 75% FIO2.   6.12.24: NAD Noted. Remains AF RVR. BP Stable. Requiring Significant Oxygen Support. Continues to Diurese well.   6.13.24: NAD. VSS. Urine output 1050/850 ml Fluid Net Negative. AFIB RVR on telemetry. No complaints of CP/SOB/Palps.     PMH: CAD/CABG, Hypertension, Dyslipidemia, MELCHOR, CEA, CVA, AS/TAVR, PSVT  PSH: LHC, CABG, TAVR, CEA, Hysterectomy, Cholecystectomy, Eye Surgery, Hand Surgery  Family History: Father- Old MI/CAD, Mother- HF, Son- Hypertension, Daughter- Cancer/Hypertension, Brother- Heart Disease, Brother- Old MI/CAD, Brother- Cancer  Social History: Tobacco- Negative, Alcohol- Negative, Substance Abuse- Negative      Previous Cardiac Diagnostics:   Echocardiogram (6.2.24):  Left Ventricle: The left ventricle is normal in size. Increased wall thickness. There is normal systolic function with a visually estimated ejection fraction of 55 - 60%. Grade I diastolic dysfunction.  Right Ventricle: Systolic function is reduced.TAPSE is 1.60 cm.  Aortic Valve: There is a transcatheter valve replacement in the aortic position. Aortic valve area by VTI is 1.56 cm². Aortic valve peak velocity is 1.94 m/s. Mean gradient is 8 mmHg. The dimensionless index is 0.50.  Mitral Valve: Mildly thickened leaflets. There is mitral annular calcification present. There is mild stenosis. The mean pressure gradient across the mitral valve is 5 mmHg at a heart rate of 87 bpm. There is mild regurgitation.  Tricuspid Valve: The tricuspid valve is structurally normal. There is mild regurgitation.  Pulmonic Valve: The pulmonic valve is structurally normal.  Pulmonary Artery: The estimated pulmonary artery systolic pressure is 47 mmHg.  IVC/SVC: Normal venous pressure at 3 mmHg.  Pericardium: Left pleural effusion.     Echocardiogram (5.15.24):  EF 55-60%. Grade I Diastolic Dysfunction. Normal LV Wall Motion.  Mild MAC with Mild MR and No MS.  Mild to Moderate TR.  Mild to Moderate AI, No AS.     CT Angiogram Head/Neck (5.15.24):  Carotid arteries:   Calcified plaque at the siphons without significant narrowing.   Normal A1 and M1 segments.   Vertebrobasilar Circulation:   Vertebral arteries: Patent. Calcified plaque on the right without significant narrowing. Otherwise no abnormalities.   Basilar artery: Patent, no abnormalities.   Posterior cerebral arteries:  Patent. Right fetal origin. Otherwise no abnormalities.      Echocardiogram (4.21.23):  The study quality is good.   The left ventricle is normal in size. Global left ventricular systolic function is normal. The left ventricular ejection fraction by Power's is 62%. The left ventricle diastolic function is impaired (Grade II) with an elevated left atrial pressure. Noted left ventricular hypertrophy. Concentric left ventricular hypertrophy is present. It is mild. LVSI=41ml/m2.    LVEDV=67.2ml and LVESV=25.7ml.  The left atrial diameter is moderately increased. Left atrial diameter is 4.4 cms. The left atrium is severely enlarged based on the left atrium volume index of 48.2ml/m².  S/P TAVR. Bioprosthetic aortic valve is well seated and functions normally with no evidence of insufficiency or perivalvular leaks. JACINTA=2.0cm2. The trans-aortic peak gradient is 15.8 mmHg. The trans-aortic mean gradient is 8.6 mmHg. DI=1.1.  Mild mitral annular calcification is noted. The mean trans mitral gradient is 1.9 mmHg.  Mild to moderate (1-2+) mitral regurgitation. Mild to moderate (1-2+) tricuspid regurgitation.  The estimated pulmonary artery systolic pressure is 41 mmHg assuming a right atrial pressure of 3 mmHg. Evidence of pulmonary hypertension is noted.      MCT Monitor (3.15.23):  Predominant Rhythm SR.  PSVT     TAVR (2.27.23):  TAVR  23  mm S3 ultra valve, nominal prep,  Right TF approach  Limited echo pre, post valve placement  Root angiography  Distal aortography  Temporary pacemaker placement  removal  Manta closure  Right CFA access site.  U/S guided bilateral right groin access   Balloon angioplasty of right CFA with a 5 x 40 Napoleon.     [facility] (1.26.23):  LM: Distal 50% Stenosis, LAD: Occluded Mid, LCX: Ostial 50% at Mid Portion, RCA: Dominant Patent Arter.  JENNINGS to LAD is Patent.  Impression: LIMA to LAD- Patent, 50% Stenosis LCX Artery  Medical Management.    Review of Systems   Cardiovascular:  Positive for dyspnea on exertion. Negative for chest pain, leg swelling and palpitations.   Respiratory:  Negative for shortness of breath.    All other systems reviewed and are negative.    Objective:     Vital Signs (Most Recent):  Temp: 97.7 °F (36.5 °C) (06/13/24 0800)  Pulse: (!) 137 (06/13/24 0800)  Resp: (!) 26 (06/13/24 0809)  BP: 118/87 (06/13/24 0400)  SpO2: (!) 92 % (06/13/24 0836) Vital Signs (24h Range):  Temp:  [97.6 °F (36.4 °C)-98.5 °F (36.9 °C)] 97.7 °F (36.5 °C)  Pulse:  [] 137  Resp:  [13-29] 26  SpO2:  [84 %-95 %] 92 %  BP: (115-135)/(82-91) 118/87   Weight: 77.1 kg (170 lb)  Body mass index is 31.09 kg/m².  SpO2: (!) 92 %       Intake/Output Summary (Last 24 hours) at 6/13/2024 1122  Last data filed at 6/13/2024 1020  Gross per 24 hour   Intake 195.25 ml   Output 1225 ml   Net -1029.75 ml     Lines/Drains/Airways       Drain  Duration                  Urethral Catheter 06/09/24 1754 3 days              Peripheral Intravenous Line  Duration                  Peripheral IV - Single Lumen 06/07/24 0515 20 G Right Forearm 6 days         Peripheral IV - Single Lumen 06/07/24 1700 20 G Anterior;Proximal;Right Forearm 5 days         Peripheral IV - Single Lumen 06/08/24 0428 20 G Left Forearm 5 days                  Significant Labs:   Recent Results (from the past 72 hour(s))   Comprehensive Metabolic Panel    Collection Time: 06/11/24  3:19 AM   Result Value Ref Range    Sodium 138 136 - 145 mmol/L    Potassium 3.4 (L) 3.5 - 5.1 mmol/L    Chloride 97 (L) 98 - 107 mmol/L    CO2 29 23 - 31  mmol/L    Glucose 152 (H) 82 - 115 mg/dL    Blood Urea Nitrogen 19.5 9.8 - 20.1 mg/dL    Creatinine 0.85 0.55 - 1.02 mg/dL    Calcium 8.8 8.4 - 10.2 mg/dL    Protein Total 6.0 5.8 - 7.6 gm/dL    Albumin 2.6 (L) 3.4 - 4.8 g/dL    Globulin 3.4 2.4 - 3.5 gm/dL    Albumin/Globulin Ratio 0.8 (L) 1.1 - 2.0 ratio    Bilirubin Total 0.6 <=1.5 mg/dL    ALP 81 40 - 150 unit/L    ALT 13 0 - 55 unit/L    AST 17 5 - 34 unit/L    eGFR >60 mL/min/1.73/m2    Anion Gap 12.0 mEq/L    BUN/Creatinine Ratio 23    Magnesium    Collection Time: 06/11/24  3:19 AM   Result Value Ref Range    Magnesium Level 1.70 1.60 - 2.60 mg/dL   Phosphorus    Collection Time: 06/11/24  3:19 AM   Result Value Ref Range    Phosphorus Level 2.0 (L) 2.3 - 4.7 mg/dL   CBC with Differential    Collection Time: 06/11/24  3:19 AM   Result Value Ref Range    WBC 15.43 (H) 4.50 - 11.50 x10(3)/mcL    RBC 3.38 (L) 4.20 - 5.40 x10(6)/mcL    Hgb 10.2 (L) 12.0 - 16.0 g/dL    Hct 31.1 (L) 37.0 - 47.0 %    MCV 92.0 80.0 - 94.0 fL    MCH 30.2 27.0 - 31.0 pg    MCHC 32.8 (L) 33.0 - 36.0 g/dL    RDW 13.0 11.5 - 17.0 %    Platelet 247 130 - 400 x10(3)/mcL    MPV 11.6 (H) 7.4 - 10.4 fL    NRBC% 0.0 %   Manual Differential    Collection Time: 06/11/24  3:19 AM   Result Value Ref Range    WBC 15.43 x10(3)/mcL    Neutrophils % 84 %    Lymphs % 6 %    Monocytes % 10 %    Neutrophils Abs 12.9612 (H) 2.1 - 9.2 x10(3)/mcL    Lymphs Abs 0.9258 0.6 - 4.6 x10(3)/mcL    Monocytes Abs 1.543 (H) 0.1 - 1.3 x10(3)/mcL    Platelets Normal Normal, Adequate    RBC Morph Abnormal (A) Normal    Polychromasia Slight (A) (none)    Anisocytosis Slight (A) (none)    Macrocytosis Slight (A) (none)   Comprehensive Metabolic Panel    Collection Time: 06/12/24  4:47 AM   Result Value Ref Range    Sodium 137 136 - 145 mmol/L    Potassium 3.8 3.5 - 5.1 mmol/L    Chloride 95 (L) 98 - 107 mmol/L    CO2 32 (H) 23 - 31 mmol/L    Glucose 126 (H) 82 - 115 mg/dL    Blood Urea Nitrogen 15.9 9.8 - 20.1 mg/dL     Creatinine 0.82 0.55 - 1.02 mg/dL    Calcium 8.9 8.4 - 10.2 mg/dL    Protein Total 5.8 5.8 - 7.6 gm/dL    Albumin 2.8 (L) 3.4 - 4.8 g/dL    Globulin 3.0 2.4 - 3.5 gm/dL    Albumin/Globulin Ratio 0.9 (L) 1.1 - 2.0 ratio    Bilirubin Total 0.4 <=1.5 mg/dL    ALP 84 40 - 150 unit/L    ALT 11 0 - 55 unit/L    AST 15 5 - 34 unit/L    eGFR >60 mL/min/1.73/m2    Anion Gap 10.0 mEq/L    BUN/Creatinine Ratio 19    Digoxin Level    Collection Time: 06/12/24  4:47 AM   Result Value Ref Range    Digoxin Level 1.7 0.8 - 2.0 ng/mL   CBC with Differential    Collection Time: 06/12/24  4:47 AM   Result Value Ref Range    WBC 14.17 (H) 4.50 - 11.50 x10(3)/mcL    RBC 3.31 (L) 4.20 - 5.40 x10(6)/mcL    Hgb 9.9 (L) 12.0 - 16.0 g/dL    Hct 30.8 (L) 37.0 - 47.0 %    MCV 93.1 80.0 - 94.0 fL    MCH 29.9 27.0 - 31.0 pg    MCHC 32.1 (L) 33.0 - 36.0 g/dL    RDW 13.6 11.5 - 17.0 %    Platelet 275 130 - 400 x10(3)/mcL    MPV 11.6 (H) 7.4 - 10.4 fL    Neut % 74.7 %    Lymph % 8.5 %    Mono % 14.0 %    Eos % 0.1 %    Basophil % 0.2 %    Lymph # 1.20 0.6 - 4.6 x10(3)/mcL    Neut # 10.59 (H) 2.1 - 9.2 x10(3)/mcL    Mono # 1.99 (H) 0.1 - 1.3 x10(3)/mcL    Eos # 0.01 0 - 0.9 x10(3)/mcL    Baso # 0.03 <=0.2 x10(3)/mcL    IG# 0.35 (H) 0 - 0.04 x10(3)/mcL    IG% 2.5 %    NRBC% 0.0 %   Comprehensive Metabolic Panel    Collection Time: 06/13/24  2:57 AM   Result Value Ref Range    Sodium 134 (L) 136 - 145 mmol/L    Potassium 3.8 3.5 - 5.1 mmol/L    Chloride 94 (L) 98 - 107 mmol/L    CO2 28 23 - 31 mmol/L    Glucose 103 82 - 115 mg/dL    Blood Urea Nitrogen 20.7 (H) 9.8 - 20.1 mg/dL    Creatinine 0.98 0.55 - 1.02 mg/dL    Calcium 9.0 8.4 - 10.2 mg/dL    Protein Total 6.1 5.8 - 7.6 gm/dL    Albumin 2.5 (L) 3.4 - 4.8 g/dL    Globulin 3.6 (H) 2.4 - 3.5 gm/dL    Albumin/Globulin Ratio 0.7 (L) 1.1 - 2.0 ratio    Bilirubin Total 0.4 <=1.5 mg/dL    ALP 77 40 - 150 unit/L    ALT 9 0 - 55 unit/L    AST 16 5 - 34 unit/L    eGFR 60 mL/min/1.73/m2    Anion Gap 12.0  mEq/L    BUN/Creatinine Ratio 21    CBC with Differential    Collection Time: 06/13/24  2:57 AM   Result Value Ref Range    WBC 11.55 (H) 4.50 - 11.50 x10(3)/mcL    RBC 3.03 (L) 4.20 - 5.40 x10(6)/mcL    Hgb 9.0 (L) 12.0 - 16.0 g/dL    Hct 28.4 (L) 37.0 - 47.0 %    MCV 93.7 80.0 - 94.0 fL    MCH 29.7 27.0 - 31.0 pg    MCHC 31.7 (L) 33.0 - 36.0 g/dL    RDW 13.9 11.5 - 17.0 %    Platelet 255 130 - 400 x10(3)/mcL    MPV 11.7 (H) 7.4 - 10.4 fL    Neut % 72.3 %    Lymph % 9.7 %    Mono % 14.6 %    Eos % 0.2 %    Basophil % 0.2 %    Lymph # 1.12 0.6 - 4.6 x10(3)/mcL    Neut # 8.35 2.1 - 9.2 x10(3)/mcL    Mono # 1.69 (H) 0.1 - 1.3 x10(3)/mcL    Eos # 0.02 0 - 0.9 x10(3)/mcL    Baso # 0.02 <=0.2 x10(3)/mcL    IG# 0.35 (H) 0 - 0.04 x10(3)/mcL    IG% 3.0 %    NRBC% 0.0 %   Magnesium    Collection Time: 06/13/24  2:57 AM   Result Value Ref Range    Magnesium Level 1.60 1.60 - 2.60 mg/dL   Phosphorus    Collection Time: 06/13/24  2:57 AM   Result Value Ref Range    Phosphorus Level 3.3 2.3 - 4.7 mg/dL     Telemetry:  AF RVR    Physical Exam  Vitals and nursing note reviewed.   Constitutional:       General: She is not in acute distress.     Appearance: She is ill-appearing.   HENT:      Head: Normocephalic.      Mouth/Throat:      Mouth: Mucous membranes are moist.   Eyes:      Extraocular Movements: Extraocular movements intact.   Cardiovascular:      Rate and Rhythm: Tachycardia present. Rhythm irregular.   Pulmonary:      Effort: Pulmonary effort is normal. No respiratory distress.      Comments: On BiPAP 100% FiO2; Bilateral Diminished Breath Sounds Posteriorly   Abdominal:      General: Bowel sounds are normal.   Skin:     General: Skin is warm and dry.   Neurological:      Mental Status: She is alert. Mental status is at baseline.   Psychiatric:         Behavior: Behavior normal.       Current Inpatient Medications:  Current Facility-Administered Medications:     0.9%  NaCl infusion, , Intravenous, PRN, Keys, Vishnu C,  MD, Stopped at 06/08/24 0133    acetaminophen tablet 650 mg, 650 mg, Oral, Q6H PRN, Naa Chin MD, 650 mg at 06/10/24 1115    amiodarone 360 mg/200 mL (1.8 mg/mL) infusion, 0.5 mg/min, Intravenous, Continuous, Nataly Mead, FNP, Stopped at 06/12/24 1242    digoxin tablet 0.125 mg, 0.125 mg, Oral, Daily, Nataly Mead, FNP, 0.125 mg at 06/13/24 0809    diltiaZEM 24 hr capsule 360 mg, 360 mg, Oral, Daily, Sukhdev Hoskins MD, 360 mg at 06/13/24 0809    docusate 50 mg/5 mL liquid 50 mg, 50 mg, Oral, Daily, Marga Smith MD, 50 mg at 06/09/24 0817    furosemide injection 60 mg, 60 mg, Intravenous, Q12H, Naa Chin MD, 60 mg at 06/13/24 0809    gabapentin capsule 600 mg, 600 mg, Oral, BID, Marga Smith MD, 600 mg at 06/13/24 0809    hydrALAZINE injection 10 mg, 10 mg, Intravenous, Q6H PRN, Eleno Abbasi MD, 10 mg at 06/06/24 1631    labetaloL injection 10 mg, 10 mg, Intravenous, Q4H PRN, Baldo Finney DO, 10 mg at 06/11/24 0257    Lactobacillus rhamnosus GG 5 billion cell packet (PEDS) 1 each, 1 packet, Oral, BID, Naa Chin MD, 1 each at 06/12/24 2009    levalbuterol nebulizer solution 1.25 mg, 1.25 mg, Nebulization, Q6H PRN, Shanna Montiel MD, 1.25 mg at 06/09/24 1200    LIDOcaine 5 % patch 2 patch, 2 patch, Transdermal, Q24H, Sheri Carson, CHRISTIANO, 2 patch at 06/11/24 1548    LORazepam tablet 0.5 mg, 0.5 mg, Oral, Q4H PRN, Sheri Carson, NP    melatonin tablet 6 mg, 6 mg, Oral, Nightly PRN, Naa Chin MD, 6 mg at 06/13/24 0028    metoprolol injection 5 mg, 5 mg, Intravenous, Q4H PRN, Justyn Landaverde MD, 5 mg at 06/12/24 0549    metoprolol tartrate (LOPRESSOR) tablet 50 mg, 50 mg, Oral, BID, Sukhdev Hoskins MD, 50 mg at 06/13/24 0809    morphine injection 2 mg, 2 mg, Intravenous, Q4H PRN, Sheri Carson, NP    ondansetron injection 4 mg, 4 mg, Intravenous, Q4H PRN, Julissa Rinaldi, FNP, 4 mg at 06/11/24 0405    sertraline tablet 100 mg, 100 mg, Oral, QHS, Marga Smith,  MD, 100 mg at 06/12/24 2007    traMADoL tablet 50 mg, 50 mg, Oral, TID, Vishnu Stevenson MD, 50 mg at 06/13/24 0809  VTE Risk Mitigation (From admission, onward)           Ordered     IP VTE HIGH RISK PATIENT  Once         06/01/24 3720                  Assessment:   Atrial Fibrillation with RVR (New Onset)- Now AF RVR    - [id number]: 7    - History of PSVT    - On FD Eliquis for Stroke Risk Reduction  Acute on Chronic Diastolic HF    - EF 55-60% with Grade I Diastolic Dysfunction  Aspiration Pneumonia    - Cleared Swallowing Study  Acute Hypoxemic Respiratory Failure Requiring Oxygenation - on BiPAP  Leukocytosis/Sepsis - Improving   Valvular Heart Disease    - AS: Status Post TAVR  23  mm S3 ultra valve, nominal prep,  Right TF approach (2.27.23)  CAD/CABG    - LIMA to LAD (Patent) with 50% Stenosis Native LCX  Hypertension   Hyperlipidemia  Chronic Left Bundle Branch Block ([place] TAVR)  MELCHOR/CEA  History of CVA  Anemia  DNR Status   No known history of GI Bleed     Plan:   Switch IV Amiodarone to Oral Amiodarone  Continue Cardizem CD to 360 Mg PO Daily   Increase Metoprolol Tartrate to 75 mg oral BID  Continue Digoxin 0.125 Mg PO Daily  Will Hold off on EP Consult for Now as patient is a DNR and would likely need an ablation   Continue Eliquis 5 Mg PO BID Re: AF/Stroke Risk Reduction  Continue Diuretics as Clinically Indicated; Ensure Accurate I/O  K+ Goal 4, Mag Goal 2; Replete K+ & Mag Today  Lopressor IV PRN HR > 120  Labs in AM: CBC, BMP, and Mag     STEF Corral  Cardiology  [facility] - 7th Floor ICU  06/13/2024    I agree with the findings of the complexity of problems addressed and take responsibility for the plan's risks and complications. I approved the plan documented by Anne Oneil NP.  Start amiodarone today for rate control

## 2024-06-13 NOTE — NURSING
Nurses Note -- 4 Eyes      6/13/2024   5:15 AM      Skin assessed during: Daily Assessment      [] No Altered Skin Integrity Present    []Prevention Measures Documented      [x] Yes- Altered Skin Integrity Present or Discovered   [x] LDA Added if Not in Epic (Describe Wound)   [] New Altered Skin Integrity was Present on Admit and Documented in LDA   [] Wound Image Taken    Wound Care Consulted? No    Attending Nurse:  Nata Pederson RN/Staff Member:  Arely

## 2024-06-13 NOTE — MEDICAL/APP STUDENT
[facility] - 7th Floor ICU  Pulmonary Critical Care Note    Patient Name: Alanna Moya  MRN: [id number]  Admission Date: 6/1/2024  Hospital Length of Stay: 12 days  Code Status: DNR  Attending Provider: Naa Chin MD  Primary Care Provider: Cornel Haddad MD     Subjective:     HPI:   78F presented as a transfer from outside hospital with BiPAP requirements, initially admitted to intensive care unit but downgraded to floor following robust response to diuretic therapy.  Has been managed for volume overload and pneumonia on hospital medicine service.  Patient has elected for DNR code status.  Pulmonary medicine consult for worsening hypoxemia. Repeat chest x-ray 06/09/2024 with changes concerning for worsening volume overload.     24 Hour Interval History:  Currently on vapotherm 40L 100% FiO2, on Bipap overnight. O2 sat 88%. Output -854.7 mL over the past 24 hours. Afebrile, mild leukocytosis which is stable. Yesterday furosemide increased to 60mg IV BID, apixaban held for possible thoracentesis, and bedside ultrasound performed with moderate-to-large free-flowing right-sided effusion.       Past Medical History:   Diagnosis Date    Abdominal pain, unspecified site     Aortic stenosis     Coronary atherosclerosis of unspecified type of vessel, native or graft     Esophageal reflux     Fatigue     History of glaucoma     History of heart attack     Hypertension     Lumbosacral spondylosis without myelopathy     Other and unspecified hyperlipidemia     Rectocele     SOB (shortness of breath) on exertion     Unspecified essential hypertension     Weakness        Past Surgical History:   Procedure Laterality Date    CHOLECYSTECTOMY      CORONARY ARTERY BYPASS GRAFT      GLAUCOMA SURGERY Bilateral     HYSTERECTOMY      TRANSCATHETER AORTIC VALVE REPLACEMENT (TAVR) N/A 2/27/2023    Procedure: REPLACEMENT, AORTIC VALVE, TRANSCATHETER (TAVR);  Surgeon: Anselmo Arango MD;  Location: [facility]  LAB;  Service: Cardiology;  Laterality: N/A;  TAVR W/ ANEST.       Social History     Socioeconomic History    Marital status:    Tobacco Use    Smoking status: Never    Smokeless tobacco: Never   Substance and Sexual Activity    Alcohol use: No    Drug use: No    Sexual activity: Never     Birth control/protection: Surgical     Social Determinants of Health     Financial Resource Strain: Low Risk  (5/3/2021)    Received from [facility]    Overall Financial Resource Strain (CARDIA)     Difficulty of Paying Living Expenses: Not hard at all   Food Insecurity: No Food Insecurity (5/3/2021)    Received from [facility]    Hunger Vital Sign     Worried About Running Out of Food in the Last Year: Never true     Ran Out of Food in the Last Year: Never true   Transportation Needs: No Transportation Needs (5/3/2021)    Received from [facility]    PRAPARE - Transportation     Lack of Transportation (Medical): No     Lack of Transportation (Non-Medical): No   Physical Activity: Sufficiently Active (5/3/2021)    Received from [facility]    Exercise Vital Sign     Days of Exercise per Week: 2 days     Minutes of Exercise per Session: 130 min   Stress: Stress Concern Present (5/3/2021)    Received from [facility]    [employer] [employer] - Occupational Stress Questionnaire     Feeling of Stress : Very much           Current Outpatient Medications   Medication Instructions    amLODIPine (NORVASC) 10 mg, Oral, 2 times daily    aspirin 325 MG tablet 1 tablet, Oral, Daily    carvediloL (COREG) 12.5 mg, Oral, 2 times daily with meals    cholecalciferol, vitamin D3, (VITAMIN D3) 50 mcg (2,000 unit) Cap capsule 1 capsule, Oral, Daily    cyanocobalamin, vitamin B-12, 2,000 mcg Tab 1 tablet, Oral, Daily    cyclobenzaprine (FLEXERIL) 10 mg, Oral, 3 times daily    docusate sodium (STOOL SOFTENER ORAL) 2 capsules, Oral, 2 times daily    furosemide (LASIX) 40 mg, Oral, Daily    hydrALAZINE (APRESOLINE) 25 mg,  Oral, Every 8 hours    L. acidophilus/Bifid. animalis (DAILY PROBIOTIC ORAL) 1 capsule, Oral, Daily    potassium gluconate 650 mg, Oral, Once    rosuvastatin (CRESTOR) 40 mg, Oral, Nightly    sertraline (ZOLOFT) 100 mg, Oral, Nightly    traMADoL (ULTRAM) 50 mg, Oral, 3 times daily       Current Inpatient Medications   digoxin  0.125 mg Oral Daily    diltiaZEM  360 mg Oral Daily    docusate  50 mg Oral Daily    furosemide (LASIX) injection  60 mg Intravenous Q12H    gabapentin  600 mg Oral BID    Lactobacillus rhamnosus GG  1 packet Oral BID    LIDOcaine  2 patch Transdermal Q24H    metoprolol tartrate  50 mg Oral BID    sertraline  100 mg Oral QHS    traMADoL  50 mg Oral TID       Current Intravenous Infusions   amiodarone in dextrose 5%  0.5 mg/min Intravenous Continuous   Stopped at 06/12/24 1242         Review of Systems   Constitutional:  Negative for chills, diaphoresis and fever.   Respiratory:  Positive for shortness of breath.    All other systems reviewed and are negative.         Objective:       Intake/Output Summary (Last 24 hours) at 6/13/2024 0809  Last data filed at 6/13/2024 0614  Gross per 24 hour   Intake 195.25 ml   Output 950 ml   Net -754.75 ml         Vital Signs (Most Recent):  Temp: 97.6 °F (36.4 °C) (06/13/24 0400)  Pulse: 106 (06/13/24 0400)  Resp: (!) 23 (06/13/24 0400)  BP: 118/87 (06/13/24 0400)  SpO2: (!) 90 % (06/13/24 0400)  Body mass index is 31.09 kg/m².  Weight: 77.1 kg (170 lb) Vital Signs (24h Range):  Temp:  [97.6 °F (36.4 °C)-98.5 °F (36.9 °C)] 97.6 °F (36.4 °C)  Pulse:  [] 106  Resp:  [13-29] 23  SpO2:  [84 %-94 %] 90 %  BP: (115-143)/(82-91) 118/87     Physical Exam  Vitals reviewed.   Constitutional:       Appearance: Normal appearance.   HENT:      Head: Normocephalic and atraumatic.      Mouth/Throat:      Mouth: Mucous membranes are moist.      Pharynx: Oropharynx is clear.   Eyes:      Extraocular Movements: Extraocular movements intact.   Cardiovascular:      Rate  "and Rhythm: Normal rate and regular rhythm.      Heart sounds: Normal heart sounds.   Pulmonary:      Breath sounds: Rales present.      Comments: Decreased lung sounds R>L slightly worse than yesterday  Musculoskeletal:      Cervical back: Normal range of motion.   Skin:     General: Skin is warm and dry.   Neurological:      General: No focal deficit present.      Mental Status: She is alert and oriented to person, place, and time.   Psychiatric:         Mood and Affect: Mood normal.         Behavior: Behavior normal.           Lines/Drains/Airways       Drain  Duration                  Urethral Catheter 06/09/24 1754 3 days              Peripheral Intravenous Line  Duration                  Peripheral IV - Single Lumen 06/07/24 0515 20 G Right Forearm 6 days         Peripheral IV - Single Lumen 06/07/24 1700 20 G Anterior;Proximal;Right Forearm 5 days         Peripheral IV - Single Lumen 06/08/24 0428 20 G Left Forearm 5 days                    Significant Labs:    Lab Results   Component Value Date    WBC 11.55 (H) 06/13/2024    HGB 9.0 (L) 06/13/2024    HCT 28.4 (L) 06/13/2024    MCV 93.7 06/13/2024     06/13/2024           BMP  Lab Results   Component Value Date     (L) 06/13/2024    K 3.8 06/13/2024    CHLORIDE 97 05/04/2021    CO2 28 06/13/2024    BUN 20.7 (H) 06/13/2024    CREATININE 0.98 06/13/2024    CALCIUM 9.0 06/13/2024    AGAP 12.0 06/13/2024    ESTGFRAFRICA 92 05/16/2024    EGFRNONAA 28.1 (A) 08/10/2017         ABG  No results for input(s): "PH", "PO2", "PCO2", "HCO3", "POCBASEDEF" in the last 168 hours.    Mechanical Ventilation Support:  Oxygen Concentration (%): 100 (06/13/24 0614)      Significant Imaging:  I have reviewed the pertinent imaging within the past 24 hours.        Assessment/Plan:     Assessment  Acute hypoxic respiratory failure  Diastolic heart failure  Right ventricular failure with potential pulmonary hypertension  Atrial fibrillation    Plan  -continue diuresis for " worsening R.sided pleural effusion, possible thoracentesis after anticoagulation adequately witheld if no improvement in oxygenation   -continue HR control per cardiology   -wean o2 as tolerated  -patient wishes to remain DO NOT RESUSCITATE/DO NOT INTUBATE      HARSHIL Yen-S3  Pulmonary Critical Care Medicine  [facility] - 7th Floor ICU  DOS: 06/13/2024

## 2024-06-13 NOTE — PROGRESS NOTES
Patient Name: Alanna Moya   MRN: [id number]   Admission Date: 6/1/2024   Hospital Length of Stay: 12   Attending Provider: Naa Chin MD   Consulting Provider: STEF Haynes  Reason for Consult: Goals of Care  Primary Care Physician:  Cornel Haddad MD     Principal Problem: <principal problem not specified>     Patient information was obtained from patient and ER records.     Final diagnoses:  [J96.90] Respiratory failure      Assessment/Plan:     I reviewed the patient and family's understanding of the seriousness of the illness and its expected prognosis. We discussed the patient's goals of care and treatment preferences.  I clarified current code status. I identified the surrogate decision maker or health care POA. I answered all questions and we formulated a plan including recommendations for symptom management and how to best achieve goals of care.       I reviewed patient's understanding of her current condition, explained she is on maximum FiO2 with support from BiPAP.  At present she is oxygenating adequately.  Reviewed again that she would not like to be intubated, to which she confirms do not resuscitate/do not intubate status.  We discussed possible thoracentesis tomorrow to alleviate some shortness of breath, she verbalizes understanding however becomes tearful.  She relates that her brother had a similar procedure, but does not divulge much information.  She also informs me that she has severe needle phobia.  She agrees that if this is life saving she would like to pursue the option, however has notable anxiety regarding the procedure.  Explained the physician conducting the procedure would give her further information.  She endorses severe anxiety at baseline, but she is not on a chronic anxiety regimen.  We will initiate p.r.n. anxiety medication.    I spoke with her daughter Nakia, who is now medical power of  as of 06/12/2024.  Explained in detail patient's current  clinical status and that she is requiring significant oxygen support, but remains stable at this time. She asks if her heart rate and volume status are going to be a chronic issue or if they will improve, to which I explained that her volume status is currently contributing to her heart rate and pulmonary function. I encouraged her to come visit patient tomorrow and speak with the pulmonologist who will likely have more information if able to perform thoracentesis.  She verbalizes understanding and reports that she will attempt to be here for 9:00 a.m. visiting.    Discussed with nursing and attending.  Offered support.  Encouraged to call with questions or concerns.  Palliative Medicine will continue to follow up.    Advance Care Planning     Date: 06/13/2024    [facility]  I engaged the patient and family in a voluntary conversation about advance care planning and we specifically addressed what the goals of care would be moving forward, in light of the patient's change in clinical status, specifically current condition.  We did specifically address the patient's likely prognosis, which is fair .  We explored the patient's values and preferences for future care.  The patient and family endorses that what is most important right now is to focus on symptom/pain control and improvement in condition but with limits to invasive therapies    Accordingly, we have decided that the best plan to meet the patient's goals includes continuing with treatment        Recommendations:     Continue morphine IV 2 mg q.4 hours p.r.n. pain/dyspnea  Add lorazepam 0.5 mg p.o. q.4 hours p.r.n. anxiety    Interval History:     Patient is currently resting in bed on BiPAP 14/8 at 100% FiO2 with O2 sat at 90%.  Remains with some component of tachycardia.  Plans for thoracentesis with mod-large pleural effusion after washout of eliquis.      Active Ambulatory Problems     Diagnosis Date Noted    Unresponsive episode 02/08/2019    Aortic stenosis  02/28/2023     Resolved Ambulatory Problems     Diagnosis Date Noted    No Resolved Ambulatory Problems     Past Medical History:   Diagnosis Date    Abdominal pain, unspecified site     Coronary atherosclerosis of unspecified type of vessel, native or graft     Esophageal reflux     Fatigue     History of glaucoma     History of heart attack     Hypertension     Lumbosacral spondylosis without myelopathy     Other and unspecified hyperlipidemia     Rectocele     SOB (shortness of breath) on exertion     Unspecified essential hypertension     Weakness         Past Surgical History:   Procedure Laterality Date    CHOLECYSTECTOMY      CORONARY ARTERY BYPASS GRAFT      GLAUCOMA SURGERY Bilateral     HYSTERECTOMY      TRANSCATHETER AORTIC VALVE REPLACEMENT (TAVR) N/A 2/27/2023    Procedure: REPLACEMENT, AORTIC VALVE, TRANSCATHETER (TAVR);  Surgeon: Anselmo Arango MD;  Location: [facility] CATH LAB;  Service: Cardiology;  Laterality: N/A;  TAVR W/ ANEST.        Review of patient's allergies indicates:  No Known Allergies       Current Facility-Administered Medications:     0.9%  NaCl infusion, , Intravenous, PRN, Vishnu Stevenson MD, Stopped at 06/08/24 0133    acetaminophen tablet 650 mg, 650 mg, Oral, Q6H PRN, Naa Chin MD, 650 mg at 06/10/24 1115    amiodarone 360 mg/200 mL (1.8 mg/mL) infusion, 0.5 mg/min, Intravenous, Continuous, Nataly Mead FNP, Stopped at 06/12/24 1242    digoxin tablet 0.125 mg, 0.125 mg, Oral, Daily, Nataly Mead, ROSELIAP, 0.125 mg at 06/13/24 0809    diltiaZEM 24 hr capsule 360 mg, 360 mg, Oral, Daily, Sukhdev Hoskins MD, 360 mg at 06/13/24 0809    docusate 50 mg/5 mL liquid 50 mg, 50 mg, Oral, Daily, Marga Smith MD, 50 mg at 06/09/24 0817    furosemide injection 60 mg, 60 mg, Intravenous, Q12H, Naa Chin MD, 60 mg at 06/13/24 0809    gabapentin capsule 600 mg, 600 mg, Oral, BID, Marga Smith MD, 600 mg at 06/13/24 0809    hydrALAZINE injection 10 mg, 10 mg, Intravenous, Q6H  PRN, Eleno Abbasi MD, 10 mg at 06/06/24 1631    labetaloL injection 10 mg, 10 mg, Intravenous, Q4H PRN, Baldo Finney DO, 10 mg at 06/11/24 0257    Lactobacillus rhamnosus GG 5 billion cell packet (PEDS) 1 each, 1 packet, Oral, BID, Naa Chin MD, 1 each at 06/12/24 2009    levalbuterol nebulizer solution 1.25 mg, 1.25 mg, Nebulization, Q6H PRN, Shanna Montiel MD, 1.25 mg at 06/09/24 1200    LIDOcaine 5 % patch 2 patch, 2 patch, Transdermal, Q24H, Sheri Carson, NP, 2 patch at 06/11/24 1548    melatonin tablet 6 mg, 6 mg, Oral, Nightly PRN, Naa Chin MD, 6 mg at 06/13/24 0028    metoprolol injection 5 mg, 5 mg, Intravenous, Q4H PRN, Justyn Landaverde MD, 5 mg at 06/12/24 0549    metoprolol tartrate (LOPRESSOR) tablet 50 mg, 50 mg, Oral, BID, Sukhdev Hoskins MD, 50 mg at 06/13/24 0809    morphine injection 2 mg, 2 mg, Intravenous, Q4H PRN, Sheri Carson, CHRISTIANO, 2 mg at 06/13/24 0028    ondansetron injection 4 mg, 4 mg, Intravenous, Q4H PRN, Julissa Rinaldi FNP, 4 mg at 06/11/24 0405    sertraline tablet 100 mg, 100 mg, Oral, QHS, Marga Smith MD, 100 mg at 06/12/24 2007    traMADoL tablet 50 mg, 50 mg, Oral, TID, Vishnu Stevenson MD, 50 mg at 06/13/24 0809       Current Facility-Administered Medications:     sodium chloride 0.9%, , Intravenous, PRN    acetaminophen, 650 mg, Oral, Q6H PRN    hydrALAZINE, 10 mg, Intravenous, Q6H PRN    labetalol, 10 mg, Intravenous, Q4H PRN    levalbuterol, 1.25 mg, Nebulization, Q6H PRN    melatonin, 6 mg, Oral, Nightly PRN    metoprolol, 5 mg, Intravenous, Q4H PRN    morphine, 2 mg, Intravenous, Q4H PRN    ondansetron, 4 mg, Intravenous, Q4H PRN     No family history on file.       Review of Systems   Constitutional:  Positive for fatigue.   HENT: Negative.     Respiratory: Negative.     Cardiovascular: Negative.    Gastrointestinal: Negative.    Genitourinary: Negative.    Musculoskeletal: Negative.    Neurological: Negative.   "  Psychiatric/Behavioral:  The patient is nervous/anxious.             Objective:   /87 (BP Location: Left arm, Patient Position: Lying)   Pulse (!) 137   Temp 97.7 °F (36.5 °C) (Oral)   Resp (!) 26   Ht 5' 2" (1.575 m)   Wt 77.1 kg (170 lb)   SpO2 (!) 91%   BMI 31.09 kg/m²      Physical Exam   Constitutional: She is oriented to person, place, and time. No distress. She appears ill.   HENT:   Head: Normocephalic.   Mouth/Throat: Mucous membranes are dry.   Cardiovascular: Normal heart sounds. An irregular rhythm present. Tachycardia present.   No murmur heard.Pulmonary:      Effort: Pulmonary effort is normal. No respiratory distress.      Comments: Bipap    Abdominal: Soft. She exhibits no distension.   Musculoskeletal:      Cervical back: Normal range of motion.      Right lower leg: No edema.      Left lower leg: No edema.   Neurological: She is alert and oriented to person, place, and time.   Skin: Skin is warm and dry.          Review of Symptoms  Review of Symptoms      Symptom Assessment (ESAS 0-10 Scale)  Pain:  0  Dyspnea:  0  Anxiety:  0  Nausea:  0  Depression:  0  Anorexia:  0  Fatigue:  0  Insomnia:  0  Restlessness:  0  Agitation:  0       Anxiety:  Is nervous/anxious    Psychosocial/Cultural:   See Palliative Psychosocial Note: Yes  **Primary  to Follow**  Palliative Care  Consult: No      Advance Care Planning   Advance Directives:   [place]: Yes    Medical Power of : Yes      Decision Making:  Patient answered questions  Goals of Care: The patient and family endorses that what is most important right now is to focus on symptom/pain control and improvement in condition but with limits to invasive therapies    Accordingly, we have decided that the best plan to meet the patient's goals includes continuing with treatment            PAINAD: NA    Caregiver burden formerly assessed: Yes        > 50% of 40 min of encounter was spent in chart review, face to " face discussion of goals of care, symptom assessment, coordination of care and emotional support.       Sheri Carson, [facility]-BC  Palliative Medicine  [facility]

## 2024-06-13 NOTE — NURSING
Nurses Note -- 4 Eyes      6/13/2024   5:23 PM      Skin assessed during: Q Shift Change      [] No Altered Skin Integrity Present    [x]Prevention Measures Documented      [x] Yes- Altered Skin Integrity Present or Discovered   [] LDA Added if Not in Epic (Describe Wound)   [] New Altered Skin Integrity was Present on Admit and Documented in LDA   [] Wound Image Taken    Wound Care Consulted? No    Attending Nurse:  Jaun Burns Rn     Second RN/Staff Member:  Ed Syed

## 2024-06-13 NOTE — PROGRESS NOTES
[facility] Medicine Progress Note        Chief Complaint: Inpatient Follow-up for multifocal pneumonia     HPI:   78-year-old lady with PMH of AS s/p AVR, CAD, mi, HTN, chronic back pain, HLD, CVA, CHF, GERD, diverticulosis who was being downgraded from the ICU to Hospital Medicine after she was initially admitted for acute hypoxemic respiratory failure requiring BiPAP for adequate saturations. She was transferred from an outside facility to [facility] with complaints of worsening shortness of breath for the last few weeks with initial CXR at outside facility showing bilateral airspace opacities and increased right-sided pleural effusion. Her last echocardiogram from 05/16 showed EF 55-60%. She was placed on antibiotics, steroids as well as IV Lasix for diuresis for pulmonary congestion. She was able to be weaned down from BiPAP to Vapotherm and eventually Oxymizer 8 L. she was also noted to have new onset atrial fibrillation for which he was started on IV Cardizem which has since been transitioned to p.o. Cardizem. She is on full-dose Lovenox due to CHADS-VASc of 7. She reported feeling significantly better and endorsed improvement in her shortness of breath since admission. Denied having any fever, chills, chest pain, cough, sputum production, abdominal pain, nausea, vomiting, headache, numbness, weakness, dizziness/lightheadedness or loss of consciousness. Blood cultures from admission are negative. PT/OT on board; in recommending moderate intensity therapy. Cardiology on board.   Patient had repeat chest x-ray done on June 9th and that showed denser consolidation patient continues to be on Zosyn now has been put on BiPAP with FiO2 of 90%  Interval Hx:   Patient seen and examined this morning   Remains hypoxic- transitioned to bipap  Pulm planning thoracentesis tomorrow   Wbc improving 11.55, hgb 9, stable    Case was discussed with patient's nurse and  on the  floor.     Objective/physical exam:  General: In no acute distress, afebrile  Chest:  On Vapotherm, minimal expiratory wheeze   Heart: RRR, +S1, S2, no appreciable murmur  Abdomen: Soft, nontender, BS +  MSK: Warm, no lower extremity edema, no clubbing or cyanosis  Neurologic: Alert , generalized weakness       Assessment/Plan:  HFpEF with exacerbation and bilateral pleural effusions   Acute respiratory failure with hypoxia requiring high-flow O2 /bipap  Leucocytosis, improving   Atrial Fibrillation with RVR (New Onset)   JUN- POA  DNR     Hx: CAD-status post CABG,Valvular heart disease-status post AVR     Plan  Remains hypoxic- transitioned to bipap  Pulm planning thoracentesis tomorrow   Continue with Lasix 60 IV q.12 strict input and output   Antibiotics were discontinued repeat chest x-ray done today still shows bilateral pleural effusions right greater than left white cell count is improving  Continue with amiodarone drip  Diarrhea is better  Palliative care consulted and following  continue with p.r.n. Xopenex   On Cardizem, digoxin, Eliquis sulfate   Continue with speech PT and OT  PT/OT  Patient was denied by LTAC     Critical care note:  Critical care diagnosis:  Acute hypoxemic respiratory failure secondary tod bilateral pleural effusions on Vapotherm, AFib with RVR on amiodarone drip  Critical care interventions: Hands-on evaluation, review of labs/radiographs/records and discussion with patient and family if present  Critical care time spent: 35 minutes   VTE prophylaxis:  Lovenox  Patient condition:  Stable   Anticipated discharge and Disposition:   LTAC placement        All diagnosis and differential diagnosis have been reviewed; assessment and plan has been documented; I have personally reviewed the labs and test results that are presently available; I have reviewed the patients medication list; I have reviewed the consulting providers response and recommendations. I have reviewed or attempted to  review medical records based upon their availability     All of the patient's questions have been  addressed and answered. Patient's is agreeable to the above stated plan. I will continue to monitor closely and make adjustments to medical management as needed.    VITAL SIGNS: 24 HRS MIN & MAX LAST   Temp  Min: 97.6 °F (36.4 °C)  Max: 98.2 °F (36.8 °C) 97.7 °F (36.5 °C)   BP  Min: 109/80  Max: 135/82 123/86   Pulse  Min: 74  Max: 137  (!) 121   Resp  Min: 13  Max: 29 20   SpO2  Min: 84 %  Max: 95 % (!) 91 %     I have reviewed the following labs:  Recent Labs   Lab 06/11/24 0319 06/12/24 0447 06/13/24 0257   WBC 15.43  15.43* 14.17* 11.55*   RBC 3.38* 3.31* 3.03*   HGB 10.2* 9.9* 9.0*   HCT 31.1* 30.8* 28.4*   MCV 92.0 93.1 93.7   MCH 30.2 29.9 29.7   MCHC 32.8* 32.1* 31.7*   RDW 13.0 13.6 13.9    275 255   MPV 11.6* 11.6* 11.7*     Recent Labs   Lab 06/09/24  0301 06/10/24  0315 06/11/24  0319 06/12/24  0447 06/13/24  0257      < > 138 137 134*   K 3.7   < > 3.4* 3.8 3.8   CL 97*   < > 97* 95* 94*   CO2 25   < > 29 32* 28   BUN 30.0*   < > 19.5 15.9 20.7*   CREATININE 0.90   < > 0.85 0.82 0.98   CALCIUM 8.6   < > 8.8 8.9 9.0   MG 1.80  --  1.70  --  1.60   ALBUMIN 2.7*   < > 2.6* 2.8* 2.5*   ALKPHOS 76   < > 81 84 77   ALT 15   < > 13 11 9   AST 18   < > 17 15 16   BILITOT 0.5   < > 0.6 0.4 0.4    < > = values in this interval not displayed.     Microbiology Results (last 7 days)       Procedure Component Value Units Date/Time    Respiratory Culture [[id number]]     Order Status: Sent Specimen: Respiratory from Sputum, Expectorated     Blood Culture [[id number]]  (Normal) Collected: 06/02/24 0115    Order Status: Completed Specimen: Blood, Venous Updated: 06/07/24 0300     Blood Culture No Growth at 5 days    Blood Culture [[id number]]  (Normal) Collected: 06/02/24 0115    Order Status: Completed Specimen: Blood, Venous Updated: 06/07/24 0300     Blood Culture No Growth at 5 days             See  below for Radiology    Scheduled Med:   [START ON 6/16/2024] amiodarone  200 mg Oral BID    [START ON 6/19/2024] amiodarone  200 mg Oral Daily    amiodarone  400 mg Oral BID    digoxin  0.125 mg Oral Daily    diltiaZEM  360 mg Oral Daily    docusate  50 mg Oral Daily    furosemide (LASIX) injection  60 mg Intravenous Q12H    gabapentin  600 mg Oral BID    Lactobacillus rhamnosus GG  1 packet Oral BID    LIDOcaine  2 patch Transdermal Q24H    magnesium sulfate IVPB  4 g Intravenous Once    metoprolol tartrate  75 mg Oral BID    sertraline  100 mg Oral QHS    traMADoL  50 mg Oral TID      Continuous Infusions:     PRN Meds:    Current Facility-Administered Medications:     sodium chloride 0.9%, , Intravenous, PRN    acetaminophen, 650 mg, Oral, Q6H PRN    hydrALAZINE, 10 mg, Intravenous, Q6H PRN    labetalol, 10 mg, Intravenous, Q4H PRN    levalbuterol, 1.25 mg, Nebulization, Q6H PRN    LORazepam, 0.5 mg, Oral, Q4H PRN    melatonin, 6 mg, Oral, Nightly PRN    metoprolol, 5 mg, Intravenous, Q4H PRN    morphine, 2 mg, Intravenous, Q4H PRN    ondansetron, 4 mg, Intravenous, Q4H PRN     Assessment/Plan:      VTE prophylaxis:     Patient condition:  Stable/Fair/Guarded/ Serious/ Critical    Anticipated discharge and Disposition:         All diagnosis and differential diagnosis have been reviewed; assessment and plan has been documented; I have personally reviewed the labs and test results that are presently available; I have reviewed the patients medication list; I have reviewed the consulting providers response and recommendations. I have reviewed or attempted to review medical records based upon their availability    All of the patient's questions have been  addressed and answered. Patient's is agreeable to the above stated plan. I will continue to monitor closely and make adjustments to medical management as needed.  _____________________________________________________________________    Nutrition  Status:    Radiology:  I have personally reviewed the following imaging and agree with the radiologist.     X-Ray Chest 1 View  Narrative: EXAMINATION:  XR CHEST 1 VIEW    CLINICAL HISTORY:  hypoxia;    TECHNIQUE:  Single view of the chest    COMPARISON:  06/09/2024    FINDINGS:  Cardiomegaly with postsurgical changes of median sternotomy.  Right greater than left pleural effusion remain with right lower lung zone opacification.  Recommend continued follow-up.  Impression: As above.    Electronically signed by: Sukhdev Chavez  Date:    06/12/2024  Time:    07:00      Augustus Barreto MD  Department of Hospital Medicine   [facility]   06/13/2024

## 2024-06-13 NOTE — PT/OT/SLP PROGRESS
Physical Therapy      Patient Name:  Alanna Moya   MRN:  [id number]    Patient not seen today for PT. Pt on 100% bipap at this time. Assisted RN with rolling for [facility]. PT to defer further mobility until pt is more stable with less O2 requirements. Will follow-up as appropriate.

## 2024-06-14 LAB
ALBUMIN FLD-MCNC: 1.6 GM/DL
ALBUMIN SERPL-MCNC: 2.5 G/DL (ref 3.4–4.8)
ANION GAP SERPL CALC-SCNC: 12 MEQ/L
ANION GAP SERPL CALC-SCNC: 12 MEQ/L
BASOPHILS # BLD AUTO: 0.03 X10(3)/MCL
BASOPHILS NFR BLD AUTO: 0.2 %
BUN SERPL-MCNC: 22.2 MG/DL (ref 9.8–20.1)
BUN SERPL-MCNC: 27 MG/DL (ref 9.8–20.1)
CALCIUM SERPL-MCNC: 8.8 MG/DL (ref 8.4–10.2)
CALCIUM SERPL-MCNC: 9.2 MG/DL (ref 8.4–10.2)
CHLORIDE SERPL-SCNC: 93 MMOL/L (ref 98–107)
CHLORIDE SERPL-SCNC: 95 MMOL/L (ref 98–107)
CO2 SERPL-SCNC: 28 MMOL/L (ref 23–31)
CO2 SERPL-SCNC: 30 MMOL/L (ref 23–31)
CREAT SERPL-MCNC: 0.98 MG/DL (ref 0.55–1.02)
CREAT SERPL-MCNC: 1.05 MG/DL (ref 0.55–1.02)
CREAT/UREA NIT SERPL: 23
CREAT/UREA NIT SERPL: 26
EOSINOPHIL # BLD AUTO: 0.01 X10(3)/MCL (ref 0–0.9)
EOSINOPHIL NFR BLD AUTO: 0.1 %
ERYTHROCYTE [DISTWIDTH] IN BLOOD BY AUTOMATED COUNT: 14.3 % (ref 11.5–17)
GFR SERPLBLD CREATININE-BSD FMLA CKD-EPI: 55 ML/MIN/1.73/M2
GFR SERPLBLD CREATININE-BSD FMLA CKD-EPI: 60 ML/MIN/1.73/M2
GLUCOSE SERPL-MCNC: 84 MG/DL (ref 82–115)
GLUCOSE SERPL-MCNC: 94 MG/DL (ref 82–115)
GRAM STN SPEC: NORMAL
GRAM STN SPEC: NORMAL
HCT VFR BLD AUTO: 29.4 % (ref 37–47)
HGB BLD-MCNC: 9.3 G/DL (ref 12–16)
IMM GRANULOCYTES # BLD AUTO: 0.4 X10(3)/MCL (ref 0–0.04)
IMM GRANULOCYTES NFR BLD AUTO: 3.1 %
KOH PREP SPEC: NORMAL
LDH FLD-CCNC: 244 U/L
LDH SERPL-CCNC: 571 U/L (ref 125–220)
LYMPHOCYTES # BLD AUTO: 1.06 X10(3)/MCL (ref 0.6–4.6)
LYMPHOCYTES NFR BLD AUTO: 8.3 %
MAGNESIUM SERPL-MCNC: 2.1 MG/DL (ref 1.6–2.6)
MAGNESIUM SERPL-MCNC: 2.5 MG/DL (ref 1.6–2.6)
MCH RBC QN AUTO: 29.9 PG (ref 27–31)
MCHC RBC AUTO-ENTMCNC: 31.6 G/DL (ref 33–36)
MCV RBC AUTO: 94.5 FL (ref 80–94)
MONOCYTES # BLD AUTO: 1.64 X10(3)/MCL (ref 0.1–1.3)
MONOCYTES NFR BLD AUTO: 12.8 %
NEUTROPHILS # BLD AUTO: 9.63 X10(3)/MCL (ref 2.1–9.2)
NEUTROPHILS NFR BLD AUTO: 75.5 %
NRBC BLD AUTO-RTO: 0.4 %
OHS QRS DURATION: 142 MS
OHS QTC CALCULATION: 444 MS
PH FLD: 7.94 [PH] (ref 5–10)
PHOSPHATE SERPL-MCNC: 4 MG/DL (ref 2.3–4.7)
PLATELET # BLD AUTO: 280 X10(3)/MCL (ref 130–400)
PMV BLD AUTO: 11.5 FL (ref 7.4–10.4)
POTASSIUM SERPL-SCNC: 4.2 MMOL/L (ref 3.5–5.1)
POTASSIUM SERPL-SCNC: 4.3 MMOL/L (ref 3.5–5.1)
PROT FLD-MCNC: 2.3 GM/DL
PROT SERPL-MCNC: 6.1 GM/DL (ref 5.8–7.6)
RBC # BLD AUTO: 3.11 X10(6)/MCL (ref 4.2–5.4)
SODIUM SERPL-SCNC: 135 MMOL/L (ref 136–145)
SODIUM SERPL-SCNC: 135 MMOL/L (ref 136–145)
WBC # SPEC AUTO: 12.77 X10(3)/MCL (ref 4.5–11.5)

## 2024-06-14 PROCEDURE — 84157 ASSAY OF PROTEIN OTHER: CPT | Performed by: INTERNAL MEDICINE

## 2024-06-14 PROCEDURE — 83615 LACTATE (LD) (LDH) ENZYME: CPT | Performed by: INTERNAL MEDICINE

## 2024-06-14 PROCEDURE — 0W993ZZ DRAINAGE OF RIGHT PLEURAL CAVITY, PERCUTANEOUS APPROACH: ICD-10-PCS | Performed by: INTERNAL MEDICINE

## 2024-06-14 PROCEDURE — 87070 CULTURE OTHR SPECIMN AEROBIC: CPT | Performed by: INTERNAL MEDICINE

## 2024-06-14 PROCEDURE — 84155 ASSAY OF PROTEIN SERUM: CPT | Performed by: INTERNAL MEDICINE

## 2024-06-14 PROCEDURE — 27100171 HC OXYGEN HIGH FLOW UP TO 24 HOURS

## 2024-06-14 PROCEDURE — 94761 N-INVAS EAR/PLS OXIMETRY MLT: CPT

## 2024-06-14 PROCEDURE — 99900035 HC TECH TIME PER 15 MIN (STAT)

## 2024-06-14 PROCEDURE — 80048 BASIC METABOLIC PNL TOTAL CA: CPT | Performed by: NURSE PRACTITIONER

## 2024-06-14 PROCEDURE — 88112 CYTOPATH CELL ENHANCE TECH: CPT

## 2024-06-14 PROCEDURE — 83735 ASSAY OF MAGNESIUM: CPT | Performed by: INTERNAL MEDICINE

## 2024-06-14 PROCEDURE — 93005 ELECTROCARDIOGRAM TRACING: CPT

## 2024-06-14 PROCEDURE — 36415 COLL VENOUS BLD VENIPUNCTURE: CPT | Performed by: NURSE PRACTITIONER

## 2024-06-14 PROCEDURE — 85025 COMPLETE CBC W/AUTO DIFF WBC: CPT

## 2024-06-14 PROCEDURE — 84100 ASSAY OF PHOSPHORUS: CPT | Performed by: INTERNAL MEDICINE

## 2024-06-14 PROCEDURE — 88305 TISSUE EXAM BY PATHOLOGIST: CPT | Performed by: INTERNAL MEDICINE

## 2024-06-14 PROCEDURE — 87205 SMEAR GRAM STAIN: CPT | Performed by: INTERNAL MEDICINE

## 2024-06-14 PROCEDURE — 25000003 PHARM REV CODE 250

## 2024-06-14 PROCEDURE — 83986 ASSAY PH BODY FLUID NOS: CPT | Performed by: INTERNAL MEDICINE

## 2024-06-14 PROCEDURE — 63600175 PHARM REV CODE 636 W HCPCS: Performed by: INTERNAL MEDICINE

## 2024-06-14 PROCEDURE — 21400001 HC TELEMETRY ROOM

## 2024-06-14 PROCEDURE — 25000003 PHARM REV CODE 250: Performed by: HOSPITALIST

## 2024-06-14 PROCEDURE — 80048 BASIC METABOLIC PNL TOTAL CA: CPT | Performed by: INTERNAL MEDICINE

## 2024-06-14 PROCEDURE — 99900031 HC PATIENT EDUCATION (STAT)

## 2024-06-14 PROCEDURE — 36415 COLL VENOUS BLD VENIPUNCTURE: CPT | Performed by: INTERNAL MEDICINE

## 2024-06-14 PROCEDURE — 25000003 PHARM REV CODE 250: Performed by: NURSE PRACTITIONER

## 2024-06-14 PROCEDURE — 82042 OTHER SOURCE ALBUMIN QUAN EA: CPT | Performed by: INTERNAL MEDICINE

## 2024-06-14 PROCEDURE — 25000003 PHARM REV CODE 250: Performed by: INTERNAL MEDICINE

## 2024-06-14 PROCEDURE — 83735 ASSAY OF MAGNESIUM: CPT | Performed by: NURSE PRACTITIONER

## 2024-06-14 PROCEDURE — 89051 BODY FLUID CELL COUNT: CPT | Performed by: INTERNAL MEDICINE

## 2024-06-14 PROCEDURE — 82040 ASSAY OF SERUM ALBUMIN: CPT | Performed by: INTERNAL MEDICINE

## 2024-06-14 PROCEDURE — 87220 TISSUE EXAM FOR FUNGI: CPT | Performed by: INTERNAL MEDICINE

## 2024-06-14 PROCEDURE — 94660 CPAP INITIATION&MGMT: CPT

## 2024-06-14 RX ADMIN — METOPROLOL TARTRATE 75 MG: 50 TABLET, FILM COATED ORAL at 08:06

## 2024-06-14 RX ADMIN — DILTIAZEM HYDROCHLORIDE 360 MG: 120 CAPSULE, COATED, EXTENDED RELEASE ORAL at 08:06

## 2024-06-14 RX ADMIN — Medication 6 MG: at 08:06

## 2024-06-14 RX ADMIN — LIDOCAINE 2 PATCH: 700 PATCH TOPICAL at 04:06

## 2024-06-14 RX ADMIN — ACETAMINOPHEN 650 MG: 325 TABLET, FILM COATED ORAL at 01:06

## 2024-06-14 RX ADMIN — Medication 1 EACH: at 08:06

## 2024-06-14 RX ADMIN — AMIODARONE HYDROCHLORIDE 400 MG: 200 TABLET ORAL at 08:06

## 2024-06-14 RX ADMIN — DIGOXIN 0.12 MG: 125 TABLET ORAL at 08:06

## 2024-06-14 RX ADMIN — TRAMADOL HYDROCHLORIDE 50 MG: 50 TABLET, COATED ORAL at 08:06

## 2024-06-14 RX ADMIN — FUROSEMIDE 60 MG: 10 INJECTION, SOLUTION INTRAMUSCULAR; INTRAVENOUS at 08:06

## 2024-06-14 RX ADMIN — GABAPENTIN 600 MG: 300 CAPSULE ORAL at 08:06

## 2024-06-14 RX ADMIN — SERTRALINE HYDROCHLORIDE 100 MG: 50 TABLET ORAL at 08:06

## 2024-06-14 RX ADMIN — LORAZEPAM 0.5 MG: 0.5 TABLET ORAL at 08:06

## 2024-06-14 RX ADMIN — DOCUSATE SODIUM LIQUID 50 MG: 100 LIQUID ORAL at 08:06

## 2024-06-14 NOTE — PROCEDURES
[facility] - 7th Floor ICU    Right Ultrasound guided Thoracentesis Procedure Note         Date of Procedure: 6/14/2024    Procedure:  Right Ultrasound-guided diagnostic and therapeutic thoracentesis    Performed by: Gregory Gandhi MD    Pre-Procedure Diagnosis:  Right Pleural effusion    Post-Procedure Diagnosis:  Same    Anesthesia:  10 cc of 1% local lidocaine injected subcutaneously      Indication: The patient is a 79 y.o. female with right pleural effusion.    Consent: The risks, benefits, potential complications, treatment options and expected outcomes were discussed with the patient and family.  The possibilities of reaction to medication, pulmonary aspiration, perforation of an organ , bleeding, failure to diagnose a condition and creating a complication requiring transfusion or operation were discussed.  Signed/verbal consent was granted.      Description of the Findings of the Procedure:  A Time Out was held and the above information confirmed.     With patient in sitting position, ultrasound guidance was used to identify a pocket of fluid felt amenable to aspiration.  The right posterior mid axillary area was sterilized with chlorhexidine and draped in sterile fashion.  10 cc of local lidocaine was injected subcutaneously.  A small incision was made with scalpel.  A 20 gauge thoracentesis needle was advanced with aspiration via 10 cc syringe.  When pleural fluid was obtained, a 8 Macanese drainage catheter was advanced over the needle into the pleural space and the needle was removed.  The catheter was then connected to suction for fluid removal via vacutainer bottle.    Total of  950cc of sanguinous fluid was removed.     Appearance of fluid was      The patient recovered from the procedure in satisfactory condition.      Complications:  None    Estimated Blood Loss (EBL): Minimal    Specimens:   Right plerual fluid      Gregory Gandhi MD  Pulmonary Critical Care Medicine  [facility] - 7th Floor ICU

## 2024-06-14 NOTE — PROGRESS NOTES
Patient Name: Alanna Moya   MRN: [id number]   Admission Date: 6/1/2024   Hospital Length of Stay: 13   Attending Provider: Augustus Barreto MD   Consulting Provider: STEF Haynes  Reason for Consult: Goals of Care  Primary Care Physician:  Cornel Haddad MD     Principal Problem: <principal problem not specified>     Patient information was obtained from patient, relative(s), and ER records.     Final diagnoses:  [J96.90] Respiratory failure      Assessment/Plan:     I reviewed the patient and family's understanding of the seriousness of the illness and its expected prognosis. We discussed the patient's goals of care and treatment preferences. I clarified current code status. I identified the surrogate decision maker or health care POA. I answered all questions and we formulated a plan including recommendations for symptom management and how to best achieve goals of care.       Spoke with patient and daughter (Nakia) at bedside. Reports she had a bowel movement this morning.  Denies pain at present.  Denies shortness of breath, tolerating BiPAP well.  Reports improvement with anxiety and ability to rest with administration of lorazepam last night.  Discussed plans for thoracentesis today.  Patient reports she has significant needle phobia, to which I explained she can receive medications prior to the procedure if needed for anxiety and pain.  She verbalizes understanding and appreciation.    I reviewed current status with patient and daughter.  Explained her response to thoracentesis today will help to guide if this is a more acute versus chronic issue.  And guide further decisions regarding goals of care.  They verbalized understanding.  Encouraged daughter to discuss further with Dr. Gandhi.    Discussed with nurse and attending.  Offered support.  Encouraged to call with questions or concerns.  Palliative Medicine will continue to follow.    Advance Care Planning     Date: 06/14/2024    [facility]  ANETA  engaged the patient and family in a voluntary conversation about advance care planning and we specifically addressed what the goals of care would be moving forward, in light of the patient's change in clinical status, specifically current condition.  We did specifically address the patient's likely prognosis, which is fair .  We explored the patient's values and preferences for future care.  The patient and family endorses that what is most important right now is to focus on symptom/pain control and improvement in condition but with limits to invasive therapies    Accordingly, we have decided that the best plan to meet the patient's goals includes continuing with treatment        Recommendations:     Continue p.r.n. Morphine and Ativan      Interval History:     Unable to tolerate Vapotherm overnight.  Remains on BiPAP 14/8 at FiO2 100%.  Per nursing reports patient was able to sleep overnight with Ativan administration.      Active Ambulatory Problems     Diagnosis Date Noted    Unresponsive episode 02/08/2019    Aortic stenosis 02/28/2023     Resolved Ambulatory Problems     Diagnosis Date Noted    No Resolved Ambulatory Problems     Past Medical History:   Diagnosis Date    Abdominal pain, unspecified site     Coronary atherosclerosis of unspecified type of vessel, native or graft     Esophageal reflux     Fatigue     History of glaucoma     History of heart attack     Hypertension     Lumbosacral spondylosis without myelopathy     Other and unspecified hyperlipidemia     Rectocele     SOB (shortness of breath) on exertion     Unspecified essential hypertension     Weakness         Past Surgical History:   Procedure Laterality Date    CHOLECYSTECTOMY      CORONARY ARTERY BYPASS GRAFT      GLAUCOMA SURGERY Bilateral     HYSTERECTOMY      TRANSCATHETER AORTIC VALVE REPLACEMENT (TAVR) N/A 2/27/2023    Procedure: REPLACEMENT, AORTIC VALVE, TRANSCATHETER (TAVR);  Surgeon: Anselmo Arango MD;  Location: [facility]  LAB;  Service: Cardiology;  Laterality: N/A;  TAVR W/ ANEST.        Review of patient's allergies indicates:  No Known Allergies       Current Facility-Administered Medications:     0.9%  NaCl infusion, , Intravenous, PRN, Vishnu Stevenson MD, Stopped at 06/08/24 0133    acetaminophen tablet 650 mg, 650 mg, Oral, Q6H PRN, Naa Chin MD, 650 mg at 06/10/24 1115    [START ON 6/16/2024] amiodarone tablet 200 mg, 200 mg, Oral, BID, Clarice, Anne, FNP    [START ON 6/19/2024] amiodarone tablet 200 mg, 200 mg, Oral, Daily, Clarice, Anne, FNP    amiodarone tablet 400 mg, 400 mg, Oral, BID, Clarice, Anne, FNP, 400 mg at 06/14/24 0803    digoxin tablet 0.125 mg, 0.125 mg, Oral, Daily, Nataly Mead, FNP, 0.125 mg at 06/14/24 0802    diltiaZEM 24 hr capsule 360 mg, 360 mg, Oral, Daily, Sukhdev Hoskins MD, 360 mg at 06/14/24 0802    docusate 50 mg/5 mL liquid 50 mg, 50 mg, Oral, Daily, Marga Smith MD, 50 mg at 06/14/24 0802    furosemide injection 60 mg, 60 mg, Intravenous, Q12H, Naa Chin MD, 60 mg at 06/14/24 0802    gabapentin capsule 600 mg, 600 mg, Oral, BID, Marga Smith MD, 600 mg at 06/14/24 0803    hydrALAZINE injection 10 mg, 10 mg, Intravenous, Q6H PRN, Eleno Abbasi MD, 10 mg at 06/06/24 1631    labetaloL injection 10 mg, 10 mg, Intravenous, Q4H PRN, Baldo Finney DO, 10 mg at 06/11/24 0257    Lactobacillus rhamnosus GG 5 billion cell packet (PEDS) 1 each, 1 packet, Oral, BID, Naa Chin MD, 1 each at 06/14/24 0803    levalbuterol nebulizer solution 1.25 mg, 1.25 mg, Nebulization, Q6H PRN, Shanna Montiel MD, 1.25 mg at 06/09/24 1200    LIDOcaine 5 % patch 2 patch, 2 patch, Transdermal, Q24H, Sheri Carson, NP, 2 patch at 06/13/24 1405    LORazepam tablet 0.5 mg, 0.5 mg, Oral, Q4H PRN, Sheri Carson, NP, 0.5 mg at 06/13/24 2107    melatonin tablet 6 mg, 6 mg, Oral, Nightly PRN, Naa Chin MD, 6 mg at 06/13/24 2107    metoprolol injection 5 mg, 5 mg, Intravenous, Q4H PRN,  "Justyn Landaverde MD, 5 mg at 06/12/24 0549    metoprolol tartrate tablet 75 mg, 75 mg, Oral, BID, Anne Oneil, FNP, 75 mg at 06/14/24 0802    morphine injection 2 mg, 2 mg, Intravenous, Q4H PRN, Sheri Carson, NP, 2 mg at 06/13/24 1137    ondansetron injection 4 mg, 4 mg, Intravenous, Q4H PRN, Julissa Rinaldi, FNP, 4 mg at 06/11/24 0405    sertraline tablet 100 mg, 100 mg, Oral, QHS, Marga Smith MD, 100 mg at 06/13/24 2100    traMADoL tablet 50 mg, 50 mg, Oral, TID, Vishnu Stevenson MD, 50 mg at 06/14/24 0803       Current Facility-Administered Medications:     sodium chloride 0.9%, , Intravenous, PRN    acetaminophen, 650 mg, Oral, Q6H PRN    hydrALAZINE, 10 mg, Intravenous, Q6H PRN    labetalol, 10 mg, Intravenous, Q4H PRN    levalbuterol, 1.25 mg, Nebulization, Q6H PRN    LORazepam, 0.5 mg, Oral, Q4H PRN    melatonin, 6 mg, Oral, Nightly PRN    metoprolol, 5 mg, Intravenous, Q4H PRN    morphine, 2 mg, Intravenous, Q4H PRN    ondansetron, 4 mg, Intravenous, Q4H PRN     No family history on file.       Review of Systems   Constitutional:  Positive for fatigue.   HENT: Negative.     Respiratory: Negative.     Cardiovascular: Negative.    Gastrointestinal: Negative.    Genitourinary: Negative.    Musculoskeletal: Negative.    Neurological:  Positive for weakness.            Objective:   /72 (BP Location: Left arm, Patient Position: Lying)   Pulse 88   Temp 97.1 °F (36.2 °C) (Axillary)   Resp 19   Ht 5' 2" (1.575 m)   Wt 77.1 kg (170 lb)   SpO2 98%   BMI 31.09 kg/m²      Physical Exam   Constitutional: She is oriented to person, place, and time. No distress. She appears ill.   HENT:   Head: Normocephalic.   Mouth/Throat: Mucous membranes are moist.   Cardiovascular: Normal rate and normal heart sounds. An irregular rhythm present.   No murmur heard.Pulmonary:      Effort: Pulmonary effort is normal. No respiratory distress.      Breath sounds: Rhonchi present.     Abdominal: Soft. She exhibits " no distension.   Musculoskeletal:      Cervical back: Normal range of motion.      Right lower leg: No edema.      Left lower leg: No edema.   Neurological: She is alert and oriented to person, place, and time.   Skin: Skin is warm and dry.          Review of Symptoms  Review of Symptoms      Symptom Assessment (ESAS 0-10 Scale)  Pain:  0  Dyspnea:  0  Anxiety:  0  Nausea:  0  Depression:  0  Anorexia:  0  Fatigue:  0  Insomnia:  0  Restlessness:  0  Agitation:  0         Bowel Management Plan (BMP):  Yes      Psychosocial/Cultural:   See Palliative Psychosocial Note: Yes  **Primary  to Follow**  Palliative Care  Consult: No      Advance Care Planning   Advance Directives:   [place]: Yes    Medical Power of : Yes      Decision Making:  Patient answered questions  Goals of Care: The patient and family endorses that what is most important right now is to focus on symptom/pain control and improvement in condition but with limits to invasive therapies    Accordingly, we have decided that the best plan to meet the patient's goals includes continuing with treatment            PAINAD: NA    Caregiver burden formerly assessed: Yes        > 50% of 30 min of encounter was spent in chart review, face to face discussion of goals of care, symptom assessment, coordination of care and emotional support.       STEF Haynes-BC  Palliative Medicine  [facility]

## 2024-06-14 NOTE — NURSING
Nurses Note -- 4 Eyes      6/14/2024   7:44 AM      Skin assessed during: Daily Assessment      [] No Altered Skin Integrity Present    [x]Prevention Measures Documented      [x] Yes- Altered Skin Integrity Present or Discovered   [] LDA Added if Not in Epic (Describe Wound)   [] New Altered Skin Integrity was Present on Admit and Documented in LDA   [] Wound Image Taken    Wound Care Consulted? No    Attending Nurse:  Nata Pederson RN/Staff Member:  MELINA Torres

## 2024-06-14 NOTE — PROGRESS NOTES
"    [facility]    Cardiology  Progress Note    Patient Name: Alanna Moya  MRN: [id number]  Admission Date: 6/1/2024  Hospital Length of Stay: 13 days  Code Status: DNR   Attending Physician: Augustus Barreto MD   Primary Care Physician: Cornel Haddad MD  Expected Discharge Date:   Principal Problem:<principal problem not specified>    Subjective:   Reason for Consult: SVT     HPI:Ms. Moya is a 78 year old female, known to Dr. Ramon in [place], who presented to the hospital from [facility] as transfer due to respiratory failure. Upon arrival she was noted to be SOB. Noted hypoxia requiring BIPAP. Chesr Radiograph from outside facility revealed bilateral airspace opacification and increase his right-sided pleural effusion suggestive of worsening pneumonia or possibly edema. CT Chest revealed mixed picture of decompensated HF, pleural effusions and superimposed pneumonia. During this hospitalization patient with tachycardia concerning for AF RVR, with noted Left Bundle Branch Block. She was given IV Metoprolol and started on Cardizem Infusion for Acute HR Control. She does have history of PSVT. Echocardiogram on 6.2.24 revealed intact LV Function with EF 55-60% & Grade I DD. Electrolytes grossly stable. Troponin normal. Lactic Acid Normal. . CIS Consulted for AF Management.     Hospital Course:   6.7.24: Patient seen sitting in chair. She denies SOB, CP, or nausea. Remains in Afib RVR   6.8.24: NAD. VSS. No complaints of CP/SOB/Palps. "I feel okay" WBC 14.27  6.9.24: NAD. VSS. AFIB RVR on telemetry. No complaints CP/SOB/Palps. "I feel okay" possible aspiration - awaiting Speech Evaluation. K 3.7, Mag 1.8, WBC 16.21.  6.10.24: NAD Noted. Sitting up in chair. AF RVR. Requiring Vapotherm Support. Denies CP. Reports exertional SOB, however, functional capacity is limited as she is deconditioned. Hypertension Noted. Diuresis noted, - 1604 ML/24 Hours.   6.11.24: BP Improved. AF RVR. " Diuresis noted, - 2144 ML/24 Hours. Vapotherm oxygen support- 75% FIO2.   6.12.24: NAD Noted. Remains AF RVR. BP Stable. Requiring Significant Oxygen Support. Continues to Diurese well.   6.13.24: NAD. VSS. Urine output 1050/850 ml Fluid Net Negative. AFIB RVR on telemetry. No complaints of CP/SOB/Palps.   6.14.24: NAD. VSS. Sitting in Bedside Chair. Denies CP and Palps. + SOB. Remain in A.Fib with CVR. Fluid Balance Net Negative 1210mL.    PMH: CAD/CABG, Hypertension, Dyslipidemia, MELCHOR, CEA, CVA, AS/TAVR, PSVT  PSH: LHC, CABG, TAVR, CEA, Hysterectomy, Cholecystectomy, Eye Surgery, Hand Surgery  Family History: Father- Old MI/CAD, Mother- HF, Son- Hypertension, Daughter- Cancer/Hypertension, Brother- Heart Disease, Brother- Old MI/CAD, Brother- Cancer  Social History: Tobacco- Negative, Alcohol- Negative, Substance Abuse- Negative      Previous Cardiac Diagnostics:   Echocardiogram (6.2.24):  Left Ventricle: The left ventricle is normal in size. Increased wall thickness. There is normal systolic function with a visually estimated ejection fraction of 55 - 60%. Grade I diastolic dysfunction.  Right Ventricle: Systolic function is reduced.TAPSE is 1.60 cm.  Aortic Valve: There is a transcatheter valve replacement in the aortic position. Aortic valve area by VTI is 1.56 cm². Aortic valve peak velocity is 1.94 m/s. Mean gradient is 8 mmHg. The dimensionless index is 0.50.  Mitral Valve: Mildly thickened leaflets. There is mitral annular calcification present. There is mild stenosis. The mean pressure gradient across the mitral valve is 5 mmHg at a heart rate of 87 bpm. There is mild regurgitation.  Tricuspid Valve: The tricuspid valve is structurally normal. There is mild regurgitation.  Pulmonic Valve: The pulmonic valve is structurally normal.  Pulmonary Artery: The estimated pulmonary artery systolic pressure is 47 mmHg.  IVC/SVC: Normal venous pressure at 3 mmHg.  Pericardium: Left pleural effusion.      Echocardiogram (5.15.24):  EF 55-60%. Grade I Diastolic Dysfunction. Normal LV Wall Motion.  Mild MAC with Mild MR and No MS. Mild to Moderate TR.  Mild to Moderate AI, No AS.     CT Angiogram Head/Neck (5.15.24):  Carotid arteries:   Calcified plaque at the siphons without significant narrowing.   Normal A1 and M1 segments.   Vertebrobasilar Circulation:   Vertebral arteries: Patent. Calcified plaque on the right without significant narrowing. Otherwise no abnormalities.   Basilar artery: Patent, no abnormalities.   Posterior cerebral arteries:  Patent. Right fetal origin. Otherwise no abnormalities.      Echocardiogram (4.21.23):  The study quality is good.   The left ventricle is normal in size. Global left ventricular systolic function is normal. The left ventricular ejection fraction by Power's is 62%. The left ventricle diastolic function is impaired (Grade II) with an elevated left atrial pressure. Noted left ventricular hypertrophy. Concentric left ventricular hypertrophy is present. It is mild. LVSI=41ml/m2.    LVEDV=67.2ml and LVESV=25.7ml.  The left atrial diameter is moderately increased. Left atrial diameter is 4.4 cms. The left atrium is severely enlarged based on the left atrium volume index of 48.2ml/m².  S/P TAVR. Bioprosthetic aortic valve is well seated and functions normally with no evidence of insufficiency or perivalvular leaks. JACINTA=2.0cm2. The trans-aortic peak gradient is 15.8 mmHg. The trans-aortic mean gradient is 8.6 mmHg. DI=1.1.  Mild mitral annular calcification is noted. The mean trans mitral gradient is 1.9 mmHg.  Mild to moderate (1-2+) mitral regurgitation. Mild to moderate (1-2+) tricuspid regurgitation.  The estimated pulmonary artery systolic pressure is 41 mmHg assuming a right atrial pressure of 3 mmHg. Evidence of pulmonary hypertension is noted.      MCT Monitor (3.15.23):  Predominant Rhythm SR.  PSVT     TAVR (2.27.23):  TAVR  23  mm S3 ultra valve, nominal prep,  Right  TF approach  Limited echo pre, post valve placement  Root angiography  Distal aortography  Temporary pacemaker placement removal  Manta closure  Right CFA access site.  U/S guided bilateral right groin access   Balloon angioplasty of right CFA with a 5 x 40 Napoleon.     [facility] (1.26.23):  LM: Distal 50% Stenosis, LAD: Occluded Mid, LCX: Ostial 50% at Mid Portion, RCA: Dominant Patent Arter.  JENNINGS to LAD is Patent.  Impression: LIMA to LAD- Patent, 50% Stenosis LCX Artery  Medical Management.    Review of Systems   Constitutional: Positive for malaise/fatigue.   Cardiovascular:  Positive for dyspnea on exertion. Negative for chest pain, leg swelling and palpitations.   Respiratory:  Positive for shortness of breath.    All other systems reviewed and are negative.    Objective:     Vital Signs (Most Recent):  Temp: 97.3 °F (36.3 °C) (06/14/24 1200)  Pulse: (!) 51 (06/14/24 1500)  Resp: 17 (06/14/24 1500)  BP: 133/61 (06/14/24 1400)  SpO2: 98 % (06/14/24 1500) Vital Signs (24h Range):  Temp:  [97.1 °F (36.2 °C)-98.2 °F (36.8 °C)] 97.3 °F (36.3 °C)  Pulse:  [] 51  Resp:  [14-31] 17  SpO2:  [85 %-100 %] 98 %  BP: (114-155)/(61-93) 133/61   Weight: 77.1 kg (170 lb)  Body mass index is 31.09 kg/m².  SpO2: 98 %       Intake/Output Summary (Last 24 hours) at 6/14/2024 1620  Last data filed at 6/14/2024 1200  Gross per 24 hour   Intake 100 ml   Output 1060 ml   Net -960 ml     Lines/Drains/Airways       Drain  Duration                  Urethral Catheter 06/09/24 1754 4 days              Peripheral Intravenous Line  Duration                  Peripheral IV - Single Lumen 06/07/24 0515 20 G Right Forearm 7 days         Peripheral IV - Single Lumen 06/07/24 1700 20 G Anterior;Proximal;Right Forearm 6 days         Peripheral IV - Single Lumen 06/08/24 0428 20 G Left Forearm 6 days                  Significant Labs:   Recent Results (from the past 72 hour(s))   Comprehensive Metabolic Panel    Collection Time: 06/12/24  4:47 AM    Result Value Ref Range    Sodium 137 136 - 145 mmol/L    Potassium 3.8 3.5 - 5.1 mmol/L    Chloride 95 (L) 98 - 107 mmol/L    CO2 32 (H) 23 - 31 mmol/L    Glucose 126 (H) 82 - 115 mg/dL    Blood Urea Nitrogen 15.9 9.8 - 20.1 mg/dL    Creatinine 0.82 0.55 - 1.02 mg/dL    Calcium 8.9 8.4 - 10.2 mg/dL    Protein Total 5.8 5.8 - 7.6 gm/dL    Albumin 2.8 (L) 3.4 - 4.8 g/dL    Globulin 3.0 2.4 - 3.5 gm/dL    Albumin/Globulin Ratio 0.9 (L) 1.1 - 2.0 ratio    Bilirubin Total 0.4 <=1.5 mg/dL    ALP 84 40 - 150 unit/L    ALT 11 0 - 55 unit/L    AST 15 5 - 34 unit/L    eGFR >60 mL/min/1.73/m2    Anion Gap 10.0 mEq/L    BUN/Creatinine Ratio 19    Digoxin Level    Collection Time: 06/12/24  4:47 AM   Result Value Ref Range    Digoxin Level 1.7 0.8 - 2.0 ng/mL   CBC with Differential    Collection Time: 06/12/24  4:47 AM   Result Value Ref Range    WBC 14.17 (H) 4.50 - 11.50 x10(3)/mcL    RBC 3.31 (L) 4.20 - 5.40 x10(6)/mcL    Hgb 9.9 (L) 12.0 - 16.0 g/dL    Hct 30.8 (L) 37.0 - 47.0 %    MCV 93.1 80.0 - 94.0 fL    MCH 29.9 27.0 - 31.0 pg    MCHC 32.1 (L) 33.0 - 36.0 g/dL    RDW 13.6 11.5 - 17.0 %    Platelet 275 130 - 400 x10(3)/mcL    MPV 11.6 (H) 7.4 - 10.4 fL    Neut % 74.7 %    Lymph % 8.5 %    Mono % 14.0 %    Eos % 0.1 %    Basophil % 0.2 %    Lymph # 1.20 0.6 - 4.6 x10(3)/mcL    Neut # 10.59 (H) 2.1 - 9.2 x10(3)/mcL    Mono # 1.99 (H) 0.1 - 1.3 x10(3)/mcL    Eos # 0.01 0 - 0.9 x10(3)/mcL    Baso # 0.03 <=0.2 x10(3)/mcL    IG# 0.35 (H) 0 - 0.04 x10(3)/mcL    IG% 2.5 %    NRBC% 0.0 %   Comprehensive Metabolic Panel    Collection Time: 06/13/24  2:57 AM   Result Value Ref Range    Sodium 134 (L) 136 - 145 mmol/L    Potassium 3.8 3.5 - 5.1 mmol/L    Chloride 94 (L) 98 - 107 mmol/L    CO2 28 23 - 31 mmol/L    Glucose 103 82 - 115 mg/dL    Blood Urea Nitrogen 20.7 (H) 9.8 - 20.1 mg/dL    Creatinine 0.98 0.55 - 1.02 mg/dL    Calcium 9.0 8.4 - 10.2 mg/dL    Protein Total 6.1 5.8 - 7.6 gm/dL    Albumin 2.5 (L) 3.4 - 4.8 g/dL     Globulin 3.6 (H) 2.4 - 3.5 gm/dL    Albumin/Globulin Ratio 0.7 (L) 1.1 - 2.0 ratio    Bilirubin Total 0.4 <=1.5 mg/dL    ALP 77 40 - 150 unit/L    ALT 9 0 - 55 unit/L    AST 16 5 - 34 unit/L    eGFR 60 mL/min/1.73/m2    Anion Gap 12.0 mEq/L    BUN/Creatinine Ratio 21    CBC with Differential    Collection Time: 06/13/24  2:57 AM   Result Value Ref Range    WBC 11.55 (H) 4.50 - 11.50 x10(3)/mcL    RBC 3.03 (L) 4.20 - 5.40 x10(6)/mcL    Hgb 9.0 (L) 12.0 - 16.0 g/dL    Hct 28.4 (L) 37.0 - 47.0 %    MCV 93.7 80.0 - 94.0 fL    MCH 29.7 27.0 - 31.0 pg    MCHC 31.7 (L) 33.0 - 36.0 g/dL    RDW 13.9 11.5 - 17.0 %    Platelet 255 130 - 400 x10(3)/mcL    MPV 11.7 (H) 7.4 - 10.4 fL    Neut % 72.3 %    Lymph % 9.7 %    Mono % 14.6 %    Eos % 0.2 %    Basophil % 0.2 %    Lymph # 1.12 0.6 - 4.6 x10(3)/mcL    Neut # 8.35 2.1 - 9.2 x10(3)/mcL    Mono # 1.69 (H) 0.1 - 1.3 x10(3)/mcL    Eos # 0.02 0 - 0.9 x10(3)/mcL    Baso # 0.02 <=0.2 x10(3)/mcL    IG# 0.35 (H) 0 - 0.04 x10(3)/mcL    IG% 3.0 %    NRBC% 0.0 %   Magnesium    Collection Time: 06/13/24  2:57 AM   Result Value Ref Range    Magnesium Level 1.60 1.60 - 2.60 mg/dL   Phosphorus    Collection Time: 06/13/24  2:57 AM   Result Value Ref Range    Phosphorus Level 3.3 2.3 - 4.7 mg/dL   Basic Metabolic Panel    Collection Time: 06/14/24  2:41 AM   Result Value Ref Range    Sodium 135 (L) 136 - 145 mmol/L    Potassium 4.3 3.5 - 5.1 mmol/L    Chloride 95 (L) 98 - 107 mmol/L    CO2 28 23 - 31 mmol/L    Glucose 94 82 - 115 mg/dL    Blood Urea Nitrogen 22.2 (H) 9.8 - 20.1 mg/dL    Creatinine 0.98 0.55 - 1.02 mg/dL    BUN/Creatinine Ratio 23     Calcium 9.2 8.4 - 10.2 mg/dL    Anion Gap 12.0 mEq/L    eGFR 60 mL/min/1.73/m2   Magnesium    Collection Time: 06/14/24  2:41 AM   Result Value Ref Range    Magnesium Level 2.50 1.60 - 2.60 mg/dL   Phosphorus    Collection Time: 06/14/24  2:41 AM   Result Value Ref Range    Phosphorus Level 4.0 2.3 - 4.7 mg/dL   CBC with Differential     Collection Time: 06/14/24  2:41 AM   Result Value Ref Range    WBC 12.77 (H) 4.50 - 11.50 x10(3)/mcL    RBC 3.11 (L) 4.20 - 5.40 x10(6)/mcL    Hgb 9.3 (L) 12.0 - 16.0 g/dL    Hct 29.4 (L) 37.0 - 47.0 %    MCV 94.5 (H) 80.0 - 94.0 fL    MCH 29.9 27.0 - 31.0 pg    MCHC 31.6 (L) 33.0 - 36.0 g/dL    RDW 14.3 11.5 - 17.0 %    Platelet 280 130 - 400 x10(3)/mcL    MPV 11.5 (H) 7.4 - 10.4 fL    Neut % 75.5 %    Lymph % 8.3 %    Mono % 12.8 %    Eos % 0.1 %    Basophil % 0.2 %    Lymph # 1.06 0.6 - 4.6 x10(3)/mcL    Neut # 9.63 (H) 2.1 - 9.2 x10(3)/mcL    Mono # 1.64 (H) 0.1 - 1.3 x10(3)/mcL    Eos # 0.01 0 - 0.9 x10(3)/mcL    Baso # 0.03 <=0.2 x10(3)/mcL    IG# 0.40 (H) 0 - 0.04 x10(3)/mcL    IG% 3.1 %    NRBC% 0.4 %   Lactate Dehydrogenase    Collection Time: 06/14/24  2:41 AM   Result Value Ref Range    Lactate Dehydrogenase 571 (H) 125 - 220 U/L   Protein, Total    Collection Time: 06/14/24  2:41 AM   Result Value Ref Range    Protein Total 6.1 5.8 - 7.6 gm/dL   Albumin    Collection Time: 06/14/24  2:41 AM   Result Value Ref Range    Albumin 2.5 (L) 3.4 - 4.8 g/dL   LD, Body Fluid    Collection Time: 06/14/24 10:21 AM   Result Value Ref Range    Lactate Dehydrogenase Body Fluid 244 U/L   Gram Stain    Collection Time: 06/14/24 10:21 AM    Specimen: Pleural Fluid; Body Fluid   Result Value Ref Range    GRAM STAIN Rare WBC observed     GRAM STAIN No bacteria seen    Albumin, Body Fluid    Collection Time: 06/14/24 10:21 AM   Result Value Ref Range    Albumin Level Body Fluid 1.6 gm/dL   pH, Body Fluid    Collection Time: 06/14/24 10:21 AM   Result Value Ref Range    pH Body Fluid 7.94 5.00 - 10.00   Protein, Body Fluid    Collection Time: 06/14/24 10:21 AM   Result Value Ref Range    Protein Body Fluid 2.3 gm/dL   KOH Prep    Collection Time: 06/14/24 10:22 AM    Specimen: Pleural Fluid; Body Fluid   Result Value Ref Range    KOH Prep No fungal elements seen      Telemetry: [facility]    Physical Exam  Vitals reviewed.    Constitutional:       General: She is not in acute distress.     Appearance: Normal appearance. She is ill-appearing.   HENT:      Head: Normocephalic.      Mouth/Throat:      Mouth: Mucous membranes are moist.   Eyes:      Extraocular Movements: Extraocular movements intact.      Conjunctiva/sclera: Conjunctivae normal.   Cardiovascular:      Rate and Rhythm: Normal rate and regular rhythm.      Heart sounds: Murmur heard.   Pulmonary:      Effort: Pulmonary effort is normal. No respiratory distress.      Breath sounds: Examination of the right-lower field reveals decreased breath sounds. Examination of the left-lower field reveals decreased breath sounds. Decreased breath sounds present.      Comments: On BiPAP 100% FiO2; Bilateral Diminished Breath Sounds Posteriorly   Abdominal:      General: Bowel sounds are normal.   Skin:     General: Skin is warm and dry.   Neurological:      Mental Status: She is alert. Mental status is at baseline.   Psychiatric:         Behavior: Behavior normal.       Current Inpatient Medications:  Current Facility-Administered Medications:     0.9%  NaCl infusion, , Intravenous, PRN, Vishnu Stevenson MD, Stopped at 06/08/24 0133    acetaminophen tablet 650 mg, 650 mg, Oral, Q6H PRN, Naa Chin MD, 650 mg at 06/14/24 1312    [START ON 6/16/2024] amiodarone tablet 200 mg, 200 mg, Oral, BID, Clarice, Anne, FNP    [START ON 6/19/2024] amiodarone tablet 200 mg, 200 mg, Oral, Daily, Clarice, Anne, FNP    amiodarone tablet 400 mg, 400 mg, Oral, BID, Marta Oneila, FNP, 400 mg at 06/14/24 0803    digoxin tablet 0.125 mg, 0.125 mg, Oral, Daily, Nataly Mead T, FNP, 0.125 mg at 06/14/24 0802    diltiaZEM 24 hr capsule 360 mg, 360 mg, Oral, Daily, Sukhdev Hoskins MD, 360 mg at 06/14/24 0802    docusate 50 mg/5 mL liquid 50 mg, 50 mg, Oral, Daily, Marga Smith MD, 50 mg at 06/14/24 0802    furosemide injection 60 mg, 60 mg, Intravenous, Q12H, Naa Chin MD, 60 mg at 06/14/24 0802     gabapentin capsule 600 mg, 600 mg, Oral, BID, Marga Smith MD, 600 mg at 06/14/24 0803    hydrALAZINE injection 10 mg, 10 mg, Intravenous, Q6H PRN, Eleno Abbasi MD, 10 mg at 06/06/24 1631    labetaloL injection 10 mg, 10 mg, Intravenous, Q4H PRN, Baldo Finney DO, 10 mg at 06/11/24 0257    Lactobacillus rhamnosus GG 5 billion cell packet (PEDS) 1 each, 1 packet, Oral, BID, Naa Chin MD, 1 each at 06/14/24 0803    levalbuterol nebulizer solution 1.25 mg, 1.25 mg, Nebulization, Q6H PRN, Shanna Montiel MD, 1.25 mg at 06/09/24 1200    LIDOcaine 5 % patch 2 patch, 2 patch, Transdermal, Q24H, Sheri Carson, NP, 2 patch at 06/13/24 1405    LORazepam tablet 0.5 mg, 0.5 mg, Oral, Q4H PRN, Sheri Carson, NP, 0.5 mg at 06/13/24 2107    melatonin tablet 6 mg, 6 mg, Oral, Nightly PRN, Naa Chin MD, 6 mg at 06/13/24 2107    metoprolol injection 5 mg, 5 mg, Intravenous, Q4H PRN, Justyn Landaverde MD, 5 mg at 06/12/24 0549    metoprolol tartrate tablet 75 mg, 75 mg, Oral, BID, Anne Oneil, FNP, 75 mg at 06/14/24 0802    morphine injection 2 mg, 2 mg, Intravenous, Q4H PRN, Sheri Carson, NP, 2 mg at 06/13/24 1137    ondansetron injection 4 mg, 4 mg, Intravenous, Q4H PRN, Julissa Rinaldi FNP, 4 mg at 06/11/24 0405    sertraline tablet 100 mg, 100 mg, Oral, QHS, Marga Smith MD, 100 mg at 06/13/24 2100    traMADoL tablet 50 mg, 50 mg, Oral, TID, Vishnu Stevenson MD, 50 mg at 06/14/24 0803  VTE Risk Mitigation (From admission, onward)           Ordered     IP VTE HIGH RISK PATIENT  Once         06/01/24 1357                  Assessment:   Atrial Fibrillation with RVR (New Onset)- Now CVR, Converted to SB in the Early Afternoon    - CHADsVASc - 7 Points - 11.2% Stroke Risk per Year     - History of PSVT    - On FD Eliquis for Stroke Risk Reduction  Acute on Chronic Diastolic HF/EF 55-60%    - EF 55-60% with Grade I Diastolic Dysfunction  Aspiration Pneumonia    - Cleared Swallowing  Study  Acute Hypoxemic Respiratory Failure Requiring Oxygenation - on BiPAP  Leukocytosis/Sepsis - Improving   Valvular Heart Disease    - AS: Status Post TAVR  23  mm S3 ultra valve, nominal prep,  Right TF approach (2.27.23)  CAD/CABG    - LIMA to LAD (Patent) with 50% Stenosis Native LCX  Hypertension   Hyperlipidemia  Chronic Left Bundle Branch Block ([place] TAVR)  MELCHOR/CEA  History of CVA  Anemia  DNR Status   No known history of GI Bleed     Plan:   Continue Oral Amiodarone, BB and Cardizem CD  D/C Digoxin  Continue Eliquis 5 Mg PO BID Re: AF/Stroke Risk Reduction  Continue Diuretics as Clinically Indicated  Accurate I&Os and Daily Weights   Keep K > 4.0 and Mg > 2.0  Electrolytes per Primary Team   Labs in AM: CBC, BMP, and Mag     LYLE Pruett  Cardiology  [facility]  06/14/2024    I agree with the findings of the complexity of problems addressed and take responsibility for the plan's risks and complications. I approved the plan documented by Murtaza Burris NP.

## 2024-06-14 NOTE — PT/OT/SLP PROGRESS
Physical Therapy Treatment    Patient Name:  Alanna Moya   MRN:  [id number]    Nurse held treatment again today due respiratory status. I will check on her this weekend.

## 2024-06-14 NOTE — PROGRESS NOTES
[facility]ia is[facility] Medicine Progress Note        Chief Complaint: Inpatient Follow-up for multifocal pneumonia     HPI:   78-year-old lady with PMH of AS s/p AVR, CAD, mi, HTN, chronic back pain, HLD, CVA, CHF, GERD, diverticulosis who was being downgraded from the ICU to Hospital Medicine after she was initially admitted for acute hypoxemic respiratory failure requiring BiPAP for adequate saturations. She was transferred from an outside facility to [facility] with complaints of worsening shortness of breath for the last few weeks with initial CXR at outside facility showing bilateral airspace opacities and increased right-sided pleural effusion. Her last echocardiogram from 05/16 showed EF 55-60%. She was placed on antibiotics, steroids as well as IV Lasix for diuresis for pulmonary congestion. She was able to be weaned down from BiPAP to Vapotherm and eventually Oxymizer 8 L. she was also noted to have new onset atrial fibrillation for which he was started on IV Cardizem which has since been transitioned to p.o. Cardizem. She is on full-dose Lovenox due to CHADS-VASc of 7. She reported feeling significantly better and endorsed improvement in her shortness of breath since admission. Denied having any fever, chills, chest pain, cough, sputum production, abdominal pain, nausea, vomiting, headache, numbness, weakness, dizziness/lightheadedness or loss of consciousness. Blood cultures from admission are negative. PT/OT on board; in recommending moderate intensity therapy. Cardiology on board.   Patient had repeat chest x-ray done on June 9th and that showed denser consolidation patient continues to be on Zosyn now has been put on BiPAP with FiO2 of 90%      Interval Hx:   Patient remains on BiPAP support   She is status post thoracentesis with removal of 950 cc of fluid by pulmonology Will follow up with cultures   White cell count of 12.77, hemoglobin 8.3   Creatinine is normal, magnesium and  phosphorus within normal levels        Case was discussed with patient's nurse and  on the floor.     Objective/physical exam:  Chart reviewed      Assessment/Plan:  HFpEF with exacerbation and bilateral pleural effusions   Acute respiratory failure with hypoxia requiring high-flow O2 /bipap  Leucocytosis, improving   Atrial Fibrillation with RVR (New Onset) , improving   JUN- POA  DNR     Hx: CAD-status post CABG,Valvular heart disease-status post AVR     Plan  She is status post thoracentesis with removal of 950 cc of fluid by pulmonology Will follow up with cultures   Continue with Lasix 60 IV q.12 strict input and output   Antibiotics were discontinued repeat chest x-ray done today still shows bilateral pleural effusions right greater than left  F/up with cis recs for rate control  Diarrhea is better  Palliative care consulted and following  continue with p.r.n. Xopenex   On Cardizem, digoxin, Eliquis sulfate   Continue with speech PT and OT  PT/OT  Patient was denied by LTAC     Critical care note:  Critical care diagnosis:  Acute hypoxemic respiratory failure secondary tod bilateral pleural effusions on Vapotherm, AFib with RVR on amiodarone drip  Critical care interventions: Hands-on evaluation, review of labs/radiographs/records and discussion with patient and family if present  Critical care time spent: 35 minutes   VTE prophylaxis:  Lovenox  Patient condition:  Stable   Anticipated discharge and Disposition: tbd        All diagnosis and differential diagnosis have been reviewed; assessment and plan has been documented; I have personally reviewed the labs and test results that are presently available; I have reviewed the patients medication list; I have reviewed the consulting providers response and recommendations. I have reviewed or attempted to review medical records based upon their availability     All of the patient's questions have been  addressed and answered. Patient's is agreeable to  the above stated plan. I will continue to monitor closely and make adjustments to medical management as needed.    VITAL SIGNS: 24 HRS MIN & MAX LAST   Temp  Min: 97.1 °F (36.2 °C)  Max: 98.2 °F (36.8 °C) 97.3 °F (36.3 °C)   BP  Min: 114/93  Max: 155/90 (!) 114/93   Pulse  Min: 56  Max: 103  (!) 56   Resp  Min: 14  Max: 31 20   SpO2  Min: 85 %  Max: 100 % (!) 91 %     I have reviewed the following labs:  Recent Labs   Lab 06/12/24 0447 06/13/24 0257 06/14/24  0241   WBC 14.17* 11.55* 12.77*   RBC 3.31* 3.03* 3.11*   HGB 9.9* 9.0* 9.3*   HCT 30.8* 28.4* 29.4*   MCV 93.1 93.7 94.5*   MCH 29.9 29.7 29.9   MCHC 32.1* 31.7* 31.6*   RDW 13.6 13.9 14.3    255 280   MPV 11.6* 11.7* 11.5*     Recent Labs   Lab 06/11/24  0319 06/12/24 0447 06/13/24 0257 06/14/24  0241    137 134* 135*   K 3.4* 3.8 3.8 4.3   CL 97* 95* 94* 95*   CO2 29 32* 28 28   BUN 19.5 15.9 20.7* 22.2*   CREATININE 0.85 0.82 0.98 0.98   CALCIUM 8.8 8.9 9.0 9.2   MG 1.70  --  1.60 2.50   ALBUMIN 2.6* 2.8* 2.5* 2.5*   ALKPHOS 81 84 77  --    ALT 13 11 9  --    AST 17 15 16  --    BILITOT 0.6 0.4 0.4  --      Microbiology Results (last 7 days)       Procedure Component Value Units Date/Time    Gram Stain [[id number]] Collected: 06/14/24 1021    Order Status: Completed Specimen: Body Fluid from Pleural Fluid Updated: 06/14/24 1320     GRAM STAIN Rare WBC observed      No bacteria seen    KOH Prep [[id number]] Collected: 06/14/24 1022    Order Status: Completed Specimen: Body Fluid from Pleural Fluid Updated: 06/14/24 1256     KOH Prep No fungal elements seen    Respiratory Culture [[id number]] Updated: 06/14/24 1111    Order Status: Sent Specimen: Sputum, Expectorated     Respiratory Culture [[id number]] Updated: 06/14/24 1111    Order Status: Sent Specimen: Respiratory from Sputum, Expectorated     Body Fluid Culture [[id number]] Collected: 06/14/24 1021    Order Status: Resulted Specimen: Body Fluid from Thoracentesis Fluid Updated: 06/14/24  1028    Fungal Culture [[id number]] Collected: 06/14/24 1021    Order Status: Sent Specimen: Body Fluid from Pleural Fluid Updated: 06/14/24 1028    Mycobacteria and Nocardia Culture [[id number]] Collected: 06/14/24 1021    Order Status: Sent Specimen: Body Fluid from Pleural Fluid, Right Updated: 06/14/24 1028             See below for Radiology    Scheduled Med:   [START ON 6/16/2024] amiodarone  200 mg Oral BID    [START ON 6/19/2024] amiodarone  200 mg Oral Daily    amiodarone  400 mg Oral BID    digoxin  0.125 mg Oral Daily    diltiaZEM  360 mg Oral Daily    docusate  50 mg Oral Daily    furosemide (LASIX) injection  60 mg Intravenous Q12H    gabapentin  600 mg Oral BID    Lactobacillus rhamnosus GG  1 packet Oral BID    LIDOcaine  2 patch Transdermal Q24H    metoprolol tartrate  75 mg Oral BID    sertraline  100 mg Oral QHS    traMADoL  50 mg Oral TID      Continuous Infusions:     PRN Meds:    Current Facility-Administered Medications:     sodium chloride 0.9%, , Intravenous, PRN    acetaminophen, 650 mg, Oral, Q6H PRN    hydrALAZINE, 10 mg, Intravenous, Q6H PRN    labetalol, 10 mg, Intravenous, Q4H PRN    levalbuterol, 1.25 mg, Nebulization, Q6H PRN    LORazepam, 0.5 mg, Oral, Q4H PRN    melatonin, 6 mg, Oral, Nightly PRN    metoprolol, 5 mg, Intravenous, Q4H PRN    morphine, 2 mg, Intravenous, Q4H PRN    ondansetron, 4 mg, Intravenous, Q4H PRN     Assessment/Plan:      VTE prophylaxis:     Patient condition:  Stable/Fair/Guarded/ Serious/ Critical    Anticipated discharge and Disposition:         All diagnosis and differential diagnosis have been reviewed; assessment and plan has been documented; I have personally reviewed the labs and test results that are presently available; I have reviewed the patients medication list; I have reviewed the consulting providers response and recommendations. I have reviewed or attempted to review medical records based upon their availability    All of the patient's  questions have been  addressed and answered. Patient's is agreeable to the above stated plan. I will continue to monitor closely and make adjustments to medical management as needed.  _____________________________________________________________________    Nutrition Status:    Radiology:  I have personally reviewed the following imaging and agree with the radiologist.     X-Ray Chest 1 View  Narrative: EXAMINATION:  XR CHEST 1 VIEW    CPT 83706    CLINICAL HISTORY:  post thoracenesis;    COMPARISON:  June 12, 2024    FINDINGS:  Examination reveals cardiomediastinal silhouette to be unchanged as compared with the previous exam.    There is significant improvement with better definition of the right costophrenic angle which may indicate resolution of the air right-sided pleural effusion.    There is blunting of the left costophrenic angle indicating the presence of left-sided pleural effusion.  There is increased left retrocardiac density and silhouetting of the left hemidiaphragm which might be related to pleural fluid but also represent an infiltrate/atelectasis.    Increase in interstitial markings are identified more so than on the examination of June 12, 2024  Impression: Resolution of right-sided pleural effusion with better definition of the right hemidiaphragm.    Persistent changes of left-sided pleural effusion and increased left retrocardiac density.    Increase interstitial markings more so than on previous exam of June 12, 2024    Electronically signed by: Baljeet Mullins  Date:    06/14/2024  Time:    10:39      Augustus Barreto MD  Department of Hospital Medicine   [facility]   06/14/2024

## 2024-06-14 NOTE — NURSING
Nurses Note -- 4 Eyes      6/14/2024   4:48 AM      Skin assessed during: Daily Assessment      [] No Altered Skin Integrity Present    [x]Prevention Measures Documented      [x] Yes- Altered Skin Integrity Present or Discovered   [] LDA Added if Not in Epic (Describe Wound)   [] New Altered Skin Integrity was Present on Admit and Documented in LDA   [] Wound Image Taken    Wound Care Consulted? No    Attending Nurse:  Nata Pederson RN/Staff Member:  Arely SUMMERS

## 2024-06-15 LAB
ALBUMIN SERPL-MCNC: 2.3 G/DL (ref 3.4–4.8)
ALBUMIN/GLOB SERPL: 0.6 RATIO (ref 1.1–2)
ALP SERPL-CCNC: 93 UNIT/L (ref 40–150)
ALT SERPL-CCNC: 9 UNIT/L (ref 0–55)
ANION GAP SERPL CALC-SCNC: 15 MEQ/L
AST SERPL-CCNC: 16 UNIT/L (ref 5–34)
BASOPHILS # BLD AUTO: 0.02 X10(3)/MCL
BASOPHILS NFR BLD AUTO: 0.2 %
BILIRUB SERPL-MCNC: 0.5 MG/DL
BSA FOR ECHO PROCEDURE: 1.84 M2
BUN SERPL-MCNC: 29.4 MG/DL (ref 9.8–20.1)
CALCIUM SERPL-MCNC: 9.2 MG/DL (ref 8.4–10.2)
CHLORIDE SERPL-SCNC: 95 MMOL/L (ref 98–107)
CO2 SERPL-SCNC: 28 MMOL/L (ref 23–31)
CREAT SERPL-MCNC: 1.05 MG/DL (ref 0.55–1.02)
CREAT/UREA NIT SERPL: 28
EOSINOPHIL # BLD AUTO: 0.01 X10(3)/MCL (ref 0–0.9)
EOSINOPHIL NFR BLD AUTO: 0.1 %
ERYTHROCYTE [DISTWIDTH] IN BLOOD BY AUTOMATED COUNT: 14.5 % (ref 11.5–17)
GFR SERPLBLD CREATININE-BSD FMLA CKD-EPI: 55 ML/MIN/1.73/M2
GLOBULIN SER-MCNC: 3.9 GM/DL (ref 2.4–3.5)
GLUCOSE SERPL-MCNC: 91 MG/DL (ref 82–115)
HCT VFR BLD AUTO: 28.9 % (ref 37–47)
HGB BLD-MCNC: 9 G/DL (ref 12–16)
IMM GRANULOCYTES # BLD AUTO: 0.34 X10(3)/MCL (ref 0–0.04)
IMM GRANULOCYTES NFR BLD AUTO: 3 %
LYMPHOCYTES # BLD AUTO: 0.73 X10(3)/MCL (ref 0.6–4.6)
LYMPHOCYTES NFR BLD AUTO: 6.5 %
MAGNESIUM SERPL-MCNC: 2 MG/DL (ref 1.6–2.6)
MCH RBC QN AUTO: 29.4 PG (ref 27–31)
MCHC RBC AUTO-ENTMCNC: 31.1 G/DL (ref 33–36)
MCV RBC AUTO: 94.4 FL (ref 80–94)
MONOCYTES # BLD AUTO: 1.34 X10(3)/MCL (ref 0.1–1.3)
MONOCYTES NFR BLD AUTO: 11.9 %
NEUTROPHILS # BLD AUTO: 8.79 X10(3)/MCL (ref 2.1–9.2)
NEUTROPHILS NFR BLD AUTO: 78.3 %
NRBC BLD AUTO-RTO: 0.4 %
OHS QRS DURATION: 140 MS
OHS QTC CALCULATION: 455 MS
PLATELET # BLD AUTO: 264 X10(3)/MCL (ref 130–400)
PMV BLD AUTO: 11.3 FL (ref 7.4–10.4)
POTASSIUM SERPL-SCNC: 4 MMOL/L (ref 3.5–5.1)
PROT SERPL-MCNC: 6.2 GM/DL (ref 5.8–7.6)
RBC # BLD AUTO: 3.06 X10(6)/MCL (ref 4.2–5.4)
SODIUM SERPL-SCNC: 138 MMOL/L (ref 136–145)
WBC # BLD AUTO: 11.23 X10(3)/MCL (ref 4.5–11.5)

## 2024-06-15 PROCEDURE — 25000003 PHARM REV CODE 250: Performed by: HOSPITALIST

## 2024-06-15 PROCEDURE — 25000003 PHARM REV CODE 250: Performed by: INTERNAL MEDICINE

## 2024-06-15 PROCEDURE — 80053 COMPREHEN METABOLIC PANEL: CPT | Performed by: NURSE PRACTITIONER

## 2024-06-15 PROCEDURE — 94799 UNLISTED PULMONARY SVC/PX: CPT

## 2024-06-15 PROCEDURE — 93005 ELECTROCARDIOGRAM TRACING: CPT

## 2024-06-15 PROCEDURE — 25000003 PHARM REV CODE 250

## 2024-06-15 PROCEDURE — 36415 COLL VENOUS BLD VENIPUNCTURE: CPT

## 2024-06-15 PROCEDURE — 85025 COMPLETE CBC W/AUTO DIFF WBC: CPT

## 2024-06-15 PROCEDURE — 99900035 HC TECH TIME PER 15 MIN (STAT)

## 2024-06-15 PROCEDURE — 21400001 HC TELEMETRY ROOM

## 2024-06-15 PROCEDURE — 63600175 PHARM REV CODE 636 W HCPCS: Performed by: INTERNAL MEDICINE

## 2024-06-15 PROCEDURE — 99900031 HC PATIENT EDUCATION (STAT)

## 2024-06-15 PROCEDURE — 94660 CPAP INITIATION&MGMT: CPT

## 2024-06-15 PROCEDURE — 83735 ASSAY OF MAGNESIUM: CPT | Performed by: INTERNAL MEDICINE

## 2024-06-15 PROCEDURE — 94760 N-INVAS EAR/PLS OXIMETRY 1: CPT

## 2024-06-15 PROCEDURE — 25500020 PHARM REV CODE 255: Performed by: INTERNAL MEDICINE

## 2024-06-15 PROCEDURE — 27100171 HC OXYGEN HIGH FLOW UP TO 24 HOURS

## 2024-06-15 RX ORDER — FUROSEMIDE 10 MG/ML
40 INJECTION INTRAMUSCULAR; INTRAVENOUS EVERY 12 HOURS
Status: DISCONTINUED | OUTPATIENT
Start: 2024-06-15 | End: 2024-06-16

## 2024-06-15 RX ADMIN — FUROSEMIDE 40 MG: 10 INJECTION, SOLUTION INTRAVENOUS at 01:06

## 2024-06-15 RX ADMIN — AMIODARONE HYDROCHLORIDE 400 MG: 200 TABLET ORAL at 08:06

## 2024-06-15 RX ADMIN — TRAMADOL HYDROCHLORIDE 50 MG: 50 TABLET, COATED ORAL at 02:06

## 2024-06-15 RX ADMIN — IOHEXOL 70 ML: 350 INJECTION, SOLUTION INTRAVENOUS at 12:06

## 2024-06-15 RX ADMIN — LIDOCAINE 2 PATCH: 700 PATCH TOPICAL at 02:06

## 2024-06-15 RX ADMIN — Medication 1 EACH: at 08:06

## 2024-06-15 RX ADMIN — FUROSEMIDE 40 MG: 10 INJECTION, SOLUTION INTRAVENOUS at 08:06

## 2024-06-15 RX ADMIN — DOCUSATE SODIUM LIQUID 50 MG: 100 LIQUID ORAL at 09:06

## 2024-06-15 RX ADMIN — AMIODARONE HYDROCHLORIDE 400 MG: 200 TABLET ORAL at 09:06

## 2024-06-15 RX ADMIN — ACETAMINOPHEN 650 MG: 325 TABLET, FILM COATED ORAL at 04:06

## 2024-06-15 RX ADMIN — SERTRALINE HYDROCHLORIDE 100 MG: 50 TABLET ORAL at 08:06

## 2024-06-15 RX ADMIN — TRAMADOL HYDROCHLORIDE 50 MG: 50 TABLET, COATED ORAL at 09:06

## 2024-06-15 RX ADMIN — GABAPENTIN 600 MG: 300 CAPSULE ORAL at 09:06

## 2024-06-15 RX ADMIN — Medication 1 EACH: at 09:06

## 2024-06-15 RX ADMIN — TRAMADOL HYDROCHLORIDE 50 MG: 50 TABLET, COATED ORAL at 08:06

## 2024-06-15 RX ADMIN — GABAPENTIN 600 MG: 300 CAPSULE ORAL at 08:06

## 2024-06-15 NOTE — PROGRESS NOTES
[facility]  Pulmonary Critical Care Note    Patient Name: Alanna Moya  MRN: [id number]  Admission Date: 6/1/2024  Hospital Length of Stay: 14 days  Code Status: DNR  Attending Provider: Augustus Barreto MD  Primary Care Provider: Cornel Haddad MD     Subjective:     HPI:   78F presented as a transfer from outside hospital with BiPAP requirements, initially admitted to intensive care unit but downgraded to floor following robust response to diuretic therapy.  Has been managed for volume overload and pneumonia on hospital medicine service.  Patient has elected for DNR code status.  Pulmonary medicine consult for worsening hypoxemia. Repeat chest x-ray 06/09/2024 with changes concerning for worsening volume overload.  MBS negative for aspiration.     S/p thoracentesis 6/14.  Reports breathing better today.  Nursing states patient was on Vapotherm for about 2 hours this morning with sats in the low 90s.  Now back on BiPAP.  No distress or complaints.        MEDS: Reviewed in EMR        ROS negative unless documented in the history of present illness.        Objective:       Intake/Output Summary (Last 24 hours) at 6/15/2024 0837  Last data filed at 6/15/2024 0613  Gross per 24 hour   Intake --   Output 1250 ml   Net -1250 ml       Vital Signs (Most Recent):  Temp: 98.2 °F (36.8 °C) (06/15/24 0400)  Pulse: (!) 53 (06/15/24 0700)  Resp: 18 (06/15/24 0700)  BP: (!) 132/55 (06/15/24 0400)  SpO2: 96 % (06/15/24 0700)  Body mass index is 31.09 kg/m².  Weight: 77.1 kg (170 lb) Vital Signs (24h Range):  Temp:  [97.3 °F (36.3 °C)-98.2 °F (36.8 °C)] 98.2 °F (36.8 °C)  Pulse:  [50-88] 53  Resp:  [12-20] 18  SpO2:  [89 %-100 %] 96 %  BP: (114-153)/(55-93) 132/55       Physical Exam:  Gen- A/O, NAD  HENT- ATNC, MMM  CV- NSR, no murmur appreciated  Resp- CTA, diminished on R>L  MSK- WWP, no lower extremity edema  Neuro- A/Ox3, DARLENE, no gross deficits   Psych- appropriate mod and affect         Significant Labs:  Lab Results   Component Value Date    WBC 11.23 06/15/2024    HGB 9.0 (L) 06/15/2024    HCT 28.9 (L) 06/15/2024    MCV 94.4 (H) 06/15/2024     06/15/2024       BMP  Lab Results   Component Value Date     06/15/2024    K 4.0 06/15/2024    CHLORIDE 97 05/04/2021    CO2 28 06/15/2024    BUN 29.4 (H) 06/15/2024    CREATININE 1.05 (H) 06/15/2024    CALCIUM 9.2 06/15/2024    AGAP 15.0 06/15/2024    ESTGFRAFRICA 92 05/16/2024    EGFRNONAA 28.1 (A) 08/10/2017         Assessment/Plan:     Assessment  Acute hypoxic respiratory failure  Diastolic heart failure  Right ventricular failure with potential pulmonary hypertension  Atrial fibrillation  R pleural effusion, s/p thoracentesis 6/14      Plan  S/p thoracentesis yesterday with negligible improvement overall.  Recent TTE w/o evidence of R heart strain, get limited TTE with bubble study and CT chest PE protocol.  Continue diuresis.   Wean O2 as tolerated.   Patient remains DNR / DNI.  Discussed with patient and nursing.            STEF Figueroa  Pulmonary Critical Care Medicine  [facility] - 7th Floor ICU  DOS: 06/15/2024

## 2024-06-15 NOTE — PROGRESS NOTES
"    [facility]    Cardiology  Progress Note    Patient Name: Alanna Moya  MRN: [id number]  Admission Date: 6/1/2024  Hospital Length of Stay: 14 days  Code Status: DNR   Attending Physician: Augustus Barreto MD   Primary Care Physician: Cornel Haddad MD  Expected Discharge Date:   Principal Problem:<principal problem not specified>    Subjective:   Reason for Consult: SVT     HPI:Ms. Moya is a 78 year old female, known to Dr. Ramon in [place], who presented to the hospital from [facility] as transfer due to respiratory failure. Upon arrival she was noted to be SOB. Noted hypoxia requiring BIPAP. Chesr Radiograph from outside facility revealed bilateral airspace opacification and increase his right-sided pleural effusion suggestive of worsening pneumonia or possibly edema. CT Chest revealed mixed picture of decompensated HF, pleural effusions and superimposed pneumonia. During this hospitalization patient with tachycardia concerning for AF RVR, with noted Left Bundle Branch Block. She was given IV Metoprolol and started on Cardizem Infusion for Acute HR Control. She does have history of PSVT. Echocardiogram on 6.2.24 revealed intact LV Function with EF 55-60% & Grade I DD. Electrolytes grossly stable. Troponin normal. Lactic Acid Normal. . [facility] Consulted for AF Management.     Hospital Course:   6.7.24: Patient seen sitting in chair. She denies SOB, CP, or nausea. Remains in Afib RVR   6.8.24: NAD. VSS. No complaints of CP/SOB/Palps. "I feel okay" WBC 14.27  6.9.24: NAD. VSS. AFIB RVR on telemetry. No complaints CP/SOB/Palps. "I feel okay" possible aspiration - awaiting Speech Evaluation. K 3.7, Mag 1.8, WBC 16.21.  6.10.24: NAD Noted. Sitting up in chair. AF RVR. Requiring Vapotherm Support. Denies CP. Reports exertional SOB, however, functional capacity is limited as she is deconditioned. Hypertension Noted. Diuresis noted, - 1604 ML/24 Hours.   6.11.24: BP Improved. AF RVR. " "Diuresis noted, - 2144 ML/24 Hours. Vapotherm oxygen support- 75% FIO2.   6.12.24: NAD Noted. Remains AF RVR. BP Stable. Requiring Significant Oxygen Support. Continues to Diurese well.   6.13.24: NAD. VSS. Urine output 1050/850 ml Fluid Net Negative. AFIB RVR on telemetry. No complaints of CP/SOB/Palps.   6.14.24: NAD. VSS. Sitting in Bedside Chair. Denies CP and Palps. + SOB. Remain in A.Fib with CVR. Fluid Balance Net Negative 1210mL.  6.15.24: NAD. VSS. Sitting in Bedside Chair. Denies CP and Palps. + SOB. PAF with CVR. Fluid Balance Net Negative 1285mL. "I am ok." HF NC O2    PMH: CAD/CABG, Hypertension, Dyslipidemia, MELCHOR, CEA, CVA, AS/TAVR, PSVT  PSH: LHC, CABG, TAVR, CEA, Hysterectomy, Cholecystectomy, Eye Surgery, Hand Surgery  Family History: Father- Old MI/CAD, Mother- HF, Son- Hypertension, Daughter- Cancer/Hypertension, Brother- Heart Disease, Brother- Old MI/CAD, Brother- Cancer  Social History: Tobacco- Negative, Alcohol- Negative, Substance Abuse- Negative      Previous Cardiac Diagnostics:   Echocardiogram (6.2.24):  Left Ventricle: The left ventricle is normal in size. Increased wall thickness. There is normal systolic function with a visually estimated ejection fraction of 55 - 60%. Grade I diastolic dysfunction.  Right Ventricle: Systolic function is reduced.TAPSE is 1.60 cm.  Aortic Valve: There is a transcatheter valve replacement in the aortic position. Aortic valve area by VTI is 1.56 cm². Aortic valve peak velocity is 1.94 m/s. Mean gradient is 8 mmHg. The dimensionless index is 0.50.  Mitral Valve: Mildly thickened leaflets. There is mitral annular calcification present. There is mild stenosis. The mean pressure gradient across the mitral valve is 5 mmHg at a heart rate of 87 bpm. There is mild regurgitation.  Tricuspid Valve: The tricuspid valve is structurally normal. There is mild regurgitation.  Pulmonic Valve: The pulmonic valve is structurally normal.  Pulmonary Artery: The estimated " pulmonary artery systolic pressure is 47 mmHg.  IVC/SVC: Normal venous pressure at 3 mmHg.  Pericardium: Left pleural effusion.     Echocardiogram (5.15.24):  EF 55-60%. Grade I Diastolic Dysfunction. Normal LV Wall Motion.  Mild MAC with Mild MR and No MS. Mild to Moderate TR.  Mild to Moderate AI, No AS.     CT Angiogram Head/Neck (5.15.24):  Carotid arteries:   Calcified plaque at the siphons without significant narrowing.   Normal A1 and M1 segments.   Vertebrobasilar Circulation:   Vertebral arteries: Patent. Calcified plaque on the right without significant narrowing. Otherwise no abnormalities.   Basilar artery: Patent, no abnormalities.   Posterior cerebral arteries:  Patent. Right fetal origin. Otherwise no abnormalities.      Echocardiogram (4.21.23):  The study quality is good.   The left ventricle is normal in size. Global left ventricular systolic function is normal. The left ventricular ejection fraction by Power's is 62%. The left ventricle diastolic function is impaired (Grade II) with an elevated left atrial pressure. Noted left ventricular hypertrophy. Concentric left ventricular hypertrophy is present. It is mild. LVSI=41ml/m2.    LVEDV=67.2ml and LVESV=25.7ml.  The left atrial diameter is moderately increased. Left atrial diameter is 4.4 cms. The left atrium is severely enlarged based on the left atrium volume index of 48.2ml/m².  S/P TAVR. Bioprosthetic aortic valve is well seated and functions normally with no evidence of insufficiency or perivalvular leaks. JACINTA=2.0cm2. The trans-aortic peak gradient is 15.8 mmHg. The trans-aortic mean gradient is 8.6 mmHg. DI=1.1.  Mild mitral annular calcification is noted. The mean trans mitral gradient is 1.9 mmHg.  Mild to moderate (1-2+) mitral regurgitation. Mild to moderate (1-2+) tricuspid regurgitation.  The estimated pulmonary artery systolic pressure is 41 mmHg assuming a right atrial pressure of 3 mmHg. Evidence of pulmonary hypertension is  noted.      MCT Monitor (3.15.23):  Predominant Rhythm SR.  PSVT     TAVR (2.27.23):  TAVR  23  mm S3 ultra valve, nominal prep,  Right TF approach  Limited echo pre, post valve placement  Root angiography  Distal aortography  Temporary pacemaker placement removal  Manta closure  Right CFA access site.  U/S guided bilateral right groin access   Balloon angioplasty of right CFA with a 5 x 40 Napoleon.     [facility] (1.26.23):  LM: Distal 50% Stenosis, LAD: Occluded Mid, LCX: Ostial 50% at Mid Portion, RCA: Dominant Patent Arter.  JENNINGS to LAD is Patent.  Impression: LIMA to LAD- Patent, 50% Stenosis LCX Artery  Medical Management.    Review of Systems   Constitutional: Positive for malaise/fatigue.   Cardiovascular:  Positive for dyspnea on exertion. Negative for chest pain, cyanosis, leg swelling and palpitations.   Respiratory:  Positive for shortness of breath.    All other systems reviewed and are negative.    Objective:     Vital Signs (Most Recent):  Temp: 97.8 °F (36.6 °C) (06/15/24 1600)  Pulse: 68 (06/15/24 1600)  Resp: 20 (06/15/24 1638)  BP: (!) 154/65 (06/15/24 1600)  SpO2: (!) 92 % (06/15/24 1600) Vital Signs (24h Range):  Temp:  [97 °F (36.1 °C)-98.2 °F (36.8 °C)] 97.8 °F (36.6 °C)  Pulse:  [50-68] 68  Resp:  [14-20] 20  SpO2:  [89 %-100 %] 92 %  BP: (125-154)/(53-75) 154/65   Weight: 77.1 kg (170 lb)  Body mass index is 31.09 kg/m².  SpO2: (!) 92 %       Intake/Output Summary (Last 24 hours) at 6/15/2024 1724  Last data filed at 6/15/2024 1400  Gross per 24 hour   Intake --   Output 1210 ml   Net -1210 ml     Lines/Drains/Airways       Drain  Duration                  Urethral Catheter 06/09/24 1754 5 days              Peripheral Intravenous Line  Duration                  Peripheral IV - Single Lumen 06/07/24 0515 20 G Right Forearm 8 days         Peripheral IV - Single Lumen 06/07/24 1700 20 G Anterior;Proximal;Right Forearm 8 days         Peripheral IV - Single Lumen 06/08/24 0428 20 G Left Forearm 7 days                   Significant Labs:   Recent Results (from the past 72 hour(s))   Comprehensive Metabolic Panel    Collection Time: 06/13/24  2:57 AM   Result Value Ref Range    Sodium 134 (L) 136 - 145 mmol/L    Potassium 3.8 3.5 - 5.1 mmol/L    Chloride 94 (L) 98 - 107 mmol/L    CO2 28 23 - 31 mmol/L    Glucose 103 82 - 115 mg/dL    Blood Urea Nitrogen 20.7 (H) 9.8 - 20.1 mg/dL    Creatinine 0.98 0.55 - 1.02 mg/dL    Calcium 9.0 8.4 - 10.2 mg/dL    Protein Total 6.1 5.8 - 7.6 gm/dL    Albumin 2.5 (L) 3.4 - 4.8 g/dL    Globulin 3.6 (H) 2.4 - 3.5 gm/dL    Albumin/Globulin Ratio 0.7 (L) 1.1 - 2.0 ratio    Bilirubin Total 0.4 <=1.5 mg/dL    ALP 77 40 - 150 unit/L    ALT 9 0 - 55 unit/L    AST 16 5 - 34 unit/L    eGFR 60 mL/min/1.73/m2    Anion Gap 12.0 mEq/L    BUN/Creatinine Ratio 21    CBC with Differential    Collection Time: 06/13/24  2:57 AM   Result Value Ref Range    WBC 11.55 (H) 4.50 - 11.50 x10(3)/mcL    RBC 3.03 (L) 4.20 - 5.40 x10(6)/mcL    Hgb 9.0 (L) 12.0 - 16.0 g/dL    Hct 28.4 (L) 37.0 - 47.0 %    MCV 93.7 80.0 - 94.0 fL    MCH 29.7 27.0 - 31.0 pg    MCHC 31.7 (L) 33.0 - 36.0 g/dL    RDW 13.9 11.5 - 17.0 %    Platelet 255 130 - 400 x10(3)/mcL    MPV 11.7 (H) 7.4 - 10.4 fL    Neut % 72.3 %    Lymph % 9.7 %    Mono % 14.6 %    Eos % 0.2 %    Basophil % 0.2 %    Lymph # 1.12 0.6 - 4.6 x10(3)/mcL    Neut # 8.35 2.1 - 9.2 x10(3)/mcL    Mono # 1.69 (H) 0.1 - 1.3 x10(3)/mcL    Eos # 0.02 0 - 0.9 x10(3)/mcL    Baso # 0.02 <=0.2 x10(3)/mcL    IG# 0.35 (H) 0 - 0.04 x10(3)/mcL    IG% 3.0 %    NRBC% 0.0 %   Magnesium    Collection Time: 06/13/24  2:57 AM   Result Value Ref Range    Magnesium Level 1.60 1.60 - 2.60 mg/dL   Phosphorus    Collection Time: 06/13/24  2:57 AM   Result Value Ref Range    Phosphorus Level 3.3 2.3 - 4.7 mg/dL   Basic Metabolic Panel    Collection Time: 06/14/24  2:41 AM   Result Value Ref Range    Sodium 135 (L) 136 - 145 mmol/L    Potassium 4.3 3.5 - 5.1 mmol/L    Chloride 95 (L) 98 - 107  mmol/L    CO2 28 23 - 31 mmol/L    Glucose 94 82 - 115 mg/dL    Blood Urea Nitrogen 22.2 (H) 9.8 - 20.1 mg/dL    Creatinine 0.98 0.55 - 1.02 mg/dL    BUN/Creatinine Ratio 23     Calcium 9.2 8.4 - 10.2 mg/dL    Anion Gap 12.0 mEq/L    eGFR 60 mL/min/1.73/m2   Magnesium    Collection Time: 06/14/24  2:41 AM   Result Value Ref Range    Magnesium Level 2.50 1.60 - 2.60 mg/dL   Phosphorus    Collection Time: 06/14/24  2:41 AM   Result Value Ref Range    Phosphorus Level 4.0 2.3 - 4.7 mg/dL   CBC with Differential    Collection Time: 06/14/24  2:41 AM   Result Value Ref Range    WBC 12.77 (H) 4.50 - 11.50 x10(3)/mcL    RBC 3.11 (L) 4.20 - 5.40 x10(6)/mcL    Hgb 9.3 (L) 12.0 - 16.0 g/dL    Hct 29.4 (L) 37.0 - 47.0 %    MCV 94.5 (H) 80.0 - 94.0 fL    MCH 29.9 27.0 - 31.0 pg    MCHC 31.6 (L) 33.0 - 36.0 g/dL    RDW 14.3 11.5 - 17.0 %    Platelet 280 130 - 400 x10(3)/mcL    MPV 11.5 (H) 7.4 - 10.4 fL    Neut % 75.5 %    Lymph % 8.3 %    Mono % 12.8 %    Eos % 0.1 %    Basophil % 0.2 %    Lymph # 1.06 0.6 - 4.6 x10(3)/mcL    Neut # 9.63 (H) 2.1 - 9.2 x10(3)/mcL    Mono # 1.64 (H) 0.1 - 1.3 x10(3)/mcL    Eos # 0.01 0 - 0.9 x10(3)/mcL    Baso # 0.03 <=0.2 x10(3)/mcL    IG# 0.40 (H) 0 - 0.04 x10(3)/mcL    IG% 3.1 %    NRBC% 0.4 %   Lactate Dehydrogenase    Collection Time: 06/14/24  2:41 AM   Result Value Ref Range    Lactate Dehydrogenase 571 (H) 125 - 220 U/L   Protein, Total    Collection Time: 06/14/24  2:41 AM   Result Value Ref Range    Protein Total 6.1 5.8 - 7.6 gm/dL   Albumin    Collection Time: 06/14/24  2:41 AM   Result Value Ref Range    Albumin 2.5 (L) 3.4 - 4.8 g/dL   LD, Body Fluid    Collection Time: 06/14/24 10:21 AM   Result Value Ref Range    Lactate Dehydrogenase Body Fluid 244 U/L   Gram Stain    Collection Time: 06/14/24 10:21 AM    Specimen: Pleural Fluid; Body Fluid   Result Value Ref Range    GRAM STAIN Rare WBC observed     GRAM STAIN No bacteria seen    Body Fluid Culture    Collection Time: 06/14/24  10:21 AM    Specimen: Thoracentesis Fluid; Body Fluid   Result Value Ref Range    Body Fluid Culture No Growth At 24 Hours    Albumin, Body Fluid    Collection Time: 06/14/24 10:21 AM   Result Value Ref Range    Albumin Level Body Fluid 1.6 gm/dL   pH, Body Fluid    Collection Time: 06/14/24 10:21 AM   Result Value Ref Range    pH Body Fluid 7.94 5.00 - 10.00   Protein, Body Fluid    Collection Time: 06/14/24 10:21 AM   Result Value Ref Range    Protein Body Fluid 2.3 gm/dL   KOH Prep    Collection Time: 06/14/24 10:22 AM    Specimen: Pleural Fluid; Body Fluid   Result Value Ref Range    KOH Prep No fungal elements seen    EKG 12-lead    Collection Time: 06/14/24  2:52 PM   Result Value Ref Range    QRS Duration 142 ms    [facility] QTC Calculation 444 ms   Basic Metabolic Panel    Collection Time: 06/14/24  5:06 PM   Result Value Ref Range    Sodium 135 (L) 136 - 145 mmol/L    Potassium 4.2 3.5 - 5.1 mmol/L    Chloride 93 (L) 98 - 107 mmol/L    CO2 30 23 - 31 mmol/L    Glucose 84 82 - 115 mg/dL    Blood Urea Nitrogen 27.0 (H) 9.8 - 20.1 mg/dL    Creatinine 1.05 (H) 0.55 - 1.02 mg/dL    BUN/Creatinine Ratio 26     Calcium 8.8 8.4 - 10.2 mg/dL    Anion Gap 12.0 mEq/L    eGFR 55 mL/min/1.73/m2   Magnesium    Collection Time: 06/14/24  5:06 PM   Result Value Ref Range    Magnesium Level 2.10 1.60 - 2.60 mg/dL   Magnesium    Collection Time: 06/15/24  2:57 AM   Result Value Ref Range    Magnesium Level 2.00 1.60 - 2.60 mg/dL   Comprehensive Metabolic Panel    Collection Time: 06/15/24  2:57 AM   Result Value Ref Range    Sodium 138 136 - 145 mmol/L    Potassium 4.0 3.5 - 5.1 mmol/L    Chloride 95 (L) 98 - 107 mmol/L    CO2 28 23 - 31 mmol/L    Glucose 91 82 - 115 mg/dL    Blood Urea Nitrogen 29.4 (H) 9.8 - 20.1 mg/dL    Creatinine 1.05 (H) 0.55 - 1.02 mg/dL    Calcium 9.2 8.4 - 10.2 mg/dL    Protein Total 6.2 5.8 - 7.6 gm/dL    Albumin 2.3 (L) 3.4 - 4.8 g/dL    Globulin 3.9 (H) 2.4 - 3.5 gm/dL    Albumin/Globulin Ratio 0.6 (L)  1.1 - 2.0 ratio    Bilirubin Total 0.5 <=1.5 mg/dL    ALP 93 40 - 150 unit/L    ALT 9 0 - 55 unit/L    AST 16 5 - 34 unit/L    eGFR 55 mL/min/1.73/m2    Anion Gap 15.0 mEq/L    BUN/Creatinine Ratio 28    CBC with Differential    Collection Time: 06/15/24  2:57 AM   Result Value Ref Range    WBC 11.23 4.50 - 11.50 x10(3)/mcL    RBC 3.06 (L) 4.20 - 5.40 x10(6)/mcL    Hgb 9.0 (L) 12.0 - 16.0 g/dL    Hct 28.9 (L) 37.0 - 47.0 %    MCV 94.4 (H) 80.0 - 94.0 fL    MCH 29.4 27.0 - 31.0 pg    MCHC 31.1 (L) 33.0 - 36.0 g/dL    RDW 14.5 11.5 - 17.0 %    Platelet 264 130 - 400 x10(3)/mcL    MPV 11.3 (H) 7.4 - 10.4 fL    Neut % 78.3 %    Lymph % 6.5 %    Mono % 11.9 %    Eos % 0.1 %    Basophil % 0.2 %    Lymph # 0.73 0.6 - 4.6 x10(3)/mcL    Neut # 8.79 2.1 - 9.2 x10(3)/mcL    Mono # 1.34 (H) 0.1 - 1.3 x10(3)/mcL    Eos # 0.01 0 - 0.9 x10(3)/mcL    Baso # 0.02 <=0.2 x10(3)/mcL    IG# 0.34 (H) 0 - 0.04 x10(3)/mcL    IG% 3.0 %    NRBC% 0.4 %   EKG 12-lead    Collection Time: 06/15/24  4:31 AM   Result Value Ref Range    QRS Duration 140 ms    [facility] QTC Calculation 455 ms   Echo Saline Bubble? Yes    Collection Time: 06/15/24 12:19 PM   Result Value Ref Range    BSA 1.84 m2     Telemetry: PAF with CVR    Physical Exam  Vitals reviewed.   Constitutional:       General: She is not in acute distress.     Appearance: Normal appearance. She is ill-appearing.   HENT:      Head: Normocephalic.      Mouth/Throat:      Mouth: Mucous membranes are moist.   Eyes:      Extraocular Movements: Extraocular movements intact.      Conjunctiva/sclera: Conjunctivae normal.   Cardiovascular:      Rate and Rhythm: Normal rate and regular rhythm.      Heart sounds: Murmur heard.   Pulmonary:      Effort: Pulmonary effort is normal. No respiratory distress.      Breath sounds: Examination of the right-lower field reveals decreased breath sounds. Examination of the left-lower field reveals decreased breath sounds. Decreased breath sounds present.       Comments: HF NC; Bilateral Diminished Breath Sounds Posteriorly   Abdominal:      General: Bowel sounds are normal.   Skin:     General: Skin is warm and dry.   Neurological:      Mental Status: She is alert. Mental status is at baseline.   Psychiatric:         Behavior: Behavior normal.       Current Inpatient Medications:  Current Facility-Administered Medications:     0.9%  NaCl infusion, , Intravenous, PRN, Vishnu Stevenson MD, Stopped at 06/08/24 0133    acetaminophen tablet 650 mg, 650 mg, Oral, Q6H PRN, Naa Chin MD, 650 mg at 06/15/24 1610    [START ON 6/16/2024] amiodarone tablet 200 mg, 200 mg, Oral, BID, Clarice, Anne, FNP    [START ON 6/19/2024] amiodarone tablet 200 mg, 200 mg, Oral, Daily, Clarice, Anne, FNP    amiodarone tablet 400 mg, 400 mg, Oral, BID, Clarice, Anne, FNP, 400 mg at 06/15/24 0913    docusate 50 mg/5 mL liquid 50 mg, 50 mg, Oral, Daily, Marga Smith MD, 50 mg at 06/15/24 0912    furosemide injection 40 mg, 40 mg, Intravenous, Q12H, Augustus Barreto MD, 40 mg at 06/15/24 1312    gabapentin capsule 600 mg, 600 mg, Oral, BID, Marga Smith MD, 600 mg at 06/15/24 0913    hydrALAZINE injection 10 mg, 10 mg, Intravenous, Q6H PRN, Eleno Abbasi MD, 10 mg at 06/06/24 1631    labetaloL injection 10 mg, 10 mg, Intravenous, Q4H PRN, Baldo Finney DO, 10 mg at 06/11/24 0257    Lactobacillus rhamnosus GG 5 billion cell packet (PEDS) 1 each, 1 packet, Oral, BID, Naa Chin MD, 1 each at 06/15/24 0913    levalbuterol nebulizer solution 1.25 mg, 1.25 mg, Nebulization, Q6H PRN, Shanna Montiel MD, 1.25 mg at 06/09/24 1200    LIDOcaine 5 % patch 2 patch, 2 patch, Transdermal, Q24H, Sheri Carson, NP, 2 patch at 06/15/24 1438    LORazepam tablet 0.5 mg, 0.5 mg, Oral, Q4H PRN, Sheri Carson, NP, 0.5 mg at 06/14/24 2038    melatonin tablet 6 mg, 6 mg, Oral, Nightly PRN, Naa Chin MD, 6 mg at 06/14/24 2038    metoprolol injection 5 mg, 5 mg, Intravenous, Q4H PRN,  Justyn Landaverde MD, 5 mg at 06/12/24 0549    morphine injection 2 mg, 2 mg, Intravenous, Q4H PRN, Sheri Carson NP, 2 mg at 06/13/24 1137    ondansetron injection 4 mg, 4 mg, Intravenous, Q4H PRN, Julissa Rinaldi, FNP, 4 mg at 06/11/24 0405    sertraline tablet 100 mg, 100 mg, Oral, QHS, Marga Smith MD, 100 mg at 06/14/24 2038    traMADoL tablet 50 mg, 50 mg, Oral, TID, Vishnu Stevenson MD, 50 mg at 06/15/24 1438  VTE Risk Mitigation (From admission, onward)           Ordered     IP VTE HIGH RISK PATIENT  Once         06/01/24 2247                  Assessment:   Atrial Fibrillation with RVR (New Onset) - Now SR    - CHADsVASc - 7 Points - 11.2% Stroke Risk per Year     - History of PSVT    - On FD Eliquis for Stroke Risk Reduction  Acute on Chronic Diastolic HF/EF 55-60%    - EF 55-60% with Grade I Diastolic Dysfunction  Aspiration Pneumonia    - Cleared Swallowing Study  Acute Hypoxemic Respiratory Failure Requiring Oxygenation - on BiPAP  Leukocytosis/Sepsis - Improving   Valvular Heart Disease    - AS: Status Post TAVR  23  mm S3 ultra valve, nominal prep,  Right TF approach (2.27.23)  CAD/CABG    - LIMA to LAD (Patent) with 50% Stenosis Native LCX  Hypertension   Hyperlipidemia  Chronic Left Bundle Branch Block ([place] TAVR)  MELCHOR/CEA  History of CVA  Anemia  DNR Status   No known history of GI Bleed     Plan:   Continue Oral Amiodarone, BB and Cardizem CD  Continue Eliquis 5 Mg PO BID Re: AF/Stroke Risk Reduction  Continue Diuretics as Clinically Indicated  Accurate I&Os and Daily Weights   Keep K > 4.0 and Mg > 2.0  F/U with [facility] in 1 Week Dr. Ramon  We will be available as needed    LYLE Pruett  Cardiology  [facility]  06/15/2024    I agree with the findings of the complexity of problems addressed and take responsibility for the plan's risks and complications. I approved the plan documented by Murtaza Burris NP.

## 2024-06-15 NOTE — NURSING
Nurses Note -- 4 Eyes      6/15/2024   1:41 PM      Skin assessed during: Daily Assessment      [] No Altered Skin Integrity Present    [x]Prevention Measures Documented      [x] Yes- Altered Skin Integrity Present or Discovered   [] LDA Added if Not in Epic (Describe Wound)   [] New Altered Skin Integrity was Present on Admit and Documented in LDA   [] Wound Image Taken    Wound Care Consulted? No    Attending Nurse:  Nata Pederson RN/Staff Member:  MELINA Silva

## 2024-06-15 NOTE — PROGRESS NOTES
[facility]maria isabel [facility] Medicine Progress Note        Chief Complaint: Inpatient Follow-up for multifocal pneumonia     HPI:   78-year-old lady with PMH of AS s/p AVR, CAD, mi, HTN, chronic back pain, HLD, CVA, CHF, GERD, diverticulosis who was being downgraded from the ICU to Hospital Medicine after she was initially admitted for acute hypoxemic respiratory failure requiring BiPAP for adequate saturations. She was transferred from an outside facility to [facility] with complaints of worsening shortness of breath for the last few weeks with initial CXR at outside facility showing bilateral airspace opacities and increased right-sided pleural effusion. Her last echocardiogram from 05/16 showed EF 55-60%. She was placed on antibiotics, steroids as well as IV Lasix for diuresis for pulmonary congestion. She was able to be weaned down from BiPAP to Vapotherm and eventually Oxymizer 8 L. she was also noted to have new onset atrial fibrillation for which he was started on IV Cardizem which has since been transitioned to p.o. Cardizem. She is on full-dose Lovenox due to CHADS-VASc of 7. She reported feeling significantly better and endorsed improvement in her shortness of breath since admission. Denied having any fever, chills, chest pain, cough, sputum production, abdominal pain, nausea, vomiting, headache, numbness, weakness, dizziness/lightheadedness or loss of consciousness. Blood cultures from admission are negative. PT/OT on board; in recommending moderate intensity therapy. Cardiology on board.   Patient had repeat chest x-ray done on June 9th and that showed denser consolidation patient continues to be on Zosyn now has been put on BiPAP with FiO2 of 90%   She is status post thoracentesis with removal of 950 cc of fluid by pulmonology Will follow up with cultures , 6/14     Interval Hx:   Patient remains on BiPAP support   Asking to go home but does not want hospice    White cell count back to  normal, creatinine 1.05, stable       Case was discussed with patient's nurse on the floor.       Objective/physical exam:  General: In no acute distress, afebrile  Chest:  On Vapotherm, minimal expiratory wheeze   Heart: RRR, +S1, S2, no appreciable murmur  Abdomen: Soft, nontender, BS +  MSK: Warm, no lower extremity edema, no clubbing or cyanosis  Neurologic: Alert , generalized weakness         Assessment/Plan:  HFpEF with exacerbation and bilateral pleural effusions   Acute respiratory failure with hypoxia requiring high-flow O2 /bipap  Bilateral pleural effusions right greater than left status post thoracentesis on 06/14   Leucocytosis, resolved   Atrial Fibrillation with CVR (New Onset) ,  JUN- RESOLVED   DNR     Hx: CAD-status post CABG,Valvular heart disease-status post AVR     Plan   White cell count back to normal, creatinine 1.05, stable   Begin IV Lasix 40 mg q.12   Follow up with pulmonology input- Follow up with CTA chest and echo  She is status post thoracentesis with removal of 950 cc of fluid on 6/14   strict input and output   Antibiotics were discontinued repeat chest x-ray done today still shows bilateral pleural effusions right greater than left  F/up with cis recs for rate control  Diarrhea is better  Palliative care consulted and following  continue with p.r.n. Xopenex   On Cardizem, digoxin, Eliquis sulfate   Continue with speech PT and OT  Patient was denied by LTAC     Critical care note:  Critical care diagnosis:  Acute hypoxemic respiratory failure secondary tod bilateral pleural effusions on Vapotherm,  Critical care interventions: Hands-on evaluation, review of labs/radiographs/records and discussion with patient and family if present  Critical care time spent: 35 minutes   VTE prophylaxis:  Lovenox  Patient condition:  Stable   Anticipated discharge and Disposition: tbd        All diagnosis and differential diagnosis have been reviewed; assessment and plan has been documented; I have  personally reviewed the labs and test results that are presently available; I have reviewed the patients medication list; I have reviewed the consulting providers response and recommendations. I have reviewed or attempted to review medical records based upon their availability     All of the patient's questions have been  addressed and answered. Patient's is agreeable to the above stated plan. I will continue to monitor closely and make adjustments to medical management as needed.    VITAL SIGNS: 24 HRS MIN & MAX LAST   Temp  Min: 97 °F (36.1 °C)  Max: 98.2 °F (36.8 °C) 97 °F (36.1 °C)   BP  Min: 132/55  Max: 153/58 (!) 143/60   Pulse  Min: 50  Max: 60  (!) 57   Resp  Min: 12  Max: 20 17   SpO2  Min: 89 %  Max: 100 % 96 %     I have reviewed the following labs:  Recent Labs   Lab 06/13/24 0257 06/14/24  0241 06/15/24  0257   WBC 11.55* 12.77* 11.23   RBC 3.03* 3.11* 3.06*   HGB 9.0* 9.3* 9.0*   HCT 28.4* 29.4* 28.9*   MCV 93.7 94.5* 94.4*   MCH 29.7 29.9 29.4   MCHC 31.7* 31.6* 31.1*   RDW 13.9 14.3 14.5    280 264   MPV 11.7* 11.5* 11.3*     Recent Labs   Lab 06/12/24  0447 06/13/24  0257 06/14/24  0241 06/14/24  1706 06/15/24  0257    134* 135* 135* 138   K 3.8 3.8 4.3 4.2 4.0   CL 95* 94* 95* 93* 95*   CO2 32* 28 28 30 28   BUN 15.9 20.7* 22.2* 27.0* 29.4*   CREATININE 0.82 0.98 0.98 1.05* 1.05*   CALCIUM 8.9 9.0 9.2 8.8 9.2   MG  --  1.60 2.50 2.10 2.00   ALBUMIN 2.8* 2.5* 2.5*  --  2.3*   ALKPHOS 84 77  --   --  93   ALT 11 9  --   --  9   AST 15 16  --   --  16   BILITOT 0.4 0.4  --   --  0.5     Microbiology Results (last 7 days)       Procedure Component Value Units Date/Time    Body Fluid Culture [[id number]] Collected: 06/14/24 1021    Order Status: Completed Specimen: Body Fluid from Thoracentesis Fluid Updated: 06/15/24 0651     Body Fluid Culture No Growth At 24 Hours    Gram Stain [[id number]] Collected: 06/14/24 1021    Order Status: Completed Specimen: Body Fluid from Pleural Fluid  Updated: 06/14/24 1320     GRAM STAIN Rare WBC observed      No bacteria seen    KOH Prep [[id number]] Collected: 06/14/24 1022    Order Status: Completed Specimen: Body Fluid from Pleural Fluid Updated: 06/14/24 1256     KOH Prep No fungal elements seen    Respiratory Culture [[id number]] Updated: 06/14/24 1111    Order Status: Sent Specimen: Sputum, Expectorated     Respiratory Culture [[id number]] Updated: 06/14/24 1111    Order Status: Sent Specimen: Respiratory from Sputum, Expectorated     Fungal Culture [[id number]] Collected: 06/14/24 1021    Order Status: Sent Specimen: Body Fluid from Pleural Fluid Updated: 06/14/24 1028    Mycobacteria and Nocardia Culture [[id number]] Collected: 06/14/24 1021    Order Status: Sent Specimen: Body Fluid from Pleural Fluid, Right Updated: 06/14/24 1028             See below for Radiology    Scheduled Med:   [START ON 6/16/2024] amiodarone  200 mg Oral BID    [START ON 6/19/2024] amiodarone  200 mg Oral Daily    amiodarone  400 mg Oral BID    docusate  50 mg Oral Daily    furosemide (LASIX) injection  40 mg Intravenous Q12H    gabapentin  600 mg Oral BID    Lactobacillus rhamnosus GG  1 packet Oral BID    LIDOcaine  2 patch Transdermal Q24H    sertraline  100 mg Oral QHS    traMADoL  50 mg Oral TID      Continuous Infusions:     PRN Meds:    Current Facility-Administered Medications:     sodium chloride 0.9%, , Intravenous, PRN    acetaminophen, 650 mg, Oral, Q6H PRN    hydrALAZINE, 10 mg, Intravenous, Q6H PRN    labetalol, 10 mg, Intravenous, Q4H PRN    levalbuterol, 1.25 mg, Nebulization, Q6H PRN    LORazepam, 0.5 mg, Oral, Q4H PRN    melatonin, 6 mg, Oral, Nightly PRN    metoprolol, 5 mg, Intravenous, Q4H PRN    morphine, 2 mg, Intravenous, Q4H PRN    ondansetron, 4 mg, Intravenous, Q4H PRN     Assessment/Plan:      VTE prophylaxis:     Patient condition:  Stable/Fair/Guarded/ Serious/ Critical    Anticipated discharge and Disposition:         All diagnosis and  differential diagnosis have been reviewed; assessment and plan has been documented; I have personally reviewed the labs and test results that are presently available; I have reviewed the patients medication list; I have reviewed the consulting providers response and recommendations. I have reviewed or attempted to review medical records based upon their availability    All of the patient's questions have been  addressed and answered. Patient's is agreeable to the above stated plan. I will continue to monitor closely and make adjustments to medical management as needed.  _____________________________________________________________________    Nutrition Status:    Radiology:  I have personally reviewed the following imaging and agree with the radiologist.     X-Ray Chest 1 View  Narrative: EXAMINATION:  XR CHEST 1 VIEW    CPT 74259    CLINICAL HISTORY:  post thoracenesis;    COMPARISON:  June 12, 2024    FINDINGS:  Examination reveals cardiomediastinal silhouette to be unchanged as compared with the previous exam.    There is significant improvement with better definition of the right costophrenic angle which may indicate resolution of the air right-sided pleural effusion.    There is blunting of the left costophrenic angle indicating the presence of left-sided pleural effusion.  There is increased left retrocardiac density and silhouetting of the left hemidiaphragm which might be related to pleural fluid but also represent an infiltrate/atelectasis.    Increase in interstitial markings are identified more so than on the examination of June 12, 2024  Impression: Resolution of right-sided pleural effusion with better definition of the right hemidiaphragm.    Persistent changes of left-sided pleural effusion and increased left retrocardiac density.    Increase interstitial markings more so than on previous exam of June 12, 2024    Electronically signed by: Baljeet  Susanna  Date:    06/14/2024  Time:    10:39      Augustus Barreto MD  Department of [facility]   [facility]   06/15/2024

## 2024-06-15 NOTE — NURSING
Nurses Note -- 4 Eyes      6/15/2024   4:29 AM      Skin assessed during: Daily Assessment      [] No Altered Skin Integrity Present    [x]Prevention Measures Documented      [x] Yes- Altered Skin Integrity Present or Discovered   [] LDA Added if Not in Epic (Describe Wound)   [] New Altered Skin Integrity was Present on Admit and Documented in LDA   [] Wound Image Taken    Wound Care Consulted? No    Attending Nurse:  Nata Pederson RN/Staff Member:  Arely SUMMERS

## 2024-06-16 LAB
ALBUMIN SERPL-MCNC: 2.5 G/DL (ref 3.4–4.8)
ALBUMIN/GLOB SERPL: 0.8 RATIO (ref 1.1–2)
ALP SERPL-CCNC: 100 UNIT/L (ref 40–150)
ALT SERPL-CCNC: 9 UNIT/L (ref 0–55)
ANION GAP SERPL CALC-SCNC: 15 MEQ/L
AST SERPL-CCNC: 17 UNIT/L (ref 5–34)
BASOPHILS # BLD AUTO: 0.03 X10(3)/MCL
BASOPHILS NFR BLD AUTO: 0.3 %
BILIRUB SERPL-MCNC: 0.5 MG/DL
BUN SERPL-MCNC: 30.9 MG/DL (ref 9.8–20.1)
CALCIUM SERPL-MCNC: 8.8 MG/DL (ref 8.4–10.2)
CHLORIDE SERPL-SCNC: 92 MMOL/L (ref 98–107)
CO2 SERPL-SCNC: 29 MMOL/L (ref 23–31)
CREAT SERPL-MCNC: 1.1 MG/DL (ref 0.55–1.02)
CREAT/UREA NIT SERPL: 28
DIGOXIN SERPL-MCNC: 1.4 NG/ML (ref 0.8–2)
EOSINOPHIL # BLD AUTO: 0.03 X10(3)/MCL (ref 0–0.9)
EOSINOPHIL NFR BLD AUTO: 0.3 %
ERYTHROCYTE [DISTWIDTH] IN BLOOD BY AUTOMATED COUNT: 14.6 % (ref 11.5–17)
GFR SERPLBLD CREATININE-BSD FMLA CKD-EPI: 52 ML/MIN/1.73/M2
GLOBULIN SER-MCNC: 3.2 GM/DL (ref 2.4–3.5)
GLUCOSE SERPL-MCNC: 79 MG/DL (ref 82–115)
HCT VFR BLD AUTO: 30.4 % (ref 37–47)
HGB BLD-MCNC: 9.7 G/DL (ref 12–16)
IMM GRANULOCYTES # BLD AUTO: 0.28 X10(3)/MCL (ref 0–0.04)
IMM GRANULOCYTES NFR BLD AUTO: 2.9 %
LYMPHOCYTES # BLD AUTO: 0.68 X10(3)/MCL (ref 0.6–4.6)
LYMPHOCYTES NFR BLD AUTO: 7.1 %
MAGNESIUM SERPL-MCNC: 1.9 MG/DL (ref 1.6–2.6)
MCH RBC QN AUTO: 30.2 PG (ref 27–31)
MCHC RBC AUTO-ENTMCNC: 31.9 G/DL (ref 33–36)
MCV RBC AUTO: 94.7 FL (ref 80–94)
MONOCYTES # BLD AUTO: 1.09 X10(3)/MCL (ref 0.1–1.3)
MONOCYTES NFR BLD AUTO: 11.4 %
NEUTROPHILS # BLD AUTO: 7.48 X10(3)/MCL (ref 2.1–9.2)
NEUTROPHILS NFR BLD AUTO: 78 %
NRBC BLD AUTO-RTO: 0.3 %
OHS QRS DURATION: 142 MS
OHS QTC CALCULATION: 541 MS
PLATELET # BLD AUTO: 280 X10(3)/MCL (ref 130–400)
PMV BLD AUTO: 10.7 FL (ref 7.4–10.4)
POTASSIUM SERPL-SCNC: 4 MMOL/L (ref 3.5–5.1)
PROT SERPL-MCNC: 5.7 GM/DL (ref 5.8–7.6)
RBC # BLD AUTO: 3.21 X10(6)/MCL (ref 4.2–5.4)
SODIUM SERPL-SCNC: 136 MMOL/L (ref 136–145)
WBC # BLD AUTO: 9.59 X10(3)/MCL (ref 4.5–11.5)

## 2024-06-16 PROCEDURE — 63600175 PHARM REV CODE 636 W HCPCS: Performed by: INTERNAL MEDICINE

## 2024-06-16 PROCEDURE — 21400001 HC TELEMETRY ROOM

## 2024-06-16 PROCEDURE — 85025 COMPLETE CBC W/AUTO DIFF WBC: CPT

## 2024-06-16 PROCEDURE — 83735 ASSAY OF MAGNESIUM: CPT | Performed by: INTERNAL MEDICINE

## 2024-06-16 PROCEDURE — 63600175 PHARM REV CODE 636 W HCPCS

## 2024-06-16 PROCEDURE — 94760 N-INVAS EAR/PLS OXIMETRY 1: CPT

## 2024-06-16 PROCEDURE — 94660 CPAP INITIATION&MGMT: CPT

## 2024-06-16 PROCEDURE — 27100171 HC OXYGEN HIGH FLOW UP TO 24 HOURS

## 2024-06-16 PROCEDURE — 25000003 PHARM REV CODE 250: Performed by: INTERNAL MEDICINE

## 2024-06-16 PROCEDURE — 36415 COLL VENOUS BLD VENIPUNCTURE: CPT | Performed by: INTERNAL MEDICINE

## 2024-06-16 PROCEDURE — 93010 ELECTROCARDIOGRAM REPORT: CPT | Mod: ,,, | Performed by: INTERNAL MEDICINE

## 2024-06-16 PROCEDURE — 25000003 PHARM REV CODE 250

## 2024-06-16 PROCEDURE — 99900031 HC PATIENT EDUCATION (STAT)

## 2024-06-16 PROCEDURE — 99900035 HC TECH TIME PER 15 MIN (STAT)

## 2024-06-16 PROCEDURE — 36415 COLL VENOUS BLD VENIPUNCTURE: CPT

## 2024-06-16 PROCEDURE — 25000003 PHARM REV CODE 250: Performed by: HOSPITALIST

## 2024-06-16 PROCEDURE — 80053 COMPREHEN METABOLIC PANEL: CPT | Performed by: INTERNAL MEDICINE

## 2024-06-16 PROCEDURE — 93005 ELECTROCARDIOGRAM TRACING: CPT

## 2024-06-16 PROCEDURE — 63600175 PHARM REV CODE 636 W HCPCS: Performed by: NURSE PRACTITIONER

## 2024-06-16 PROCEDURE — 80162 ASSAY OF DIGOXIN TOTAL: CPT | Performed by: NURSE PRACTITIONER

## 2024-06-16 PROCEDURE — 94761 N-INVAS EAR/PLS OXIMETRY MLT: CPT

## 2024-06-16 PROCEDURE — 94799 UNLISTED PULMONARY SVC/PX: CPT

## 2024-06-16 PROCEDURE — 25000003 PHARM REV CODE 250: Performed by: NURSE PRACTITIONER

## 2024-06-16 PROCEDURE — 97530 THERAPEUTIC ACTIVITIES: CPT | Mod: CQ

## 2024-06-16 RX ORDER — FUROSEMIDE 10 MG/ML
40 INJECTION INTRAMUSCULAR; INTRAVENOUS EVERY 12 HOURS
Status: DISCONTINUED | OUTPATIENT
Start: 2024-06-16 | End: 2024-06-17

## 2024-06-16 RX ORDER — MAGNESIUM SULFATE 1 G/100ML
1 INJECTION INTRAVENOUS ONCE
Status: COMPLETED | OUTPATIENT
Start: 2024-06-16 | End: 2024-06-16

## 2024-06-16 RX ORDER — METOLAZONE 5 MG/1
5 TABLET ORAL DAILY
Status: DISCONTINUED | OUTPATIENT
Start: 2024-06-16 | End: 2024-06-17

## 2024-06-16 RX ORDER — ENOXAPARIN SODIUM 100 MG/ML
1 INJECTION SUBCUTANEOUS EVERY 12 HOURS
Status: COMPLETED | OUTPATIENT
Start: 2024-06-16 | End: 2024-06-16

## 2024-06-16 RX ORDER — DILTIAZEM HYDROCHLORIDE 5 MG/ML
10 INJECTION INTRAVENOUS ONCE
Status: COMPLETED | OUTPATIENT
Start: 2024-06-16 | End: 2024-06-16

## 2024-06-16 RX ADMIN — TRAMADOL HYDROCHLORIDE 50 MG: 50 TABLET, COATED ORAL at 03:06

## 2024-06-16 RX ADMIN — LIDOCAINE 2 PATCH: 700 PATCH TOPICAL at 03:06

## 2024-06-16 RX ADMIN — DILTIAZEM HYDROCHLORIDE 10 MG: 5 INJECTION INTRAVENOUS at 01:06

## 2024-06-16 RX ADMIN — METOPROLOL TARTRATE 5 MG: 1 INJECTION, SOLUTION INTRAVENOUS at 05:06

## 2024-06-16 RX ADMIN — DILTIAZEM HYDROCHLORIDE 5 MG/HR: 5 INJECTION, SOLUTION INTRAVENOUS at 01:06

## 2024-06-16 RX ADMIN — SERTRALINE HYDROCHLORIDE 100 MG: 50 TABLET ORAL at 09:06

## 2024-06-16 RX ADMIN — ACETAMINOPHEN 650 MG: 325 TABLET, FILM COATED ORAL at 09:06

## 2024-06-16 RX ADMIN — FUROSEMIDE 40 MG: 10 INJECTION, SOLUTION INTRAMUSCULAR; INTRAVENOUS at 09:06

## 2024-06-16 RX ADMIN — ACETAMINOPHEN 650 MG: 325 TABLET, FILM COATED ORAL at 03:06

## 2024-06-16 RX ADMIN — AMIODARONE HYDROCHLORIDE 1 MG/MIN: 1.8 INJECTION, SOLUTION INTRAVENOUS at 07:06

## 2024-06-16 RX ADMIN — TRAMADOL HYDROCHLORIDE 50 MG: 50 TABLET, COATED ORAL at 09:06

## 2024-06-16 RX ADMIN — METOLAZONE 5 MG: 5 TABLET ORAL at 11:06

## 2024-06-16 RX ADMIN — GABAPENTIN 600 MG: 300 CAPSULE ORAL at 08:06

## 2024-06-16 RX ADMIN — HYDRALAZINE HYDROCHLORIDE 10 MG: 20 INJECTION INTRAMUSCULAR; INTRAVENOUS at 12:06

## 2024-06-16 RX ADMIN — APIXABAN 5 MG: 5 TABLET, FILM COATED ORAL at 11:06

## 2024-06-16 RX ADMIN — ENOXAPARIN SODIUM 80 MG: 80 INJECTION SUBCUTANEOUS at 09:06

## 2024-06-16 RX ADMIN — METHYLPREDNISOLONE SODIUM SUCCINATE 60 MG: 40 INJECTION, POWDER, FOR SOLUTION INTRAMUSCULAR; INTRAVENOUS at 12:06

## 2024-06-16 RX ADMIN — AMIODARONE HYDROCHLORIDE 150 MG: 1.5 INJECTION, SOLUTION INTRAVENOUS at 06:06

## 2024-06-16 RX ADMIN — GABAPENTIN 600 MG: 300 CAPSULE ORAL at 09:06

## 2024-06-16 RX ADMIN — Medication 1 EACH: at 09:06

## 2024-06-16 RX ADMIN — Medication 1 EACH: at 08:06

## 2024-06-16 RX ADMIN — FUROSEMIDE 40 MG: 10 INJECTION, SOLUTION INTRAMUSCULAR; INTRAVENOUS at 12:06

## 2024-06-16 RX ADMIN — MAGNESIUM SULFATE IN DEXTROSE 1 G: 10 INJECTION, SOLUTION INTRAVENOUS at 06:06

## 2024-06-16 RX ADMIN — DILTIAZEM HYDROCHLORIDE 15 MG/HR: 5 INJECTION, SOLUTION INTRAVENOUS at 09:06

## 2024-06-16 RX ADMIN — METHYLPREDNISOLONE SODIUM SUCCINATE 60 MG: 40 INJECTION, POWDER, FOR SOLUTION INTRAMUSCULAR; INTRAVENOUS at 05:06

## 2024-06-16 RX ADMIN — TRAMADOL HYDROCHLORIDE 50 MG: 50 TABLET, COATED ORAL at 08:06

## 2024-06-16 RX ADMIN — DOCUSATE SODIUM LIQUID 50 MG: 100 LIQUID ORAL at 08:06

## 2024-06-16 NOTE — PROGRESS NOTES
[facility] - 7th Floor ICU  Pulmonary Critical Care Note    Patient Name: Alanna Moya  MRN: [id number]  Admission Date: 6/1/2024  Hospital Length of Stay: 15 days  Code Status: DNR  Attending Provider: Augustus Barreto MD  Primary Care Provider: Cornel Haddad MD     Subjective:     HPI:   78F presented as a transfer from outside hospital with BiPAP requirements, initially admitted to intensive care unit but downgraded to floor following robust response to diuretic therapy.  Has been managed for volume overload and pneumonia on hospital medicine service.  Patient has elected for DNR code status.  Pulmonary medicine consult for worsening hypoxemia. Repeat chest x-ray 06/09/2024 with changes concerning for worsening volume overload.  MBS negative for aspiration.         24hr history:  No significant change in oxygenation. Symptoms overall unchanged. Remains on 90% FiO2 with saturations high 80s/low 90s.         ROS negative unless documented in the history of present illness.        Objective:       Intake/Output Summary (Last 24 hours) at 6/16/2024 1132  Last data filed at 6/16/2024 1031  Gross per 24 hour   Intake 294.6 ml   Output 1175 ml   Net -880.4 ml       Vital Signs (Most Recent):  Temp: 97.9 °F (36.6 °C) (06/16/24 0800)  Pulse: (!) 112 (06/16/24 1000)  Resp: 20 (06/16/24 1000)  BP: (!) 155/63 (06/16/24 1000)  SpO2: (!) 94 % (06/16/24 1000)  Body mass index is 31.09 kg/m².  Weight: 77.1 kg (170 lb) Vital Signs (24h Range):  Temp:  [96.7 °F (35.9 °C)-97.9 °F (36.6 °C)] 97.9 °F (36.6 °C)  Pulse:  [] 112  Resp:  [12-23] 20  SpO2:  [90 %-100 %] 94 %  BP: (125-166)/(53-85) 155/63         Physical Exam:  Gen- A/O, NAD  HENT- ATNC, MMM  CV- NSR, no murmur appreciated  Resp- faint scattered bilateral crackles, oxygen saturations 89% on 90% FiO2 via Vapotherm  MSK- WWP, no lower extremity edema  Skin- warm, dry, skin tenting noted  Neuro- A/Ox3, DARLENE, no gross deficits   Psych- appropriate  mod and affect        Significant Labs:  Lab Results   Component Value Date    WBC 9.59 06/16/2024    HGB 9.7 (L) 06/16/2024    HCT 30.4 (L) 06/16/2024    MCV 94.7 (H) 06/16/2024     06/16/2024       BMP  Lab Results   Component Value Date     06/16/2024    K 4.0 06/16/2024    CHLORIDE 97 05/04/2021    CO2 29 06/16/2024    BUN 30.9 (H) 06/16/2024    CREATININE 1.10 (H) 06/16/2024    CALCIUM 8.8 06/16/2024    AGAP 15.0 06/16/2024    ESTGFRAFRICA 92 05/16/2024    EGFRNONAA 28.1 (A) 08/10/2017         Assessment/Plan:     Assessment  Acute hypoxic respiratory failure  Diastolic heart failure  Right ventricular failure with potential pulmonary hypertension  Atrial fibrillation  R pleural effusion, s/p thoracentesis 6/14      Plan  Has shown minimal improvement with aggressive diuresis and thoracentesis, with increasing renal indices and persistent bilateral ground-glass opacities on CT chest.  Overall, this raises suspicion that these findings may not be related to pulmonary edema.  I will discuss with Cardiology and request a right heart catheterization for evaluation of pulmonary capillary wedge pressure to determine true extent of cardiac-related volume overload.  In the meantime, I would recommend discontinuation of amiodarone and will initiate systemic corticosteroids for potential amiodarone related pulmonary toxicity.  Discussed plan with patient and she is amenable to proceeding.  Discussed with Cardiology, right heart catheterization planned for tomorrow.           Gregory Gandhi MD  Pulmonary Critical Care Medicine  [facility] - 7th Floor ICU  DOS: 06/16/2024

## 2024-06-16 NOTE — NURSING
Nurses Note -- 4 Eyes      6/16/2024   9:35 AM      Skin assessed during: Daily Assessment      [] No Altered Skin Integrity Present    [x]Prevention Measures Documented      [x] Yes- Altered Skin Integrity Present or Discovered   [] LDA Added if Not in Epic (Describe Wound)   [] New Altered Skin Integrity was Present on Admit and Documented in LDA   [] Wound Image Taken    Wound Care Consulted? No    Attending Nurse:  Nata Pederson RN/Staff Member:  MELINA Silva

## 2024-06-16 NOTE — PROGRESS NOTES
[facility] Medicine Progress Note        Chief Complaint: Inpatient Follow-up for multifocal pneumonia     HPI:   78-year-old lady with PMH of AS s/p AVR, CAD, mi, HTN, chronic back pain, HLD, CVA, CHF, GERD, diverticulosis who was being downgraded from the ICU to Hospital Medicine after she was initially admitted for acute hypoxemic respiratory failure requiring BiPAP for adequate saturations. She was transferred from an outside facility to [facility] with complaints of worsening shortness of breath for the last few weeks with initial CXR at outside facility showing bilateral airspace opacities and increased right-sided pleural effusion. Her last echocardiogram from 05/16 showed EF 55-60%. She was placed on antibiotics, steroids as well as IV Lasix for diuresis for pulmonary congestion. She was able to be weaned down from BiPAP to Vapotherm and eventually Oxymizer 8 L. she was also noted to have new onset atrial fibrillation for which he was started on IV Cardizem which has since been transitioned to p.o. Cardizem. She is on full-dose Lovenox due to CHADS-VASc of 7. She reported feeling significantly better and endorsed improvement in her shortness of breath since admission. Denied having any fever, chills, chest pain, cough, sputum production, abdominal pain, nausea, vomiting, headache, numbness, weakness, dizziness/lightheadedness or loss of consciousness. Blood cultures from admission are negative. PT/OT on board; in recommending moderate intensity therapy. Cardiology on board.   Patient had repeat chest x-ray done on June 9th and that showed denser consolidation patient continues to be on Zosyn now has been put on BiPAP with FiO2 of 90%   She is status post thoracentesis with removal of 950 cc of fluid by pulmonology Will follow up with cultures , 6/14     Interval Hx:   Patient seen and examined at bedside, no family member at bedside   Remains on Vapotherm support at 90/30    Cardiology planning for right heart catheterization tomorrow   Appreciate pulmonology input   Labs reviewed and fairly stable creatinine 1.1, BUN 30.9     Case was discussed with patient's nurse on the floor.        Objective/physical exam:  General: In no acute distress, afebrile  Chest:  On Vapotherm, minimal expiratory wheeze   Heart: RRR, +S1, S2, no appreciable murmur  Abdomen: Soft, nontender, BS +  MSK: Warm, no lower extremity edema, no clubbing or cyanosis  Neurologic: Alert , generalized weakness         Assessment/Plan:  HFpEF with exacerbation and bilateral pleural effusions   Acute respiratory failure with hypoxia requiring high-flow O2 /bipap  Bilateral pleural effusions right greater than left status post thoracentesis on 06/14   Leucocytosis, resolved   Atrial Fibrillation with CVR (New Onset) ,  JUN- RESOLVED   DNR     Hx: CAD-status post CABG,Valvular heart disease-status post AVR     Plan  Add po metolazone 5mg 30 minutes before iv lasix   continue IV Lasix 40 mg q.12   Cardiology planning for right heart catheterization tomorrow   Follow up with pulmonology input- starting on steroids   She is status post thoracentesis with removal of 950 cc of fluid on 6/14  strict input and output   Antibiotics were discontinued repeat chest x-ray done today still shows bilateral pleural effusions right greater than left  CTA neg for PE on 6/15  Palliative care consulted and following  continue with p.r.n. Xopenex   On Cardizem, digoxin, Eliquis sulfate   Continue with speech PT and OT  Patient was denied by LTAC     Critical care note:  Critical care diagnosis:  Acute hypoxemic respiratory failure secondary tod bilateral pleural effusions on Vapotherm,  Critical care interventions: Hands-on evaluation, review of labs/radiographs/records and discussion with patient and family if present  Critical care time spent: 35 minutes     VTE prophylaxis:  Lovenox  Patient condition:  Stable   Anticipated discharge and  Disposition: tbd        All diagnosis and differential diagnosis have been reviewed; assessment and plan has been documented; I have personally reviewed the labs and test results that are presently available; I have reviewed the patients medication list; I have reviewed the consulting providers response and recommendations. I have reviewed or attempted to review medical records based upon their availability     All of the patient's questions have been  addressed and answered. Patient's is agreeable to the above stated plan. I will continue to monitor closely and make adjustments to medical management as needed.    VITAL SIGNS: 24 HRS MIN & MAX LAST   Temp  Min: 96.7 °F (35.9 °C)  Max: 98.2 °F (36.8 °C) 98.2 °F (36.8 °C)   BP  Min: 130/70  Max: 166/63 (!) 143/103   Pulse  Min: 58  Max: 117  (!) 117   Resp  Min: 12  Max: 23 20   SpO2  Min: 90 %  Max: 100 % 95 %     I have reviewed the following labs:  Recent Labs   Lab 06/14/24  0241 06/15/24  0257 06/16/24  0327   WBC 12.77* 11.23 9.59   RBC 3.11* 3.06* 3.21*   HGB 9.3* 9.0* 9.7*   HCT 29.4* 28.9* 30.4*   MCV 94.5* 94.4* 94.7*   MCH 29.9 29.4 30.2   MCHC 31.6* 31.1* 31.9*   RDW 14.3 14.5 14.6    264 280   MPV 11.5* 11.3* 10.7*     Recent Labs   Lab 06/13/24  0257 06/14/24  0241 06/14/24  1706 06/15/24  0257 06/16/24  0349   * 135* 135* 138 136   K 3.8 4.3 4.2 4.0 4.0   CL 94* 95* 93* 95* 92*   CO2 28 28 30 28 29   BUN 20.7* 22.2* 27.0* 29.4* 30.9*   CREATININE 0.98 0.98 1.05* 1.05* 1.10*   CALCIUM 9.0 9.2 8.8 9.2 8.8   MG 1.60 2.50 2.10 2.00 1.90   ALBUMIN 2.5* 2.5*  --  2.3* 2.5*   ALKPHOS 77  --   --  93 100   ALT 9  --   --  9 9   AST 16  --   --  16 17   BILITOT 0.4  --   --  0.5 0.5     Microbiology Results (last 7 days)       Procedure Component Value Units Date/Time    Body Fluid Culture [[id number]] Collected: 06/14/24 1021    Order Status: Completed Specimen: Body Fluid from Thoracentesis Fluid Updated: 06/16/24 0732     Body Fluid Culture No  Growth At 48 Hours    Gram Stain [[id number]] Collected: 06/14/24 1021    Order Status: Completed Specimen: Body Fluid from Pleural Fluid Updated: 06/14/24 1320     GRAM STAIN Rare WBC observed      No bacteria seen    KOH Prep [[id number]] Collected: 06/14/24 1022    Order Status: Completed Specimen: Body Fluid from Pleural Fluid Updated: 06/14/24 1256     KOH Prep No fungal elements seen    Respiratory Culture [[id number]] Updated: 06/14/24 1111    Order Status: Canceled Specimen: Sputum, Expectorated     Respiratory Culture [[id number]] Updated: 06/14/24 1111    Order Status: Canceled Specimen: Respiratory from Sputum, Expectorated     Fungal Culture [[id number]] Collected: 06/14/24 1021    Order Status: Sent Specimen: Body Fluid from Pleural Fluid Updated: 06/14/24 1028    Mycobacteria and Nocardia Culture [[id number]] Collected: 06/14/24 1021    Order Status: Sent Specimen: Body Fluid from Pleural Fluid, Right Updated: 06/14/24 1028             See below for Radiology    Scheduled Med:   apixaban  5 mg Oral BID    docusate  50 mg Oral Daily    furosemide (LASIX) injection  40 mg Intravenous Q12H    gabapentin  600 mg Oral BID    Lactobacillus rhamnosus GG  1 packet Oral BID    LIDOcaine  2 patch Transdermal Q24H    methylPREDNISolone sodium succinate injection  60 mg Intravenous Q6H    metOLazone  5 mg Oral Daily    sertraline  100 mg Oral QHS    traMADoL  50 mg Oral TID      Continuous Infusions:     PRN Meds:    Current Facility-Administered Medications:     sodium chloride 0.9%, , Intravenous, PRN    acetaminophen, 650 mg, Oral, Q6H PRN    hydrALAZINE, 10 mg, Intravenous, Q6H PRN    labetalol, 10 mg, Intravenous, Q4H PRN    levalbuterol, 1.25 mg, Nebulization, Q6H PRN    LORazepam, 0.5 mg, Oral, Q4H PRN    melatonin, 6 mg, Oral, Nightly PRN    metoprolol, 5 mg, Intravenous, Q4H PRN    morphine, 2 mg, Intravenous, Q4H PRN    ondansetron, 4 mg, Intravenous, Q4H PRN     Assessment/Plan:      VTE prophylaxis:      Patient condition:  Stable/Fair/Guarded/ Serious/ Critical    Anticipated discharge and Disposition:         All diagnosis and differential diagnosis have been reviewed; assessment and plan has been documented; I have personally reviewed the labs and test results that are presently available; I have reviewed the patients medication list; I have reviewed the consulting providers response and recommendations. I have reviewed or attempted to review medical records based upon their availability    All of the patient's questions have been  addressed and answered. Patient's is agreeable to the above stated plan. I will continue to monitor closely and make adjustments to medical management as needed.  _____________________________________________________________________    Nutrition Status:    Radiology:  I have personally reviewed the following imaging and agree with the radiologist.     CTA Chest Non-Coronary (PE Studies)  Narrative: EXAMINATION:  CTA CHEST NON CORONARY (PE STUDIES)    CLINICAL HISTORY:  hypoxia;    TECHNIQUE:  Helically acquired images with axial, sagittal and coronal reformations were obtained from the thoracic inlet to the lung bases followingthe IV administration of contrast.  CTA timed for evaluation of the pulmonary arteries.  MIP images were performed.    Automated tube current modulation, weight-based exposure dosing, and/or iterative reconstruction technique utilized to reach lowest reasonably achievable exposure rate.    DLP: 393 mGy*cm    COMPARISON:  CT chest 06/02/2024    FINDINGS:  BASE OF NECK: No significant abnormality.    AORTA: Aortic atherosclerosis.    PULMONARY VASCULATURE: Pulmonary arteries enhance normally.  No evidence of pulmonary embolus.    HEART: Normal size. No effusion. There is a TAVR prosthesis.  There are calcifications at the mitral valve annulus.  There are coronary artery calcifications.    ROSEMARIE/MEDIASTINUM: No enlarged lymph nodes by size criteria.    AIRWAYS:  Patent.    LUNGS/PLEURA: There are ground-glass opacities and septal thickening within the lungs diffusely.  There are dense consolidative opacities in the perihilar right upper lobe and dependent lungs.  The left lower lobe is completely opacified.  There are bilateral pleural effusions small on the right, moderate on the left.    UPPER ABDOMEN: No abnormality of the partially imaged upper abdomen.    THORACIC SOFT TISSUES: Unremarkable.    BONES: No acute fracture. No suspicious lytic or sclerotic lesions.  Impression: 1. No evidence of pulmonary embolus  2. Ground-glass opacities and septal thickening compatible with pulmonary edema.  3. Superimposed alveolar opacities may be related to alveolar edema or pneumonia.  Slightly improved from prior  4. Bilateral pleural effusions    Electronically signed by: Chelsi Ch  Date:    06/15/2024  Time:    12:51  Echo Saline Bubble? Yes    Limited exam for bubble study.    Left Atrium: Agitated saline study of the atrial septum is negative,   suggesting absence of intracardiac shunt at the atrial level.      Augustus Barreto MD  Department of Hospital Medicine   [facility]   06/16/2024

## 2024-06-16 NOTE — PROGRESS NOTES
"    [facility]    Cardiology  Progress Note    Patient Name: Alanna Moya  MRN: [id number]  Admission Date: 6/1/2024  Hospital Length of Stay: 15 days  Code Status: DNR   Attending Physician: Augustus Barreto MD   Primary Care Physician: Cornel Haddad MD  Expected Discharge Date:   Principal Problem:<principal problem not specified>    Subjective:   Reason for Consult: SVT     HPI:Ms. Moya is a 78 year old female, known to Dr. Ramon in [place], who presented to the hospital from [facility] as transfer due to respiratory failure. Upon arrival she was noted to be SOB. Noted hypoxia requiring BIPAP. Chesr Radiograph from outside facility revealed bilateral airspace opacification and increase his right-sided pleural effusion suggestive of worsening pneumonia or possibly edema. CT Chest revealed mixed picture of decompensated HF, pleural effusions and superimposed pneumonia. During this hospitalization patient with tachycardia concerning for AF RVR, with noted Left Bundle Branch Block. She was given IV Metoprolol and started on Cardizem Infusion for Acute HR Control. She does have history of PSVT. Echocardiogram on 6.2.24 revealed intact LV Function with EF 55-60% & Grade I DD. Electrolytes grossly stable. Troponin normal. Lactic Acid Normal. . CIS Consulted for AF Management.     Hospital Course:   6.7.24: Patient seen sitting in chair. She denies SOB, CP, or nausea. Remains in Afib RVR   6.8.24: NAD. VSS. No complaints of CP/SOB/Palps. "I feel okay" WBC 14.27  6.9.24: NAD. VSS. AFIB RVR on telemetry. No complaints CP/SOB/Palps. "I feel okay" possible aspiration - awaiting Speech Evaluation. K 3.7, Mag 1.8, WBC 16.21.  6.10.24: NAD Noted. Sitting up in chair. AF RVR. Requiring Vapotherm Support. Denies CP. Reports exertional SOB, however, functional capacity is limited as she is deconditioned. Hypertension Noted. Diuresis noted, - 1604 ML/24 Hours.   6.11.24: BP Improved. AF RVR. " "Diuresis noted, - 2144 ML/24 Hours. Vapotherm oxygen support- 75% FIO2.   6.12.24: NAD Noted. Remains AF RVR. BP Stable. Requiring Significant Oxygen Support. Continues to Diurese well.   6.13.24: NAD. VSS. Urine output 1050/850 ml Fluid Net Negative. AFIB RVR on telemetry. No complaints of CP/SOB/Palps.   6.14.24: NAD. VSS. Sitting in Bedside Chair. Denies CP and Palps. + SOB. Remain in A.Fib with CVR. Fluid Balance Net Negative 1210mL.  6.15.24: NAD. VSS. Sitting in Bedside Chair. Denies CP and Palps. + SOB. PAF with CVR. Fluid Balance Net Negative 1285mL. "I am ok." HF NC O2  6.16.24: NAD. PAF/RVR. Amiodarone 0.5mg/min. Fluid Balance Net Negative 1175mL. HF NC O2    PMH: CAD/CABG, Hypertension, Dyslipidemia, MELCHOR, CEA, CVA, AS/TAVR, PSVT  PSH: LHC, CABG, TAVR, CEA, Hysterectomy, Cholecystectomy, Eye Surgery, Hand Surgery  Family History: Father- Old MI/CAD, Mother- HF, Son- Hypertension, Daughter- Cancer/Hypertension, Brother- Heart Disease, Brother- Old MI/CAD, Brother- Cancer  Social History: Tobacco- Negative, Alcohol- Negative, Substance Abuse- Negative      Previous Cardiac Diagnostics:   Echocardiogram (6.2.24):  Left Ventricle: The left ventricle is normal in size. Increased wall thickness. There is normal systolic function with a visually estimated ejection fraction of 55 - 60%. Grade I diastolic dysfunction.  Right Ventricle: Systolic function is reduced.TAPSE is 1.60 cm.  Aortic Valve: There is a transcatheter valve replacement in the aortic position. Aortic valve area by VTI is 1.56 cm². Aortic valve peak velocity is 1.94 m/s. Mean gradient is 8 mmHg. The dimensionless index is 0.50.  Mitral Valve: Mildly thickened leaflets. There is mitral annular calcification present. There is mild stenosis. The mean pressure gradient across the mitral valve is 5 mmHg at a heart rate of 87 bpm. There is mild regurgitation.  Tricuspid Valve: The tricuspid valve is structurally normal. There is mild " regurgitation.  Pulmonic Valve: The pulmonic valve is structurally normal.  Pulmonary Artery: The estimated pulmonary artery systolic pressure is 47 mmHg.  IVC/SVC: Normal venous pressure at 3 mmHg.  Pericardium: Left pleural effusion.     Echocardiogram (5.15.24):  EF 55-60%. Grade I Diastolic Dysfunction. Normal LV Wall Motion.  Mild MAC with Mild MR and No MS. Mild to Moderate TR.  Mild to Moderate AI, No AS.     CT Angiogram Head/Neck (5.15.24):  Carotid arteries:   Calcified plaque at the siphons without significant narrowing.   Normal A1 and M1 segments.   Vertebrobasilar Circulation:   Vertebral arteries: Patent. Calcified plaque on the right without significant narrowing. Otherwise no abnormalities.   Basilar artery: Patent, no abnormalities.   Posterior cerebral arteries:  Patent. Right fetal origin. Otherwise no abnormalities.      Echocardiogram (4.21.23):  The study quality is good.   The left ventricle is normal in size. Global left ventricular systolic function is normal. The left ventricular ejection fraction by Power's is 62%. The left ventricle diastolic function is impaired (Grade II) with an elevated left atrial pressure. Noted left ventricular hypertrophy. Concentric left ventricular hypertrophy is present. It is mild. LVSI=41ml/m2.    LVEDV=67.2ml and LVESV=25.7ml.  The left atrial diameter is moderately increased. Left atrial diameter is 4.4 cms. The left atrium is severely enlarged based on the left atrium volume index of 48.2ml/m².  S/P TAVR. Bioprosthetic aortic valve is well seated and functions normally with no evidence of insufficiency or perivalvular leaks. JACINTA=2.0cm2. The trans-aortic peak gradient is 15.8 mmHg. The trans-aortic mean gradient is 8.6 mmHg. DI=1.1.  Mild mitral annular calcification is noted. The mean trans mitral gradient is 1.9 mmHg.  Mild to moderate (1-2+) mitral regurgitation. Mild to moderate (1-2+) tricuspid regurgitation.  The estimated pulmonary artery systolic  pressure is 41 mmHg assuming a right atrial pressure of 3 mmHg. Evidence of pulmonary hypertension is noted.      MCT Monitor (3.15.23):  Predominant Rhythm SR.  PSVT     TAVR (2.27.23):  TAVR  23  mm S3 ultra valve, nominal prep,  Right TF approach  Limited echo pre, post valve placement  Root angiography  Distal aortography  Temporary pacemaker placement removal  Manta closure  Right CFA access site.  U/S guided bilateral right groin access   Balloon angioplasty of right CFA with a 5 x 40 [place].     LHC (1.26.23):  LM: Distal 50% Stenosis, LAD: Occluded Mid, LCX: Ostial 50% at Mid Portion, RCA: Dominant Patent Arter.  JENNINGS to LAD is Patent.  Impression: LIMA to LAD- Patent, 50% Stenosis LCX Artery  Medical Management.    Review of Systems   Constitutional: Positive for malaise/fatigue. Negative for fever.   Cardiovascular:  Positive for dyspnea on exertion. Negative for chest pain, cyanosis, leg swelling and palpitations.   Respiratory:  Positive for shortness of breath.    All other systems reviewed and are negative.    Objective:     Vital Signs (Most Recent):  Temp: 98.2 °F (36.8 °C) (06/16/24 1200)  Pulse: (!) 116 (06/16/24 1300)  Resp: 20 (06/16/24 1300)  BP: (!) 143/103 (06/16/24 1200)  SpO2: 95 % (06/16/24 1300) Vital Signs (24h Range):  Temp:  [96.7 °F (35.9 °C)-98.2 °F (36.8 °C)] 98.2 °F (36.8 °C)  Pulse:  [] 116  Resp:  [12-23] 20  SpO2:  [90 %-100 %] 95 %  BP: (130-166)/() 143/103   Weight: 77.1 kg (170 lb)  Body mass index is 31.09 kg/m².  SpO2: 95 %       Intake/Output Summary (Last 24 hours) at 6/16/2024 1325  Last data filed at 6/16/2024 1303  Gross per 24 hour   Intake 818.21 ml   Output 1240 ml   Net -421.79 ml     Lines/Drains/Airways       Drain  Duration                  Urethral Catheter 06/09/24 1754 6 days              Peripheral Intravenous Line  Duration                  Peripheral IV - Single Lumen 06/07/24 1700 20 G Anterior;Proximal;Right Forearm 8 days         Peripheral  IV - Single Lumen 06/08/24 0428 20 G Left Forearm 8 days                  Significant Labs:   Recent Results (from the past 72 hour(s))   Basic Metabolic Panel    Collection Time: 06/14/24  2:41 AM   Result Value Ref Range    Sodium 135 (L) 136 - 145 mmol/L    Potassium 4.3 3.5 - 5.1 mmol/L    Chloride 95 (L) 98 - 107 mmol/L    CO2 28 23 - 31 mmol/L    Glucose 94 82 - 115 mg/dL    Blood Urea Nitrogen 22.2 (H) 9.8 - 20.1 mg/dL    Creatinine 0.98 0.55 - 1.02 mg/dL    BUN/Creatinine Ratio 23     Calcium 9.2 8.4 - 10.2 mg/dL    Anion Gap 12.0 mEq/L    eGFR 60 mL/min/1.73/m2   Magnesium    Collection Time: 06/14/24  2:41 AM   Result Value Ref Range    Magnesium Level 2.50 1.60 - 2.60 mg/dL   Phosphorus    Collection Time: 06/14/24  2:41 AM   Result Value Ref Range    Phosphorus Level 4.0 2.3 - 4.7 mg/dL   CBC with Differential    Collection Time: 06/14/24  2:41 AM   Result Value Ref Range    WBC 12.77 (H) 4.50 - 11.50 x10(3)/mcL    RBC 3.11 (L) 4.20 - 5.40 x10(6)/mcL    Hgb 9.3 (L) 12.0 - 16.0 g/dL    Hct 29.4 (L) 37.0 - 47.0 %    MCV 94.5 (H) 80.0 - 94.0 fL    MCH 29.9 27.0 - 31.0 pg    MCHC 31.6 (L) 33.0 - 36.0 g/dL    RDW 14.3 11.5 - 17.0 %    Platelet 280 130 - 400 x10(3)/mcL    MPV 11.5 (H) 7.4 - 10.4 fL    Neut % 75.5 %    Lymph % 8.3 %    Mono % 12.8 %    Eos % 0.1 %    Basophil % 0.2 %    Lymph # 1.06 0.6 - 4.6 x10(3)/mcL    Neut # 9.63 (H) 2.1 - 9.2 x10(3)/mcL    Mono # 1.64 (H) 0.1 - 1.3 x10(3)/mcL    Eos # 0.01 0 - 0.9 x10(3)/mcL    Baso # 0.03 <=0.2 x10(3)/mcL    IG# 0.40 (H) 0 - 0.04 x10(3)/mcL    IG% 3.1 %    NRBC% 0.4 %   Lactate Dehydrogenase    Collection Time: 06/14/24  2:41 AM   Result Value Ref Range    Lactate Dehydrogenase 571 (H) 125 - 220 U/L   Protein, Total    Collection Time: 06/14/24  2:41 AM   Result Value Ref Range    Protein Total 6.1 5.8 - 7.6 gm/dL   Albumin    Collection Time: 06/14/24  2:41 AM   Result Value Ref Range    Albumin 2.5 (L) 3.4 - 4.8 g/dL   LD, Body Fluid    Collection  Time: 06/14/24 10:21 AM   Result Value Ref Range    Lactate Dehydrogenase Body Fluid 244 U/L   Gram Stain    Collection Time: 06/14/24 10:21 AM    Specimen: Pleural Fluid; Body Fluid   Result Value Ref Range    GRAM STAIN Rare WBC observed     GRAM STAIN No bacteria seen    Body Fluid Culture    Collection Time: 06/14/24 10:21 AM    Specimen: Thoracentesis Fluid; Body Fluid   Result Value Ref Range    Body Fluid Culture No Growth At 48 Hours    Albumin, Body Fluid    Collection Time: 06/14/24 10:21 AM   Result Value Ref Range    Albumin Level Body Fluid 1.6 gm/dL   pH, Body Fluid    Collection Time: 06/14/24 10:21 AM   Result Value Ref Range    pH Body Fluid 7.94 5.00 - 10.00   Protein, Body Fluid    Collection Time: 06/14/24 10:21 AM   Result Value Ref Range    Protein Body Fluid 2.3 gm/dL   KOH Prep    Collection Time: 06/14/24 10:22 AM    Specimen: Pleural Fluid; Body Fluid   Result Value Ref Range    KOH Prep No fungal elements seen    EKG 12-lead    Collection Time: 06/14/24  2:52 PM   Result Value Ref Range    QRS Duration 142 ms    [facility] QTC Calculation 444 ms   Basic Metabolic Panel    Collection Time: 06/14/24  5:06 PM   Result Value Ref Range    Sodium 135 (L) 136 - 145 mmol/L    Potassium 4.2 3.5 - 5.1 mmol/L    Chloride 93 (L) 98 - 107 mmol/L    CO2 30 23 - 31 mmol/L    Glucose 84 82 - 115 mg/dL    Blood Urea Nitrogen 27.0 (H) 9.8 - 20.1 mg/dL    Creatinine 1.05 (H) 0.55 - 1.02 mg/dL    BUN/Creatinine Ratio 26     Calcium 8.8 8.4 - 10.2 mg/dL    Anion Gap 12.0 mEq/L    eGFR 55 mL/min/1.73/m2   Magnesium    Collection Time: 06/14/24  5:06 PM   Result Value Ref Range    Magnesium Level 2.10 1.60 - 2.60 mg/dL   Magnesium    Collection Time: 06/15/24  2:57 AM   Result Value Ref Range    Magnesium Level 2.00 1.60 - 2.60 mg/dL   Comprehensive Metabolic Panel    Collection Time: 06/15/24  2:57 AM   Result Value Ref Range    Sodium 138 136 - 145 mmol/L    Potassium 4.0 3.5 - 5.1 mmol/L    Chloride 95 (L) 98 - 107  mmol/L    CO2 28 23 - 31 mmol/L    Glucose 91 82 - 115 mg/dL    Blood Urea Nitrogen 29.4 (H) 9.8 - 20.1 mg/dL    Creatinine 1.05 (H) 0.55 - 1.02 mg/dL    Calcium 9.2 8.4 - 10.2 mg/dL    Protein Total 6.2 5.8 - 7.6 gm/dL    Albumin 2.3 (L) 3.4 - 4.8 g/dL    Globulin 3.9 (H) 2.4 - 3.5 gm/dL    Albumin/Globulin Ratio 0.6 (L) 1.1 - 2.0 ratio    Bilirubin Total 0.5 <=1.5 mg/dL    ALP 93 40 - 150 unit/L    ALT 9 0 - 55 unit/L    AST 16 5 - 34 unit/L    eGFR 55 mL/min/1.73/m2    Anion Gap 15.0 mEq/L    BUN/Creatinine Ratio 28    CBC with Differential    Collection Time: 06/15/24  2:57 AM   Result Value Ref Range    WBC 11.23 4.50 - 11.50 x10(3)/mcL    RBC 3.06 (L) 4.20 - 5.40 x10(6)/mcL    Hgb 9.0 (L) 12.0 - 16.0 g/dL    Hct 28.9 (L) 37.0 - 47.0 %    MCV 94.4 (H) 80.0 - 94.0 fL    MCH 29.4 27.0 - 31.0 pg    MCHC 31.1 (L) 33.0 - 36.0 g/dL    RDW 14.5 11.5 - 17.0 %    Platelet 264 130 - 400 x10(3)/mcL    MPV 11.3 (H) 7.4 - 10.4 fL    Neut % 78.3 %    Lymph % 6.5 %    Mono % 11.9 %    Eos % 0.1 %    Basophil % 0.2 %    Lymph # 0.73 0.6 - 4.6 x10(3)/mcL    Neut # 8.79 2.1 - 9.2 x10(3)/mcL    Mono # 1.34 (H) 0.1 - 1.3 x10(3)/mcL    Eos # 0.01 0 - 0.9 x10(3)/mcL    Baso # 0.02 <=0.2 x10(3)/mcL    IG# 0.34 (H) 0 - 0.04 x10(3)/mcL    IG% 3.0 %    NRBC% 0.4 %   EKG 12-lead    Collection Time: 06/15/24  4:31 AM   Result Value Ref Range    QRS Duration 140 ms    [facility] QTC Calculation 455 ms   Echo Saline Bubble? Yes    Collection Time: 06/15/24 12:19 PM   Result Value Ref Range    BSA 1.84 m2   CBC with Differential    Collection Time: 06/16/24  3:27 AM   Result Value Ref Range    WBC 9.59 4.50 - 11.50 x10(3)/mcL    RBC 3.21 (L) 4.20 - 5.40 x10(6)/mcL    Hgb 9.7 (L) 12.0 - 16.0 g/dL    Hct 30.4 (L) 37.0 - 47.0 %    MCV 94.7 (H) 80.0 - 94.0 fL    MCH 30.2 27.0 - 31.0 pg    MCHC 31.9 (L) 33.0 - 36.0 g/dL    RDW 14.6 11.5 - 17.0 %    Platelet 280 130 - 400 x10(3)/mcL    MPV 10.7 (H) 7.4 - 10.4 fL    Neut % 78.0 %    Lymph % 7.1 %    Mono  % 11.4 %    Eos % 0.3 %    Basophil % 0.3 %    Lymph # 0.68 0.6 - 4.6 x10(3)/mcL    Neut # 7.48 2.1 - 9.2 x10(3)/mcL    Mono # 1.09 0.1 - 1.3 x10(3)/mcL    Eos # 0.03 0 - 0.9 x10(3)/mcL    Baso # 0.03 <=0.2 x10(3)/mcL    IG# 0.28 (H) 0 - 0.04 x10(3)/mcL    IG% 2.9 %    NRBC% 0.3 %   Comprehensive Metabolic Panel    Collection Time: 06/16/24  3:49 AM   Result Value Ref Range    Sodium 136 136 - 145 mmol/L    Potassium 4.0 3.5 - 5.1 mmol/L    Chloride 92 (L) 98 - 107 mmol/L    CO2 29 23 - 31 mmol/L    Glucose 79 (L) 82 - 115 mg/dL    Blood Urea Nitrogen 30.9 (H) 9.8 - 20.1 mg/dL    Creatinine 1.10 (H) 0.55 - 1.02 mg/dL    Calcium 8.8 8.4 - 10.2 mg/dL    Protein Total 5.7 (L) 5.8 - 7.6 gm/dL    Albumin 2.5 (L) 3.4 - 4.8 g/dL    Globulin 3.2 2.4 - 3.5 gm/dL    Albumin/Globulin Ratio 0.8 (L) 1.1 - 2.0 ratio    Bilirubin Total 0.5 <=1.5 mg/dL     40 - 150 unit/L    ALT 9 0 - 55 unit/L    AST 17 5 - 34 unit/L    eGFR 52 mL/min/1.73/m2    Anion Gap 15.0 mEq/L    BUN/Creatinine Ratio 28    Magnesium    Collection Time: 06/16/24  3:49 AM   Result Value Ref Range    Magnesium Level 1.90 1.60 - 2.60 mg/dL   EKG 12-lead    Collection Time: 06/16/24  5:18 AM   Result Value Ref Range    QRS Duration 142 ms    [facility] QTC Calculation 541 ms     Telemetry: PAF with RVR    Physical Exam  Vitals reviewed.   Constitutional:       General: She is not in acute distress.     Appearance: Normal appearance. She is ill-appearing.   HENT:      Head: Normocephalic.      Mouth/Throat:      Mouth: Mucous membranes are moist.   Eyes:      Extraocular Movements: Extraocular movements intact.   Cardiovascular:      Rate and Rhythm: Tachycardia present. Rhythm irregular.      Heart sounds: Murmur heard.   Pulmonary:      Effort: Pulmonary effort is normal. No respiratory distress.      Breath sounds: Examination of the right-lower field reveals decreased breath sounds. Examination of the left-lower field reveals decreased breath sounds. Decreased  breath sounds present.      Comments: HF NC; Bilateral Diminished Breath Sounds Posteriorly   Abdominal:      General: Bowel sounds are normal.   Skin:     General: Skin is warm and dry.   Neurological:      Mental Status: She is alert. Mental status is at baseline.   Psychiatric:         Behavior: Behavior normal.         Judgment: Judgment normal.       Current Inpatient Medications:  Current Facility-Administered Medications:     0.9%  NaCl infusion, , Intravenous, PRN, Vishnu Stevenson MD, Stopped at 06/08/24 0133    acetaminophen tablet 650 mg, 650 mg, Oral, Q6H PRN, Naa Chin MD, 650 mg at 06/15/24 1610    apixaban tablet 5 mg, 5 mg, Oral, BID, Augustus Barreto MD, 5 mg at 06/16/24 1107    diltiaZEM 125 mg in dextrose 5 % 125 mL IVPB (ready to mix) (titrating), 0-15 mg/hr, Intravenous, Continuous, Murtaza Burris, ANP    diltiaZEM injection 10 mg, 10 mg, Intravenous, Once, Murtaza Burris ANP    docusate 50 mg/5 mL liquid 50 mg, 50 mg, Oral, Daily, Marga Smith MD, 50 mg at 06/16/24 0845    furosemide injection 40 mg, 40 mg, Intravenous, Q12H, Augustus Barreto MD, 40 mg at 06/16/24 1216    gabapentin capsule 600 mg, 600 mg, Oral, BID, Marga Smith MD, 600 mg at 06/16/24 0845    hydrALAZINE injection 10 mg, 10 mg, Intravenous, Q6H PRN, Eleno Abbasi MD, 10 mg at 06/16/24 0025    labetaloL injection 10 mg, 10 mg, Intravenous, Q4H PRN, Baldo Finney DO, 10 mg at 06/11/24 0257    Lactobacillus rhamnosus GG 5 billion cell packet (PEDS) 1 each, 1 packet, Oral, BID, Naa Chin MD, 1 each at 06/16/24 0845    levalbuterol nebulizer solution 1.25 mg, 1.25 mg, Nebulization, Q6H PRN, Shanna Montiel MD, 1.25 mg at 06/09/24 1200    LIDOcaine 5 % patch 2 patch, 2 patch, Transdermal, Q24H, Sheri Carson, NP, 2 patch at 06/15/24 1438    LORazepam tablet 0.5 mg, 0.5 mg, Oral, Q4H PRN, Sheri Carson, NP, 0.5 mg at 06/14/24 2038    melatonin tablet 6 mg, 6 mg, Oral, Nightly PRN, Naa Chin MD,  6 mg at 06/14/24 2038    methylPREDNISolone sodium succinate injection 60 mg, 60 mg, Intravenous, Q6H, Gregory Gandhi MD, 60 mg at 06/16/24 1216    metOLazone tablet 5 mg, 5 mg, Oral, Daily, Augustus Barreto MD, 5 mg at 06/16/24 1107    metoprolol injection 5 mg, 5 mg, Intravenous, Q4H PRN, Justyn Landaverde MD, 5 mg at 06/16/24 0527    morphine injection 2 mg, 2 mg, Intravenous, Q4H PRN, Sheri Carson NP, 2 mg at 06/13/24 1137    ondansetron injection 4 mg, 4 mg, Intravenous, Q4H PRN, Julissa Rinaldi FNP, 4 mg at 06/11/24 0405    sertraline tablet 100 mg, 100 mg, Oral, QHS, Marga Smith MD, 100 mg at 06/15/24 2053    traMADoL tablet 50 mg, 50 mg, Oral, TID, Vishnu Stevenson MD, 50 mg at 06/16/24 0845  VTE Risk Mitigation (From admission, onward)           Ordered     apixaban tablet 5 mg  2 times daily         06/16/24 0952     IP VTE HIGH RISK PATIENT  Once         06/01/24 2247                  Assessment:   Atrial Fibrillation with RVR (New Onset) - Now SR    - CHADsVASc - 7 Points - 11.2% Stroke Risk per Year     - History of PSVT    - On FD Eliquis for Stroke Risk Reduction  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 Lung Disease/Condition   Acute on Chronic Diastolic HF/EF 55-60%    - EF 55-60% with Grade I Diastolic Dysfunction  Aspiration Pneumonia    - Cleared Swallowing Study  Acute Hypoxemic Respiratory Failure Requiring Oxygenation - on BiPAP/HF NC O2  Leukocytosis/Sepsis - Improving   Valvular Heart Disease    - AS: Status Post TAVR  23  mm S3 ultra valve, nominal prep,  Right TF approach (2.27.23)  CAD/CABG    - LIMA to LAD (Patent) with 50% Stenosis Native LCX  Hypertension   Hyperlipidemia  Chronic Left Bundle Branch Block ([place] TAVR)  MELCHOR/CEA  History of CVA  Anemia  DNR Status   No known history of GI Bleed     Plan:   Discussion with Primary Team for concerns for Amiodarone Toxicity  D/C IV Amiodarone  Start IV Cardizem and Titrate for HR < 100 bpm; 10mg IVP  Bolus  Continue Eliquis 5 Mg PO BID Re: AF/Stroke Risk Reduction  Continue Diuretics as Clinically Indicated  Accurate I&Os and Daily Weights   Keep K > 4.0 and Mg > 2.0  Keep NPO after MN  Plan for RHC in AM to Evaluate R Heart Pressures  Risk, Benefits and Alternatives Reviewed and Discussed with the PT and their Family and they wish to proceed with above Procedure.   Labs and EKG in AM: CBC, CMP and Mg    LYLE Pruett  Cardiology  [facility]  06/16/2024    I agree with the findings of the complexity of problems addressed and take responsibility for the plan's risks and complications. I approved the plan documented by Murtaza Burris NP.  Plan for RHC  Possible amiodarone toxicity D/C switch to [place]ilt

## 2024-06-16 NOTE — PT/OT/SLP PROGRESS
Physical Therapy Treatment    Patient Name:  Alanna Moya   MRN:  [id number]    Recommendations:     Discharge therapy intensity: Moderate Intensity Therapy   Discharge Equipment Recommendations: to be determined by next level of care  Barriers to discharge:  medical dx, impaired mobility, decreased independence, decreased endurance    Assessment:     Alanna Moya is a 79 y.o. female admitted with a medical diagnosis of SOB, respiratory failure, pneumonia, pleural effusions.  She presents with the following impairments/functional limitations: weakness, gait instability, decreased upper extremity function, impaired endurance, impaired balance, decreased lower extremity function, impaired self care skills, impaired functional mobility.    Rehab Prognosis: Good; patient would benefit from acute skilled PT services to address these deficits and reach maximum level of function.    Recent Surgery: Procedure(s) (LRB):  INSERTION, CATHETER, RIGHT HEART (N/A)      Plan:     During this hospitalization, patient would benefit from acute PT services 5 x/week to address the identified rehab impairments via gait training, therapeutic activities, therapeutic exercises and progress toward the following goals:    Plan of Care Expires:  07/04/24    Subjective     Chief Complaint: dizziness & headache  Patient/Family Comments/goals:   Pain/Comfort:         Objective:     Communicated with nursing prior to session.  Patient found up in chair with pulse ox (continuous), telemetry, peripheral IV, oxygen, melton catheter, blood pressure cuff upon PT entry to room.     General Precautions: Standard, fall  Orthopedic Precautions: N/A  Braces: N/A  Respiratory Status:  Vapotherm 40L 100%   Pre: O2 sat 91%   During O2 Sat didn't drop below 96%   Post: O2 sat 97%    Blood Pressure:    Pre tx: 129/73   During: 160/86 - RN okmark'd cont of tx   Post tx: 170/111 - RN notified    Functional Mobility:  Transfers:     Sit to Stand:   contact guard assistance and minimum assistance with rolling walker  Pt required Osiel but after VC for hand placement pt only needed CGA  X8 reps - pt with slow transitions, standing for ~15-20 secs & sitting for ~20-30 secs between reps    Therapeutic Activities/Exercises:  Standing marches x8 reps x2 sets - pt stated this was more difficult than sit to stands & requested only to do sit to stands    Education:  Patient provided with verbal education education regarding PT role/goals/POC, fall prevention, and safety awareness.  Understanding was verbalized.     Patient left up in chair with all lines intact, call button in reach, geomat cushion, nurse notified, and SCD donned    GOALS:   Multidisciplinary Problems       Physical Therapy Goals          Problem: Physical Therapy    Goal Priority Disciplines Outcome Goal Variances Interventions   Physical Therapy Goal     PT, PT/OT Progressing     Description: Goals to be met by: 24     Patient will increase functional independence with mobility by performin. Supine to sit with Stand-by Assistance  2. Sit to supine with Stand-by Assistance  3. Sit to stand transfer with Stand-by Assistance  4. Bed to chair transfer with Stand-by Assistance using Rolling Walker  5. Gait  x 150 feet with Stand-by Assistance using Rolling Walker.                          Time Tracking:     PT Received On: 24  PT Start Time: 1439     PT Stop Time: 1459  PT Total Time (min): 20 min     Billable Minutes: Therapeutic Activity 20    Treatment Type: Treatment  PT/PTA: PTA     Number of PTA visits since last PT visit: 2024

## 2024-06-16 NOTE — NURSING
Nurses Note -- 4 Eyes      6/16/2024   6:51 AM      Skin assessed during: Q Shift Change      [] No Altered Skin Integrity Present    [x]Prevention Measures Documented      [x] Yes- Altered Skin Integrity Present or Discovered   [] LDA Added if Not in Epic (Describe Wound)   [] New Altered Skin Integrity was Present on Admit and Documented in LDA   [] Wound Image Taken    Wound Care Consulted? No    Attending Nurse:  Maribel Pederson RN/Staff Member:  MELINA Mcgee

## 2024-06-17 LAB
ALBUMIN SERPL-MCNC: 2.5 G/DL (ref 3.4–4.8)
ALBUMIN/GLOB SERPL: 0.8 RATIO (ref 1.1–2)
ALP SERPL-CCNC: 107 UNIT/L (ref 40–150)
ALT SERPL-CCNC: 10 UNIT/L (ref 0–55)
ANION GAP SERPL CALC-SCNC: 13 MEQ/L
AST SERPL-CCNC: 16 UNIT/L (ref 5–34)
BASOPHIL MANUAL BF (OHS): 1 %
BASOPHILS # BLD AUTO: 0 X10(3)/MCL
BASOPHILS NFR BLD AUTO: 0 %
BILIRUB SERPL-MCNC: 0.4 MG/DL
BUN SERPL-MCNC: 40.7 MG/DL (ref 9.8–20.1)
CALCIUM SERPL-MCNC: 8.6 MG/DL (ref 8.4–10.2)
CHLORIDE SERPL-SCNC: 88 MMOL/L (ref 98–107)
CLARITY BODY FLUID (OHS): CLEAR
CO2 SERPL-SCNC: 32 MMOL/L (ref 23–31)
COLOR BODY FLUID (OHS): YELLOW
CREAT SERPL-MCNC: 1.55 MG/DL (ref 0.55–1.02)
CREAT/UREA NIT SERPL: 26
EOSINOPHIL # BLD AUTO: 0 X10(3)/MCL (ref 0–0.9)
EOSINOPHIL NFR BLD AUTO: 0 %
ERYTHROCYTE [DISTWIDTH] IN BLOOD BY AUTOMATED COUNT: 14.6 % (ref 11.5–17)
GFR SERPLBLD CREATININE-BSD FMLA CKD-EPI: 34 ML/MIN/1.73/M2
GLOBULIN SER-MCNC: 3.1 GM/DL (ref 2.4–3.5)
GLUCOSE SERPL-MCNC: 253 MG/DL (ref 82–115)
HCT VFR BLD AUTO: 26.9 % (ref 37–47)
HGB BLD-MCNC: 8.6 G/DL (ref 12–16)
IMM GRANULOCYTES # BLD AUTO: 0.08 X10(3)/MCL (ref 0–0.04)
IMM GRANULOCYTES NFR BLD AUTO: 1.9 %
LYMPHOCYTE MANUAL BF (OHS): 73 %
LYMPHOCYTES # BLD AUTO: 0.38 X10(3)/MCL (ref 0.6–4.6)
LYMPHOCYTES NFR BLD AUTO: 9 %
MACROPHAGES, FLUID MAN COUNT (OHS): 7
MAGNESIUM SERPL-MCNC: 2.1 MG/DL (ref 1.6–2.6)
MCH RBC QN AUTO: 29.4 PG (ref 27–31)
MCHC RBC AUTO-ENTMCNC: 32 G/DL (ref 33–36)
MCV RBC AUTO: 91.8 FL (ref 80–94)
MONOCYTE MANUAL BF (OHS): 24 %
MONOCYTES # BLD AUTO: 0.08 X10(3)/MCL (ref 0.1–1.3)
MONOCYTES NFR BLD AUTO: 1.9 %
NEUTROPHILS # BLD AUTO: 3.66 X10(3)/MCL (ref 2.1–9.2)
NEUTROPHILS MAN BF (OHS): 2 %
NEUTROPHILS NFR BLD AUTO: 87.2 %
NRBC BLD AUTO-RTO: 0 %
OHS QRS DURATION: 154 MS
OHS QTC CALCULATION: 492 MS
PLATELET # BLD AUTO: 254 X10(3)/MCL (ref 130–400)
PMV BLD AUTO: 10.7 FL (ref 7.4–10.4)
POTASSIUM SERPL-SCNC: 5 MMOL/L (ref 3.5–5.1)
PROT SERPL-MCNC: 5.6 GM/DL (ref 5.8–7.6)
PSYCHE PATHOLOGY RESULT: NORMAL
RBC # BLD AUTO: 2.93 X10(6)/MCL (ref 4.2–5.4)
SODIUM SERPL-SCNC: 133 MMOL/L (ref 136–145)
WBC # BLD AUTO: 4.2 X10(3)/MCL (ref 4.5–11.5)
WBC # FLD AUTO: 20 /UL

## 2024-06-17 PROCEDURE — 63600175 PHARM REV CODE 636 W HCPCS: Performed by: INTERNAL MEDICINE

## 2024-06-17 PROCEDURE — 25000003 PHARM REV CODE 250

## 2024-06-17 PROCEDURE — 94799 UNLISTED PULMONARY SVC/PX: CPT

## 2024-06-17 PROCEDURE — 83735 ASSAY OF MAGNESIUM: CPT | Performed by: INTERNAL MEDICINE

## 2024-06-17 PROCEDURE — 27000249 HC VAPOTHERM CIRCUIT

## 2024-06-17 PROCEDURE — 99900031 HC PATIENT EDUCATION (STAT)

## 2024-06-17 PROCEDURE — 25000003 PHARM REV CODE 250: Performed by: NURSE PRACTITIONER

## 2024-06-17 PROCEDURE — C1769 GUIDE WIRE: HCPCS | Performed by: INTERNAL MEDICINE

## 2024-06-17 PROCEDURE — C1894 INTRO/SHEATH, NON-LASER: HCPCS | Performed by: INTERNAL MEDICINE

## 2024-06-17 PROCEDURE — 11000001 HC ACUTE MED/SURG PRIVATE ROOM

## 2024-06-17 PROCEDURE — 85025 COMPLETE CBC W/AUTO DIFF WBC: CPT

## 2024-06-17 PROCEDURE — 36415 COLL VENOUS BLD VENIPUNCTURE: CPT | Performed by: NURSE PRACTITIONER

## 2024-06-17 PROCEDURE — 93451 RIGHT HEART CATH: CPT | Performed by: INTERNAL MEDICINE

## 2024-06-17 PROCEDURE — 93010 ELECTROCARDIOGRAM REPORT: CPT | Mod: ,,, | Performed by: INTERNAL MEDICINE

## 2024-06-17 PROCEDURE — 99900035 HC TECH TIME PER 15 MIN (STAT)

## 2024-06-17 PROCEDURE — 25000003 PHARM REV CODE 250: Performed by: INTERNAL MEDICINE

## 2024-06-17 PROCEDURE — 63600175 PHARM REV CODE 636 W HCPCS: Performed by: NURSE PRACTITIONER

## 2024-06-17 PROCEDURE — 99152 MOD SED SAME PHYS/QHP 5/>YRS: CPT | Performed by: INTERNAL MEDICINE

## 2024-06-17 PROCEDURE — 93005 ELECTROCARDIOGRAM TRACING: CPT

## 2024-06-17 PROCEDURE — 20000000 HC ICU ROOM

## 2024-06-17 PROCEDURE — 25000003 PHARM REV CODE 250: Performed by: HOSPITALIST

## 2024-06-17 PROCEDURE — 94760 N-INVAS EAR/PLS OXIMETRY 1: CPT

## 2024-06-17 PROCEDURE — 80053 COMPREHEN METABOLIC PANEL: CPT | Performed by: NURSE PRACTITIONER

## 2024-06-17 PROCEDURE — C9113 INJ PANTOPRAZOLE SODIUM, VIA: HCPCS | Performed by: INTERNAL MEDICINE

## 2024-06-17 PROCEDURE — 94660 CPAP INITIATION&MGMT: CPT

## 2024-06-17 PROCEDURE — 4A023N6 MEASUREMENT OF CARDIAC SAMPLING AND PRESSURE, RIGHT HEART, PERCUTANEOUS APPROACH: ICD-10-PCS | Performed by: INTERNAL MEDICINE

## 2024-06-17 PROCEDURE — 27100171 HC OXYGEN HIGH FLOW UP TO 24 HOURS

## 2024-06-17 PROCEDURE — C1751 CATH, INF, PER/CENT/MIDLINE: HCPCS | Performed by: INTERNAL MEDICINE

## 2024-06-17 RX ORDER — DILTIAZEM HYDROCHLORIDE 5 MG/ML
INJECTION INTRAVENOUS
Status: COMPLETED
Start: 2024-06-17 | End: 2024-06-17

## 2024-06-17 RX ORDER — ACETAMINOPHEN 325 MG/1
650 TABLET ORAL EVERY 4 HOURS PRN
Status: DISCONTINUED | OUTPATIENT
Start: 2024-06-17 | End: 2024-06-23 | Stop reason: HOSPADM

## 2024-06-17 RX ORDER — FUROSEMIDE 10 MG/ML
60 INJECTION INTRAMUSCULAR; INTRAVENOUS ONCE
Status: COMPLETED | OUTPATIENT
Start: 2024-06-17 | End: 2024-06-17

## 2024-06-17 RX ORDER — MIDAZOLAM HYDROCHLORIDE 1 MG/ML
INJECTION INTRAMUSCULAR; INTRAVENOUS
Status: DISCONTINUED | OUTPATIENT
Start: 2024-06-17 | End: 2024-06-17 | Stop reason: HOSPADM

## 2024-06-17 RX ORDER — LIDOCAINE HYDROCHLORIDE 10 MG/ML
INJECTION INFILTRATION; PERINEURAL
Status: DISCONTINUED | OUTPATIENT
Start: 2024-06-17 | End: 2024-06-17 | Stop reason: HOSPADM

## 2024-06-17 RX ORDER — FENTANYL CITRATE 50 UG/ML
INJECTION, SOLUTION INTRAMUSCULAR; INTRAVENOUS
Status: DISCONTINUED | OUTPATIENT
Start: 2024-06-17 | End: 2024-06-17 | Stop reason: HOSPADM

## 2024-06-17 RX ORDER — DILTIAZEM HYDROCHLORIDE 5 MG/ML
10 INJECTION INTRAVENOUS ONCE
Status: COMPLETED | OUTPATIENT
Start: 2024-06-17 | End: 2024-06-17

## 2024-06-17 RX ORDER — DIGOXIN 0.25 MG/ML
500 INJECTION INTRAMUSCULAR; INTRAVENOUS ONCE
Status: COMPLETED | OUTPATIENT
Start: 2024-06-17 | End: 2024-06-17

## 2024-06-17 RX ORDER — PANTOPRAZOLE SODIUM 40 MG/10ML
40 INJECTION, POWDER, LYOPHILIZED, FOR SOLUTION INTRAVENOUS 2 TIMES DAILY
Status: DISCONTINUED | OUTPATIENT
Start: 2024-06-17 | End: 2024-06-23 | Stop reason: HOSPADM

## 2024-06-17 RX ADMIN — DIGOXIN 500 MCG: 0.25 INJECTION INTRAMUSCULAR; INTRAVENOUS at 07:06

## 2024-06-17 RX ADMIN — Medication 1 EACH: at 08:06

## 2024-06-17 RX ADMIN — DOCUSATE SODIUM LIQUID 50 MG: 100 LIQUID ORAL at 08:06

## 2024-06-17 RX ADMIN — DILTIAZEM HYDROCHLORIDE 15 MG/HR: 5 INJECTION, SOLUTION INTRAVENOUS at 03:06

## 2024-06-17 RX ADMIN — METHYLPREDNISOLONE SODIUM SUCCINATE 60 MG: 40 INJECTION, POWDER, FOR SOLUTION INTRAMUSCULAR; INTRAVENOUS at 06:06

## 2024-06-17 RX ADMIN — METOPROLOL TARTRATE 5 MG: 1 INJECTION, SOLUTION INTRAVENOUS at 05:06

## 2024-06-17 RX ADMIN — DILTIAZEM HYDROCHLORIDE 15 MG/HR: 5 INJECTION, SOLUTION INTRAVENOUS at 06:06

## 2024-06-17 RX ADMIN — LORAZEPAM 0.5 MG: 0.5 TABLET ORAL at 09:06

## 2024-06-17 RX ADMIN — ACETAMINOPHEN 650 MG: 325 TABLET, FILM COATED ORAL at 10:06

## 2024-06-17 RX ADMIN — METOLAZONE 5 MG: 5 TABLET ORAL at 08:06

## 2024-06-17 RX ADMIN — FUROSEMIDE 40 MG: 10 INJECTION, SOLUTION INTRAMUSCULAR; INTRAVENOUS at 09:06

## 2024-06-17 RX ADMIN — PANTOPRAZOLE SODIUM 40 MG: 40 INJECTION, POWDER, FOR SOLUTION INTRAVENOUS at 08:06

## 2024-06-17 RX ADMIN — METHYLPREDNISOLONE SODIUM SUCCINATE 60 MG: 40 INJECTION, POWDER, FOR SOLUTION INTRAMUSCULAR; INTRAVENOUS at 12:06

## 2024-06-17 RX ADMIN — FUROSEMIDE 60 MG: 10 INJECTION, SOLUTION INTRAMUSCULAR; INTRAVENOUS at 03:06

## 2024-06-17 RX ADMIN — DILTIAZEM HYDROCHLORIDE 15 MG/HR: 5 INJECTION, SOLUTION INTRAVENOUS at 09:06

## 2024-06-17 RX ADMIN — GABAPENTIN 600 MG: 300 CAPSULE ORAL at 08:06

## 2024-06-17 RX ADMIN — METHYLPREDNISOLONE SODIUM SUCCINATE 60 MG: 40 INJECTION, POWDER, FOR SOLUTION INTRAMUSCULAR; INTRAVENOUS at 05:06

## 2024-06-17 RX ADMIN — TRAMADOL HYDROCHLORIDE 50 MG: 50 TABLET, COATED ORAL at 08:06

## 2024-06-17 RX ADMIN — APIXABAN 5 MG: 5 TABLET, FILM COATED ORAL at 08:06

## 2024-06-17 RX ADMIN — DILTIAZEM HYDROCHLORIDE 10 MG: 5 INJECTION INTRAVENOUS at 03:06

## 2024-06-17 RX ADMIN — SERTRALINE HYDROCHLORIDE 100 MG: 50 TABLET ORAL at 08:06

## 2024-06-17 RX ADMIN — DILTIAZEM HYDROCHLORIDE 10 MG: 5 INJECTION INTRAVENOUS at 05:06

## 2024-06-17 NOTE — MEDICAL/APP STUDENT
[facility] - 7th Floor ICU  Pulmonary Critical Care Note    Patient Name: Alanna Moya  MRN: [id number]  Admission Date: 6/1/2024  Hospital Length of Stay: 16 days  Code Status: DNR  Attending Provider: Augustus Barreto MD  Primary Care Provider: Cornel Haddad MD     Subjective:     HPI:   78F presented as a transfer from outside hospital with BiPAP requirements, initially admitted to intensive care unit but downgraded to floor following robust response to diuretic therapy.  Has been managed for volume overload and pneumonia on hospital medicine service.  Patient has elected for DNR code status.  Pulmonary medicine consult for worsening hypoxemia. Repeat chest x-ray 06/09/2024 with changes concerning for worsening volume overload.       24 Hour Interval History:  6/16: Has shown minimal improvement with aggressive diuresis and thoracentesis, with increasing renal indices and persistent bilateral ground-glass opacities on CT chest.  Overall, this raises suspicion that these findings may not be related to pulmonary edema. Amiodarone discontinued and corticosteroids initiated for potential amiodarone related pulmonary toxicity.     Today she continues to require Bipap, sat 98%. Net I&O +950 in past 24 hours. Scheduled for right heart cath today for evaluation of pulmonary capillary wedge pressure to determine true extent of cardiac-related volume overload.     Past Medical History:   Diagnosis Date    Abdominal pain, unspecified site     Aortic stenosis     Coronary atherosclerosis of unspecified type of vessel, native or graft     Esophageal reflux     Fatigue     History of glaucoma     History of heart attack     Hypertension     Lumbosacral spondylosis without myelopathy     Other and unspecified hyperlipidemia     Rectocele     SOB (shortness of breath) on exertion     Unspecified essential hypertension     Weakness        Past Surgical History:   Procedure Laterality Date    CHOLECYSTECTOMY       CORONARY ARTERY BYPASS GRAFT      GLAUCOMA SURGERY Bilateral     HYSTERECTOMY      TRANSCATHETER AORTIC VALVE REPLACEMENT (TAVR) N/A 2/27/2023    Procedure: REPLACEMENT, AORTIC VALVE, TRANSCATHETER (TAVR);  Surgeon: Anselmo Arango MD;  Location: [facility] CATH LAB;  Service: Cardiology;  Laterality: N/A;  TAVR W/ ANEST.       Social History     Socioeconomic History    Marital status:    Tobacco Use    Smoking status: Never    Smokeless tobacco: Never   Substance and Sexual Activity    Alcohol use: No    Drug use: No    Sexual activity: Never     Birth control/protection: Surgical     Social Determinants of Health     Financial Resource Strain: Low Risk  (5/3/2021)    Received from [facility]    Overall Financial Resource Strain (CARDIA)     Difficulty of Paying Living Expenses: Not hard at all   Food Insecurity: No Food Insecurity (5/3/2021)    Received from [facility]    Hunger Vital Sign     Worried About Running Out of Food in the Last Year: Never true     Ran Out of Food in the Last Year: Never true   Transportation Needs: No Transportation Needs (5/3/2021)    Received from [facility]    PRAPARE - Transportation     Lack of Transportation (Medical): No     Lack of Transportation (Non-Medical): No   Physical Activity: Sufficiently Active (5/3/2021)    Received from [facility]    Exercise Vital Sign     Days of Exercise per Week: 2 days     Minutes of Exercise per Session: 130 min   Stress: Stress Concern Present (5/3/2021)    Received from [facility]    [employer] [place] [employer] - Occupational Stress Questionnaire     Feeling of Stress : Very much           Current Outpatient Medications   Medication Instructions    amLODIPine (NORVASC) 10 mg, Oral, 2 times daily    aspirin 325 MG tablet 1 tablet, Oral, Daily    carvediloL (COREG) 12.5 mg, Oral, 2 times daily with meals    cholecalciferol, vitamin D3, (VITAMIN D3) 50 mcg (2,000 unit) Cap capsule 1 capsule, Oral, Daily     cyanocobalamin, vitamin B-12, 2,000 mcg Tab 1 tablet, Oral, Daily    cyclobenzaprine (FLEXERIL) 10 mg, Oral, 3 times daily    docusate sodium (STOOL SOFTENER ORAL) 2 capsules, Oral, 2 times daily    furosemide (LASIX) 40 mg, Oral, Daily    hydrALAZINE (APRESOLINE) 25 mg, Oral, Every 8 hours    L. acidophilus/Bifid. animalis (DAILY PROBIOTIC ORAL) 1 capsule, Oral, Daily    potassium gluconate 650 mg, Oral, Once    rosuvastatin (CRESTOR) 40 mg, Oral, Nightly    sertraline (ZOLOFT) 100 mg, Oral, Nightly    traMADoL (ULTRAM) 50 mg, Oral, 3 times daily       Current Inpatient Medications   docusate  50 mg Oral Daily    furosemide (LASIX) injection  40 mg Intravenous Q12H    gabapentin  600 mg Oral BID    Lactobacillus rhamnosus GG  1 packet Oral BID    LIDOcaine  2 patch Transdermal Q24H    methylPREDNISolone sodium succinate injection  60 mg Intravenous Q6H    metOLazone  5 mg Oral Daily    sertraline  100 mg Oral QHS    traMADoL  50 mg Oral TID       Current Intravenous Infusions   dilTIAZem  0-15 mg/hr Intravenous Continuous 2.5 mL/hr at 06/17/24 0848 2.5 mg/hr at 06/17/24 0848         Review of Systems   Constitutional:  Negative for chills, diaphoresis and fever.   Respiratory:  Positive for shortness of breath.    All other systems reviewed and are negative.         Objective:       Intake/Output Summary (Last 24 hours) at 6/17/2024 0850  Last data filed at 6/17/2024 0848  Gross per 24 hour   Intake 1423.67 ml   Output 950 ml   Net 473.67 ml         Vital Signs (Most Recent):  Temp: 97.5 °F (36.4 °C) (06/17/24 0800)  Pulse: 67 (06/17/24 0815)  Resp: 16 (06/17/24 0846)  BP: (!) 144/83 (06/17/24 0846)  SpO2: (!) 94 % (06/17/24 0815)  Body mass index is 31.09 kg/m².  Weight: 77.1 kg (170 lb) Vital Signs (24h Range):  Temp:  [96.8 °F (36 °C)-98.2 °F (36.8 °C)] 97.5 °F (36.4 °C)  Pulse:  [] 67  Resp:  [10-29] 16  SpO2:  [88 %-100 %] 94 %  BP: (101-170)/() 144/83     Physical Exam  Vitals reviewed.  "  Constitutional:       Appearance: Normal appearance.   HENT:      Head: Normocephalic and atraumatic.      Mouth/Throat:      Mouth: Mucous membranes are moist.      Pharynx: Oropharynx is clear.   Eyes:      Extraocular Movements: Extraocular movements intact.   Cardiovascular:      Rate and Rhythm: Normal rate and regular rhythm.      Heart sounds: Normal heart sounds.   Pulmonary:      Breath sounds: Rales present.      Comments: Decreased lung sounds R>L slightly worse than yesterday  Musculoskeletal:      Cervical back: Normal range of motion.   Skin:     General: Skin is warm and dry.   Neurological:      General: No focal deficit present.      Mental Status: She is alert and oriented to person, place, and time.   Psychiatric:         Mood and Affect: Mood normal.         Behavior: Behavior normal.           Lines/Drains/Airways       Drain  Duration                  Urethral Catheter 06/09/24 1754 7 days              Peripheral Intravenous Line  Duration                  Peripheral IV - Single Lumen 06/07/24 1700 20 G Anterior;Proximal;Right Forearm 9 days         Peripheral IV - Single Lumen 06/08/24 0428 20 G Left Forearm 9 days                    Significant Labs:    Lab Results   Component Value Date    WBC 4.20 (L) 06/17/2024    HGB 8.6 (L) 06/17/2024    HCT 26.9 (L) 06/17/2024    MCV 91.8 06/17/2024     06/17/2024           BMP  Lab Results   Component Value Date     (L) 06/17/2024    K 5.0 06/17/2024    CHLORIDE 97 05/04/2021    CO2 32 (H) 06/17/2024    BUN 40.7 (H) 06/17/2024    CREATININE 1.55 (H) 06/17/2024    CALCIUM 8.6 06/17/2024    AGAP 13.0 06/17/2024    ESTGFRAFRICA 92 05/16/2024    EGFRNONAA 28.1 (A) 08/10/2017         ABG  No results for input(s): "PH", "PO2", "PCO2", "HCO3", "POCBASEDEF" in the last 168 hours.    Mechanical Ventilation Support:  Oxygen Concentration (%): 90 (06/17/24 0835)      Significant Imaging:  I have reviewed the pertinent imaging within the past 24 " hours.  CTA Chest Non-Coronary (PE Studies)  Narrative: EXAMINATION:  CTA CHEST NON CORONARY (PE STUDIES)    CLINICAL HISTORY:  hypoxia;    TECHNIQUE:  Helically acquired images with axial, sagittal and coronal reformations were obtained from the thoracic inlet to the lung bases followingthe IV administration of contrast.  CTA timed for evaluation of the pulmonary arteries.  MIP images were performed.    Automated tube current modulation, weight-based exposure dosing, and/or iterative reconstruction technique utilized to reach lowest reasonably achievable exposure rate.    DLP: 393 mGy*cm    COMPARISON:  CT chest 06/02/2024    FINDINGS:  BASE OF NECK: No significant abnormality.    AORTA: Aortic atherosclerosis.    PULMONARY VASCULATURE: Pulmonary arteries enhance normally.  No evidence of pulmonary embolus.    HEART: Normal size. No effusion. There is a TAVR prosthesis.  There are calcifications at the mitral valve annulus.  There are coronary artery calcifications.    ROSEMARIE/MEDIASTINUM: No enlarged lymph nodes by size criteria.    AIRWAYS: Patent.    LUNGS/PLEURA: There are ground-glass opacities and septal thickening within the lungs diffusely.  There are dense consolidative opacities in the perihilar right upper lobe and dependent lungs.  The left lower lobe is completely opacified.  There are bilateral pleural effusions small on the right, moderate on the left.    UPPER ABDOMEN: No abnormality of the partially imaged upper abdomen.    THORACIC SOFT TISSUES: Unremarkable.    BONES: No acute fracture. No suspicious lytic or sclerotic lesions.  Impression: 1. No evidence of pulmonary embolus  2. Ground-glass opacities and septal thickening compatible with pulmonary edema.  3. Superimposed alveolar opacities may be related to alveolar edema or pneumonia.  Slightly improved from prior  4. Bilateral pleural effusions    Electronically signed by: Chelsi Ch  Date:    06/15/2024  Time:    12:51  Echo Saline  Bubble? Yes    Limited exam for bubble study.    Left Atrium: Agitated saline study of the atrial septum is negative,   suggesting absence of intracardiac shunt at the atrial level.           Assessment/Plan:     Assessment  Acute hypoxic respiratory failure  Diastolic heart failure  Right ventricular failure with potential pulmonary hypertension  Atrial fibrillation    Plan  -Right heart cath today for evaluation of pulmonary capillary wedge pressure to determine true extent of cardiac-related volume overload.     -wean o2 as tolerated  -continue aggressive diuresis  -hold amiodarone for potential amiodarone related pulmonary toxicity. Hold corticosteroids for now due to blood glucose 253  -patient wishes to remain DO NOT RESUSCITATE/DO NOT INTUBATE      HARSHIL Yen-S3  Pulmonary Critical Care Medicine  [facility] - 7th Floor ICU  DOS: 06/17/2024

## 2024-06-17 NOTE — PROGRESS NOTES
"    [facility]    Cardiology  Progress Note    Patient Name: Alanna Moya  MRN: [id number]  Admission Date: 6/1/2024  Hospital Length of Stay: 16 days  Code Status: DNR   Attending Physician: Augustus Barreto MD   Primary Care Physician: Cornel Haddad MD  Expected Discharge Date:   Principal Problem:<principal problem not specified>    Subjective:   Reason for Consult: SVT     HPI:Ms. Moya is a 78 year old female, known to Dr. Ramon in [place], who presented to the hospital from [facility] as transfer due to respiratory failure. Upon arrival she was noted to be SOB. Noted hypoxia requiring BIPAP. Chesr Radiograph from outside facility revealed bilateral airspace opacification and increase his right-sided pleural effusion suggestive of worsening pneumonia or possibly edema. CT Chest revealed mixed picture of decompensated HF, pleural effusions and superimposed pneumonia. During this hospitalization patient with tachycardia concerning for AF RVR, with noted Left Bundle Branch Block. She was given IV Metoprolol and started on Cardizem Infusion for Acute HR Control. She does have history of PSVT. Echocardiogram on 6.2.24 revealed intact LV Function with EF 55-60% & Grade I DD. Electrolytes grossly stable. Troponin normal. Lactic Acid Normal. . CIS Consulted for AF Management.     Hospital Course:   6.7.24: Patient seen sitting in chair. She denies SOB, CP, or nausea. Remains in Afib RVR   6.8.24: NAD. VSS. No complaints of CP/SOB/Palps. "I feel okay" WBC 14.27  6.9.24: NAD. VSS. AFIB RVR on telemetry. No complaints CP/SOB/Palps. "I feel okay" possible aspiration - awaiting Speech Evaluation. K 3.7, Mag 1.8, WBC 16.21.  6.10.24: NAD Noted. Sitting up in chair. AF RVR. Requiring Vapotherm Support. Denies CP. Reports exertional SOB, however, functional capacity is limited as she is deconditioned. Hypertension Noted. Diuresis noted, - 1604 ML/24 Hours.   6.11.24: BP Improved. AF RVR. " "Diuresis noted, - 2144 ML/24 Hours. Vapotherm oxygen support- 75% FIO2.   6.12.24: NAD Noted. Remains AF RVR. BP Stable. Requiring Significant Oxygen Support. Continues to Diurese well.   6.13.24: NAD. VSS. Urine output 1050/850 ml Fluid Net Negative. AFIB RVR on telemetry. No complaints of CP/SOB/Palps.   6.14.24: NAD. VSS. Sitting in Bedside Chair. Denies CP and Palps. + SOB. Remain in A.Fib with CVR. Fluid Balance Net Negative 1210mL.  6.15.24: NAD. VSS. Sitting in Bedside Chair. Denies CP and Palps. + SOB. PAF with CVR. Fluid Balance Net Negative 1285mL. "I am ok." HF NC O2  6.16.24: NAD. PAF/RVR. Amiodarone 0.5mg/min. Fluid Balance Net Negative 1175mL. HF NC O2  6.17.24: NAD. PAF with CVR. Cardizem 2.5mg/HR. Fluid Balance Net + 458mL. "I am ok."     MH: CAD/CABG, Hypertension, Dyslipidemia, MELCHOR, CEA, CVA, AS/TAVR, PSVT  PSH: LHC, CABG, TAVR, CEA, Hysterectomy, Cholecystectomy, Eye Surgery, Hand Surgery  Family History: Father- Old MI/CAD, Mother- HF, Son- Hypertension, Daughter- Cancer/Hypertension, Brother- Heart Disease, Brother- Old MI/CAD, Brother- Cancer  Social History: Tobacco- Negative, Alcohol- Negative, Substance Abuse- Negative      Previous Cardiac Diagnostics:   Echocardiogram (6.2.24):  Left Ventricle: The left ventricle is normal in size. Increased wall thickness. There is normal systolic function with a visually estimated ejection fraction of 55 - 60%. Grade I diastolic dysfunction.  Right Ventricle: Systolic function is reduced.TAPSE is 1.60 cm.  Aortic Valve: There is a transcatheter valve replacement in the aortic position. Aortic valve area by VTI is 1.56 cm². Aortic valve peak velocity is 1.94 m/s. Mean gradient is 8 mmHg. The dimensionless index is 0.50.  Mitral Valve: Mildly thickened leaflets. There is mitral annular calcification present. There is mild stenosis. The mean pressure gradient across the mitral valve is 5 mmHg at a heart rate of 87 bpm. There is mild " regurgitation.  Tricuspid Valve: The tricuspid valve is structurally normal. There is mild regurgitation.  Pulmonic Valve: The pulmonic valve is structurally normal.  Pulmonary Artery: The estimated pulmonary artery systolic pressure is 47 mmHg.  IVC/SVC: Normal venous pressure at 3 mmHg.  Pericardium: Left pleural effusion.     Echocardiogram (5.15.24):  EF 55-60%. Grade I Diastolic Dysfunction. Normal LV Wall Motion.  Mild MAC with Mild MR and No MS. Mild to Moderate TR.  Mild to Moderate AI, No AS.     CT Angiogram Head/Neck (5.15.24):  Carotid arteries:   Calcified plaque at the siphons without significant narrowing.   Normal A1 and M1 segments.   Vertebrobasilar Circulation:   Vertebral arteries: Patent. Calcified plaque on the right without significant narrowing. Otherwise no abnormalities.   Basilar artery: Patent, no abnormalities.   Posterior cerebral arteries:  Patent. Right fetal origin. Otherwise no abnormalities.      Echocardiogram (4.21.23):  The study quality is good.   The left ventricle is normal in size. Global left ventricular systolic function is normal. The left ventricular ejection fraction by Power's is 62%. The left ventricle diastolic function is impaired (Grade II) with an elevated left atrial pressure. Noted left ventricular hypertrophy. Concentric left ventricular hypertrophy is present. It is mild. LVSI=41ml/m2.    LVEDV=67.2ml and LVESV=25.7ml.  The left atrial diameter is moderately increased. Left atrial diameter is 4.4 cms. The left atrium is severely enlarged based on the left atrium volume index of 48.2ml/m².  S/P TAVR. Bioprosthetic aortic valve is well seated and functions normally with no evidence of insufficiency or perivalvular leaks. JACINTA=2.0cm2. The trans-aortic peak gradient is 15.8 mmHg. The trans-aortic mean gradient is 8.6 mmHg. DI=1.1.  Mild mitral annular calcification is noted. The mean trans mitral gradient is 1.9 mmHg.  Mild to moderate (1-2+) mitral  regurgitation. Mild to moderate (1-2+) tricuspid regurgitation.  The estimated pulmonary artery systolic pressure is 41 mmHg assuming a right atrial pressure of 3 mmHg. Evidence of pulmonary hypertension is noted.      MCT Monitor (3.15.23):  Predominant Rhythm SR.  PSVT     TAVR (2.27.23):  TAVR  23  mm S3 ultra valve, nominal prep,  Right TF approach  Limited echo pre, post valve placement  Root angiography  Distal aortography  Temporary pacemaker placement removal  Manta closure  Right CFA access site.  U/S guided bilateral right groin access   Balloon angioplasty of right CFA with a 5 x 40 Napoleon.     LHC (1.26.23):  LM: Distal 50% Stenosis, LAD: Occluded Mid, LCX: Ostial 50% at Mid Portion, RCA: Dominant Patent Arter.  JENNINGS to LAD is Patent.  Impression: LIMA to LAD- Patent, 50% Stenosis LCX Artery  Medical Management.    Review of Systems   Constitutional: Positive for malaise/fatigue. Negative for fever.   Cardiovascular:  Positive for dyspnea on exertion. Negative for chest pain, cyanosis, leg swelling, orthopnea and palpitations.   Respiratory:  Positive for shortness of breath.    All other systems reviewed and are negative.    Objective:     Vital Signs (Most Recent):  Temp: 97.5 °F (36.4 °C) (06/17/24 0800)  Pulse: 68 (06/17/24 0846)  Resp: 16 (06/17/24 0846)  BP: (!) 144/83 (06/17/24 0846)  SpO2: 95 % (06/17/24 0846) Vital Signs (24h Range):  Temp:  [96.8 °F (36 °C)-98.2 °F (36.8 °C)] 97.5 °F (36.4 °C)  Pulse:  [] 68  Resp:  [10-29] 16  SpO2:  [88 %-100 %] 95 %  BP: (101-170)/() 144/83   Weight: 77.1 kg (170 lb)  Body mass index is 31.09 kg/m².  SpO2: 95 %       Intake/Output Summary (Last 24 hours) at 6/17/2024 1004  Last data filed at 6/17/2024 0848  Gross per 24 hour   Intake 1285.31 ml   Output 950 ml   Net 335.31 ml     Lines/Drains/Airways       Drain  Duration                  Urethral Catheter 06/09/24 1754 7 days              Peripheral Intravenous Line  Duration                   Peripheral IV - Single Lumen 06/07/24 1700 20 G Anterior;Proximal;Right Forearm 9 days         Peripheral IV - Single Lumen 06/08/24 0428 20 G Left Forearm 9 days                  Significant Labs:   Recent Results (from the past 72 hour(s))   LD, Body Fluid    Collection Time: 06/14/24 10:21 AM   Result Value Ref Range    Lactate Dehydrogenase Body Fluid 244 U/L   Gram Stain    Collection Time: 06/14/24 10:21 AM    Specimen: Pleural Fluid; Body Fluid   Result Value Ref Range    GRAM STAIN Rare WBC observed     GRAM STAIN No bacteria seen    Body Fluid Culture    Collection Time: 06/14/24 10:21 AM    Specimen: Thoracentesis Fluid; Body Fluid   Result Value Ref Range    Body Fluid Culture No Growth At 48 Hours    Albumin, Body Fluid    Collection Time: 06/14/24 10:21 AM   Result Value Ref Range    Albumin Level Body Fluid 1.6 gm/dL   pH, Body Fluid    Collection Time: 06/14/24 10:21 AM   Result Value Ref Range    pH Body Fluid 7.94 5.00 - 10.00   Protein, Body Fluid    Collection Time: 06/14/24 10:21 AM   Result Value Ref Range    Protein Body Fluid 2.3 gm/dL   KOH Prep    Collection Time: 06/14/24 10:22 AM    Specimen: Pleural Fluid; Body Fluid   Result Value Ref Range    KOH Prep No fungal elements seen    EKG 12-lead    Collection Time: 06/14/24  2:52 PM   Result Value Ref Range    QRS Duration 142 ms    [facility] QTC Calculation 444 ms   Basic Metabolic Panel    Collection Time: 06/14/24  5:06 PM   Result Value Ref Range    Sodium 135 (L) 136 - 145 mmol/L    Potassium 4.2 3.5 - 5.1 mmol/L    Chloride 93 (L) 98 - 107 mmol/L    CO2 30 23 - 31 mmol/L    Glucose 84 82 - 115 mg/dL    Blood Urea Nitrogen 27.0 (H) 9.8 - 20.1 mg/dL    Creatinine 1.05 (H) 0.55 - 1.02 mg/dL    BUN/Creatinine Ratio 26     Calcium 8.8 8.4 - 10.2 mg/dL    Anion Gap 12.0 mEq/L    eGFR 55 mL/min/1.73/m2   Magnesium    Collection Time: 06/14/24  5:06 PM   Result Value Ref Range    Magnesium Level 2.10 1.60 - 2.60 mg/dL   Magnesium    Collection Time:  06/15/24  2:57 AM   Result Value Ref Range    Magnesium Level 2.00 1.60 - 2.60 mg/dL   Comprehensive Metabolic Panel    Collection Time: 06/15/24  2:57 AM   Result Value Ref Range    Sodium 138 136 - 145 mmol/L    Potassium 4.0 3.5 - 5.1 mmol/L    Chloride 95 (L) 98 - 107 mmol/L    CO2 28 23 - 31 mmol/L    Glucose 91 82 - 115 mg/dL    Blood Urea Nitrogen 29.4 (H) 9.8 - 20.1 mg/dL    Creatinine 1.05 (H) 0.55 - 1.02 mg/dL    Calcium 9.2 8.4 - 10.2 mg/dL    Protein Total 6.2 5.8 - 7.6 gm/dL    Albumin 2.3 (L) 3.4 - 4.8 g/dL    Globulin 3.9 (H) 2.4 - 3.5 gm/dL    Albumin/Globulin Ratio 0.6 (L) 1.1 - 2.0 ratio    Bilirubin Total 0.5 <=1.5 mg/dL    ALP 93 40 - 150 unit/L    ALT 9 0 - 55 unit/L    AST 16 5 - 34 unit/L    eGFR 55 mL/min/1.73/m2    Anion Gap 15.0 mEq/L    BUN/Creatinine Ratio 28    CBC with Differential    Collection Time: 06/15/24  2:57 AM   Result Value Ref Range    WBC 11.23 4.50 - 11.50 x10(3)/mcL    RBC 3.06 (L) 4.20 - 5.40 x10(6)/mcL    Hgb 9.0 (L) 12.0 - 16.0 g/dL    Hct 28.9 (L) 37.0 - 47.0 %    MCV 94.4 (H) 80.0 - 94.0 fL    MCH 29.4 27.0 - 31.0 pg    MCHC 31.1 (L) 33.0 - 36.0 g/dL    RDW 14.5 11.5 - 17.0 %    Platelet 264 130 - 400 x10(3)/mcL    MPV 11.3 (H) 7.4 - 10.4 fL    Neut % 78.3 %    Lymph % 6.5 %    Mono % 11.9 %    Eos % 0.1 %    Basophil % 0.2 %    Lymph # 0.73 0.6 - 4.6 x10(3)/mcL    Neut # 8.79 2.1 - 9.2 x10(3)/mcL    Mono # 1.34 (H) 0.1 - 1.3 x10(3)/mcL    Eos # 0.01 0 - 0.9 x10(3)/mcL    Baso # 0.02 <=0.2 x10(3)/mcL    IG# 0.34 (H) 0 - 0.04 x10(3)/mcL    IG% 3.0 %    NRBC% 0.4 %   EKG 12-lead    Collection Time: 06/15/24  4:31 AM   Result Value Ref Range    QRS Duration 140 ms    [facility] QTC Calculation 455 ms   Echo Saline Bubble? Yes    Collection Time: 06/15/24 12:19 PM   Result Value Ref Range    BSA 1.84 m2   CBC with Differential    Collection Time: 06/16/24  3:27 AM   Result Value Ref Range    WBC 9.59 4.50 - 11.50 x10(3)/mcL    RBC 3.21 (L) 4.20 - 5.40 x10(6)/mcL    Hgb 9.7 (L)  12.0 - 16.0 g/dL    Hct 30.4 (L) 37.0 - 47.0 %    MCV 94.7 (H) 80.0 - 94.0 fL    MCH 30.2 27.0 - 31.0 pg    MCHC 31.9 (L) 33.0 - 36.0 g/dL    RDW 14.6 11.5 - 17.0 %    Platelet 280 130 - 400 x10(3)/mcL    MPV 10.7 (H) 7.4 - 10.4 fL    Neut % 78.0 %    Lymph % 7.1 %    Mono % 11.4 %    Eos % 0.3 %    Basophil % 0.3 %    Lymph # 0.68 0.6 - 4.6 x10(3)/mcL    Neut # 7.48 2.1 - 9.2 x10(3)/mcL    Mono # 1.09 0.1 - 1.3 x10(3)/mcL    Eos # 0.03 0 - 0.9 x10(3)/mcL    Baso # 0.03 <=0.2 x10(3)/mcL    IG# 0.28 (H) 0 - 0.04 x10(3)/mcL    IG% 2.9 %    NRBC% 0.3 %   Comprehensive Metabolic Panel    Collection Time: 06/16/24  3:49 AM   Result Value Ref Range    Sodium 136 136 - 145 mmol/L    Potassium 4.0 3.5 - 5.1 mmol/L    Chloride 92 (L) 98 - 107 mmol/L    CO2 29 23 - 31 mmol/L    Glucose 79 (L) 82 - 115 mg/dL    Blood Urea Nitrogen 30.9 (H) 9.8 - 20.1 mg/dL    Creatinine 1.10 (H) 0.55 - 1.02 mg/dL    Calcium 8.8 8.4 - 10.2 mg/dL    Protein Total 5.7 (L) 5.8 - 7.6 gm/dL    Albumin 2.5 (L) 3.4 - 4.8 g/dL    Globulin 3.2 2.4 - 3.5 gm/dL    Albumin/Globulin Ratio 0.8 (L) 1.1 - 2.0 ratio    Bilirubin Total 0.5 <=1.5 mg/dL     40 - 150 unit/L    ALT 9 0 - 55 unit/L    AST 17 5 - 34 unit/L    eGFR 52 mL/min/1.73/m2    Anion Gap 15.0 mEq/L    BUN/Creatinine Ratio 28    Magnesium    Collection Time: 06/16/24  3:49 AM   Result Value Ref Range    Magnesium Level 1.90 1.60 - 2.60 mg/dL   Digoxin Level    Collection Time: 06/16/24  3:49 AM   Result Value Ref Range    Digoxin Level 1.4 0.8 - 2.0 ng/mL   EKG 12-lead    Collection Time: 06/16/24  5:18 AM   Result Value Ref Range    QRS Duration 142 ms    [facility] QTC Calculation 541 ms   Magnesium    Collection Time: 06/17/24  2:08 AM   Result Value Ref Range    Magnesium Level 2.10 1.60 - 2.60 mg/dL   Comprehensive Metabolic Panel    Collection Time: 06/17/24  2:08 AM   Result Value Ref Range    Sodium 133 (L) 136 - 145 mmol/L    Potassium 5.0 3.5 - 5.1 mmol/L    Chloride 88 (L) 98 - 107  mmol/L    CO2 32 (H) 23 - 31 mmol/L    Glucose 253 (H) 82 - 115 mg/dL    Blood Urea Nitrogen 40.7 (H) 9.8 - 20.1 mg/dL    Creatinine 1.55 (H) 0.55 - 1.02 mg/dL    Calcium 8.6 8.4 - 10.2 mg/dL    Protein Total 5.6 (L) 5.8 - 7.6 gm/dL    Albumin 2.5 (L) 3.4 - 4.8 g/dL    Globulin 3.1 2.4 - 3.5 gm/dL    Albumin/Globulin Ratio 0.8 (L) 1.1 - 2.0 ratio    Bilirubin Total 0.4 <=1.5 mg/dL     40 - 150 unit/L    ALT 10 0 - 55 unit/L    AST 16 5 - 34 unit/L    eGFR 34 mL/min/1.73/m2    Anion Gap 13.0 mEq/L    BUN/Creatinine Ratio 26    CBC with Differential    Collection Time: 06/17/24  2:08 AM   Result Value Ref Range    WBC 4.20 (L) 4.50 - 11.50 x10(3)/mcL    RBC 2.93 (L) 4.20 - 5.40 x10(6)/mcL    Hgb 8.6 (L) 12.0 - 16.0 g/dL    Hct 26.9 (L) 37.0 - 47.0 %    MCV 91.8 80.0 - 94.0 fL    MCH 29.4 27.0 - 31.0 pg    MCHC 32.0 (L) 33.0 - 36.0 g/dL    RDW 14.6 11.5 - 17.0 %    Platelet 254 130 - 400 x10(3)/mcL    MPV 10.7 (H) 7.4 - 10.4 fL    Neut % 87.2 %    Lymph % 9.0 %    Mono % 1.9 %    Eos % 0.0 %    Basophil % 0.0 %    Lymph # 0.38 (L) 0.6 - 4.6 x10(3)/mcL    Neut # 3.66 2.1 - 9.2 x10(3)/mcL    Mono # 0.08 (L) 0.1 - 1.3 x10(3)/mcL    Eos # 0.00 0 - 0.9 x10(3)/mcL    Baso # 0.00 <=0.2 x10(3)/mcL    IG# 0.08 (H) 0 - 0.04 x10(3)/mcL    IG% 1.9 %    NRBC% 0.0 %   EKG 12-lead    Collection Time: 06/17/24  4:16 AM   Result Value Ref Range    QRS Duration 154 ms    [facility] QTC Calculation 492 ms     Telemetry: PAF with CVR    Physical Exam  Vitals reviewed.   Constitutional:       General: She is not in acute distress.     Appearance: Normal appearance. She is ill-appearing.   HENT:      Head: Normocephalic.      Mouth/Throat:      Mouth: Mucous membranes are moist.   Eyes:      Extraocular Movements: Extraocular movements intact.      Conjunctiva/sclera: Conjunctivae normal.   Cardiovascular:      Rate and Rhythm: Tachycardia present. Rhythm irregular.      Heart sounds: Murmur heard.   Pulmonary:      Effort: Pulmonary effort  is normal. No respiratory distress.      Breath sounds: Examination of the right-lower field reveals decreased breath sounds. Examination of the left-lower field reveals decreased breath sounds. Decreased breath sounds present.      Comments: HF NC; Bilateral Diminished Breath Sounds Posteriorly   Abdominal:      General: Bowel sounds are normal.   Skin:     General: Skin is warm and dry.   Neurological:      Mental Status: She is alert and oriented to person, place, and time. Mental status is at baseline.   Psychiatric:         Behavior: Behavior normal.         Judgment: Judgment normal.       Current Inpatient Medications:  Current Facility-Administered Medications:     0.9%  NaCl infusion, , Intravenous, PRN, Vishnu Stevenson MD, Stopped at 06/08/24 0133    acetaminophen tablet 650 mg, 650 mg, Oral, Q6H PRN, Naa Chin MD, 650 mg at 06/16/24 2106    diltiaZEM 125 mg in dextrose 5 % 125 mL IVPB (ready to mix) (titrating), 0-15 mg/hr, Intravenous, Continuous, Murtaza Burris, ANP, Last Rate: 2.5 mL/hr at 06/17/24 0848, 2.5 mg/hr at 06/17/24 0848    docusate 50 mg/5 mL liquid 50 mg, 50 mg, Oral, Daily, Marga Smith MD, 50 mg at 06/17/24 0845    gabapentin capsule 600 mg, 600 mg, Oral, BID, Marga Smith MD, 600 mg at 06/17/24 0848    hydrALAZINE injection 10 mg, 10 mg, Intravenous, Q6H PRN, Eleno Abbasi MD, 10 mg at 06/16/24 0025    labetaloL injection 10 mg, 10 mg, Intravenous, Q4H PRN, Baldo Finney DO, 10 mg at 06/11/24 0257    Lactobacillus rhamnosus GG 5 billion cell packet (PEDS) 1 each, 1 packet, Oral, BID, Naa Chin MD, 1 each at 06/17/24 0845    levalbuterol nebulizer solution 1.25 mg, 1.25 mg, Nebulization, Q6H PRN, Shanna Montiel MD, 1.25 mg at 06/09/24 1200    LIDOcaine 5 % patch 2 patch, 2 patch, Transdermal, Q24H, Sehri Carson, CHRISTIANO, 2 patch at 06/16/24 1502    LORazepam tablet 0.5 mg, 0.5 mg, Oral, Q4H PRN, Sheri Carson, NP, 0.5 mg at 06/17/24 0911    melatonin tablet 6  mg, 6 mg, Oral, Nightly PRN, Naa Chin MD, 6 mg at 06/14/24 2038    methylPREDNISolone sodium succinate injection 60 mg, 60 mg, Intravenous, Q6H, Gregory Gandhi MD, 60 mg at 06/17/24 0611    metoprolol injection 5 mg, 5 mg, Intravenous, Q4H PRN, Justyn Landaverde MD, 5 mg at 06/16/24 0527    morphine injection 2 mg, 2 mg, Intravenous, Q4H PRN, Sheri Carson NP, 2 mg at 06/13/24 1137    ondansetron injection 4 mg, 4 mg, Intravenous, Q4H PRN, Julissa Rinaldi FNP, 4 mg at 06/11/24 0405    sertraline tablet 100 mg, 100 mg, Oral, QHS, Marga Smith MD, 100 mg at 06/16/24 2101    traMADoL tablet 50 mg, 50 mg, Oral, TID, Vishnu Stevenson MD, 50 mg at 06/17/24 0846  VTE Risk Mitigation (From admission, onward)           Ordered     IP VTE HIGH RISK PATIENT  Once         06/01/24 2247                  Assessment:   Atrial Fibrillation with RVR (New Onset) - Now SR    - CHADsVASc - 7 Points - 11.2% Stroke Risk per Year     - History of PSVT    - On FD Eliquis for Stroke Risk Reduction  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   Acute on Chronic Diastolic HF/EF 55-60%    - EF 55-60% with Grade I Diastolic Dysfunction  Aspiration Pneumonia    - Cleared Swallowing Study  Acute Hypoxemic Respiratory Failure Requiring Oxygenation - on BiPAP/HF NC O2  Leukocytosis/Sepsis - Improving   Valvular Heart Disease    - AS: Status Post TAVR  23  mm S3 ultra valve, nominal prep,  Right TF approach (2.27.23)  CAD/CABG    - LIMA to LAD (Patent) with 50% Stenosis Native LCX  Hypertension   Hyperlipidemia  Chronic Left Bundle Branch Block ([place] TAVR)  MELCHOR/CEA  History of CVA  Anemia  DNR Status   No known history of GI Bleed     Plan:   Concern for Amiodarone Toxicity  Keep NPO  Will Proceed with RHC for Evaluation of R Heart Filling Pressures Today (6.17.24)  Risk, Benefits and Alternatives Reviewed and Discussed with the PT and their Family and they wish to proceed with above  Procedure.   Resume Eliquis Post RHC if no Further Diagnostics Needed  Continue IV Cardizem and Titrate for HR < 100 bpm  Keep K > 4.0 and Mg > 2.0  Labs in AM: CBC, CMP and Mg    LYLE Pruett  Cardiology  [facility]  06/17/2024    I agree with the findings of the complexity of problems addressed and take responsibility for the plan's risks and complications. I approved the plan documented by Murtaza Burris NP.

## 2024-06-17 NOTE — PROGRESS NOTES
Patient Name: Alanna Moya   MRN: [id number]   Admission Date: 6/1/2024   Hospital Length of Stay: 16   Attending Provider: Augustus Barreto MD   Consulting Provider: STEF Haynes  Reason for Consult: Goals of Care  Primary Care Physician:  Cornel Haddad MD     Principal Problem: <principal problem not specified>     Patient information was obtained from patient, relative(s), and ER records.     Final diagnoses:  [J96.90] Respiratory failure      Assessment/Plan:     I reviewed the patient and family's understanding of the seriousness of the illness and its expected prognosis. We discussed the patient's goals of care and treatment preferences. I clarified current code status. I identified the surrogate decision maker or health care POA. I answered all questions and we formulated a plan including recommendations for symptom management and how to best achieve goals of care.       Patient remains on Bipap this morning at 90% FiO2.  Endorses some slight anxiety pending right heart catheterization today.  Denies pain, nausea, vomiting, constipation.  Last bowel movement 6/16.    Reviewed current plan of care and condition with patient, returned to room to speak with daughter Nakia at bedside.  Discussed that right heart catheterization will hopefully give a little more information into clinical picture and help to guide further medical care.  Answered questions.  Offered support.  Encouraged to call with questions or concerns.  Palliative Medicine continues to follow.    Advance Care Planning     Date: 06/17/2024    [facility]  I engaged the patient and family in a voluntary conversation about advance care planning and we specifically addressed what the goals of care would be moving forward, in light of the patient's change in clinical status, specifically current condition.  We did specifically address the patient's likely prognosis, which is fair .  We explored the patient's values and preferences for future  care.  The patient and family endorses that what is most important right now is to focus on symptom/pain control and improvement in condition but with limits to invasive therapies    Accordingly, we have decided that the best plan to meet the patient's goals includes continuing with treatment        Recommendations:     Continue morphine and Lorazepam PRN      Interval History:     S/p thoracentesis Friday with 950 mLs removed. Some improvement in O2 requirements noted after procedure. However, she currently continues to have high O2 requirements alternating on Vapotherm and Bipap. Recurrent Afib RVR remains an issue, for which she has received multiple amiodarone infusions. Amiodarone has been discontinued with concerns for amiodarone toxicity. Plans for RHC today with cardiology.      Active Ambulatory Problems     Diagnosis Date Noted    Unresponsive episode 02/08/2019    Aortic stenosis 02/28/2023     Resolved Ambulatory Problems     Diagnosis Date Noted    No Resolved Ambulatory Problems     Past Medical History:   Diagnosis Date    Abdominal pain, unspecified site     Coronary atherosclerosis of unspecified type of vessel, native or graft     Esophageal reflux     Fatigue     History of glaucoma     History of heart attack     Hypertension     Lumbosacral spondylosis without myelopathy     Other and unspecified hyperlipidemia     Rectocele     SOB (shortness of breath) on exertion     Unspecified essential hypertension     Weakness         Past Surgical History:   Procedure Laterality Date    CHOLECYSTECTOMY      CORONARY ARTERY BYPASS GRAFT      GLAUCOMA SURGERY Bilateral     HYSTERECTOMY      TRANSCATHETER AORTIC VALVE REPLACEMENT (TAVR) N/A 2/27/2023    Procedure: REPLACEMENT, AORTIC VALVE, TRANSCATHETER (TAVR);  Surgeon: Anselmo Arango MD;  Location: [facility] CATH LAB;  Service: Cardiology;  Laterality: N/A;  TAVR W/ ANEST.        Review of patient's allergies indicates:  No Known Allergies       Current  Facility-Administered Medications:     0.9%  NaCl infusion, , Intravenous, PRN, Vishnu Stevenson MD, Stopped at 06/08/24 0133    acetaminophen tablet 650 mg, 650 mg, Oral, Q6H PRN, Naa Chin MD, 650 mg at 06/16/24 2106    diltiaZEM 125 mg in dextrose 5 % 125 mL IVPB (ready to mix) (titrating), 0-15 mg/hr, Intravenous, Continuous, Murtaza Burris, ANP, Last Rate: 5 mL/hr at 06/17/24 0750, 5 mg/hr at 06/17/24 0750    docusate 50 mg/5 mL liquid 50 mg, 50 mg, Oral, Daily, Marga Smith MD, 50 mg at 06/16/24 0845    furosemide injection 40 mg, 40 mg, Intravenous, Q12H, Augustus Barreto MD, 40 mg at 06/16/24 2101    gabapentin capsule 600 mg, 600 mg, Oral, BID, Marga Smith MD, 600 mg at 06/16/24 2101    hydrALAZINE injection 10 mg, 10 mg, Intravenous, Q6H PRN, Eleno Abbasi MD, 10 mg at 06/16/24 0025    labetaloL injection 10 mg, 10 mg, Intravenous, Q4H PRN, Baldo Finney DO, 10 mg at 06/11/24 0257    Lactobacillus rhamnosus GG 5 billion cell packet (PEDS) 1 each, 1 packet, Oral, BID, Naa Chin MD, 1 each at 06/16/24 2102    levalbuterol nebulizer solution 1.25 mg, 1.25 mg, Nebulization, Q6H PRN, Shanna Montiel MD, 1.25 mg at 06/09/24 1200    LIDOcaine 5 % patch 2 patch, 2 patch, Transdermal, Q24H, Sheri Carson, CHRISTIANO, 2 patch at 06/16/24 1502    LORazepam tablet 0.5 mg, 0.5 mg, Oral, Q4H PRN, Sheri Carson, NP, 0.5 mg at 06/14/24 2038    melatonin tablet 6 mg, 6 mg, Oral, Nightly PRN, Naa Chin MD, 6 mg at 06/14/24 2038    methylPREDNISolone sodium succinate injection 60 mg, 60 mg, Intravenous, Q6H, Gregory Gadnhi MD, 60 mg at 06/17/24 0611    metOLazone tablet 5 mg, 5 mg, Oral, Daily, Augustus Barreto MD, 5 mg at 06/16/24 1107    metoprolol injection 5 mg, 5 mg, Intravenous, Q4H PRN, Justyn Landaverde MD, 5 mg at 06/16/24 0527    morphine injection 2 mg, 2 mg, Intravenous, Q4H PRN, Sheri Carson, NP, 2 mg at 06/13/24 1137    ondansetron injection 4 mg, 4 mg, Intravenous,  "Q4H PRN, Julissa Rinaldi, FNP, 4 mg at 06/11/24 0405    sertraline tablet 100 mg, 100 mg, Oral, QHS, Marga Smith MD, 100 mg at 06/16/24 2101    traMADoL tablet 50 mg, 50 mg, Oral, TID, Vishnu Stevenson MD, 50 mg at 06/16/24 2101       Current Facility-Administered Medications:     sodium chloride 0.9%, , Intravenous, PRN    acetaminophen, 650 mg, Oral, Q6H PRN    hydrALAZINE, 10 mg, Intravenous, Q6H PRN    labetalol, 10 mg, Intravenous, Q4H PRN    levalbuterol, 1.25 mg, Nebulization, Q6H PRN    LORazepam, 0.5 mg, Oral, Q4H PRN    melatonin, 6 mg, Oral, Nightly PRN    metoprolol, 5 mg, Intravenous, Q4H PRN    morphine, 2 mg, Intravenous, Q4H PRN    ondansetron, 4 mg, Intravenous, Q4H PRN     No family history on file.       Review of Systems   Constitutional:  Positive for fatigue.   HENT: Negative.     Respiratory: Negative.     Cardiovascular: Negative.    Gastrointestinal: Negative.    Genitourinary: Negative.    Musculoskeletal: Negative.    Skin: Negative.    Neurological:  Positive for weakness.   Psychiatric/Behavioral:  The patient is nervous/anxious.             Objective:   BP (!) 160/67   Pulse 67   Temp 97.5 °F (36.4 °C) (Oral)   Resp 19   Ht 5' 2" (1.575 m)   Wt 77.1 kg (170 lb)   SpO2 (!) 94%   BMI 31.09 kg/m²      Physical Exam   Constitutional: She is oriented to person, place, and time. No distress. She appears ill.   HENT:   Head: Normocephalic.   Mouth/Throat: Mucous membranes are dry.   Cardiovascular: Normal heart sounds. An irregular rhythm present. Tachycardia present.   No murmur heard.Pulmonary:      Effort: Pulmonary effort is normal. No respiratory distress.     Abdominal: Soft. She exhibits no distension.   Musculoskeletal:      Cervical back: Normal range of motion.      Right lower leg: No edema.      Left lower leg: No edema.   Neurological: She is alert and oriented to person, place, and time.   Skin: Skin is warm and dry.          Review of Symptoms  Review of " Symptoms      Symptom Assessment (ESAS 0-10 Scale)  Pain:  0  Dyspnea:  0  Anxiety:  0  Nausea:  0  Depression:  0  Anorexia:  0  Fatigue:  0  Insomnia:  0  Restlessness:  0  Agitation:  0       Anxiety:  Is nervous/anxious    Bowel Management Plan (BMP):  Yes      Psychosocial/Cultural:   See Palliative Psychosocial Note: Yes  **Primary  to Follow**  Palliative Care  Consult: No      Advance Care Planning   Advance Directives:   [place]: Yes    Medical Power of : Yes      Decision Making:  Patient answered questions and Family answered questions  Goals of Care: The patient and family endorses that what is most important right now is to focus on symptom/pain control and improvement in condition but with limits to invasive therapies    Accordingly, we have decided that the best plan to meet the patient's goals includes continuing with treatment            PAINAD: NA    Caregiver burden formerly assessed: Yes        > 50% of 30 min of encounter was spent in chart review, face to face discussion of goals of care, symptom assessment, coordination of care and emotional support.    ROSELIA HaynesP-BC  Palliative Medicine  [facility]

## 2024-06-17 NOTE — INTERVAL H&P NOTE
Patient name: Alanna Moya  MRN: [id number]  : 1945  Cath Lab Procedure H&P Update    Pre-Procedure Assessment:    I saw and examined the patient face to face. The patient has been re-evaluated and her condition is unchanged. The reason for admission, procedure and care is still present.  Based on the patients H&P, pre-procedure physical exam, relevant diagnostic studies, NPO status and information obtained from the patient, I determine the patient is an appropriate candidate for the proposed procedure and anesthesia planned. I further certify the anesthesia risks, benefits and options have been explained to the patient to which she agrees as documented on the procedural consent.

## 2024-06-17 NOTE — PROGRESS NOTES
[facility] Medicine Progress Note      Chief Complaint: Inpatient Follow-up for multifocal pneumonia     HPI:   78-year-old lady with PMH of AS s/p AVR, CAD, mi, HTN, chronic back pain, HLD, CVA, CHF, GERD, diverticulosis who was being downgraded from the ICU to Hospital Medicine after she was initially admitted for acute hypoxemic respiratory failure requiring BiPAP for adequate saturations. She was transferred from an outside facility to [facility] with complaints of worsening shortness of breath for the last few weeks with initial CXR at outside facility showing bilateral airspace opacities and increased right-sided pleural effusion. Her last echocardiogram from 05/16 showed EF 55-60%. She was placed on antibiotics, steroids as well as IV Lasix for diuresis for pulmonary congestion. She was able to be weaned down from BiPAP to Vapotherm and eventually Oxymizer 8 L. she was also noted to have new onset atrial fibrillation for which he was started on IV Cardizem which has since been transitioned to p.o. Cardizem. She is on full-dose Lovenox due to CHADS-VASc of 7. She reported feeling significantly better and endorsed improvement in her shortness of breath since admission. Denied having any fever, chills, chest pain, cough, sputum production, abdominal pain, nausea, vomiting, headache, numbness, weakness, dizziness/lightheadedness or loss of consciousness. Blood cultures from admission are negative. PT/OT on board; in recommending moderate intensity therapy. Cardiology on board.   Patient had repeat chest x-ray done on June 9th and that showed denser consolidation patient continues to be on Zosyn now has been put on BiPAP with FiO2 of 90%   She is status post thoracentesis with removal of 950 cc of fluid by pulmonology Will follow up with cultures , 6/14     Interval Hx:   Patient seen and examined at bedside, no family member at bedside   Remains on bipap  Cardiology planning for  right heart catheterization today  C/o HA   Creatinine trended upwards to 1.55, BUN 40.7, CO2 32, hemoglobin downtrending to 8.6        Objective/physical exam:  General: In no acute distress, afebrile  Chest:  On bipap  Heart: RRR, +S1, S2, no appreciable murmur  Abdomen: Soft, nontender, BS +  MSK: Warm, no lower extremity edema, no clubbing or cyanosis  Neurologic: Alert x o x 3 , generalized weakness         Assessment/Plan:  Acute respiratory failure with hypoxia requiring high-flow O2 /bipap  HFpEF with exacerbation and bilateral pleural effusions   To r/o pulmonary hypertension  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   Bilateral pleural effusions right greater than left status post thoracentesis on 06/14   Leucocytosis, resolved   Atrial Fibrillation with CVR (New Onset) ,  JUN- RESOLVED   DNR     Hx: CAD-status post CABG,Valvular heart disease-status post AVR     Plan  Continue BiPAP/Vapotherm support   Discontinue Lasix and metolazone, creatinine and CO2 trending upwards   Follow up with cis plans for right heart catheterization today to rule out pulmonary hypertension   Continue with IV steroid for possible amiodarone lung injury   Currently on diltiazem drip for heart rate control   Hemoglobin trended downwards will check FOBT x3, IV PPI 40 b.i.d.   Follow up with pulmonology input-  on steroids   She is status post thoracentesis with removal of 950 cc of fluid on 6/14  strict input and output   Antibiotics were discontinued repeat chest x-ray done today still shows bilateral pleural effusions right greater than left  CTA neg for PE on 6/15  Palliative care consulted and following  continue with p.r.n. Xopenex   On Cardizem, digoxin, Eliquis   Continue with speech PT and OT  Patient was denied by LTAC     Critical care note:  Critical care diagnosis:  Acute hypoxemic respiratory failure secondary tod bilateral pleural effusions on Vapotherm,  Critical care interventions: Hands-on evaluation, review of  labs/radiographs/records and discussion with patient and family if present  Critical care time spent: 35 minutes      VTE prophylaxis:  eliquis   Patient condition:  Stable   Anticipated discharge and Disposition: tbd        All diagnosis and differential diagnosis have been reviewed; assessment and plan has been documented; I have personally reviewed the labs and test results that are presently available; I have reviewed the patients medication list; I have reviewed the consulting providers response and recommendations. I have reviewed or attempted to review medical records based upon their availability     All of the patient's questions have been  addressed and answered. Patient's is agreeable to the above stated plan. I will continue to monitor closely and make adjustments to medical management as needed.    VITAL SIGNS: 24 HRS MIN & MAX LAST   Temp  Min: 96.8 °F (36 °C)  Max: 98.1 °F (36.7 °C) 97.5 °F (36.4 °C)   BP  Min: 101/80  Max: 170/111 131/77   Pulse  Min: 60  Max: 123  68   Resp  Min: 10  Max: 29 (!) 21   SpO2  Min: 88 %  Max: 100 % 100 %     I have reviewed the following labs:  Recent Labs   Lab 06/15/24  0257 06/16/24  0327 06/17/24  0208   WBC 11.23 9.59 4.20*   RBC 3.06* 3.21* 2.93*   HGB 9.0* 9.7* 8.6*   HCT 28.9* 30.4* 26.9*   MCV 94.4* 94.7* 91.8   MCH 29.4 30.2 29.4   MCHC 31.1* 31.9* 32.0*   RDW 14.5 14.6 14.6    280 254   MPV 11.3* 10.7* 10.7*     Recent Labs   Lab 06/15/24  0257 06/16/24  0349 06/17/24  0208    136 133*   K 4.0 4.0 5.0   CL 95* 92* 88*   CO2 28 29 32*   BUN 29.4* 30.9* 40.7*   CREATININE 1.05* 1.10* 1.55*   CALCIUM 9.2 8.8 8.6   MG 2.00 1.90 2.10   ALBUMIN 2.3* 2.5* 2.5*   ALKPHOS 93 100 107   ALT 9 9 10   AST 16 17 16   BILITOT 0.5 0.5 0.4     Microbiology Results (last 7 days)       Procedure Component Value Units Date/Time    Body Fluid Culture [[id number]] Collected: 06/14/24 1021    Order Status: Completed Specimen: Body Fluid from Thoracentesis Fluid  Updated: 06/17/24 1108     Body Fluid Culture No Growth At 72 Hours    Gram Stain [[id number]] Collected: 06/14/24 1021    Order Status: Completed Specimen: Body Fluid from Pleural Fluid Updated: 06/14/24 1320     GRAM STAIN Rare WBC observed      No bacteria seen    KOH Prep [[id number]] Collected: 06/14/24 1022    Order Status: Completed Specimen: Body Fluid from Pleural Fluid Updated: 06/14/24 1256     KOH Prep No fungal elements seen    Respiratory Culture [[id number]] Updated: 06/14/24 1111    Order Status: Canceled Specimen: Sputum, Expectorated     Respiratory Culture [[id number]] Updated: 06/14/24 1111    Order Status: Canceled Specimen: Respiratory from Sputum, Expectorated     Fungal Culture [[id number]] Collected: 06/14/24 1021    Order Status: Sent Specimen: Body Fluid from Pleural Fluid Updated: 06/14/24 1028    Mycobacteria and Nocardia Culture [[id number]] Collected: 06/14/24 1021    Order Status: Sent Specimen: Body Fluid from Pleural Fluid, Right Updated: 06/14/24 1028             See below for Radiology    Scheduled Med:   docusate  50 mg Oral Daily    gabapentin  600 mg Oral BID    Lactobacillus rhamnosus GG  1 packet Oral BID    LIDOcaine  2 patch Transdermal Q24H    methylPREDNISolone sodium succinate injection  60 mg Intravenous Q6H    sertraline  100 mg Oral QHS    traMADoL  50 mg Oral TID      Continuous Infusions:   dilTIAZem  0-15 mg/hr Intravenous Continuous 15 mL/hr at 06/17/24 1156 15 mg/hr at 06/17/24 1156      PRN Meds:    Current Facility-Administered Medications:     sodium chloride 0.9%, , Intravenous, PRN    acetaminophen, 650 mg, Oral, Q6H PRN    hydrALAZINE, 10 mg, Intravenous, Q6H PRN    labetalol, 10 mg, Intravenous, Q4H PRN    levalbuterol, 1.25 mg, Nebulization, Q6H PRN    LORazepam, 0.5 mg, Oral, Q4H PRN    melatonin, 6 mg, Oral, Nightly PRN    metoprolol, 5 mg, Intravenous, Q4H PRN    morphine, 2 mg, Intravenous, Q4H PRN    ondansetron, 4 mg, Intravenous, Q4H PRN      Assessment/Plan:      VTE prophylaxis:     Patient condition:  Stable/Fair/Guarded/ Serious/ Critical    Anticipated discharge and Disposition:         All diagnosis and differential diagnosis have been reviewed; assessment and plan has been documented; I have personally reviewed the labs and test results that are presently available; I have reviewed the patients medication list; I have reviewed the consulting providers response and recommendations. I have reviewed or attempted to review medical records based upon their availability    All of the patient's questions have been  addressed and answered. Patient's is agreeable to the above stated plan. I will continue to monitor closely and make adjustments to medical management as needed.  _____________________________________________________________________    Nutrition Status:    Radiology:  I have personally reviewed the following imaging and agree with the radiologist.     CTA Chest Non-Coronary (PE Studies)  Narrative: EXAMINATION:  CTA CHEST NON CORONARY (PE STUDIES)    CLINICAL HISTORY:  hypoxia;    TECHNIQUE:  Helically acquired images with axial, sagittal and coronal reformations were obtained from the thoracic inlet to the lung bases followingthe IV administration of contrast.  CTA timed for evaluation of the pulmonary arteries.  MIP images were performed.    Automated tube current modulation, weight-based exposure dosing, and/or iterative reconstruction technique utilized to reach lowest reasonably achievable exposure rate.    DLP: 393 mGy*cm    COMPARISON:  CT chest 06/02/2024    FINDINGS:  BASE OF NECK: No significant abnormality.    AORTA: Aortic atherosclerosis.    PULMONARY VASCULATURE: Pulmonary arteries enhance normally.  No evidence of pulmonary embolus.    HEART: Normal size. No effusion. There is a TAVR prosthesis.  There are calcifications at the mitral valve annulus.  There are coronary artery calcifications.    ROSEMARIE/MEDIASTINUM: No enlarged  lymph nodes by size criteria.    AIRWAYS: Patent.    LUNGS/PLEURA: There are ground-glass opacities and septal thickening within the lungs diffusely.  There are dense consolidative opacities in the perihilar right upper lobe and dependent lungs.  The left lower lobe is completely opacified.  There are bilateral pleural effusions small on the right, moderate on the left.    UPPER ABDOMEN: No abnormality of the partially imaged upper abdomen.    THORACIC SOFT TISSUES: Unremarkable.    BONES: No acute fracture. No suspicious lytic or sclerotic lesions.  Impression: 1. No evidence of pulmonary embolus  2. Ground-glass opacities and septal thickening compatible with pulmonary edema.  3. Superimposed alveolar opacities may be related to alveolar edema or pneumonia.  Slightly improved from prior  4. Bilateral pleural effusions    Electronically signed by: Chelsi Ch  Date:    06/15/2024  Time:    12:51  Echo Saline Bubble? Yes    Limited exam for bubble study.    Left Atrium: Agitated saline study of the atrial septum is negative,   suggesting absence of intracardiac shunt at the atrial level.      Augustus Barreto MD  Department of Hospital Medicine   [facility]   06/17/2024

## 2024-06-17 NOTE — NURSING
Nurses Note -- 4 Eyes      6/17/2024   2:46 AM      Skin assessed during: Q Shift Change      [] No Altered Skin Integrity Present    [x]Prevention Measures Documented      [x] Yes- Altered Skin Integrity Present or Discovered   [] LDA Added if Not in Epic (Describe Wound)   [] New Altered Skin Integrity was Present on Admit and Documented in LDA   [] Wound Image Taken    Wound Care Consulted? No    Attending Nurse:  MELINA López    Second RN/Staff Member:  MELINA Mcgee

## 2024-06-18 LAB
ANION GAP SERPL CALC-SCNC: 12 MEQ/L
BASOPHILS # BLD AUTO: 0 X10(3)/MCL
BASOPHILS NFR BLD AUTO: 0 %
BUN SERPL-MCNC: 46.1 MG/DL (ref 9.8–20.1)
CALCIUM SERPL-MCNC: 8.7 MG/DL (ref 8.4–10.2)
CHLORIDE SERPL-SCNC: 89 MMOL/L (ref 98–107)
CO2 SERPL-SCNC: 31 MMOL/L (ref 23–31)
CREAT SERPL-MCNC: 1.55 MG/DL (ref 0.55–1.02)
CREAT/UREA NIT SERPL: 30
EOSINOPHIL # BLD AUTO: 0.01 X10(3)/MCL (ref 0–0.9)
EOSINOPHIL NFR BLD AUTO: 0.1 %
ERYTHROCYTE [DISTWIDTH] IN BLOOD BY AUTOMATED COUNT: 14.6 % (ref 11.5–17)
GFR SERPLBLD CREATININE-BSD FMLA CKD-EPI: 34 ML/MIN/1.73/M2
GLUCOSE SERPL-MCNC: 180 MG/DL (ref 82–115)
HCT VFR BLD AUTO: 24.6 % (ref 37–47)
HGB BLD-MCNC: 8.1 G/DL (ref 12–16)
IMM GRANULOCYTES # BLD AUTO: 0.08 X10(3)/MCL (ref 0–0.04)
IMM GRANULOCYTES NFR BLD AUTO: 1.1 %
LYMPHOCYTES # BLD AUTO: 0.44 X10(3)/MCL (ref 0.6–4.6)
LYMPHOCYTES NFR BLD AUTO: 5.9 %
MAGNESIUM SERPL-MCNC: 2 MG/DL (ref 1.6–2.6)
MCH RBC QN AUTO: 29.9 PG (ref 27–31)
MCHC RBC AUTO-ENTMCNC: 32.9 G/DL (ref 33–36)
MCV RBC AUTO: 90.8 FL (ref 80–94)
MONOCYTES # BLD AUTO: 0.38 X10(3)/MCL (ref 0.1–1.3)
MONOCYTES NFR BLD AUTO: 5.1 %
NEUTROPHILS # BLD AUTO: 6.51 X10(3)/MCL (ref 2.1–9.2)
NEUTROPHILS NFR BLD AUTO: 87.8 %
NRBC BLD AUTO-RTO: 0 %
PLATELET # BLD AUTO: 278 X10(3)/MCL (ref 130–400)
PMV BLD AUTO: 10.7 FL (ref 7.4–10.4)
POCT GLUCOSE: 195 MG/DL (ref 70–110)
POTASSIUM SERPL-SCNC: 3.2 MMOL/L (ref 3.5–5.1)
POTASSIUM SERPL-SCNC: 3.3 MMOL/L (ref 3.5–5.1)
RBC # BLD AUTO: 2.71 X10(6)/MCL (ref 4.2–5.4)
SODIUM SERPL-SCNC: 132 MMOL/L (ref 136–145)
WBC # BLD AUTO: 7.42 X10(3)/MCL (ref 4.5–11.5)

## 2024-06-18 PROCEDURE — 85025 COMPLETE CBC W/AUTO DIFF WBC: CPT

## 2024-06-18 PROCEDURE — 25000003 PHARM REV CODE 250

## 2024-06-18 PROCEDURE — 63600175 PHARM REV CODE 636 W HCPCS: Performed by: NURSE PRACTITIONER

## 2024-06-18 PROCEDURE — 63600175 PHARM REV CODE 636 W HCPCS: Mod: JG | Performed by: INTERNAL MEDICINE

## 2024-06-18 PROCEDURE — 25000003 PHARM REV CODE 250: Performed by: NURSE PRACTITIONER

## 2024-06-18 PROCEDURE — 83735 ASSAY OF MAGNESIUM: CPT | Performed by: INTERNAL MEDICINE

## 2024-06-18 PROCEDURE — 99900035 HC TECH TIME PER 15 MIN (STAT)

## 2024-06-18 PROCEDURE — 94799 UNLISTED PULMONARY SVC/PX: CPT

## 2024-06-18 PROCEDURE — 63600175 PHARM REV CODE 636 W HCPCS: Performed by: STUDENT IN AN ORGANIZED HEALTH CARE EDUCATION/TRAINING PROGRAM

## 2024-06-18 PROCEDURE — P9047 ALBUMIN (HUMAN), 25%, 50ML: HCPCS | Mod: JG | Performed by: INTERNAL MEDICINE

## 2024-06-18 PROCEDURE — C9113 INJ PANTOPRAZOLE SODIUM, VIA: HCPCS | Performed by: INTERNAL MEDICINE

## 2024-06-18 PROCEDURE — 63600175 PHARM REV CODE 636 W HCPCS: Performed by: INTERNAL MEDICINE

## 2024-06-18 PROCEDURE — 94660 CPAP INITIATION&MGMT: CPT

## 2024-06-18 PROCEDURE — 25000003 PHARM REV CODE 250: Performed by: INTERNAL MEDICINE

## 2024-06-18 PROCEDURE — 80048 BASIC METABOLIC PNL TOTAL CA: CPT | Performed by: INTERNAL MEDICINE

## 2024-06-18 PROCEDURE — 36415 COLL VENOUS BLD VENIPUNCTURE: CPT | Performed by: NURSE PRACTITIONER

## 2024-06-18 PROCEDURE — 94760 N-INVAS EAR/PLS OXIMETRY 1: CPT

## 2024-06-18 PROCEDURE — 36415 COLL VENOUS BLD VENIPUNCTURE: CPT

## 2024-06-18 PROCEDURE — 25000003 PHARM REV CODE 250: Performed by: HOSPITALIST

## 2024-06-18 PROCEDURE — 99900031 HC PATIENT EDUCATION (STAT)

## 2024-06-18 PROCEDURE — 97530 THERAPEUTIC ACTIVITIES: CPT | Mod: CQ

## 2024-06-18 PROCEDURE — 97535 SELF CARE MNGMENT TRAINING: CPT | Mod: CO

## 2024-06-18 PROCEDURE — 21400001 HC TELEMETRY ROOM

## 2024-06-18 PROCEDURE — 94761 N-INVAS EAR/PLS OXIMETRY MLT: CPT

## 2024-06-18 PROCEDURE — 20000000 HC ICU ROOM

## 2024-06-18 PROCEDURE — 27100171 HC OXYGEN HIGH FLOW UP TO 24 HOURS

## 2024-06-18 PROCEDURE — 84132 ASSAY OF SERUM POTASSIUM: CPT | Performed by: NURSE PRACTITIONER

## 2024-06-18 PROCEDURE — 11000001 HC ACUTE MED/SURG PRIVATE ROOM

## 2024-06-18 RX ORDER — FUROSEMIDE 10 MG/ML
40 INJECTION INTRAMUSCULAR; INTRAVENOUS EVERY 6 HOURS
Status: DISCONTINUED | OUTPATIENT
Start: 2024-06-18 | End: 2024-06-20

## 2024-06-18 RX ORDER — BUMETANIDE 0.25 MG/ML
1 INJECTION INTRAMUSCULAR; INTRAVENOUS DAILY
Status: DISCONTINUED | OUTPATIENT
Start: 2024-06-18 | End: 2024-06-18

## 2024-06-18 RX ORDER — FUROSEMIDE 10 MG/ML
40 INJECTION INTRAMUSCULAR; INTRAVENOUS 2 TIMES DAILY
Status: DISCONTINUED | OUTPATIENT
Start: 2024-06-18 | End: 2024-06-18

## 2024-06-18 RX ORDER — ALBUMIN HUMAN 250 G/1000ML
12.5 SOLUTION INTRAVENOUS ONCE
Status: COMPLETED | OUTPATIENT
Start: 2024-06-18 | End: 2024-06-18

## 2024-06-18 RX ORDER — POTASSIUM CHLORIDE 20 MEQ/1
40 TABLET, EXTENDED RELEASE ORAL
Status: COMPLETED | OUTPATIENT
Start: 2024-06-18 | End: 2024-06-18

## 2024-06-18 RX ORDER — POTASSIUM CHLORIDE 20 MEQ/1
40 TABLET, EXTENDED RELEASE ORAL ONCE
Status: COMPLETED | OUTPATIENT
Start: 2024-06-18 | End: 2024-06-18

## 2024-06-18 RX ORDER — DILTIAZEM HYDROCHLORIDE 180 MG/1
180 CAPSULE, COATED, EXTENDED RELEASE ORAL DAILY
Status: DISCONTINUED | OUTPATIENT
Start: 2024-06-18 | End: 2024-06-19

## 2024-06-18 RX ORDER — GABAPENTIN 300 MG/1
300 CAPSULE ORAL 2 TIMES DAILY
Status: DISCONTINUED | OUTPATIENT
Start: 2024-06-18 | End: 2024-06-21

## 2024-06-18 RX ADMIN — SERTRALINE HYDROCHLORIDE 100 MG: 50 TABLET ORAL at 08:06

## 2024-06-18 RX ADMIN — PANTOPRAZOLE SODIUM 40 MG: 40 INJECTION, POWDER, FOR SOLUTION INTRAVENOUS at 08:06

## 2024-06-18 RX ADMIN — APIXABAN 5 MG: 5 TABLET, FILM COATED ORAL at 08:06

## 2024-06-18 RX ADMIN — POTASSIUM CHLORIDE 40 MEQ: 1500 TABLET, EXTENDED RELEASE ORAL at 12:06

## 2024-06-18 RX ADMIN — DILTIAZEM HYDROCHLORIDE 180 MG: 180 CAPSULE, EXTENDED RELEASE ORAL at 12:06

## 2024-06-18 RX ADMIN — FUROSEMIDE 40 MG: 10 INJECTION, SOLUTION INTRAVENOUS at 11:06

## 2024-06-18 RX ADMIN — FUROSEMIDE 40 MG: 10 INJECTION, SOLUTION INTRAMUSCULAR; INTRAVENOUS at 11:06

## 2024-06-18 RX ADMIN — METHYLPREDNISOLONE SODIUM SUCCINATE 60 MG: 40 INJECTION, POWDER, FOR SOLUTION INTRAMUSCULAR; INTRAVENOUS at 05:06

## 2024-06-18 RX ADMIN — ACETAMINOPHEN 650 MG: 325 TABLET, FILM COATED ORAL at 08:06

## 2024-06-18 RX ADMIN — METHYLPREDNISOLONE SODIUM SUCCINATE 60 MG: 40 INJECTION, POWDER, FOR SOLUTION INTRAMUSCULAR; INTRAVENOUS at 06:06

## 2024-06-18 RX ADMIN — METHYLPREDNISOLONE SODIUM SUCCINATE 60 MG: 40 INJECTION, POWDER, FOR SOLUTION INTRAMUSCULAR; INTRAVENOUS at 12:06

## 2024-06-18 RX ADMIN — DOCUSATE SODIUM LIQUID 50 MG: 100 LIQUID ORAL at 08:06

## 2024-06-18 RX ADMIN — POTASSIUM CHLORIDE 40 MEQ: 1500 TABLET, EXTENDED RELEASE ORAL at 11:06

## 2024-06-18 RX ADMIN — ALBUMIN (HUMAN) 12.5 G: 12.5 SOLUTION INTRAVENOUS at 11:06

## 2024-06-18 RX ADMIN — POTASSIUM CHLORIDE 40 MEQ: 1500 TABLET, EXTENDED RELEASE ORAL at 05:06

## 2024-06-18 RX ADMIN — TRAMADOL HYDROCHLORIDE 50 MG: 50 TABLET, COATED ORAL at 08:06

## 2024-06-18 RX ADMIN — TRAMADOL HYDROCHLORIDE 50 MG: 50 TABLET, COATED ORAL at 02:06

## 2024-06-18 RX ADMIN — GABAPENTIN 600 MG: 300 CAPSULE ORAL at 08:06

## 2024-06-18 RX ADMIN — Medication 1 EACH: at 08:06

## 2024-06-18 RX ADMIN — DILTIAZEM HYDROCHLORIDE 5 MG/HR: 5 INJECTION, SOLUTION INTRAVENOUS at 08:06

## 2024-06-18 RX ADMIN — FUROSEMIDE 40 MG: 10 INJECTION, SOLUTION INTRAVENOUS at 05:06

## 2024-06-18 RX ADMIN — GABAPENTIN 300 MG: 300 CAPSULE ORAL at 08:06

## 2024-06-18 RX ADMIN — LIDOCAINE 2 PATCH: 700 PATCH TOPICAL at 02:06

## 2024-06-18 NOTE — PROGRESS NOTES
"    [facility]    Cardiology  Progress Note    Patient Name: Alanna Moya  MRN: [id number]  Admission Date: 6/1/2024  Hospital Length of Stay: 17 days  Code Status: DNR   Attending Physician: Augustus Barreto MD   Primary Care Physician: Cornel Haddad MD  Expected Discharge Date:   Principal Problem:<principal problem not specified>    Subjective:   Reason for Consult: SVT     HPI:Ms. Moya is a 78 year old female, known to Dr. Ramon in [place], who presented to the hospital from [facility] as transfer due to respiratory failure. Upon arrival she was noted to be SOB. Noted hypoxia requiring BIPAP. Chesr Radiograph from outside facility revealed bilateral airspace opacification and increase his right-sided pleural effusion suggestive of worsening pneumonia or possibly edema. CT Chest revealed mixed picture of decompensated HF, pleural effusions and superimposed pneumonia. During this hospitalization patient with tachycardia concerning for AF RVR, with noted Left Bundle Branch Block. She was given IV Metoprolol and started on Cardizem Infusion for Acute HR Control. She does have history of PSVT. Echocardiogram on 6.2.24 revealed intact LV Function with EF 55-60% & Grade I DD. Electrolytes grossly stable. Troponin normal. Lactic Acid Normal. . CIS Consulted for AF Management.     Hospital Course:   6.7.24: Patient seen sitting in chair. She denies SOB, CP, or nausea. Remains in Afib RVR   6.8.24: NAD. VSS. No complaints of CP/SOB/Palps. "I feel okay" WBC 14.27  6.9.24: NAD. VSS. AFIB RVR on telemetry. No complaints CP/SOB/Palps. "I feel okay" possible aspiration - awaiting Speech Evaluation. K 3.7, Mag 1.8, WBC 16.21.  6.10.24: NAD Noted. Sitting up in chair. AF RVR. Requiring Vapotherm Support. Denies CP. Reports exertional SOB, however, functional capacity is limited as she is deconditioned. Hypertension Noted. Diuresis noted, - 1604 ML/24 Hours.   6.11.24: BP Improved. AF RVR. " "Diuresis noted, - 2144 ML/24 Hours. Vapotherm oxygen support- 75% FIO2.   6.12.24: NAD Noted. Remains AF RVR. BP Stable. Requiring Significant Oxygen Support. Continues to Diurese well.   6.13.24: NAD. VSS. Urine output 1050/850 ml Fluid Net Negative. AFIB RVR on telemetry. No complaints of CP/SOB/Palps.   6.14.24: NAD. VSS. Sitting in Bedside Chair. Denies CP and Palps. + SOB. Remain in A.Fib with CVR. Fluid Balance Net Negative 1210mL.  6.15.24: NAD. VSS. Sitting in Bedside Chair. Denies CP and Palps. + SOB. PAF with CVR. Fluid Balance Net Negative 1285mL. "I am ok." HF NC O2  6.16.24: NAD. PAF/RVR. Amiodarone 0.5mg/min. Fluid Balance Net Negative 1175mL. HF NC O2  6.17.24: NAD. PAF with CVR. Cardizem 2.5mg/HR. Fluid Balance Net + 458mL. "I am ok."   6.18.24: NAD. Laying in Bed on HF NC. Cardizem 5mg/HR. Fluid Balance Net Negative 1816.7mL. "I am feeling a little better." + SOB/NIETO. Denies CP and Palps. S/P RHC with Elevated EDP.     PMH: CAD/CABG, Hypertension, Dyslipidemia, MELCHOR, CEA, CVA, AS/TAVR, PSVT  PSH: LHC, CABG, TAVR, CEA, Hysterectomy, Cholecystectomy, Eye Surgery, Hand Surgery  Family History: Father- Old MI/CAD, Mother- HF, Son- Hypertension, Daughter- Cancer/Hypertension, Brother- Heart Disease, Brother- Old MI/CAD, Brother- Cancer  Social History: Tobacco- Negative, Alcohol- Negative, Substance Abuse- Negative      Previous Cardiac Diagnostics:   RHC (6.17.24):  RA 11, RV 53/9, PA 57/31 (mean 41), PCWP 34, PA sat 58% (on 100% FiO2 BiPAP)  CO/CI: 3.8/2.1 (TD)  PVR: 1.9 Woods  Assessment:  -Elevated right and left-sided filling pressures  -Low cardiac outputs  -Moderate pulmonary hypertension with normal pulmonary vascular resistance  Plan:  -Consider diuresis and maintenance of normal sinus rhythm    Echocardiogram (6.2.24):  Left Ventricle: The left ventricle is normal in size. Increased wall thickness. There is normal systolic function with a visually estimated ejection fraction of 55 - 60%. Grade I " diastolic dysfunction.  Right Ventricle: Systolic function is reduced.TAPSE is 1.60 cm.  Aortic Valve: There is a transcatheter valve replacement in the aortic position. Aortic valve area by VTI is 1.56 cm². Aortic valve peak velocity is 1.94 m/s. Mean gradient is 8 mmHg. The dimensionless index is 0.50.  Mitral Valve: Mildly thickened leaflets. There is mitral annular calcification present. There is mild stenosis. The mean pressure gradient across the mitral valve is 5 mmHg at a heart rate of 87 bpm. There is mild regurgitation.  Tricuspid Valve: The tricuspid valve is structurally normal. There is mild regurgitation.  Pulmonic Valve: The pulmonic valve is structurally normal.  Pulmonary Artery: The estimated pulmonary artery systolic pressure is 47 mmHg.  IVC/SVC: Normal venous pressure at 3 mmHg.  Pericardium: Left pleural effusion.     Echocardiogram (5.15.24):  EF 55-60%. Grade I Diastolic Dysfunction. Normal LV Wall Motion.  Mild MAC with Mild MR and No MS. Mild to Moderate TR.  Mild to Moderate AI, No AS.     CT Angiogram Head/Neck (5.15.24):  Carotid arteries:   Calcified plaque at the siphons without significant narrowing.   Normal A1 and M1 segments.   Vertebrobasilar Circulation:   Vertebral arteries: Patent. Calcified plaque on the right without significant narrowing. Otherwise no abnormalities.   Basilar artery: Patent, no abnormalities.   Posterior cerebral arteries:  Patent. Right fetal origin. Otherwise no abnormalities.      Echocardiogram (4.21.23):  The study quality is good.   The left ventricle is normal in size. Global left ventricular systolic function is normal. The left ventricular ejection fraction by Power's is 62%. The left ventricle diastolic function is impaired (Grade II) with an elevated left atrial pressure. Noted left ventricular hypertrophy. Concentric left ventricular hypertrophy is present. It is mild. LVSI=41ml/m2.    LVEDV=67.2ml and LVESV=25.7ml.  The left atrial diameter is  moderately increased. Left atrial diameter is 4.4 cms. The left atrium is severely enlarged based on the left atrium volume index of 48.2ml/m².  S/P TAVR. Bioprosthetic aortic valve is well seated and functions normally with no evidence of insufficiency or perivalvular leaks. JACINTA=2.0cm2. The trans-aortic peak gradient is 15.8 mmHg. The trans-aortic mean gradient is 8.6 mmHg. DI=1.1.  Mild mitral annular calcification is noted. The mean trans mitral gradient is 1.9 mmHg.  Mild to moderate (1-2+) mitral regurgitation. Mild to moderate (1-2+) tricuspid regurgitation.  The estimated pulmonary artery systolic pressure is 41 mmHg assuming a right atrial pressure of 3 mmHg. Evidence of pulmonary hypertension is noted.      MCT Monitor (3.15.23):  Predominant Rhythm SR.  PSVT     TAVR (2.27.23):  TAVR  23  mm S3 ultra valve, nominal prep,  Right TF approach  Limited echo pre, post valve placement  Root angiography  Distal aortography  Temporary pacemaker placement removal  Manta closure  Right CFA access site.  U/S guided bilateral right groin access   Balloon angioplasty of right CFA with a 5 x 40 Napoleon.     LHC (1.26.23):  LM: Distal 50% Stenosis, LAD: Occluded Mid, LCX: Ostial 50% at Mid Portion, RCA: Dominant Patent Arter.  JENNINGS to LAD is Patent.  Impression: LIMA to LAD- Patent, 50% Stenosis LCX Artery  Medical Management.    Review of Systems   Constitutional: Positive for malaise/fatigue. Negative for fever.   Cardiovascular:  Positive for dyspnea on exertion. Negative for chest pain, cyanosis, leg swelling, orthopnea and palpitations.   Respiratory:  Positive for shortness of breath.    All other systems reviewed and are negative.    Objective:     Vital Signs (Most Recent):  Temp: 98.2 °F (36.8 °C) (06/18/24 0800)  Pulse: 73 (06/18/24 0808)  Resp: 16 (06/18/24 0847)  BP: (!) 140/60 (06/18/24 0800)  SpO2: (!) 93 % (06/18/24 0808) Vital Signs (24h Range):  Temp:  [98.1 °F (36.7 °C)-99 °F (37.2 °C)] 98.2 °F (36.8  °C)  Pulse:  [] 73  Resp:  [10-31] 16  SpO2:  [86 %-99 %] 93 %  BP: (102-143)/() 140/60   Weight: 77.1 kg (170 lb)  Body mass index is 31.09 kg/m².  SpO2: (!) 93 %       Intake/Output Summary (Last 24 hours) at 6/18/2024 1215  Last data filed at 6/18/2024 0800  Gross per 24 hour   Intake 262.2 ml   Output 2330 ml   Net -2067.8 ml     Lines/Drains/Airways       Drain  Duration                  Urethral Catheter 06/09/24 1754 8 days              Peripheral Intravenous Line  Duration                  Peripheral IV - Single Lumen 06/07/24 1700 20 G Anterior;Proximal;Right Forearm 10 days         Peripheral IV - Single Lumen 06/08/24 0428 20 G Left Forearm 10 days                  Significant Labs:   Recent Results (from the past 72 hour(s))   Echo Saline Bubble? Yes    Collection Time: 06/15/24 12:19 PM   Result Value Ref Range    BSA 1.84 m2   CBC with Differential    Collection Time: 06/16/24  3:27 AM   Result Value Ref Range    WBC 9.59 4.50 - 11.50 x10(3)/mcL    RBC 3.21 (L) 4.20 - 5.40 x10(6)/mcL    Hgb 9.7 (L) 12.0 - 16.0 g/dL    Hct 30.4 (L) 37.0 - 47.0 %    MCV 94.7 (H) 80.0 - 94.0 fL    MCH 30.2 27.0 - 31.0 pg    MCHC 31.9 (L) 33.0 - 36.0 g/dL    RDW 14.6 11.5 - 17.0 %    Platelet 280 130 - 400 x10(3)/mcL    MPV 10.7 (H) 7.4 - 10.4 fL    Neut % 78.0 %    Lymph % 7.1 %    Mono % 11.4 %    Eos % 0.3 %    Basophil % 0.3 %    Lymph # 0.68 0.6 - 4.6 x10(3)/mcL    Neut # 7.48 2.1 - 9.2 x10(3)/mcL    Mono # 1.09 0.1 - 1.3 x10(3)/mcL    Eos # 0.03 0 - 0.9 x10(3)/mcL    Baso # 0.03 <=0.2 x10(3)/mcL    IG# 0.28 (H) 0 - 0.04 x10(3)/mcL    IG% 2.9 %    NRBC% 0.3 %   Comprehensive Metabolic Panel    Collection Time: 06/16/24  3:49 AM   Result Value Ref Range    Sodium 136 136 - 145 mmol/L    Potassium 4.0 3.5 - 5.1 mmol/L    Chloride 92 (L) 98 - 107 mmol/L    CO2 29 23 - 31 mmol/L    Glucose 79 (L) 82 - 115 mg/dL    Blood Urea Nitrogen 30.9 (H) 9.8 - 20.1 mg/dL    Creatinine 1.10 (H) 0.55 - 1.02 mg/dL    Calcium  8.8 8.4 - 10.2 mg/dL    Protein Total 5.7 (L) 5.8 - 7.6 gm/dL    Albumin 2.5 (L) 3.4 - 4.8 g/dL    Globulin 3.2 2.4 - 3.5 gm/dL    Albumin/Globulin Ratio 0.8 (L) 1.1 - 2.0 ratio    Bilirubin Total 0.5 <=1.5 mg/dL     40 - 150 unit/L    ALT 9 0 - 55 unit/L    AST 17 5 - 34 unit/L    eGFR 52 mL/min/1.73/m2    Anion Gap 15.0 mEq/L    BUN/Creatinine Ratio 28    Magnesium    Collection Time: 06/16/24  3:49 AM   Result Value Ref Range    Magnesium Level 1.90 1.60 - 2.60 mg/dL   Digoxin Level    Collection Time: 06/16/24  3:49 AM   Result Value Ref Range    Digoxin Level 1.4 0.8 - 2.0 ng/mL   EKG 12-lead    Collection Time: 06/16/24  5:18 AM   Result Value Ref Range    QRS Duration 142 ms    [facility] QTC Calculation 541 ms   Magnesium    Collection Time: 06/17/24  2:08 AM   Result Value Ref Range    Magnesium Level 2.10 1.60 - 2.60 mg/dL   Comprehensive Metabolic Panel    Collection Time: 06/17/24  2:08 AM   Result Value Ref Range    Sodium 133 (L) 136 - 145 mmol/L    Potassium 5.0 3.5 - 5.1 mmol/L    Chloride 88 (L) 98 - 107 mmol/L    CO2 32 (H) 23 - 31 mmol/L    Glucose 253 (H) 82 - 115 mg/dL    Blood Urea Nitrogen 40.7 (H) 9.8 - 20.1 mg/dL    Creatinine 1.55 (H) 0.55 - 1.02 mg/dL    Calcium 8.6 8.4 - 10.2 mg/dL    Protein Total 5.6 (L) 5.8 - 7.6 gm/dL    Albumin 2.5 (L) 3.4 - 4.8 g/dL    Globulin 3.1 2.4 - 3.5 gm/dL    Albumin/Globulin Ratio 0.8 (L) 1.1 - 2.0 ratio    Bilirubin Total 0.4 <=1.5 mg/dL     40 - 150 unit/L    ALT 10 0 - 55 unit/L    AST 16 5 - 34 unit/L    eGFR 34 mL/min/1.73/m2    Anion Gap 13.0 mEq/L    BUN/Creatinine Ratio 26    CBC with Differential    Collection Time: 06/17/24  2:08 AM   Result Value Ref Range    WBC 4.20 (L) 4.50 - 11.50 x10(3)/mcL    RBC 2.93 (L) 4.20 - 5.40 x10(6)/mcL    Hgb 8.6 (L) 12.0 - 16.0 g/dL    Hct 26.9 (L) 37.0 - 47.0 %    MCV 91.8 80.0 - 94.0 fL    MCH 29.4 27.0 - 31.0 pg    MCHC 32.0 (L) 33.0 - 36.0 g/dL    RDW 14.6 11.5 - 17.0 %    Platelet 254 130 - 400  x10(3)/mcL    MPV 10.7 (H) 7.4 - 10.4 fL    Neut % 87.2 %    Lymph % 9.0 %    Mono % 1.9 %    Eos % 0.0 %    Basophil % 0.0 %    Lymph # 0.38 (L) 0.6 - 4.6 x10(3)/mcL    Neut # 3.66 2.1 - 9.2 x10(3)/mcL    Mono # 0.08 (L) 0.1 - 1.3 x10(3)/mcL    Eos # 0.00 0 - 0.9 x10(3)/mcL    Baso # 0.00 <=0.2 x10(3)/mcL    IG# 0.08 (H) 0 - 0.04 x10(3)/mcL    IG% 1.9 %    NRBC% 0.0 %   EKG 12-lead    Collection Time: 06/17/24  4:16 AM   Result Value Ref Range    QRS Duration 154 ms    OHS QTC Calculation 492 ms   POCT glucose    Collection Time: 06/18/24 12:18 AM   Result Value Ref Range    POCT Glucose 195 (H) 70 - 110 mg/dL   Magnesium    Collection Time: 06/18/24  2:58 AM   Result Value Ref Range    Magnesium Level 2.00 1.60 - 2.60 mg/dL   Basic Metabolic Panel    Collection Time: 06/18/24  2:58 AM   Result Value Ref Range    Sodium 132 (L) 136 - 145 mmol/L    Potassium 3.3 (L) 3.5 - 5.1 mmol/L    Chloride 89 (L) 98 - 107 mmol/L    CO2 31 23 - 31 mmol/L    Glucose 180 (H) 82 - 115 mg/dL    Blood Urea Nitrogen 46.1 (H) 9.8 - 20.1 mg/dL    Creatinine 1.55 (H) 0.55 - 1.02 mg/dL    BUN/Creatinine Ratio 30     Calcium 8.7 8.4 - 10.2 mg/dL    Anion Gap 12.0 mEq/L    eGFR 34 mL/min/1.73/m2   CBC with Differential    Collection Time: 06/18/24  2:58 AM   Result Value Ref Range    WBC 7.42 4.50 - 11.50 x10(3)/mcL    RBC 2.71 (L) 4.20 - 5.40 x10(6)/mcL    Hgb 8.1 (L) 12.0 - 16.0 g/dL    Hct 24.6 (L) 37.0 - 47.0 %    MCV 90.8 80.0 - 94.0 fL    MCH 29.9 27.0 - 31.0 pg    MCHC 32.9 (L) 33.0 - 36.0 g/dL    RDW 14.6 11.5 - 17.0 %    Platelet 278 130 - 400 x10(3)/mcL    MPV 10.7 (H) 7.4 - 10.4 fL    Neut % 87.8 %    Lymph % 5.9 %    Mono % 5.1 %    Eos % 0.1 %    Basophil % 0.0 %    Lymph # 0.44 (L) 0.6 - 4.6 x10(3)/mcL    Neut # 6.51 2.1 - 9.2 x10(3)/mcL    Mono # 0.38 0.1 - 1.3 x10(3)/mcL    Eos # 0.01 0 - 0.9 x10(3)/mcL    Baso # 0.00 <=0.2 x10(3)/mcL    IG# 0.08 (H) 0 - 0.04 x10(3)/mcL    IG% 1.1 %    NRBC% 0.0 %     Telemetry: PAF with  Mostly CVR    Physical Exam  Vitals reviewed.   Constitutional:       General: She is not in acute distress.     Appearance: Normal appearance. She is ill-appearing.   HENT:      Head: Normocephalic.      Mouth/Throat:      Mouth: Mucous membranes are moist.   Eyes:      Extraocular Movements: Extraocular movements intact.      Conjunctiva/sclera: Conjunctivae normal.   Cardiovascular:      Rate and Rhythm: Normal rate. Rhythm irregular.      Heart sounds: Murmur heard.   Pulmonary:      Effort: Pulmonary effort is normal. No respiratory distress.      Breath sounds: Rhonchi and rales present.      Comments: HF NC  Abdominal:      General: Bowel sounds are normal.   Skin:     General: Skin is warm and dry.   Neurological:      Mental Status: She is alert and oriented to person, place, and time. Mental status is at baseline.   Psychiatric:         Behavior: Behavior normal.         Judgment: Judgment normal.       Current Inpatient Medications:  Current Facility-Administered Medications:     0.9%  NaCl infusion, , Intravenous, PRN, Vishnu Stevenson MD, Stopped at 06/08/24 0133    acetaminophen tablet 650 mg, 650 mg, Oral, Q6H PRN, Naa Chin MD, 650 mg at 06/17/24 1012    acetaminophen tablet 650 mg, 650 mg, Oral, Q4H PRN, Steven Wells MD    albumin human 25% bottle 12.5 g, 12.5 g, Intravenous, Once, Augustus Barreto MD, Last Rate: 50 mL/hr at 06/18/24 1118, 12.5 g at 06/18/24 1118    apixaban tablet 5 mg, 5 mg, Oral, BID, Murtaza Burris ANP, 5 mg at 06/18/24 0847    diltiaZEM 125 mg in dextrose 5 % 125 mL IVPB (ready to mix) (titrating), 0-15 mg/hr, Intravenous, Continuous, Murtaza Burris ANP, Last Rate: 5 mL/hr at 06/18/24 0853, 5 mg/hr at 06/18/24 0853    docusate 50 mg/5 mL liquid 50 mg, 50 mg, Oral, Daily, Marga Smith MD, 50 mg at 06/18/24 0848    furosemide injection 40 mg, 40 mg, Intravenous, Q6H, Jay Padilla DO    gabapentin capsule 600 mg, 600 mg, Oral, BID, Marga Smith MD, 600 mg at  06/18/24 0847    hydrALAZINE injection 10 mg, 10 mg, Intravenous, Q6H PRN, Eleno Abbasi MD, 10 mg at 06/16/24 0025    labetaloL injection 10 mg, 10 mg, Intravenous, Q4H PRN, Baldo Finney DO, 10 mg at 06/11/24 0257    Lactobacillus rhamnosus GG 5 billion cell packet (PEDS) 1 each, 1 packet, Oral, BID, Naa Chin MD, 1 each at 06/18/24 0847    levalbuterol nebulizer solution 1.25 mg, 1.25 mg, Nebulization, Q6H PRN, Shanna Montiel MD, 1.25 mg at 06/09/24 1200    LIDOcaine 5 % patch 2 patch, 2 patch, Transdermal, Q24H, Sheri Carson, CHRISTIANO, 2 patch at 06/16/24 1502    LORazepam tablet 0.5 mg, 0.5 mg, Oral, Q4H PRN, Sheri Carson, NP, 0.5 mg at 06/17/24 0911    melatonin tablet 6 mg, 6 mg, Oral, Nightly PRN, Naa Chin MD, 6 mg at 06/14/24 2038    methylPREDNISolone sodium succinate injection 60 mg, 60 mg, Intravenous, Q6H, Gregory Gandhi MD, 60 mg at 06/18/24 0600    metoprolol injection 5 mg, 5 mg, Intravenous, Q4H PRN, Justyn Landaverde MD, 5 mg at 06/17/24 1748    morphine injection 2 mg, 2 mg, Intravenous, Q4H PRN, Sheri Carson NP, 2 mg at 06/13/24 1137    ondansetron injection 4 mg, 4 mg, Intravenous, Q4H PRN, Julissa Rinaldi FNP, 4 mg at 06/11/24 0405    pantoprazole injection 40 mg, 40 mg, Intravenous, BID, Augustus Barreto MD, 40 mg at 06/18/24 0847    potassium chloride SA CR tablet 40 mEq, 40 mEq, Oral, Q2H, Augustus Barreto MD, 40 mEq at 06/18/24 1100    sertraline tablet 100 mg, 100 mg, Oral, QHS, Marga Smith MD, 100 mg at 06/17/24 2013    traMADoL tablet 50 mg, 50 mg, Oral, TID, Vishnu Stevenson MD, 50 mg at 06/18/24 0847  VTE Risk Mitigation (From admission, onward)           Ordered     apixaban tablet 5 mg  2 times daily         06/17/24 1821     IP VTE HIGH RISK PATIENT  Once         06/01/24 2247                  Assessment:   Acute on Chronic Diastolic HF/EF 55-60%    - EF 55-60% with Grade I Diastolic Dysfunction    - [facility] (6.17.24) - RA 11, RV 53/9, PA  57/31 (mean 41), PCWP 34, PA sat 58% (on 100% FiO2 BiPAP), CO/CI: 3.8/2.1 (TD), PVR: 1.9 Woods Assessment: Elevated R and L-Sided Filling Pressures  Atrial Fibrillation with RVR (New Onset) - Now SR    - CHADsVASc - 7 Points - 11.2% Stroke Risk per Year     - History of PSVT    - On FD Eliquis for Stroke Risk Reduction  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     - Based on RHC Finding PT appears to be in HF with EDP 31mmHg (6.17.24)  Aspiration Pneumonia    - Cleared Swallowing Study  Acute Hypoxemic Respiratory Failure Requiring Oxygenation - on BiPAP/HF NC O2  Leukocytosis/Sepsis - Improving   Valvular Heart Disease    - AS: Status Post TAVR  23  mm S3 ultra valve, nominal prep,  Right TF approach (2.27.23)  CAD/CABG    - LIMA to LAD (Patent) with 50% Stenosis Native LCX  Hypertension   Hyperlipidemia  Chronic Left Bundle Branch Block ([place] TAVR)  MELCHOR/CEA  History of CVA  Anemia  DNR Status   No known history of GI Bleed     Plan:   Continue Diuresis with Lasix 40mg IVP BID   Accurate I&Os and Daily Weights   Continue Eliquis 5mg PO BID RE: Stroke Risk Reduction   Wean IV Cardizem and Titrate for HR < 100 bpm  Start Cardizem CD 180mg PO Daily   Keep K > 4.0 and Mg > 2.0  Labs in AM: CBC, CMP and Mg    Murtaza Burris, LYLE  Cardiology  [facility]  06/18/2024    Physician addendum:  I have seen and examined this patient as a split-shared visit with the KELLIE d/t complicated medical management of above problems written in assessment and high acuity requiring physician expertise in medical decision-making. I performed the substantive portion of the history and exam. Above medical decision-making is also formulated by me.    Cardiovascular exam:  S1, S2  Lungs:  fine crackles at bases.  Extremities:  1+ edema bilaterally    Plan:  Continue diuresis with Lasix.  Medications as above.  Continue supportive therapy.  We will follow up.      Salas Gould MD  Cardiologist

## 2024-06-18 NOTE — MEDICAL/APP STUDENT
[facility] - 7th Floor ICU  Pulmonary Critical Care Note    Patient Name: Alanna Moya  MRN: [id number]  Admission Date: 6/1/2024  Hospital Length of Stay: 17 days  Code Status: DNR  Attending Provider: Augustus Barreto MD  Primary Care Provider: Cornel Haddad MD     Subjective:     HPI:   78F presented as a transfer from outside hospital with BiPAP requirements, initially admitted to intensive care unit but downgraded to floor following robust response to diuretic therapy.  Has been managed for volume overload and pneumonia on hospital medicine service.  Patient has elected for DNR code status.  Pulmonary medicine consult for worsening hypoxemia. Repeat chest x-ray 06/09/2024 with changes concerning for worsening volume overload.     Hospital Course/Significant events:   6/16: Has shown minimal improvement with aggressive diuresis and thoracentesis, with increasing renal indices and persistent bilateral ground-glass opacities on CT chest.  Overall, this raises suspicion that these findings may not be related to pulmonary edema. Amiodarone discontinued and corticosteroids initiated for potential amiodarone related pulmonary toxicity.     6/17: Right heart cath resulting PCWP 34. Patient needs optimization of CHF therapy, requires more diuresis, management per cardiology.  Findings:  RA 11, RV 53/9, PA 57/31 (mean 41), PCWP 34, PA sat 58% (on 100% FiO2 BiPAP)  CO/CI: 3.8/2.1 (TD)  PVR: 1.9 Woods    24 Hour Interval History:  She continues to require Bipap overnight. Currently on vapotherm 40L 100% FiO2 with O2 sat 93%. Using IS. New I&O -1816.7 in past 24hr. Has been getting up to chair with PT.    Past Medical History:   Diagnosis Date    Abdominal pain, unspecified site     Aortic stenosis     Coronary atherosclerosis of unspecified type of vessel, native or graft     Esophageal reflux     Fatigue     History of glaucoma     History of heart attack     Hypertension     Lumbosacral spondylosis  without myelopathy     Other and unspecified hyperlipidemia     Rectocele     SOB (shortness of breath) on exertion     Unspecified essential hypertension     Weakness        Past Surgical History:   Procedure Laterality Date    CHOLECYSTECTOMY      CORONARY ARTERY BYPASS GRAFT      GLAUCOMA SURGERY Bilateral     HYSTERECTOMY      TRANSCATHETER AORTIC VALVE REPLACEMENT (TAVR) N/A 2/27/2023    Procedure: REPLACEMENT, AORTIC VALVE, TRANSCATHETER (TAVR);  Surgeon: Anselmo Arango MD;  Location: [facility] CATH LAB;  Service: Cardiology;  Laterality: N/A;  TAVR W/ ANEST.       Social History     Socioeconomic History    Marital status:    Tobacco Use    Smoking status: Never    Smokeless tobacco: Never   Substance and Sexual Activity    Alcohol use: No    Drug use: No    Sexual activity: Never     Birth control/protection: Surgical     Social Determinants of Health     Financial Resource Strain: Low Risk  (5/3/2021)    Received from [facility]    Overall Financial Resource Strain (CARDIA)     Difficulty of Paying Living Expenses: Not hard at all   Food Insecurity: No Food Insecurity (5/3/2021)    Received from [facility]    Hunger Vital Sign     Worried About Running Out of Food in the Last Year: Never true     Ran Out of Food in the Last Year: Never true   Transportation Needs: No Transportation Needs (5/3/2021)    Received from [facility]    PRAPARE - Transportation     Lack of Transportation (Medical): No     Lack of Transportation (Non-Medical): No   Physical Activity: Sufficiently Active (5/3/2021)    Received from [facility]    Exercise Vital Sign     Days of Exercise per Week: 2 days     Minutes of Exercise per Session: 130 min   Stress: Stress Concern Present (5/3/2021)    Received from [facility]    [employer] [place] [employer] - Occupational Stress Questionnaire     Feeling of Stress : Very much           Current Outpatient Medications   Medication Instructions    amLODIPine (NORVASC) 10  mg, Oral, 2 times daily    aspirin 325 MG tablet 1 tablet, Oral, Daily    carvediloL (COREG) 12.5 mg, Oral, 2 times daily with meals    cholecalciferol, vitamin D3, (VITAMIN D3) 50 mcg (2,000 unit) Cap capsule 1 capsule, Oral, Daily    cyanocobalamin, vitamin B-12, 2,000 mcg Tab 1 tablet, Oral, Daily    cyclobenzaprine (FLEXERIL) 10 mg, Oral, 3 times daily    docusate sodium (STOOL SOFTENER ORAL) 2 capsules, Oral, 2 times daily    furosemide (LASIX) 40 mg, Oral, Daily    hydrALAZINE (APRESOLINE) 25 mg, Oral, Every 8 hours    L. acidophilus/Bifid. animalis (DAILY PROBIOTIC ORAL) 1 capsule, Oral, Daily    potassium gluconate 650 mg, Oral, Once    rosuvastatin (CRESTOR) 40 mg, Oral, Nightly    sertraline (ZOLOFT) 100 mg, Oral, Nightly    traMADoL (ULTRAM) 50 mg, Oral, 3 times daily       Current Inpatient Medications   apixaban  5 mg Oral BID    docusate  50 mg Oral Daily    gabapentin  600 mg Oral BID    Lactobacillus rhamnosus GG  1 packet Oral BID    LIDOcaine  2 patch Transdermal Q24H    methylPREDNISolone sodium succinate injection  60 mg Intravenous Q6H    pantoprazole  40 mg Intravenous BID    sertraline  100 mg Oral QHS    traMADoL  50 mg Oral TID       Current Intravenous Infusions   dilTIAZem  0-15 mg/hr Intravenous Continuous 10 mL/hr at 06/18/24 0453 10 mg/hr at 06/18/24 0453         Review of Systems   Constitutional:  Negative for chills, diaphoresis and fever.   Respiratory:  Positive for shortness of breath.    All other systems reviewed and are negative.         Objective:       Intake/Output Summary (Last 24 hours) at 6/18/2024 0842  Last data filed at 6/18/2024 0453  Gross per 24 hour   Intake 292.33 ml   Output 2120 ml   Net -1827.67 ml         Vital Signs (Most Recent):  Temp: 99 °F (37.2 °C) (06/18/24 0400)  Pulse: 73 (06/18/24 0808)  Resp: (!) 21 (06/18/24 0808)  BP: 127/76 (06/18/24 0500)  SpO2: (!) 93 % (06/18/24 0808)  Body mass index is 31.09 kg/m².  Weight: 77.1 kg (170 lb) Vital Signs (24h  "Range):  Temp:  [98 °F (36.7 °C)-99 °F (37.2 °C)] 99 °F (37.2 °C)  Pulse:  [] 73  Resp:  [10-31] 21  SpO2:  [86 %-100 %] 93 %  BP: (101-145)/() 127/76     Physical Exam  Vitals reviewed.   Constitutional:       Appearance: Normal appearance.   HENT:      Head: Normocephalic and atraumatic.      Mouth/Throat:      Mouth: Mucous membranes are moist.      Pharynx: Oropharynx is clear.   Eyes:      Extraocular Movements: Extraocular movements intact.   Cardiovascular:      Rate and Rhythm: Normal rate and regular rhythm.      Heart sounds: Normal heart sounds.   Pulmonary:      Breath sounds: Rales present.      Comments: Decreased lung sounds R>L slightly worse than yesterday  Musculoskeletal:      Cervical back: Normal range of motion.   Skin:     General: Skin is warm and dry.   Neurological:      General: No focal deficit present.      Mental Status: She is alert and oriented to person, place, and time.   Psychiatric:         Mood and Affect: Mood normal.         Behavior: Behavior normal.           Lines/Drains/Airways       Drain  Duration                  Urethral Catheter 06/09/24 1754 8 days              Peripheral Intravenous Line  Duration                  Peripheral IV - Single Lumen 06/07/24 1700 20 G Anterior;Proximal;Right Forearm 10 days         Peripheral IV - Single Lumen 06/08/24 0428 20 G Left Forearm 10 days                    Significant Labs:    Lab Results   Component Value Date    WBC 7.42 06/18/2024    HGB 8.1 (L) 06/18/2024    HCT 24.6 (L) 06/18/2024    MCV 90.8 06/18/2024     06/18/2024           BMP  Lab Results   Component Value Date     (L) 06/18/2024    K 3.3 (L) 06/18/2024    CHLORIDE 97 05/04/2021    CO2 31 06/18/2024    BUN 46.1 (H) 06/18/2024    CREATININE 1.55 (H) 06/18/2024    CALCIUM 8.7 06/18/2024    AGAP 12.0 06/18/2024    ESTGFRAFRICA 92 05/16/2024    EGFRNONAA 28.1 (A) 08/10/2017         ABG  No results for input(s): "PH", "PO2", "PCO2", "HCO3", " ""POCBASEDEF" in the last 168 hours.    Mechanical Ventilation Support:  Oxygen Concentration (%): 100 (06/18/24 0808)      Significant Imaging:  I have reviewed the pertinent imaging within the past 24 hours.  Cardiac catheterization  The procedure log was documented by No documenter listed and verified by   Steven Wells MD.    Procedure:  Right heart catheterization    Preoperative diagnosis:    Hypoxic respiratory failure    Postoperative diagnosis:    As above  Congestive heart failure    Access: Right common femoral vein  EBL: 10 cc  Complications: None    Summary:  Consent obtained.  Risks and benefits discussed with the patient.  The   patient was brought to the cath lab in a fasting state.  The patient was   prepped and draped in usual sterile fashion.  Ultrasound-guided right   common femoral venous access via modified Seldinger technique with   micropuncture.  A 7 Slovenian sheath was inserted into the right common   femoral vein.  Seven Slovenian balloon tip Casanova-Micky catheter was used to   obtain intracardiac pressures, O2 sats, cardiac outputs and wedge   pressure.  At the end the procedure all wires and catheters removed.    Manual hemostasis.    Findings:  RA 11, RV 53/9, PA 57/31 (mean 41), PCWP 34, PA sat 58% (on 100% FiO2   BiPAP)  CO/CI: 3.8/2.1 (TD)  PVR: 1.9 Woods    Assessment:  -Elevated right and left-sided filling pressures  -Low cardiac outputs  -Moderate pulmonary hypertension with normal pulmonary vascular resistance    Plan:  -Consider diuresis and maintenance of normal sinus rhythm    Date: 6/17/2024  Time: 3:57 PM           Assessment/Plan:     Assessment  Acute hypoxic respiratory failure  Diastolic heart failure  Right ventricular failure with potential pulmonary hypertension  Atrial fibrillation    Plan  -Right heart cath yesterday, elevated PA wedge pressure. Diuresis increased per cardiology.  -wean o2 as tolerated  -hold amiodarone for potential amiodarone related pulmonary toxicity. "   -Will DC steroids at this time due to blood glucose 253 yesterday, still elevated at 180 today  -patient wishes to remain DO NOT RESUSCITATE/DO NOT INTUBATE      HARSHIL Yen-S3  Pulmonary Critical Care Medicine  [facility] - 7th Floor ICU  DOS: 06/18/2024

## 2024-06-18 NOTE — PT/OT/SLP PROGRESS
Physical Therapy Treatment    Patient Name:  Alanna Moya   MRN:  [id number]    Recommendations:     Discharge therapy intensity: Moderate Intensity Therapy   Discharge Equipment Recommendations: to be determined by next level of care  Barriers to discharge: Impaired mobility and Ongoing medical needs    Assessment:     Alanna Moya is a 79 y.o. female admitted with a medical diagnosis of SOB, respiratory failure, pneumonia, pleural effusions.  She presents with the following impairments/functional limitations: weakness, gait instability, decreased upper extremity function, impaired endurance, impaired balance, decreased lower extremity function, impaired self care skills, impaired functional mobility.    Pt remains on 40L and 100% vapotherm. Pt in good spirits and requesting to get OOB to the chair. Stable SPO2 with activity. Nurse reports pt had a fall on Sunday. Pt reports she slid out of the chair. Placed wedge under pt's knees to prevent anterior sliding today.     Rehab Prognosis: Good; patient would benefit from acute skilled PT services to address these deficits and reach maximum level of function.    Recent Surgery: Procedure(s) (LRB):  INSERTION, CATHETER, RIGHT HEART (N/A) 1 Day Post-Op    Plan:     During this hospitalization, patient would benefit from acute PT services 5 x/week to address the identified rehab impairments via gait training, therapeutic activities, therapeutic exercises and progress toward the following goals:    Plan of Care Expires:  07/04/24    Subjective     Chief Complaint: no complaints  Patient/Family Comments/goals: to get better  Pain/Comfort:  Pain Rating 1: 0/10      Objective:     Communicated with nurse prior to session.  Patient found HOB elevated with pulse ox (continuous), telemetry, peripheral IV, oxygen, melton catheter, blood pressure cuff upon PT entry to room.     General Precautions: Standard, fall  Orthopedic Precautions: N/A  Braces: N/A  Respiratory  Status: High flow, flow 40 L/min, concentration 100%  Blood Pressure: 151/74  HR: 81   SPO2: 93%  Skin Integrity: Visible skin intact      Functional Mobility:  Bed mobility:   Supine to sit with mod assist  Increased time to scoot to EOB and required max assist to complete  Transfers:    Sit<->stand min assist x 2 with RW  Stand step with RW from bed to recliner min assist.     Therapeutic Activities/Exercises:      Education:  Patient provided with verbal education education regarding PT role/goals/POC, fall prevention, safety awareness, and discharge/DME recommendations.  Understanding was verbalized.     Patient left up in chair with all lines intact, call button in reach, and nurse notified.    GOALS:   Multidisciplinary Problems       Physical Therapy Goals          Problem: Physical Therapy    Goal Priority Disciplines Outcome Goal Variances Interventions   Physical Therapy Goal     PT, PT/OT Progressing     Description: Goals to be met by: 24     Patient will increase functional independence with mobility by performin. Supine to sit with Stand-by Assistance  2. Sit to supine with Stand-by Assistance  3. Sit to stand transfer with Stand-by Assistance  4. Bed to chair transfer with Stand-by Assistance using Rolling Walker  5. Gait  x 150 feet with Stand-by Assistance using Rolling Walker.                          Time Tracking:     PT Received On: 24  PT Start Time: 1034     PT Stop Time: 1058  PT Total Time (min): 24 min     Billable Minutes: Therapeutic Activity 2 units    Treatment Type: Treatment  PT/PTA: PTA     Number of PTA visits since last PT visit: 2     2024

## 2024-06-18 NOTE — PT/OT/SLP PROGRESS
Occupational Therapy   Treatment    Name: Alanna Moya  MRN: [id number]  Admitting Diagnosis:  SOB  1 Day Post-Op    Recommendations:     Recommended therapy intensity at discharge: Moderate Intensity Therapy   Discharge Equipment Recommendations:  to be determined by next level of care  Barriers to discharge:       Assessment:     Alanna Moya is a 79 y.o. female with a medical diagnosis of SOB.  She presents with increased vapotherm settings however pt. O2 WFL throughout session, overall Min A for mobility with RW. Recommending Mod intensity therapy pending progress. Performance deficits affecting function are weakness, impaired endurance, impaired self care skills, impaired functional mobility, impaired balance.     Rehab Prognosis:  Good; patient would benefit from acute skilled OT services to address these deficits and reach maximum level of function.       Plan:     Patient to be seen 4 x/week to address the above listed problems via self-care/home management  Plan of Care Expires: 07/04/24  Plan of Care Reviewed with: patient    Subjective     Pain/Comfort:       Objective:     Communicated with: RN prior to session.  Patient found HOB elevated with   upon OT entry to room.    General Precautions: Standard, fall    Orthopedic Precautions:N/A  Braces: N/A  Respiratory Status: High flow, flow 40 L/min, concentration 100%  Vital Signs: Blood Pressure: 151/74  HR: 81  Sp02: 100     Occupational Performance:  (Bed Mobility-Mod A)  (Sitting balance- SBA)  Pt. Requiring Max A for donning/doffing socks EOB.  (Sit to stand- Min A) Performing stand step t/f from EOB to BS chair using RW for UE support with balance. No SOB noted.   Pt. Left UIC with all needs within reach.       Therapeutic Positioning    OT interventions performed during the course of today's session in an effort to prevent and/or reduce acquired pressure injuries:   Therapeutic positioning was provided at the conclusion of session to  offload all bony prominences for the prevention and/or reduction of pressure injuries      [facility] 6 Click ADL:      Patient Education:  Patient provided with verbal education education regarding fall prevention, safety awareness, and pressure ulcer prevention.  Understanding was verbalized.      Patient left up in chair with all lines intact and call button in reach.    GOALS:   Multidisciplinary Problems       Occupational Therapy Goals          Problem: Occupational Therapy    Goal Priority Disciplines Outcome Interventions   Occupational Therapy Goal     OT, PT/OT Progressing    Description: Goals to be met by: 7/4/24     Patient will increase functional independence with ADLs by performing:    UE Dressing with Supervision.  LE Dressing with Supervision.  Grooming while standing at sink with Supervision.  Toileting from toilet with Supervision for hygiene and clothing management.   Toilet transfer to toilet with Supervision.                         Time Tracking:     OT Date of Treatment: 06/18/24  OT Start Time: 1030  OT Stop Time: 1054  OT Total Time (min): 24 min    Billable Minutes:Self Care/Home Management 2    OT/MARY: MARY     Number of MARY visits since last OT visit: 5    6/18/2024

## 2024-06-18 NOTE — PROGRESS NOTES
[facility] Medicine Progress Note        Chief Complaint: Inpatient Follow-up for multifocal pneumonia     HPI:   78-year-old lady with PMH of AS s/p AVR, CAD, mi, HTN, chronic back pain, HLD, CVA, CHF, GERD, diverticulosis who was being downgraded from the ICU to Hospital Medicine after she was initially admitted for acute hypoxemic respiratory failure requiring BiPAP for adequate saturations. She was transferred from an outside facility to [facility] with complaints of worsening shortness of breath for the last few weeks with initial CXR at outside facility showing bilateral airspace opacities and increased right-sided pleural effusion. Her last echocardiogram from 05/16 showed EF 55-60%. She was placed on antibiotics, steroids as well as IV Lasix for diuresis for pulmonary congestion. She was able to be weaned down from BiPAP to Vapotherm and eventually Oxymizer 8 L. she was also noted to have new onset atrial fibrillation for which he was started on IV Cardizem which has since been transitioned to p.o. Cardizem. She is on full-dose Lovenox due to CHADS-VASc of 7. She reported feeling significantly better and endorsed improvement in her shortness of breath since admission. Denied having any fever, chills, chest pain, cough, sputum production, abdominal pain, nausea, vomiting, headache, numbness, weakness, dizziness/lightheadedness or loss of consciousness. Blood cultures from admission are negative. PT/OT on board; in recommending moderate intensity therapy. Cardiology on board.   Patient had repeat chest x-ray done on June 9th and that showed denser consolidation patient continues to be on Zosyn now has been put on BiPAP with FiO2 of 90%   She is status post thoracentesis with removal of 950 cc of fluid by pulmonology Will follow up with cultures , 6/14      Interval Hx:   Patient seen and examined at bedside, no family member at bedside   Remains on VT   S/P  right heart  catheterization          Objective/physical exam:  General: In no acute distress, afebrile  Chest:  On bipap  Heart: RRR, +S1, S2, no appreciable murmur  Abdomen: Soft, nontender, BS +  MSK: Warm, no lower extremity edema, no clubbing or cyanosis  Neurologic: Alert x o x 3 , generalized weakness        Assessment/Plan:  Acute respiratory failure with hypoxia requiring high-flow O2 /bipap  HFpEF with exacerbation and bilateral pleural effusions   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   Follow up with pulmonology input-  on steroids   She is status post thoracentesis with removal of 950 cc of fluid on 6/14  strict input and output   Antibiotics were discontinued repeat chest x-ray done today still shows bilateral pleural effusions right greater than left  CTA neg for PE on 6/15  Palliative care consulted and following  continue with p.r.n. Xopenex   On Cardizem, digoxin, Eliquis   Continue with speech PT and OT  Patient was denied by LTAC     Critical care note:  Critical care diagnosis:  Acute hypoxemic respiratory failure secondary tod bilateral pleural effusions on Vapotherm,  Critical care interventions: Hands-on evaluation, review of labs/radiographs/records and discussion with patient and family if present  Critical care time spent: 35 minutes      VTE prophylaxis:  eliquis   Patient condition:  Stable   Anticipated discharge and Disposition:  tbd        All diagnosis and differential diagnosis have been reviewed; assessment and plan has been documented; I have personally reviewed the labs and test results that are presently available; I have reviewed the patients medication list; I have reviewed the consulting providers response and recommendations. I have reviewed or attempted to review medical records based upon their availability     All of the patient's questions have been  addressed and answered. Patient's is agreeable to the above stated plan. I will continue to monitor closely and make adjustments to medical management as needed.    VITAL SIGNS: 24 HRS MIN & MAX LAST   Temp  Min: 98.1 °F (36.7 °C)  Max: 99 °F (37.2 °C) 98.2 °F (36.8 °C)   BP  Min: 102/57  Max: 155/61 (!) 155/61   Pulse  Min: 66  Max: 128  82   Resp  Min: 10  Max: 31 (!) 23   SpO2  Min: 86 %  Max: 99 % 96 %     I have reviewed the following labs:  Recent Labs   Lab 06/16/24  0327 06/17/24  0208 06/18/24  0258   WBC 9.59 4.20* 7.42   RBC 3.21* 2.93* 2.71*   HGB 9.7* 8.6* 8.1*   HCT 30.4* 26.9* 24.6*   MCV 94.7* 91.8 90.8   MCH 30.2 29.4 29.9   MCHC 31.9* 32.0* 32.9*   RDW 14.6 14.6 14.6    254 278   MPV 10.7* 10.7* 10.7*     Recent Labs   Lab 06/15/24  0257 06/16/24  0349 06/17/24  0208 06/18/24  0258    136 133* 132*   K 4.0 4.0 5.0 3.3*   CL 95* 92* 88* 89*   CO2 28 29 32* 31   BUN 29.4* 30.9* 40.7* 46.1*   CREATININE 1.05* 1.10* 1.55* 1.55*   CALCIUM 9.2 8.8 8.6 8.7   MG 2.00 1.90 2.10 2.00   ALBUMIN 2.3* 2.5* 2.5*  --    ALKPHOS 93 100 107  --    ALT 9 9 10  --    AST 16 17 16  --    BILITOT 0.5 0.5 0.4  --      Microbiology Results (last 7 days)       Procedure Component Value Units Date/Time    Body Fluid Culture [[id number]] Collected: 06/14/24 1021    Order Status: Completed Specimen: Body Fluid from Thoracentesis Fluid Updated: 06/18/24 0759     Body Fluid Culture No growth at 4 days    Gram Stain [[id number]] Collected: 06/14/24 1021    Order Status: Completed  Specimen: Body Fluid from Pleural Fluid Updated: 06/14/24 1320     GRAM STAIN Rare WBC observed      No bacteria seen    KOH Prep [[id number]] Collected: 06/14/24 1022    Order Status: Completed Specimen: Body Fluid from Pleural Fluid Updated: 06/14/24 1256     KOH Prep No fungal elements seen    Respiratory Culture [[id number]] Updated: 06/14/24 1111    Order Status: Canceled Specimen: Sputum, Expectorated     Respiratory Culture [[id number]] Updated: 06/14/24 1111    Order Status: Canceled Specimen: Respiratory from Sputum, Expectorated     Fungal Culture [[id number]] Collected: 06/14/24 1021    Order Status: Sent Specimen: Body Fluid from Pleural Fluid Updated: 06/14/24 1028    Mycobacteria and Nocardia Culture [[id number]] Collected: 06/14/24 1021    Order Status: Sent Specimen: Body Fluid from Pleural Fluid, Right Updated: 06/14/24 1028             See below for Radiology    Scheduled Med:   apixaban  5 mg Oral BID    diltiaZEM  180 mg Oral Daily    docusate  50 mg Oral Daily    furosemide (LASIX) injection  40 mg Intravenous Q6H    gabapentin  300 mg Oral BID    Lactobacillus rhamnosus GG  1 packet Oral BID    LIDOcaine  2 patch Transdermal Q24H    methylPREDNISolone sodium succinate injection  60 mg Intravenous Q6H    pantoprazole  40 mg Intravenous BID    sertraline  100 mg Oral QHS    traMADoL  50 mg Oral TID      Continuous Infusions:   dilTIAZem  0-15 mg/hr Intravenous Continuous 5 mL/hr at 06/18/24 0853 5 mg/hr at 06/18/24 0853      PRN Meds:    Current Facility-Administered Medications:     sodium chloride 0.9%, , Intravenous, PRN    acetaminophen, 650 mg, Oral, Q6H PRN    acetaminophen, 650 mg, Oral, Q4H PRN    hydrALAZINE, 10 mg, Intravenous, Q6H PRN    labetalol, 10 mg, Intravenous, Q4H PRN    levalbuterol, 1.25 mg, Nebulization, Q6H PRN    LORazepam, 0.5 mg, Oral, Q4H PRN    melatonin, 6 mg, Oral, Nightly PRN    metoprolol, 5 mg, Intravenous, Q4H PRN    morphine, 2 mg, Intravenous, Q4H PRN     ondansetron, 4 mg, Intravenous, Q4H PRN     Assessment/Plan:      VTE prophylaxis:     Patient condition:  Stable/Fair/Guarded/ Serious/ Critical    Anticipated discharge and Disposition:         All diagnosis and differential diagnosis have been reviewed; assessment and plan has been documented; I have personally reviewed the labs and test results that are presently available; I have reviewed the patients medication list; I have reviewed the consulting providers response and recommendations. I have reviewed or attempted to review medical records based upon their availability    All of the patient's questions have been  addressed and answered. Patient's is agreeable to the above stated plan. I will continue to monitor closely and make adjustments to medical management as needed.  _____________________________________________________________________    Nutrition Status:    Radiology:  I have personally reviewed the following imaging and agree with the radiologist.     Cardiac catheterization  The procedure log was documented by No documenter listed and verified by   Steven Wells MD.    Procedure:  Right heart catheterization    Preoperative diagnosis:    Hypoxic respiratory failure    Postoperative diagnosis:    As above  Congestive heart failure    Access: Right common femoral vein  EBL: 10 cc  Complications: None    Summary:  Consent obtained.  Risks and benefits discussed with the patient.  The   patient was brought to the cath lab in a fasting state.  The patient was   prepped and draped in usual sterile fashion.  Ultrasound-guided right   common femoral venous access via modified Seldinger technique with   micropuncture.  A 7 French sheath was inserted into the right common   femoral vein.  Seven French balloon tip Vass-Micky catheter was used to   obtain intracardiac pressures, O2 sats, cardiac outputs and wedge   pressure.  At the end the procedure all wires and catheters removed.    Manual  hemostasis.    Findings:  RA 11, RV 53/9, PA 57/31 (mean 41), PCWP 34, PA sat 58% (on 100% FiO2   BiPAP)  CO/CI: 3.8/2.1 (TD)  PVR: 1.9 [place]    Assessment:  -Elevated right and left-sided filling pressures  -Low cardiac outputs  -Moderate pulmonary hypertension with normal pulmonary vascular resistance    Plan:  -Consider diuresis and maintenance of normal sinus rhythm    Date: 6/17/2024  Time: 3:57 PM      Augustus Barreto MD  Department of Hospital Medicine   [facility]   06/18/2024

## 2024-06-18 NOTE — PROGRESS NOTES
Advance Care Planning     Date: 06/18/2024    Patient on telephone when rounded, informed palliative care team would follow up with patient and patient's daughter after renal rounded on patient.     Notified patient's daughter, Nakia, discussed/reviewed physician's notes and updated her on patient's current condition. Informed that at this time renal is recommending to continue aggressive diuresis and will re-evaluate labs in the morning, along with cardiology. Informed that patient did state that she would not ever want dialysis should the need arise. Informed patient's daughter at this time palliative care team and medical team will continue to provide support, education, and constant re-evaluation of goals throughout patient's hospital stay. Verbalized understanding and appreciation.

## 2024-06-18 NOTE — CONSULTS
Nephrology Initial Consult Note    Patient Name: Alanna Moya  Age: 79 y.o.  : 1945  MRN: [id number]  Admission Date: 2024    Reason for Consult:      Acute kidney injury, hypervolemia.  Tolu Rice MD    HPI:     Alanna Moya is a 79 y.o. female who presents with shortness of breath.  Past medical history significant for coronary artery disease status post CABG, hypertension, hyperlipidemia, HFpEF, aortic valve replacement, diverticulosis, and history of CVA.  She initially presented on 2024 with worsening shortness of breath.  She was initially admitted to the ICU due to BiPAP requirements.  She was given IV Lasix, and had some improvement in her shortness of breath initially, however, shortness of breath recurred and continue to worsen.  She was transferred to the floor, but had to be transferred back to the ICU due to increasing oxygen requirements.  There was some concern for possible amiodarone toxicity causing worsening lung function.  She did have a right heart catheterization done by Cardiology on 2024 showed HFpEF, and significantly elevated pulmonary capillary wedge pressure of 34.  Overall she has not diuresed very well, and Nephrology was consulted for assistance with volume management.  Patient states that prior to hospitalization she had not required supplemental oxygen.  She is now on high-flow nasal cannula 90% FiO2, 40 L. she has no lower extremity edema.  She does feel short of breath.  No chest pain, abdominal pain, nausea, or vomiting.        Current Facility-Administered Medications   Medication Dose Route Frequency Provider Last Rate Last Admin    0.9%  NaCl infusion   Intravenous PRN Vishnu Stevenson MD   Stopped at 24 0133    acetaminophen tablet 650 mg  650 mg Oral Q6H PRN Naa Chin MD   650 mg at 24 1012    acetaminophen tablet 650 mg  650 mg Oral Q4H PRN Steven Wells MD        apixaban tablet 5 mg  5 mg Oral BID [place]  LYLE Whittaker   5 mg at 06/18/24 0847    diltiaZEM 125 mg in dextrose 5 % 125 mL IVPB (ready to mix) (titrating)  0-15 mg/hr Intravenous Continuous Murtaza Burris ANP 5 mL/hr at 06/18/24 0853 5 mg/hr at 06/18/24 0853    diltiaZEM 24 hr capsule 180 mg  180 mg Oral Daily Murtaza Burris ANP        docusate 50 mg/5 mL liquid 50 mg  50 mg Oral Daily Marga Smith MD   50 mg at 06/18/24 0848    furosemide injection 40 mg  40 mg Intravenous Q6H Jay Padilla DO        gabapentin capsule 600 mg  600 mg Oral BID Marga Smith MD   600 mg at 06/18/24 0847    hydrALAZINE injection 10 mg  10 mg Intravenous Q6H PRN Eleno Abbasi MD   10 mg at 06/16/24 0025    labetaloL injection 10 mg  10 mg Intravenous Q4H PRN Baldo Finney DO   10 mg at 06/11/24 0257    Lactobacillus rhamnosus GG 5 billion cell packet (PEDS) 1 each  1 packet Oral BID Naa Chin MD   1 each at 06/18/24 0847    levalbuterol nebulizer solution 1.25 mg  1.25 mg Nebulization Q6H PRN Shanna Montiel MD   1.25 mg at 06/09/24 1200    LIDOcaine 5 % patch 2 patch  2 patch Transdermal Q24H Sheri Carson NP   2 patch at 06/16/24 1502    LORazepam tablet 0.5 mg  0.5 mg Oral Q4H PRN Sheri Carson NP   0.5 mg at 06/17/24 0911    melatonin tablet 6 mg  6 mg Oral Nightly PRN Naa Chin MD   6 mg at 06/14/24 2038    methylPREDNISolone sodium succinate injection 60 mg  60 mg Intravenous Q6H Gregory Gandhi MD   60 mg at 06/18/24 1223    metoprolol injection 5 mg  5 mg Intravenous Q4H PRN Justyn Landaverde MD   5 mg at 06/17/24 1748    morphine injection 2 mg  2 mg Intravenous Q4H PRN Sheri Carson NP   2 mg at 06/13/24 1137    ondansetron injection 4 mg  4 mg Intravenous Q4H PRN Julissa Rinaldi FNP   4 mg at 06/11/24 0405    pantoprazole injection 40 mg  40 mg Intravenous BID Augustus Barreto MD   40 mg at 06/18/24 0847    sertraline tablet 100 mg  100 mg Oral QHS Marga Smith MD   100 mg at 06/17/24 2013    traMADoL tablet 50 mg   50 mg Oral TID Vishnu Stevenson MD   50 mg at 06/18/24 0847       Cornel Haddad MD    Past Medical History:   Diagnosis Date    Abdominal pain, unspecified site     Aortic stenosis     Coronary atherosclerosis of unspecified type of vessel, native or graft     Esophageal reflux     Fatigue     History of glaucoma     History of heart attack     Hypertension     Lumbosacral spondylosis without myelopathy     Other and unspecified hyperlipidemia     Rectocele     SOB (shortness of breath) on exertion     Unspecified essential hypertension     Weakness       Past Surgical History:   Procedure Laterality Date    CHOLECYSTECTOMY      CORONARY ARTERY BYPASS GRAFT      GLAUCOMA SURGERY Bilateral     HYSTERECTOMY      RIGHT HEART CATHETERIZATION N/A 6/17/2024    Procedure: INSERTION, CATHETER, RIGHT HEART;  Surgeon: Steven Wells MD;  Location: [facility] CATH LAB;  Service: Cardiology;  Laterality: N/A;    TRANSCATHETER AORTIC VALVE REPLACEMENT (TAVR) N/A 2/27/2023    Procedure: REPLACEMENT, AORTIC VALVE, TRANSCATHETER (TAVR);  Surgeon: Anselmo Arango MD;  Location: [facility] CATH LAB;  Service: Cardiology;  Laterality: N/A;  TAVR W/ ANEST.      No family history on file.  Social History     Tobacco Use    Smoking status: Never    Smokeless tobacco: Never   Substance Use Topics    Alcohol use: No     Medications Prior to Admission   Medication Sig Dispense Refill Last Dose    amLODIPine (NORVASC) 10 MG tablet Take 10 mg by mouth 2 (two) times daily.   6/1/2024    aspirin 325 MG tablet Take 1 tablet by mouth once daily.   6/1/2024    carvediloL (COREG) 12.5 MG tablet Take 1 tablet (12.5 mg total) by mouth 2 (two) times daily with meals. (Patient taking differently: Take 25 mg by mouth 2 (two) times daily with meals.)   6/1/2024    cyanocobalamin, vitamin B-12, 2,000 mcg Tab Take 1 tablet by mouth once daily.   6/1/2024    cyclobenzaprine (FLEXERIL) 10 MG tablet Take 10 mg by mouth 3 (three) times daily.   6/1/2024    docusate  sodium (STOOL SOFTENER ORAL) Take 2 capsules by mouth 2 (two) times a day.   6/2/2024    furosemide (LASIX) 40 MG tablet Take 40 mg by mouth once daily.   6/1/2024    L. acidophilus/Bifid. animalis (DAILY PROBIOTIC ORAL) Take 1 capsule by mouth once daily.   6/1/2024    rosuvastatin (CRESTOR) 40 MG Tab Take 40 mg by mouth every evening.   6/1/2024    sertraline (ZOLOFT) 50 MG tablet Take 100 mg by mouth every evening.   6/1/2024    traMADoL (ULTRAM) 50 mg tablet Take 50 mg by mouth 3 (three) times daily.   6/2/2024    cholecalciferol, vitamin D3, (VITAMIN D3) 50 mcg (2,000 unit) Cap capsule Take 1 capsule by mouth once daily.       hydrALAZINE (APRESOLINE) 25 MG tablet Take 25 mg by mouth every 8 (eight) hours.       potassium gluconate 600 mg (99 mg) Tab Take 650 mg by mouth once.        Review of patient's allergies indicates:  No Known Allergies         Review of Systems:     Comprehensive 10pt ROS negative except as noted per history.      Objective:       VITAL SIGNS: 24 HR MIN & MAX LAST    Temp  Min: 98.1 °F (36.7 °C)  Max: 99 °F (37.2 °C)  98.2 °F (36.8 °C)        BP  Min: 102/57  Max: 143/69  (!) 140/60     Pulse  Min: 66  Max: 128  73     Resp  Min: 10  Max: 31  16    SpO2  Min: 86 %  Max: 99 %  (!) 93 %      GEN:  Ill-appearing WF in NAD  HEENT: Conjunctiva anicteric, pupils equal   CV: RRR +S1,S2 without murmur  PULM:  Bilateral rhonchi, unlabored, high-flow nasal cannula 90% FiO2 40 L.  ABD: Soft, NT/ND abdomen with NABS  EXT: No cyanosis or edema  SKIN: Warm and dry  PSYCH: Awake, alert and appropriately conversant.   Dialysis access:  No dialysis access            Component Value Date/Time     (L) 06/18/2024 0258     (L) 06/17/2024 0208     05/16/2024 0614     05/15/2024 0424     08/10/2017 1250    K 3.3 (L) 06/18/2024 0258    K 5.0 06/17/2024 0208    K 3.6 05/16/2024 0614    K 4.0 05/15/2024 0424    K 3.9 08/10/2017 1250    CHLORIDE 97 05/04/2021 0522    CHLORIDE 100  05/03/2021 0603    CO2 31 06/18/2024 0258    CO2 32 (H) 06/17/2024 0208    CO2 24 05/16/2024 0614    CO2 24 05/15/2024 0424    CO2 23 08/10/2017 1250    BUN 46.1 (H) 06/18/2024 0258    BUN 40.7 (H) 06/17/2024 0208    BUN 16 05/16/2024 0614    BUN 22 05/15/2024 0424    BUN 18.0 05/04/2021 0522    BUN 16.0 05/03/2021 0603    BUN 61 (H) 08/10/2017 1250    CREATININE 1.55 (H) 06/18/2024 0258    CREATININE 1.55 (H) 06/17/2024 0208    CREATININE 0.62 05/16/2024 0614    CREATININE 0.64 05/15/2024 0424    CREATININE 1.8 (H) 08/10/2017 1250    CALCIUM 8.7 06/18/2024 0258    CALCIUM 8.6 06/17/2024 0208    CALCIUM 10.2 05/16/2024 0614    CALCIUM 9.0 05/15/2024 0424    CALCIUM 8.8 08/10/2017 1250    PHOS 4.0 06/14/2024 0241            Component Value Date/Time    WBC 7.42 06/18/2024 0258    WBC 4.20 (L) 06/17/2024 0208    WBC 15.43 06/11/2024 0319    WBC 4.28 08/10/2017 1250    HGB 8.1 (L) 06/18/2024 0258    HGB 8.6 (L) 06/17/2024 0208    HGB 11.4 (L) 08/10/2017 1250    HCT 24.6 (L) 06/18/2024 0258    HCT 26.9 (L) 06/17/2024 0208    HCT 35.5 (L) 08/10/2017 1250     06/18/2024 0258     06/17/2024 0208     08/10/2017 1250             CTA Chest Non-Coronary (PE Studies)   Final Result      1. No evidence of pulmonary embolus   2. Ground-glass opacities and septal thickening compatible with pulmonary edema.   3. Superimposed alveolar opacities may be related to alveolar edema or pneumonia.  Slightly improved from prior   4. Bilateral pleural effusions         Electronically signed by: Chelsi Ch   Date:    06/15/2024   Time:    12:51      X-Ray Chest 1 View   Final Result      Resolution of right-sided pleural effusion with better definition of the right hemidiaphragm.      Persistent changes of left-sided pleural effusion and increased left retrocardiac density.      Increase interstitial markings more so than on previous exam of June 12, 2024         Electronically signed by: Baljeet Mullins    Date:    06/14/2024   Time:    10:39      X-Ray Chest 1 View   Final Result      As above.         Electronically signed by: Sukhdev Chavez   Date:    06/12/2024   Time:    07:00      Fl Modified Barium Swallow Speech   Final Result      X-Ray Chest 1 View   Final Result      As above.         Electronically signed by: Juan Luis Hendrix   Date:    06/09/2024   Time:    07:58      X-Ray Chest 1 View   Final Result      Stable exam without significant interval change.         Electronically signed by: Dimple Sellers   Date:    06/06/2024   Time:    06:04      X-Ray Chest 1 View   Final Result      Changes of left-sided pleural effusion.      Increased left retrocardiac density and silhouetting of the left hemidiaphragm as above.      Slightly more confluent opacities in the left perihilar region infiltrate cannot be completely excluded         Electronically signed by: Baljeet Mullins   Date:    06/03/2024   Time:    08:49      CT Chest Without Contrast   Final Result      1. Moderately sized bilateral pleural effusions with lower lobe atelectasis and moderate interstitial pulmonary edema.   2. Multifocal airspace consolidations throughout the lungs bilaterally favored to represent a superimposed multifocal pneumonia.         Electronically signed by: Jose Maria Robles MD   Date:    06/02/2024   Time:    10:31      Xray Previous   Final Result      US Previous   Final Result      CT Previous   Final Result          Assessment / Plan:       There are no hospital problems to display for this patient.    Acute kidney injury - no known CKD.  Baseline creatinine around 1  Acute hypoxic respiratory failure  HFpEF  Pulmonary hypertension  Atrial fibrillation    Plan:  Renal function slightly worse than typical baseline, but stable since yesterday.  This is likely due to diuretic needs due to pulmonary hypertension.  Patient is still requiring high-dose high-flow nasal cannula O2.  We will continue diuresis for now.  Increase Lasix  to 40 mg IV q.6 hours.  If not responding to that she may require a Lasix infusion, or additional metolazone.  I did talk to patient about the potential of worsening renal function with aggressive diuresis, or the need for dialysis for volume removal in the future.  She states that she would not ever want dialysis.  Repeat labs in a.m..  We will follow.    Jay Padilla DO  Nephrology  [facility]  Clinic number: [phone]

## 2024-06-19 PROBLEM — J96.90 RESPIRATORY FAILURE: Status: ACTIVE | Noted: 2024-06-19

## 2024-06-19 LAB
ALBUMIN SERPL-MCNC: 3 G/DL (ref 3.4–4.8)
ALBUMIN/GLOB SERPL: 0.9 RATIO (ref 1.1–2)
ALP SERPL-CCNC: 83 UNIT/L (ref 40–150)
ALT SERPL-CCNC: 10 UNIT/L (ref 0–55)
ANION GAP SERPL CALC-SCNC: 14 MEQ/L
AST SERPL-CCNC: 14 UNIT/L (ref 5–34)
BACTERIA FLD CULT: NORMAL
BASOPHILS # BLD AUTO: 0.01 X10(3)/MCL
BASOPHILS NFR BLD AUTO: 0.1 %
BILIRUB SERPL-MCNC: 0.5 MG/DL
BUN SERPL-MCNC: 48.9 MG/DL (ref 9.8–20.1)
CALCIUM SERPL-MCNC: 9.7 MG/DL (ref 8.4–10.2)
CHLORIDE SERPL-SCNC: 89 MMOL/L (ref 98–107)
CO2 SERPL-SCNC: 35 MMOL/L (ref 23–31)
CREAT SERPL-MCNC: 1.26 MG/DL (ref 0.55–1.02)
CREAT/UREA NIT SERPL: 39
EOSINOPHIL # BLD AUTO: 0.01 X10(3)/MCL (ref 0–0.9)
EOSINOPHIL NFR BLD AUTO: 0.1 %
ERYTHROCYTE [DISTWIDTH] IN BLOOD BY AUTOMATED COUNT: 14.5 % (ref 11.5–17)
GFR SERPLBLD CREATININE-BSD FMLA CKD-EPI: 44 ML/MIN/1.73/M2
GLOBULIN SER-MCNC: 3.5 GM/DL (ref 2.4–3.5)
GLUCOSE SERPL-MCNC: 178 MG/DL (ref 82–115)
HCT VFR BLD AUTO: 27.5 % (ref 37–47)
HGB BLD-MCNC: 8.8 G/DL (ref 12–16)
IMM GRANULOCYTES # BLD AUTO: 0.09 X10(3)/MCL (ref 0–0.04)
IMM GRANULOCYTES NFR BLD AUTO: 1.3 %
LYMPHOCYTES # BLD AUTO: 0.36 X10(3)/MCL (ref 0.6–4.6)
LYMPHOCYTES NFR BLD AUTO: 5.1 %
MAGNESIUM SERPL-MCNC: 1.8 MG/DL (ref 1.6–2.6)
MCH RBC QN AUTO: 29.1 PG (ref 27–31)
MCHC RBC AUTO-ENTMCNC: 32 G/DL (ref 33–36)
MCV RBC AUTO: 91.1 FL (ref 80–94)
MONOCYTES # BLD AUTO: 0.2 X10(3)/MCL (ref 0.1–1.3)
MONOCYTES NFR BLD AUTO: 2.9 %
NEUTROPHILS # BLD AUTO: 6.34 X10(3)/MCL (ref 2.1–9.2)
NEUTROPHILS NFR BLD AUTO: 90.5 %
NRBC BLD AUTO-RTO: 0 %
OHS QRS DURATION: 154 MS
OHS QTC CALCULATION: 453 MS
PLATELET # BLD AUTO: 294 X10(3)/MCL (ref 130–400)
PMV BLD AUTO: 10.4 FL (ref 7.4–10.4)
POTASSIUM SERPL-SCNC: 3.5 MMOL/L (ref 3.5–5.1)
PROT SERPL-MCNC: 6.5 GM/DL (ref 5.8–7.6)
RBC # BLD AUTO: 3.02 X10(6)/MCL (ref 4.2–5.4)
SODIUM SERPL-SCNC: 138 MMOL/L (ref 136–145)
WBC # BLD AUTO: 7.01 X10(3)/MCL (ref 4.5–11.5)

## 2024-06-19 PROCEDURE — 27000249 HC VAPOTHERM CIRCUIT

## 2024-06-19 PROCEDURE — 83735 ASSAY OF MAGNESIUM: CPT | Performed by: INTERNAL MEDICINE

## 2024-06-19 PROCEDURE — 25000003 PHARM REV CODE 250: Performed by: INTERNAL MEDICINE

## 2024-06-19 PROCEDURE — 63600175 PHARM REV CODE 636 W HCPCS: Performed by: NURSE PRACTITIONER

## 2024-06-19 PROCEDURE — 36415 COLL VENOUS BLD VENIPUNCTURE: CPT | Performed by: NURSE PRACTITIONER

## 2024-06-19 PROCEDURE — 27100171 HC OXYGEN HIGH FLOW UP TO 24 HOURS

## 2024-06-19 PROCEDURE — 94660 CPAP INITIATION&MGMT: CPT

## 2024-06-19 PROCEDURE — 21400001 HC TELEMETRY ROOM

## 2024-06-19 PROCEDURE — C9113 INJ PANTOPRAZOLE SODIUM, VIA: HCPCS | Performed by: INTERNAL MEDICINE

## 2024-06-19 PROCEDURE — 97168 OT RE-EVAL EST PLAN CARE: CPT

## 2024-06-19 PROCEDURE — 99900031 HC PATIENT EDUCATION (STAT)

## 2024-06-19 PROCEDURE — 25000003 PHARM REV CODE 250: Performed by: HOSPITALIST

## 2024-06-19 PROCEDURE — 93010 ELECTROCARDIOGRAM REPORT: CPT | Mod: ,,, | Performed by: INTERNAL MEDICINE

## 2024-06-19 PROCEDURE — 11000001 HC ACUTE MED/SURG PRIVATE ROOM

## 2024-06-19 PROCEDURE — 63600175 PHARM REV CODE 636 W HCPCS: Performed by: INTERNAL MEDICINE

## 2024-06-19 PROCEDURE — 93005 ELECTROCARDIOGRAM TRACING: CPT

## 2024-06-19 PROCEDURE — 63600175 PHARM REV CODE 636 W HCPCS: Performed by: STUDENT IN AN ORGANIZED HEALTH CARE EDUCATION/TRAINING PROGRAM

## 2024-06-19 PROCEDURE — 99900035 HC TECH TIME PER 15 MIN (STAT)

## 2024-06-19 PROCEDURE — 25000003 PHARM REV CODE 250

## 2024-06-19 PROCEDURE — 85025 COMPLETE CBC W/AUTO DIFF WBC: CPT

## 2024-06-19 PROCEDURE — 25000003 PHARM REV CODE 250: Performed by: NURSE PRACTITIONER

## 2024-06-19 PROCEDURE — 94799 UNLISTED PULMONARY SVC/PX: CPT

## 2024-06-19 PROCEDURE — 80053 COMPREHEN METABOLIC PANEL: CPT | Performed by: NURSE PRACTITIONER

## 2024-06-19 PROCEDURE — 20000000 HC ICU ROOM

## 2024-06-19 PROCEDURE — 94761 N-INVAS EAR/PLS OXIMETRY MLT: CPT

## 2024-06-19 PROCEDURE — 94760 N-INVAS EAR/PLS OXIMETRY 1: CPT

## 2024-06-19 RX ORDER — MORPHINE SULFATE 4 MG/ML
0.5 INJECTION, SOLUTION INTRAMUSCULAR; INTRAVENOUS EVERY 4 HOURS PRN
Status: DISCONTINUED | OUTPATIENT
Start: 2024-06-19 | End: 2024-06-23 | Stop reason: HOSPADM

## 2024-06-19 RX ORDER — MAGNESIUM SULFATE HEPTAHYDRATE 40 MG/ML
2 INJECTION, SOLUTION INTRAVENOUS ONCE
Status: COMPLETED | OUTPATIENT
Start: 2024-06-19 | End: 2024-06-19

## 2024-06-19 RX ORDER — METOPROLOL TARTRATE 1 MG/ML
5 INJECTION, SOLUTION INTRAVENOUS
Status: COMPLETED | OUTPATIENT
Start: 2024-06-19 | End: 2024-06-19

## 2024-06-19 RX ORDER — DILTIAZEM HYDROCHLORIDE 5 MG/ML
10 INJECTION INTRAVENOUS ONCE
Status: COMPLETED | OUTPATIENT
Start: 2024-06-19 | End: 2024-06-19

## 2024-06-19 RX ORDER — TRAMADOL HYDROCHLORIDE 50 MG/1
50 TABLET ORAL 3 TIMES DAILY PRN
Status: DISCONTINUED | OUTPATIENT
Start: 2024-06-19 | End: 2024-06-23 | Stop reason: HOSPADM

## 2024-06-19 RX ORDER — ACETAZOLAMIDE 500 MG/5ML
250 INJECTION, POWDER, LYOPHILIZED, FOR SOLUTION INTRAVENOUS EVERY 8 HOURS
Status: COMPLETED | OUTPATIENT
Start: 2024-06-19 | End: 2024-06-20

## 2024-06-19 RX ORDER — DILTIAZEM HYDROCHLORIDE 120 MG/1
240 CAPSULE, COATED, EXTENDED RELEASE ORAL DAILY
Status: DISCONTINUED | OUTPATIENT
Start: 2024-06-20 | End: 2024-06-23 | Stop reason: HOSPADM

## 2024-06-19 RX ORDER — POTASSIUM CHLORIDE 20 MEQ/1
40 TABLET, EXTENDED RELEASE ORAL ONCE
Status: COMPLETED | OUTPATIENT
Start: 2024-06-19 | End: 2024-06-19

## 2024-06-19 RX ADMIN — Medication 1 EACH: at 09:06

## 2024-06-19 RX ADMIN — MAGNESIUM SULFATE HEPTAHYDRATE 2 G: 40 INJECTION, SOLUTION INTRAVENOUS at 02:06

## 2024-06-19 RX ADMIN — ACETAZOLAMIDE 250 MG: 500 INJECTION, POWDER, LYOPHILIZED, FOR SOLUTION INTRAVENOUS at 09:06

## 2024-06-19 RX ADMIN — DILTIAZEM HYDROCHLORIDE 5 MG/HR: 5 INJECTION, SOLUTION INTRAVENOUS at 11:06

## 2024-06-19 RX ADMIN — DOCUSATE SODIUM LIQUID 50 MG: 100 LIQUID ORAL at 08:06

## 2024-06-19 RX ADMIN — Medication 1 EACH: at 08:06

## 2024-06-19 RX ADMIN — TRAMADOL HYDROCHLORIDE 50 MG: 50 TABLET, COATED ORAL at 08:06

## 2024-06-19 RX ADMIN — DILTIAZEM HYDROCHLORIDE 180 MG: 180 CAPSULE, EXTENDED RELEASE ORAL at 08:06

## 2024-06-19 RX ADMIN — METOPROLOL TARTRATE 5 MG: 1 INJECTION, SOLUTION INTRAVENOUS at 06:06

## 2024-06-19 RX ADMIN — ACETAZOLAMIDE 250 MG: 500 INJECTION, POWDER, LYOPHILIZED, FOR SOLUTION INTRAVENOUS at 02:06

## 2024-06-19 RX ADMIN — FUROSEMIDE 40 MG: 10 INJECTION, SOLUTION INTRAVENOUS at 05:06

## 2024-06-19 RX ADMIN — PANTOPRAZOLE SODIUM 40 MG: 40 INJECTION, POWDER, FOR SOLUTION INTRAVENOUS at 08:06

## 2024-06-19 RX ADMIN — POTASSIUM CHLORIDE 40 MEQ: 1500 TABLET, EXTENDED RELEASE ORAL at 03:06

## 2024-06-19 RX ADMIN — LIDOCAINE 2 PATCH: 700 PATCH TOPICAL at 02:06

## 2024-06-19 RX ADMIN — APIXABAN 5 MG: 5 TABLET, FILM COATED ORAL at 08:06

## 2024-06-19 RX ADMIN — FUROSEMIDE 40 MG: 10 INJECTION, SOLUTION INTRAVENOUS at 12:06

## 2024-06-19 RX ADMIN — APIXABAN 5 MG: 5 TABLET, FILM COATED ORAL at 09:06

## 2024-06-19 RX ADMIN — TRAMADOL HYDROCHLORIDE 50 MG: 50 TABLET, COATED ORAL at 09:06

## 2024-06-19 RX ADMIN — GABAPENTIN 300 MG: 300 CAPSULE ORAL at 09:06

## 2024-06-19 RX ADMIN — DILTIAZEM HYDROCHLORIDE 5 MG/HR: 5 INJECTION, SOLUTION INTRAVENOUS at 05:06

## 2024-06-19 RX ADMIN — TRAMADOL HYDROCHLORIDE 50 MG: 50 TABLET, COATED ORAL at 03:06

## 2024-06-19 RX ADMIN — DILTIAZEM HYDROCHLORIDE 10 MG: 5 INJECTION INTRAVENOUS at 11:06

## 2024-06-19 RX ADMIN — SERTRALINE HYDROCHLORIDE 100 MG: 50 TABLET ORAL at 09:06

## 2024-06-19 RX ADMIN — GABAPENTIN 300 MG: 300 CAPSULE ORAL at 08:06

## 2024-06-19 RX ADMIN — POTASSIUM BICARBONATE 20 MEQ: 391 TABLET, EFFERVESCENT ORAL at 06:06

## 2024-06-19 RX ADMIN — PANTOPRAZOLE SODIUM 40 MG: 40 INJECTION, POWDER, FOR SOLUTION INTRAVENOUS at 09:06

## 2024-06-19 RX ADMIN — LORAZEPAM 0.5 MG: 0.5 TABLET ORAL at 12:06

## 2024-06-19 RX ADMIN — METOPROLOL TARTRATE 5 MG: 1 INJECTION, SOLUTION INTRAVENOUS at 02:06

## 2024-06-19 RX ADMIN — ACETAMINOPHEN 650 MG: 325 TABLET, FILM COATED ORAL at 10:06

## 2024-06-19 NOTE — PROGRESS NOTES
[facility] Medicine Progress Note        Chief Complaint: Inpatient Follow-up for multifocal pneumonia     HPI:   78-year-old lady with PMH of AS s/p AVR, CAD, mi, HTN, chronic back pain, HLD, CVA, CHF, GERD, diverticulosis who was being downgraded from the ICU to Hospital Medicine after she was initially admitted for acute hypoxemic respiratory failure requiring BiPAP for adequate saturations. She was transferred from an outside facility to [facility] with complaints of worsening shortness of breath for the last few weeks with initial CXR at outside facility showing bilateral airspace opacities and increased right-sided pleural effusion. Her last echocardiogram from 05/16 showed EF 55-60%. She was placed on antibiotics, steroids as well as IV Lasix for diuresis for pulmonary congestion. She was able to be weaned down from BiPAP to Vapotherm and eventually Oxymizer 8 L. she was also noted to have new onset atrial fibrillation for which he was started on IV Cardizem which has since been transitioned to p.o. Cardizem. She is on full-dose Lovenox due to CHADS-VASc of 7. She reported feeling significantly better and endorsed improvement in her shortness of breath since admission. Denied having any fever, chills, chest pain, cough, sputum production, abdominal pain, nausea, vomiting, headache, numbness, weakness, dizziness/lightheadedness or loss of consciousness. Blood cultures from admission are negative. PT/OT on board; in recommending moderate intensity therapy. Cardiology on board.   Patient had repeat chest x-ray done on June 9th and that showed denser consolidation patient continues to be on Zosyn now has been put on BiPAP with FiO2 of 90%   She is status post thoracentesis with removal of 950 cc of fluid by pulmonology Will follow up with cultures , 6/14.  She is status post right heart catheterization with Cardiology.  Recommendation to continue aggressive diuresis.        Patient currently being managed for persistent hypoxic respiratory failure due to diastolic CHF exacerbation.  On Vapotherm/BiPAP and diuresis.  Pulmonology, Cardiology, Nephrology on board     Interval Hx:   Patient seen and examined at bedside, no family member at bedside   Remains on VT -90/30  Patient complaining of migraine headaches this morning  Hemoglobin levels stable at 8.8 grams/dL, CO2 35, creatinine 1.26, improving, sodium 138,  QTC improved at 453           Objective/physical exam:  General: In no acute distress, afebrile  Chest:  On vapotherm  Heart: RRR, +S1, S2, no appreciable murmur  Abdomen: Soft, nontender, BS +  MSK: Warm, no lower extremity edema, no clubbing or cyanosis  Neurologic: Alert x o x 3 , generalized weakness         Assessment/Plan:  Migraine HA   Acute respiratory failure with hypoxia requiring high-flow O2 /bipap  HFpEF with exacerbation and bilateral pleural effusions   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 diltiazem 180 mg q.day, Eliquis  Follow up with pulmonology input to discontinue steroids  Patient back in AFib RVR today will continue with diltiazem drip for rate control   Monitor electrolytes on significant diuresis   Give a dose of IV morphine 0.5 mg Q 4 hourly to help with shortness of breath  Use of p.o. lorazepam to help with anxiety   Hemoglobin trended downwards will check FOBT x3, IV PPI 40 b.i.d.   She is status post thoracentesis with removal of 950 cc of fluid on 6/14  strict input  and output   Antibiotics were discontinued repeat chest x-ray done today still shows bilateral pleural effusions right greater than left  CTA neg for PE on 6/15  Palliative care consulted and following  continue with p.r.n. Xopenex   Continue with speech PT and OT  Patient was denied by LTAC     Critical care note:  Critical care diagnosis:  Acute hypoxemic respiratory failure secondary tod bilateral pleural effusions on Vapotherm,  Critical care interventions: Hands-on evaluation, review of labs/radiographs/records and discussion with patient and family if present  Critical care time spent: 35 minutes      VTE prophylaxis:  eliquis   Patient condition:  Stable   Anticipated discharge and Disposition: tbd        All diagnosis and differential diagnosis have been reviewed; assessment and plan has been documented; I have personally reviewed the labs and test results that are presently available; I have reviewed the patients medication list; I have reviewed the consulting providers response and recommendations. I have reviewed or attempted to review medical records based upon their availability     All of the patient's questions have been  addressed and answered. Patient's is agreeable to the above stated plan. I will continue to monitor closely and make adjustments to medical management as needed.    VITAL SIGNS: 24 HRS MIN & MAX LAST   Temp  Min: 97.7 °F (36.5 °C)  Max: 98.5 °F (36.9 °C) 97.7 °F (36.5 °C)   BP  Min: 127/70  Max: 165/71 133/81   Pulse  Min: 70  Max: 135  102   Resp  Min: 13  Max: 27 (!) 22   SpO2  Min: 86 %  Max: 99 % (!) 94 %     I have reviewed the following labs:  Recent Labs   Lab 06/17/24  0208 06/18/24  0258 06/19/24  0307   WBC 4.20* 7.42 7.01   RBC 2.93* 2.71* 3.02*   HGB 8.6* 8.1* 8.8*   HCT 26.9* 24.6* 27.5*   MCV 91.8 90.8 91.1   MCH 29.4 29.9 29.1   MCHC 32.0* 32.9* 32.0*   RDW 14.6 14.6 14.5    278 294   MPV 10.7* 10.7* 10.4     Recent Labs   Lab 06/16/24  0349 06/17/24 0208  06/18/24  0258 06/18/24  1511 06/19/24  0307    133* 132*  --  138   K 4.0 5.0 3.3* 3.2* 3.5   CL 92* 88* 89*  --  89*   CO2 29 32* 31  --  35*   BUN 30.9* 40.7* 46.1*  --  48.9*   CREATININE 1.10* 1.55* 1.55*  --  1.26*   CALCIUM 8.8 8.6 8.7  --  9.7   MG 1.90 2.10 2.00  --  1.80   ALBUMIN 2.5* 2.5*  --   --  3.0*   ALKPHOS 100 107  --   --  83   ALT 9 10  --   --  10   AST 17 16  --   --  14   BILITOT 0.5 0.4  --   --  0.5     Microbiology Results (last 7 days)       Procedure Component Value Units Date/Time    Body Fluid Culture [[id number]] Collected: 06/14/24 1021    Order Status: Completed Specimen: Body Fluid from Thoracentesis Fluid Updated: 06/19/24 1003     Body Fluid Culture Final Report: At 5 days. No growth    Gram Stain [[id number]] Collected: 06/14/24 1021    Order Status: Completed Specimen: Body Fluid from Pleural Fluid Updated: 06/14/24 1320     GRAM STAIN Rare WBC observed      No bacteria seen    KOH Prep [[id number]] Collected: 06/14/24 1022    Order Status: Completed Specimen: Body Fluid from Pleural Fluid Updated: 06/14/24 1256     KOH Prep No fungal elements seen    Respiratory Culture [[id number]] Updated: 06/14/24 1111    Order Status: Canceled Specimen: Sputum, Expectorated     Respiratory Culture [[id number]] Updated: 06/14/24 1111    Order Status: Canceled Specimen: Respiratory from Sputum, Expectorated     Fungal Culture [[id number]] Collected: 06/14/24 1021    Order Status: Sent Specimen: Body Fluid from Pleural Fluid Updated: 06/14/24 1028    Mycobacteria and Nocardia Culture [[id number]] Collected: 06/14/24 1021    Order Status: Sent Specimen: Body Fluid from Pleural Fluid, Right Updated: 06/14/24 1028             See below for Radiology    Scheduled Med:   acetaZOLAMIDE  250 mg Intravenous Q8H    apixaban  5 mg Oral BID    diltiaZEM  180 mg Oral Daily    docusate  50 mg Oral Daily    furosemide (LASIX) injection  40 mg Intravenous Q6H    gabapentin  300 mg Oral BID     Lactobacillus rhamnosus GG  1 packet Oral BID    LIDOcaine  2 patch Transdermal Q24H    pantoprazole  40 mg Intravenous BID    sertraline  100 mg Oral QHS    traMADoL  50 mg Oral TID      Continuous Infusions:   dilTIAZem  0-15 mg/hr Intravenous Continuous 5 mL/hr at 06/19/24 1112 5 mg/hr at 06/19/24 1112      PRN Meds:    Current Facility-Administered Medications:     sodium chloride 0.9%, , Intravenous, PRN    acetaminophen, 650 mg, Oral, Q6H PRN    acetaminophen, 650 mg, Oral, Q4H PRN    hydrALAZINE, 10 mg, Intravenous, Q6H PRN    labetalol, 10 mg, Intravenous, Q4H PRN    levalbuterol, 1.25 mg, Nebulization, Q6H PRN    LORazepam, 0.5 mg, Oral, Q4H PRN    melatonin, 6 mg, Oral, Nightly PRN    metoprolol, 5 mg, Intravenous, Q4H PRN    morphine, 0.5 mg, Intravenous, Q4H PRN    ondansetron, 4 mg, Intravenous, Q4H PRN     Assessment/Plan:      VTE prophylaxis:     Patient condition:  Stable/Fair/Guarded/ Serious/ Critical    Anticipated discharge and Disposition:         All diagnosis and differential diagnosis have been reviewed; assessment and plan has been documented; I have personally reviewed the labs and test results that are presently available; I have reviewed the patients medication list; I have reviewed the consulting providers response and recommendations. I have reviewed or attempted to review medical records based upon their availability    All of the patient's questions have been  addressed and answered. Patient's is agreeable to the above stated plan. I will continue to monitor closely and make adjustments to medical management as needed.  _____________________________________________________________________    Nutrition Status:    Radiology:  I have personally reviewed the following imaging and agree with the radiologist.     Cardiac catheterization  The procedure log was documented by No documenter listed and verified by   Steven Wells MD.    Procedure:  Right heart catheterization    Preoperative  diagnosis:    Hypoxic respiratory failure    Postoperative diagnosis:    As above  Congestive heart failure    Access: Right common femoral vein  EBL: 10 cc  Complications: None    Summary:  Consent obtained.  Risks and benefits discussed with the patient.  The   patient was brought to the cath lab in a fasting state.  The patient was   prepped and draped in usual sterile fashion.  Ultrasound-guided right   common femoral venous access via modified Seldinger technique with   micropuncture.  A 7 [place] sheath was inserted into the right common   femoral vein.  Seven [place] balloon tip [place] catheter was used to   obtain intracardiac pressures, O2 sats, cardiac outputs and wedge   pressure.  At the end the procedure all wires and catheters removed.    Manual hemostasis.    Findings:  RA 11, RV 53/9, PA 57/31 (mean 41), PCWP 34, PA sat 58% (on 100% FiO2   BiPAP)  CO/CI: 3.8/2.1 (TD)  PVR: 1.9 [place]    Assessment:  -Elevated right and left-sided filling pressures  -Low cardiac outputs  -Moderate pulmonary hypertension with normal pulmonary vascular resistance    Plan:  -Consider diuresis and maintenance of normal sinus rhythm    Date: 6/17/2024  Time: 3:57 PM      Augustus Barreto MD  Department of Hospital Medicine   [facility]   06/19/2024

## 2024-06-19 NOTE — PT/OT/SLP PROGRESS
Physical Therapy      Patient Name:  Alanna Moya   MRN:  [id number]    Tx held 2/2 elevated HR, currently in afib with HR ranging from 119-132 at rest. Will follow up tomorrow.

## 2024-06-19 NOTE — PROGRESS NOTES
"    [facility]    Cardiology  Progress Note    Patient Name: Alanna Moya  MRN: [id number]  Admission Date: 6/1/2024  Hospital Length of Stay: 18 days  Code Status: DNR   Attending Physician: Augustus Barreto MD   Primary Care Physician: Cornel Haddad MD  Expected Discharge Date:   Principal Problem:Respiratory failure    Subjective:   Reason for Consult: SVT     HPI:Ms. Moya is a 78 year old female, known to Dr. Ramon in [place], who presented to the hospital from [facility] as transfer due to respiratory failure. Upon arrival she was noted to be SOB. Noted hypoxia requiring BIPAP. Chesr Radiograph from outside facility revealed bilateral airspace opacification and increase his right-sided pleural effusion suggestive of worsening pneumonia or possibly edema. CT Chest revealed mixed picture of decompensated HF, pleural effusions and superimposed pneumonia. During this hospitalization patient with tachycardia concerning for AF RVR, with noted Left Bundle Branch Block. She was given IV Metoprolol and started on Cardizem Infusion for Acute HR Control. She does have history of PSVT. Echocardiogram on 6.2.24 revealed intact LV Function with EF 55-60% & Grade I DD. Electrolytes grossly stable. Troponin normal. Lactic Acid Normal. . CIS Consulted for AF Management.     Hospital Course:   6.7.24: Patient seen sitting in chair. She denies SOB, CP, or nausea. Remains in Afib RVR   6.8.24: NAD. VSS. No complaints of CP/SOB/Palps. "I feel okay" WBC 14.27  6.9.24: NAD. VSS. AFIB RVR on telemetry. No complaints CP/SOB/Palps. "I feel okay" possible aspiration - awaiting Speech Evaluation. K 3.7, Mag 1.8, WBC 16.21.  6.10.24: NAD Noted. Sitting up in chair. AF RVR. Requiring Vapotherm Support. Denies CP. Reports exertional SOB, however, functional capacity is limited as she is deconditioned. Hypertension Noted. Diuresis noted, - 1604 ML/24 Hours.   6.11.24: BP Improved. AF RVR. Diuresis noted, " "- 2144 ML/24 Hours. Vapotherm oxygen support- 75% FIO2.   6.12.24: NAD Noted. Remains AF RVR. BP Stable. Requiring Significant Oxygen Support. Continues to Diurese well.   6.13.24: NAD. VSS. Urine output 1050/850 ml Fluid Net Negative. AFIB RVR on telemetry. No complaints of CP/SOB/Palps.   6.14.24: NAD. VSS. Sitting in Bedside Chair. Denies CP and Palps. + SOB. Remain in A.Fib with CVR. Fluid Balance Net Negative 1210mL.  6.15.24: NAD. VSS. Sitting in Bedside Chair. Denies CP and Palps. + SOB. PAF with CVR. Fluid Balance Net Negative 1285mL. "I am ok." HF NC O2  6.16.24: NAD. PAF/RVR. Amiodarone 0.5mg/min. Fluid Balance Net Negative 1175mL. HF NC O2  6.17.24: NAD. PAF with CVR. Cardizem 2.5mg/HR. Fluid Balance Net + 458mL. "I am ok."   6.18.24: NAD. Laying in Bed on HF NC. Cardizem 5mg/HR. Fluid Balance Net Negative 1816.7mL. "I am feeling a little better." + SOB/NIETO. Denies CP and Palps. S/P RHC with Elevated EDP.   6.19.24: NAD. "I am ok" + SOB/HF NC O2 85%, Fluid Balance Net Negative 3195mL. Denies CP and Palps.     PMH: CAD/CABG, Hypertension, Dyslipidemia, MELCHOR, CEA, CVA, AS/TAVR, PSVT  PSH: LHC, CABG, TAVR, CEA, Hysterectomy, Cholecystectomy, Eye Surgery, Hand Surgery  Family History: Father- Old MI/CAD, Mother- HF, Son- Hypertension, Daughter- Cancer/Hypertension, Brother- Heart Disease, Brother- Old MI/CAD, Brother- Cancer  Social History: Tobacco- Negative, Alcohol- Negative, Substance Abuse- Negative      Previous Cardiac Diagnostics:   RHC (6.17.24):  RA 11, RV 53/9, PA 57/31 (mean 41), PCWP 34, PA sat 58% (on 100% FiO2 BiPAP)  CO/CI: 3.8/2.1 (TD)  PVR: 1.9 Woods  Assessment:  -Elevated right and left-sided filling pressures  -Low cardiac outputs  -Moderate pulmonary hypertension with normal pulmonary vascular resistance  Plan:  -Consider diuresis and maintenance of normal sinus rhythm    Echocardiogram (6.2.24):  Left Ventricle: The left ventricle is normal in size. Increased wall thickness. There is " normal systolic function with a visually estimated ejection fraction of 55 - 60%. Grade I diastolic dysfunction.  Right Ventricle: Systolic function is reduced.TAPSE is 1.60 cm.  Aortic Valve: There is a transcatheter valve replacement in the aortic position. Aortic valve area by VTI is 1.56 cm². Aortic valve peak velocity is 1.94 m/s. Mean gradient is 8 mmHg. The dimensionless index is 0.50.  Mitral Valve: Mildly thickened leaflets. There is mitral annular calcification present. There is mild stenosis. The mean pressure gradient across the mitral valve is 5 mmHg at a heart rate of 87 bpm. There is mild regurgitation.  Tricuspid Valve: The tricuspid valve is structurally normal. There is mild regurgitation.  Pulmonic Valve: The pulmonic valve is structurally normal.  Pulmonary Artery: The estimated pulmonary artery systolic pressure is 47 mmHg.  IVC/SVC: Normal venous pressure at 3 mmHg.  Pericardium: Left pleural effusion.     Echocardiogram (5.15.24):  EF 55-60%. Grade I Diastolic Dysfunction. Normal LV Wall Motion.  Mild MAC with Mild MR and No MS. Mild to Moderate TR.  Mild to Moderate AI, No AS.     CT Angiogram Head/Neck (5.15.24):  Carotid arteries:   Calcified plaque at the siphons without significant narrowing.   Normal A1 and M1 segments.   Vertebrobasilar Circulation:   Vertebral arteries: Patent. Calcified plaque on the right without significant narrowing. Otherwise no abnormalities.   Basilar artery: Patent, no abnormalities.   Posterior cerebral arteries:  Patent. Right fetal origin. Otherwise no abnormalities.      Echocardiogram (4.21.23):  The study quality is good.   The left ventricle is normal in size. Global left ventricular systolic function is normal. The left ventricular ejection fraction by Power's is 62%. The left ventricle diastolic function is impaired (Grade II) with an elevated left atrial pressure. Noted left ventricular hypertrophy. Concentric left ventricular hypertrophy is  present. It is mild. LVSI=41ml/m2.    LVEDV=67.2ml and LVESV=25.7ml.  The left atrial diameter is moderately increased. Left atrial diameter is 4.4 cms. The left atrium is severely enlarged based on the left atrium volume index of 48.2ml/m².  S/P TAVR. Bioprosthetic aortic valve is well seated and functions normally with no evidence of insufficiency or perivalvular leaks. JACINTA=2.0cm2. The trans-aortic peak gradient is 15.8 mmHg. The trans-aortic mean gradient is 8.6 mmHg. DI=1.1.  Mild mitral annular calcification is noted. The mean trans mitral gradient is 1.9 mmHg.  Mild to moderate (1-2+) mitral regurgitation. Mild to moderate (1-2+) tricuspid regurgitation.  The estimated pulmonary artery systolic pressure is 41 mmHg assuming a right atrial pressure of 3 mmHg. Evidence of pulmonary hypertension is noted.      MCT Monitor (3.15.23):  Predominant Rhythm SR.  PSVT     TAVR (2.27.23):  TAVR  23  mm S3 ultra valve, nominal prep,  Right TF approach  Limited echo pre, post valve placement  Root angiography  Distal aortography  Temporary pacemaker placement removal  Manta closure  Right CFA access site.  U/S guided bilateral right groin access   Balloon angioplasty of right CFA with a 5 x 40 Napoleon.     LHC (1.26.23):  LM: Distal 50% Stenosis, LAD: Occluded Mid, LCX: Ostial 50% at Mid Portion, RCA: Dominant Patent Arter.  JENNINGS to LAD is Patent.  Impression: LIMA to LAD- Patent, 50% Stenosis LCX Artery  Medical Management.    Review of Systems   Constitutional: Positive for malaise/fatigue. Negative for fever.   Cardiovascular:  Negative for chest pain, cyanosis, leg swelling, orthopnea and palpitations.   Respiratory:  Positive for shortness of breath.    All other systems reviewed and are negative.    Objective:     Vital Signs (Most Recent):  Temp: 97.7 °F (36.5 °C) (06/19/24 0400)  Pulse: 102 (06/19/24 1210)  Resp: (!) 22 (06/19/24 1210)  BP: 133/81 (06/19/24 1110)  SpO2: (!) 94 % (06/19/24 1210) Vital Signs (24h  Range):  Temp:  [97.7 °F (36.5 °C)-98.5 °F (36.9 °C)] 97.7 °F (36.5 °C)  Pulse:  [] 102  Resp:  [13-27] 22  SpO2:  [86 %-99 %] 94 %  BP: (127-165)/(70-86) 133/81   Weight: 77.1 kg (170 lb)  Body mass index is 31.09 kg/m².  SpO2: (!) 94 %       Intake/Output Summary (Last 24 hours) at 6/19/2024 1429  Last data filed at 6/19/2024 1000  Gross per 24 hour   Intake 500 ml   Output 4195 ml   Net -3695 ml     Lines/Drains/Airways       Drain  Duration                  Urethral Catheter 06/09/24 1754 9 days              Peripheral Intravenous Line  Duration                  Peripheral IV - Single Lumen 06/07/24 1700 20 G Anterior;Proximal;Right Forearm 11 days         Peripheral IV - Single Lumen 06/08/24 0428 20 G Left Forearm 11 days                  Significant Labs:   Recent Results (from the past 72 hour(s))   Magnesium    Collection Time: 06/17/24  2:08 AM   Result Value Ref Range    Magnesium Level 2.10 1.60 - 2.60 mg/dL   Comprehensive Metabolic Panel    Collection Time: 06/17/24  2:08 AM   Result Value Ref Range    Sodium 133 (L) 136 - 145 mmol/L    Potassium 5.0 3.5 - 5.1 mmol/L    Chloride 88 (L) 98 - 107 mmol/L    CO2 32 (H) 23 - 31 mmol/L    Glucose 253 (H) 82 - 115 mg/dL    Blood Urea Nitrogen 40.7 (H) 9.8 - 20.1 mg/dL    Creatinine 1.55 (H) 0.55 - 1.02 mg/dL    Calcium 8.6 8.4 - 10.2 mg/dL    Protein Total 5.6 (L) 5.8 - 7.6 gm/dL    Albumin 2.5 (L) 3.4 - 4.8 g/dL    Globulin 3.1 2.4 - 3.5 gm/dL    Albumin/Globulin Ratio 0.8 (L) 1.1 - 2.0 ratio    Bilirubin Total 0.4 <=1.5 mg/dL     40 - 150 unit/L    ALT 10 0 - 55 unit/L    AST 16 5 - 34 unit/L    eGFR 34 mL/min/1.73/m2    Anion Gap 13.0 mEq/L    BUN/Creatinine Ratio 26    CBC with Differential    Collection Time: 06/17/24  2:08 AM   Result Value Ref Range    WBC 4.20 (L) 4.50 - 11.50 x10(3)/mcL    RBC 2.93 (L) 4.20 - 5.40 x10(6)/mcL    Hgb 8.6 (L) 12.0 - 16.0 g/dL    Hct 26.9 (L) 37.0 - 47.0 %    MCV 91.8 80.0 - 94.0 fL    MCH 29.4 27.0 - 31.0  pg    MCHC 32.0 (L) 33.0 - 36.0 g/dL    RDW 14.6 11.5 - 17.0 %    Platelet 254 130 - 400 x10(3)/mcL    MPV 10.7 (H) 7.4 - 10.4 fL    Neut % 87.2 %    Lymph % 9.0 %    Mono % 1.9 %    Eos % 0.0 %    Basophil % 0.0 %    Lymph # 0.38 (L) 0.6 - 4.6 x10(3)/mcL    Neut # 3.66 2.1 - 9.2 x10(3)/mcL    Mono # 0.08 (L) 0.1 - 1.3 x10(3)/mcL    Eos # 0.00 0 - 0.9 x10(3)/mcL    Baso # 0.00 <=0.2 x10(3)/mcL    IG# 0.08 (H) 0 - 0.04 x10(3)/mcL    IG% 1.9 %    NRBC% 0.0 %   EKG 12-lead    Collection Time: 06/17/24  4:16 AM   Result Value Ref Range    QRS Duration 154 ms    [facility] QTC Calculation 492 ms   POCT glucose    Collection Time: 06/18/24 12:18 AM   Result Value Ref Range    POCT Glucose 195 (H) 70 - 110 mg/dL   Magnesium    Collection Time: 06/18/24  2:58 AM   Result Value Ref Range    Magnesium Level 2.00 1.60 - 2.60 mg/dL   Basic Metabolic Panel    Collection Time: 06/18/24  2:58 AM   Result Value Ref Range    Sodium 132 (L) 136 - 145 mmol/L    Potassium 3.3 (L) 3.5 - 5.1 mmol/L    Chloride 89 (L) 98 - 107 mmol/L    CO2 31 23 - 31 mmol/L    Glucose 180 (H) 82 - 115 mg/dL    Blood Urea Nitrogen 46.1 (H) 9.8 - 20.1 mg/dL    Creatinine 1.55 (H) 0.55 - 1.02 mg/dL    BUN/Creatinine Ratio 30     Calcium 8.7 8.4 - 10.2 mg/dL    Anion Gap 12.0 mEq/L    eGFR 34 mL/min/1.73/m2   CBC with Differential    Collection Time: 06/18/24  2:58 AM   Result Value Ref Range    WBC 7.42 4.50 - 11.50 x10(3)/mcL    RBC 2.71 (L) 4.20 - 5.40 x10(6)/mcL    Hgb 8.1 (L) 12.0 - 16.0 g/dL    Hct 24.6 (L) 37.0 - 47.0 %    MCV 90.8 80.0 - 94.0 fL    MCH 29.9 27.0 - 31.0 pg    MCHC 32.9 (L) 33.0 - 36.0 g/dL    RDW 14.6 11.5 - 17.0 %    Platelet 278 130 - 400 x10(3)/mcL    MPV 10.7 (H) 7.4 - 10.4 fL    Neut % 87.8 %    Lymph % 5.9 %    Mono % 5.1 %    Eos % 0.1 %    Basophil % 0.0 %    Lymph # 0.44 (L) 0.6 - 4.6 x10(3)/mcL    Neut # 6.51 2.1 - 9.2 x10(3)/mcL    Mono # 0.38 0.1 - 1.3 x10(3)/mcL    Eos # 0.01 0 - 0.9 x10(3)/mcL    Baso # 0.00 <=0.2 x10(3)/mcL     IG# 0.08 (H) 0 - 0.04 x10(3)/mcL    IG% 1.1 %    NRBC% 0.0 %   Potassium    Collection Time: 06/18/24  3:11 PM   Result Value Ref Range    Potassium 3.2 (L) 3.5 - 5.1 mmol/L   Magnesium    Collection Time: 06/19/24  3:07 AM   Result Value Ref Range    Magnesium Level 1.80 1.60 - 2.60 mg/dL   Comprehensive Metabolic Panel    Collection Time: 06/19/24  3:07 AM   Result Value Ref Range    Sodium 138 136 - 145 mmol/L    Potassium 3.5 3.5 - 5.1 mmol/L    Chloride 89 (L) 98 - 107 mmol/L    CO2 35 (H) 23 - 31 mmol/L    Glucose 178 (H) 82 - 115 mg/dL    Blood Urea Nitrogen 48.9 (H) 9.8 - 20.1 mg/dL    Creatinine 1.26 (H) 0.55 - 1.02 mg/dL    Calcium 9.7 8.4 - 10.2 mg/dL    Protein Total 6.5 5.8 - 7.6 gm/dL    Albumin 3.0 (L) 3.4 - 4.8 g/dL    Globulin 3.5 2.4 - 3.5 gm/dL    Albumin/Globulin Ratio 0.9 (L) 1.1 - 2.0 ratio    Bilirubin Total 0.5 <=1.5 mg/dL    ALP 83 40 - 150 unit/L    ALT 10 0 - 55 unit/L    AST 14 5 - 34 unit/L    eGFR 44 mL/min/1.73/m2    Anion Gap 14.0 mEq/L    BUN/Creatinine Ratio 39    CBC with Differential    Collection Time: 06/19/24  3:07 AM   Result Value Ref Range    WBC 7.01 4.50 - 11.50 x10(3)/mcL    RBC 3.02 (L) 4.20 - 5.40 x10(6)/mcL    Hgb 8.8 (L) 12.0 - 16.0 g/dL    Hct 27.5 (L) 37.0 - 47.0 %    MCV 91.1 80.0 - 94.0 fL    MCH 29.1 27.0 - 31.0 pg    MCHC 32.0 (L) 33.0 - 36.0 g/dL    RDW 14.5 11.5 - 17.0 %    Platelet 294 130 - 400 x10(3)/mcL    MPV 10.4 7.4 - 10.4 fL    Neut % 90.5 %    Lymph % 5.1 %    Mono % 2.9 %    Eos % 0.1 %    Basophil % 0.1 %    Lymph # 0.36 (L) 0.6 - 4.6 x10(3)/mcL    Neut # 6.34 2.1 - 9.2 x10(3)/mcL    Mono # 0.20 0.1 - 1.3 x10(3)/mcL    Eos # 0.01 0 - 0.9 x10(3)/mcL    Baso # 0.01 <=0.2 x10(3)/mcL    IG# 0.09 (H) 0 - 0.04 x10(3)/mcL    IG% 1.3 %    NRBC% 0.0 %   EKG 12-lead    Collection Time: 06/19/24  4:25 AM   Result Value Ref Range    QRS Duration 154 ms    [facility]HS QTC Calculation 453 ms     Telemetry: PAF with RVR    Physical Exam  Vitals reviewed.   Constitutional:        General: She is not in acute distress.     Appearance: Normal appearance. She is ill-appearing.   HENT:      Head: Normocephalic.      Mouth/Throat:      Mouth: Mucous membranes are moist.   Eyes:      Extraocular Movements: Extraocular movements intact.      Conjunctiva/sclera: Conjunctivae normal.   Cardiovascular:      Rate and Rhythm: Tachycardia present. Rhythm irregular.      Heart sounds: Murmur heard.   Pulmonary:      Effort: Pulmonary effort is normal. No respiratory distress.      Breath sounds: Rhonchi present.      Comments: HF NC  Abdominal:      General: Bowel sounds are normal.   Skin:     General: Skin is warm and dry.   Neurological:      Mental Status: She is alert and oriented to person, place, and time. Mental status is at baseline.   Psychiatric:         Behavior: Behavior normal.         Judgment: Judgment normal.       Current Inpatient Medications:  Current Facility-Administered Medications:     0.9%  NaCl infusion, , Intravenous, PRN, Vishnu Stevenson MD, Stopped at 06/08/24 0133    acetaminophen tablet 650 mg, 650 mg, Oral, Q6H PRN, Naa Chin MD, 650 mg at 06/17/24 1012    acetaminophen tablet 650 mg, 650 mg, Oral, Q4H PRN, Steven Wells MD, 650 mg at 06/19/24 1054    acetaZOLAMIDE injection 250 mg, 250 mg, Intravenous, Q8H, Augustus Barreto MD, 250 mg at 06/19/24 0954    apixaban tablet 5 mg, 5 mg, Oral, BID, Murtaza Burris ANP, 5 mg at 06/19/24 0814    diltiaZEM 125 mg in dextrose 5 % 125 mL IVPB (ready to mix) (titrating), 0-15 mg/hr, Intravenous, Continuous, Murtaza Burris ANP, Last Rate: 5 mL/hr at 06/19/24 1112, 5 mg/hr at 06/19/24 1112    diltiaZEM 24 hr capsule 180 mg, 180 mg, Oral, Daily, Murtaza Burris ANP, 180 mg at 06/19/24 0814    docusate 50 mg/5 mL liquid 50 mg, 50 mg, Oral, Daily, Marga Smith MD, 50 mg at 06/19/24 0814    furosemide injection 40 mg, 40 mg, Intravenous, Q6H, Jay Padilla DO, 40 mg at 06/19/24 1212    gabapentin capsule 300 mg, 300  mg, Oral, BID, Augustus Barreto MD, 300 mg at 06/19/24 0814    hydrALAZINE injection 10 mg, 10 mg, Intravenous, Q6H PRN, Eleno Abbasi MD, 10 mg at 06/16/24 0025    labetaloL injection 10 mg, 10 mg, Intravenous, Q4H PRN, Baldo Finney DO, 10 mg at 06/11/24 0257    Lactobacillus rhamnosus GG 5 billion cell packet (PEDS) 1 each, 1 packet, Oral, BID, Naa Chin MD, 1 each at 06/19/24 0814    levalbuterol nebulizer solution 1.25 mg, 1.25 mg, Nebulization, Q6H PRN, Shanna Montiel MD, 1.25 mg at 06/09/24 1200    LIDOcaine 5 % patch 2 patch, 2 patch, Transdermal, Q24H, Sheri Carson, NP, 2 patch at 06/18/24 1441    LORazepam tablet 0.5 mg, 0.5 mg, Oral, Q4H PRN, Sheri Carson, NP, 0.5 mg at 06/19/24 1210    magnesium sulfate 2g in water 50mL IVPB (premix), 2 g, Intravenous, Once, Murtaza Burris, ANP    melatonin tablet 6 mg, 6 mg, Oral, Nightly PRN, Naa Chin MD, 6 mg at 06/14/24 2038    metoprolol injection 5 mg, 5 mg, Intravenous, Q4H PRN, Justyn Landaverde MD, 5 mg at 06/19/24 0631    metoprolol injection 5 mg, 5 mg, Intravenous, Q5 Min, Murtaza Burris, ANP    morphine injection 0.5 mg, 0.5 mg, Intravenous, Q4H PRN, Augustus Barreto MD    ondansetron injection 4 mg, 4 mg, Intravenous, Q4H PRN, Julissa Rinaldi FNP, 4 mg at 06/11/24 0405    pantoprazole injection 40 mg, 40 mg, Intravenous, BID, Augustus Barreto MD, 40 mg at 06/19/24 0814    sertraline tablet 100 mg, 100 mg, Oral, QHS, Marga Smith MD, 100 mg at 06/18/24 2046    traMADoL tablet 50 mg, 50 mg, Oral, TID, Vishnu Stevenson MD, 50 mg at 06/19/24 0814    traMADoL tablet 50 mg, 50 mg, Oral, TID PRN, Augustus Barreto MD  VTE Risk Mitigation (From admission, onward)           Ordered     apixaban tablet 5 mg  2 times daily         06/17/24 1821     IP VTE HIGH RISK PATIENT  Once         06/01/24 2247                  Assessment:   Acute on Chronic Diastolic HF/EF 55-60%    - EF 55-60% with Grade I Diastolic Dysfunction    - RHC  (6.17.24) - RA 11, RV 53/9, PA 57/31 (mean 41), PCWP 34, PA sat 58% (on 100% FiO2 BiPAP), CO/CI: 3.8/2.1 (TD), PVR: 1.9 Woods Assessment: Elevated R and L-Sided Filling Pressures  Atrial Fibrillation with RVR (New Onset) - Remains in RVR    - CHADsVASc - 7 Points - 11.2% Stroke Risk per Year     - History of PSVT    - On FD Eliquis for Stroke Risk Reduction  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     - Based on RHC Finding PT appears to be in HF with EDP 31mmHg (6.17.24)  Aspiration Pneumonia    - Cleared Swallowing Study  Acute Hypoxemic Respiratory Failure Requiring Oxygenation - on BiPAP/HF NC O2  Leukocytosis/Sepsis - Improving   Valvular Heart Disease    - AS: Status Post TAVR  23  mm S3 ultra valve, nominal prep,  Right TF approach (2.27.23)  CAD/CABG    - LIMA to LAD (Patent) with 50% Stenosis Native LCX  Hypertension   Hyperlipidemia  Chronic Left Bundle Branch Block ([place] TAVR)  MELCHOR/CEA  History of CVA  Anemia  DNR Status   No known history of GI Bleed     Plan:   Continue Diuresis with Lasix 40mg IVP BID   Accurate I&Os and Daily Weights   Continue Eliquis 5mg PO BID RE: Stroke Risk Reduction   Restart IV Cardizem and Titrate for HR < 100 bpm  Increase Cardizem to 240mg PO Qday   Keep K > 4.0 and Mg > 2.0  Replete Mg and K  Consider Blood Transfusion to Keep Hgb > 9.0   Labs in AM: CBC, CMP and Mg    Murtaza Burris, ANP  Cardiology  [facility]  06/19/2024    Physician addendum:  I have seen and examined this patient as a split-shared visit with the KELLIE d/t complicated medical management of above problems written in assessment and high acuity requiring physician expertise in medical decision-making. I performed the substantive portion of the history and exam. Above medical decision-making is also formulated by me.    Cardiovascular exam:  S1, S2  Lungs:  fine crackles at bases.  Extremities:  1+ edema bilaterally    Plan:  Continue diuresis.  Continue  Eliquis.  Restart IV Cardizem.  Medications as above.  Continue supportive therapy.  We will follow up.      Salas Gould MD  Cardiologist

## 2024-06-19 NOTE — PLAN OF CARE
Problem: Occupational Therapy  Goal: Occupational Therapy Goal  Description: Goals to be met by: 7/19/24    Patient will increase functional independence with ADLs by performing:    UE Dressing with Supervision.  LE Dressing with Supervision.  Grooming while standing at sink with Supervision.  Toileting from toilet with Supervision for hygiene and clothing management.   Toilet transfer to toilet with Supervision.    Outcome: Progressing

## 2024-06-19 NOTE — PLAN OF CARE
Problem: Adult Inpatient Plan of Care  Goal: Plan of Care Review  Outcome: Progressing  Goal: Patient-Specific Goal (Individualized)  Outcome: Progressing  Goal: Absence of Hospital-Acquired Illness or Injury  Outcome: Progressing  Goal: Optimal Comfort and Wellbeing  Outcome: Progressing  Goal: Readiness for Transition of Care  Outcome: Progressing     Problem: Fall Injury Risk  Goal: Absence of Fall and Fall-Related Injury  Outcome: Progressing     Problem: Coping Ineffective  Goal: Effective Coping  Outcome: Progressing

## 2024-06-19 NOTE — PROGRESS NOTES
[facility] - 7th Floor ICU  Nephrology  Progress Note    Patient Name: Alanna Moya  MRN: [id number]  Admission Date: 6/1/2024  Hospital Length of Stay: 18 days  Attending Provider: Augustus Barreto MD   Primary Care Physician: Cornel Haddad MD  Principal Problem:<principal problem not specified>      Subjective:     Alanna Moya is a 79 y.o. female who presents with shortness of breath.  Past medical history significant for coronary artery disease status post CABG, hypertension, hyperlipidemia, HFpEF, aortic valve replacement, diverticulosis, and history of CVA.  She initially presented on 06/01/2024 with worsening shortness of breath.  She was initially admitted to the ICU due to BiPAP requirements.  She was given IV Lasix, and had some improvement in her shortness of breath initially, however, shortness of breath recurred and continue to worsen.  She was transferred to the floor, but had to be transferred back to the ICU due to increasing oxygen requirements.  There was some concern for possible amiodarone toxicity causing worsening lung function.  She did have a right heart catheterization done by Cardiology on 06/17/2024 showed HFpEF, and significantly elevated pulmonary capillary wedge pressure of 34.  Overall she has not diuresed very well, and Nephrology was consulted for assistance with volume management.  Patient states that prior to hospitalization she had not required supplemental oxygen.  She is now on high-flow nasal cannula 90% FiO2, 40 L. she has no lower extremity edema.  She does feel short of breath.  No chest pain, abdominal pain, nausea, or vomiting.     Interval History:   Urine output improved with increased in diuretics. She voided >4L in the past 24 hours. Remains on Vapotherm 40 LPM, 100% FiO2.     Review of patient's allergies indicates:  No Known Allergies  Current Facility-Administered Medications   Medication Frequency    0.9%  NaCl infusion PRN     acetaminophen tablet 650 mg Q6H PRN    acetaminophen tablet 650 mg Q4H PRN    acetaZOLAMIDE injection 250 mg Q8H    apixaban tablet 5 mg BID    diltiaZEM 125 mg in dextrose 5 % 125 mL IVPB (ready to mix) (titrating) Continuous    diltiaZEM 24 hr capsule 180 mg Daily    docusate 50 mg/5 mL liquid 50 mg Daily    furosemide injection 40 mg Q6H    gabapentin capsule 300 mg BID    hydrALAZINE injection 10 mg Q6H PRN    labetaloL injection 10 mg Q4H PRN    Lactobacillus rhamnosus GG 5 billion cell packet (PEDS) 1 each BID    levalbuterol nebulizer solution 1.25 mg Q6H PRN    LIDOcaine 5 % patch 2 patch Q24H    LORazepam tablet 0.5 mg Q4H PRN    melatonin tablet 6 mg Nightly PRN    metoprolol injection 5 mg Q4H PRN    morphine injection 2 mg Q4H PRN    ondansetron injection 4 mg Q4H PRN    pantoprazole injection 40 mg BID    sertraline tablet 100 mg QHS    traMADoL tablet 50 mg TID       Objective:     Vital Signs (Most Recent):  Temp: 97.7 °F (36.5 °C) (06/19/24 0400)  Pulse: 73 (06/19/24 0500)  Resp: 18 (06/19/24 0814)  BP: (!) 154/76 (06/19/24 0631)  SpO2: 99 % (06/19/24 0500) Vital Signs (24h Range):  Temp:  [97.7 °F (36.5 °C)-98.5 °F (36.9 °C)] 97.7 °F (36.5 °C)  Pulse:  [70-92] 73  Resp:  [13-27] 18  SpO2:  [86 %-99 %] 99 %  BP: (153-165)/(61-78) 154/76     Weight: 77.1 kg (170 lb) (06/01/24 2230)  Body mass index is 31.09 kg/m².  Body surface area is 1.84 meters squared.    I/O last 3 completed shifts:  In: 1242.2 [P.O.:980; I.V.:262.2]  Out: 4945 [Urine:4945]    Physical Exam  Constitutional:       General: She is not in acute distress.     Appearance: She is ill-appearing.   HENT:      Head: Atraumatic.      Nose: Nose normal.      Mouth/Throat:      Mouth: Mucous membranes are moist.   Cardiovascular:      Rate and Rhythm: Normal rate and regular rhythm.   Pulmonary:      Effort: Pulmonary effort is normal.      Comments: Vapotherm 40 LPM, FiO2 100%, SpO2 97%, non labored   Abdominal:      General: There is no  distension.      Palpations: Abdomen is soft.   Genitourinary:     Comments: Urinary catheter with pale clear yellow urine   Musculoskeletal:         General: Swelling present.      Cervical back: Neck supple.   Skin:     General: Skin is warm.   Neurological:      Mental Status: She is alert and oriented to person, place, and time.   Psychiatric:         Mood and Affect: Mood normal.         Behavior: Behavior normal.         Significant Labs:sureBMP:   Recent Labs   Lab 06/19/24  0307      K 3.5   CL 89*   CO2 35*   BUN 48.9*   CREATININE 1.26*   CALCIUM 9.7   MG 1.80     CBC:   Recent Labs   Lab 06/19/24  0307   WBC 7.01   RBC 3.02*   HGB 8.8*   HCT 27.5*      MCV 91.1   MCH 29.1   MCHC 32.0*     Microbiology Results (last 7 days)       Procedure Component Value Units Date/Time    Body Fluid Culture [[id number]] Collected: 06/14/24 1021    Order Status: Completed Specimen: Body Fluid from Thoracentesis Fluid Updated: 06/18/24 0759     Body Fluid Culture No growth at 4 days    Gram Stain [[id number]] Collected: 06/14/24 1021    Order Status: Completed Specimen: Body Fluid from Pleural Fluid Updated: 06/14/24 1320     GRAM STAIN Rare WBC observed      No bacteria seen    KOH Prep [[id number]] Collected: 06/14/24 1022    Order Status: Completed Specimen: Body Fluid from Pleural Fluid Updated: 06/14/24 1256     KOH Prep No fungal elements seen    Respiratory Culture [[id number]] Updated: 06/14/24 1111    Order Status: Canceled Specimen: Sputum, Expectorated     Respiratory Culture [[id number]] Updated: 06/14/24 1111    Order Status: Canceled Specimen: Respiratory from Sputum, Expectorated     Fungal Culture [[id number]] Collected: 06/14/24 1021    Order Status: Sent Specimen: Body Fluid from Pleural Fluid Updated: 06/14/24 1028    Mycobacteria and Nocardia Culture [[id number]] Collected: 06/14/24 1021    Order Status: Sent Specimen: Body Fluid from Pleural Fluid, Right Updated: 06/14/24 1028       "    Specimen (24h ago, onward)      None          No results for input(s): "COLORU", "CLARITYU", "SPECGRAV", "PHUR", "PROTEINUA", "GLUCOSEU", "BILIRUBINCON", "BLOODU", "WBCU", "RBCU", "BACTERIA", "MUCUS", "NITRITE", "LEUKOCYTESUR", "UROBILINOGEN", "HYALINECASTS" in the last 168 hours.    Significant Imaging:    Imaging reviewed     Assessment/Plan:     Acute kidney injury - no known CKD.  Baseline creatinine around 1  Acute hypoxic respiratory failure  HFpEF  Pulmonary hypertension  Atrial fibrillation    Renal function improving with improved urine output. Considering she is remaining on 100% FiO2 we will continue Lasix q6 hours. Diamox added every 8 hours to start this morning per ICU team.   We will continue to monitor renal function while assisting with diuresis       LATANYA Stearns  Nephrology  [facility] - 7th Floor ICU  "

## 2024-06-19 NOTE — PT/OT/SLP RE-EVAL
"Occupational Therapy   Re-evaluation    Name: Alanna Moya  MRN: [id number]  Admitting Diagnosis: acute resp failure  Recent Surgery: Procedure(s) (LRB):  INSERTION, CATHETER, RIGHT HEART (N/A) 2 Days Post-Op    Recommendations:     Discharge therapy intensity: Moderate Intensity Therapy   Discharge Equipment Recommendations:  to be determined by next level of care  Barriers to discharge:       Assessment:     Alanna Moya is a 79 y.o. female with a medical diagnosis of acute hypoxic resp failure, diastolic heart failure, R ventricular failure with potential pulmonary hypertension, afib, potential amiodarone related pulmonary toxicity.  She presents up in chair, agreeable.  Limited by vapotherm.  RN present for session.  Pt has been having difficulty with mobilizing with nursing staff.  Pt educated on techniques.  Gait belt provided.  RW and BSC present.  She presents with the following performance deficits affecting function: weakness, impaired self care skills, impaired functional mobility, impaired endurance, impaired balance, gait instability, decreased lower extremity function.    Rehab Prognosis: Good; patient would benefit from acute skilled OT services to address these deficits and reach maximum level of function.       Plan:     Patient to be seen 4 x/week to address the above listed problems via self-care/home management  Plan of Care Expires: 07/19/24  Plan of Care Reviewed with: patient    Subjective     Chief Complaint: "I'm tired of being in chair"  Patient/Family Comments/goals: "get stronger"    Pain/Comfort:  Pain Rating 1: 0/10    Patients cultural, spiritual, Hindu conflicts given the current situation: no    Objective:     OT communicated with RN prior to session.      Patient was found up in chair with  (vital monitoring, vapotherm) upon OT entry to room. Pt prefers to sleep up in chair at night.    General Precautions: Standard, fall  Orthopedic Precautions: N/A  Braces: " N/A    Vital Signs: 127/70  90% FiO2, 35 LPM    Bed Mobility:    Patient completed Sit to Supine with moderate assistance    Functional Mobility/Transfers:  Patient completed Sit <> Stand Transfer with minimum assistance  with  rolling walker   Patient completed Toilet Transfer Step Transfer technique with minimum assistance with  rolling walker  Functional Mobility: Min A RW and gait belt utilized, requires cue to utilize UE to push up from chair    Activities of Daily Living:  Lower Body Dressing: maximal assistance    Toileting: maximal assistance +BM, assist required due to need for support on RW    Functional Cognition:  Intact    Visual Perceptual Skills:  Intact    Upper Extremity Function:  Right Upper Extremity:   WFL    Left Upper Extremity:  WFL    Balance:   Intact    Therapeutic Positioning  Risk for acquired pressure injuries is increased due to relative decrease in mobility d/t hospitalization  and impaired mobility.  Pt does have catheter    OT interventions performed during the course of today's session in an effort to prevent and/or reduce acquired pressure injuries:   Education was provided on benefits of and recommendations for therapeutic positioning    Skin assessment: all bony prominences were assessed    Findings: no redness or breakdown noted    OT recommendations for therapeutic positioning throughout hospitalization:   Follow [facility] Pressure Injury Prevention Protocol    Use geomat with caution as pt has slid out of chair this hospital stay      Patient Education:  Patient provided with verbal education education regarding OT role/goals/POC, fall prevention, and pressure ulcer prevention.  Understanding was verbalized, however additional teaching warranted.     Patient left HOB elevated with all lines intact and call button in reach    GOALS:   Multidisciplinary Problems       Occupational Therapy Goals          Problem: Occupational Therapy    Goal Priority Disciplines Outcome  Interventions   Occupational Therapy Goal     OT, PT/OT Progressing    Description: Goals to be met by: 7/19/24    Patient will increase functional independence with ADLs by performing:    UE Dressing with Supervision.  LE Dressing with Supervision.  Grooming while standing at sink with Supervision.  Toileting from toilet with Supervision for hygiene and clothing management.   Toilet transfer to toilet with Supervision.                         History:     Past Medical History:   Diagnosis Date    Abdominal pain, unspecified site     Aortic stenosis     Coronary atherosclerosis of unspecified type of vessel, native or graft     Esophageal reflux     Fatigue     History of glaucoma     History of heart attack     Hypertension     Lumbosacral spondylosis without myelopathy     Other and unspecified hyperlipidemia     Rectocele     SOB (shortness of breath) on exertion     Unspecified essential hypertension     Weakness          Past Surgical History:   Procedure Laterality Date    CHOLECYSTECTOMY      CORONARY ARTERY BYPASS GRAFT      GLAUCOMA SURGERY Bilateral     HYSTERECTOMY      RIGHT HEART CATHETERIZATION N/A 6/17/2024    Procedure: INSERTION, CATHETER, RIGHT HEART;  Surgeon: Steven Wells MD;  Location: [facility] CATH LAB;  Service: Cardiology;  Laterality: N/A;    TRANSCATHETER AORTIC VALVE REPLACEMENT (TAVR) N/A 2/27/2023    Procedure: REPLACEMENT, AORTIC VALVE, TRANSCATHETER (TAVR);  Surgeon: Anselmo Arango MD;  Location: [facility] CATH LAB;  Service: Cardiology;  Laterality: N/A;  TAVR W/ ANEST.       Time Tracking:     OT Date of Treatment:    OT Start Time: 0838  OT Stop Time: 0903  OT Total Time (min): 25 min    Billable Minutes:Re-eval 1    6/19/2024

## 2024-06-19 NOTE — MEDICAL/APP STUDENT
[facility] - 7th Floor ICU  Pulmonary Critical Care Note    Patient Name: Alanna Moya  MRN: [id number]  Admission Date: 6/1/2024  Hospital Length of Stay: 18 days  Code Status: DNR  Attending Provider: Augustus Barreto MD  Primary Care Provider: Cornel Haddad MD     Subjective:     HPI:   78F presented as a transfer from outside hospital with BiPAP requirements, initially admitted to intensive care unit but downgraded to floor following robust response to diuretic therapy.  Has been managed for volume overload and pneumonia on hospital medicine service.  Patient has elected for DNR code status.  Pulmonary medicine consult for worsening hypoxemia. Repeat chest x-ray 06/09/2024 with changes concerning for worsening volume overload.     Hospital Course/Significant events:   6/16: Has shown minimal improvement with aggressive diuresis and thoracentesis, with increasing renal indices and persistent bilateral ground-glass opacities on CT chest.  Overall, this raises suspicion that these findings may not be related to pulmonary edema. Amiodarone discontinued and corticosteroids initiated for potential amiodarone related pulmonary toxicity.     6/17: Right heart cath resulting PCWP 34. Patient needs optimization of CHF therapy, requires more diuresis, management per cardiology.  Findings:  RA 11, RV 53/9, PA 57/31 (mean 41), PCWP 34, PA sat 58% (on 100% FiO2 BiPAP)  CO/CI: 3.8/2.1 (TD)  PVR: 1.9 Woods    24 Hour Interval History:  She continues to require Bipap overnight. Currently on vapotherm 35L 90% FiO2 with O2 sat 96%. Using IS. Net I&O -3195 in past 24hr. She is up in the chair.    Past Medical History:   Diagnosis Date    Abdominal pain, unspecified site     Aortic stenosis     Coronary atherosclerosis of unspecified type of vessel, native or graft     Esophageal reflux     Fatigue     History of glaucoma     History of heart attack     Hypertension     Lumbosacral spondylosis without  myelopathy     Other and unspecified hyperlipidemia     Rectocele     SOB (shortness of breath) on exertion     Unspecified essential hypertension     Weakness        Past Surgical History:   Procedure Laterality Date    CHOLECYSTECTOMY      CORONARY ARTERY BYPASS GRAFT      GLAUCOMA SURGERY Bilateral     HYSTERECTOMY      RIGHT HEART CATHETERIZATION N/A 6/17/2024    Procedure: INSERTION, CATHETER, RIGHT HEART;  Surgeon: Steven Wells MD;  Location: [facility] CATH LAB;  Service: Cardiology;  Laterality: N/A;    TRANSCATHETER AORTIC VALVE REPLACEMENT (TAVR) N/A 2/27/2023    Procedure: REPLACEMENT, AORTIC VALVE, TRANSCATHETER (TAVR);  Surgeon: Anselmo Arango MD;  Location: [facility] CATH LAB;  Service: Cardiology;  Laterality: N/A;  TAVR W/ ANEST.       Social History     Socioeconomic History    Marital status:    Tobacco Use    Smoking status: Never    Smokeless tobacco: Never   Substance and Sexual Activity    Alcohol use: No    Drug use: No    Sexual activity: Never     Birth control/protection: Surgical     Social Determinants of Health     Financial Resource Strain: Low Risk  (5/3/2021)    Received from [facility]    Overall Financial Resource Strain (CARDIA)     Difficulty of Paying Living Expenses: Not hard at all   Food Insecurity: No Food Insecurity (5/3/2021)    Received from [facility]    Hunger Vital Sign     Worried About Running Out of Food in the Last Year: Never true     Ran Out of Food in the Last Year: Never true   Transportation Needs: No Transportation Needs (5/3/2021)    Received from [facility]    PRAPARE - Transportation     Lack of Transportation (Medical): No     Lack of Transportation (Non-Medical): No   Physical Activity: Sufficiently Active (5/3/2021)    Received from [facility]    Exercise Vital Sign     Days of Exercise per Week: 2 days     Minutes of Exercise per Session: 130 min   Stress: Stress Concern Present (5/3/2021)    Received from [facility]    [employer] - Occupational Stress Questionnaire     Feeling of Stress : Very much           Current Outpatient Medications   Medication Instructions    amLODIPine (NORVASC) 10 mg, Oral, 2 times daily    aspirin 325 MG tablet 1 tablet, Oral, Daily    carvediloL (COREG) 12.5 mg, Oral, 2 times daily with meals    cholecalciferol, vitamin D3, (VITAMIN D3) 50 mcg (2,000 unit) Cap capsule 1 capsule, Oral, Daily    cyanocobalamin, vitamin B-12, 2,000 mcg Tab 1 tablet, Oral, Daily    cyclobenzaprine (FLEXERIL) 10 mg, Oral, 3 times daily    docusate sodium (STOOL SOFTENER ORAL) 2 capsules, Oral, 2 times daily    furosemide (LASIX) 40 mg, Oral, Daily    hydrALAZINE (APRESOLINE) 25 mg, Oral, Every 8 hours    L. acidophilus/Bifid. animalis (DAILY PROBIOTIC ORAL) 1 capsule, Oral, Daily    potassium gluconate 650 mg, Oral, Once    rosuvastatin (CRESTOR) 40 mg, Oral, Nightly    sertraline (ZOLOFT) 100 mg, Oral, Nightly    traMADoL (ULTRAM) 50 mg, Oral, 3 times daily       Current Inpatient Medications   apixaban  5 mg Oral BID    diltiaZEM  180 mg Oral Daily    docusate  50 mg Oral Daily    furosemide (LASIX) injection  40 mg Intravenous Q6H    gabapentin  300 mg Oral BID    Lactobacillus rhamnosus GG  1 packet Oral BID    LIDOcaine  2 patch Transdermal Q24H    methylPREDNISolone sodium succinate injection  60 mg Intravenous Q6H    pantoprazole  40 mg Intravenous BID    sertraline  100 mg Oral QHS    traMADoL  50 mg Oral TID       Current Intravenous Infusions   dilTIAZem  0-15 mg/hr Intravenous Continuous 5 mL/hr at 06/18/24 0853 5 mg/hr at 06/18/24 0853         Review of Systems   Constitutional:  Negative for chills, diaphoresis and fever.   Respiratory:  Positive for shortness of breath.    All other systems reviewed and are negative.         Objective:       Intake/Output Summary (Last 24 hours) at 6/19/2024 0814  Last data filed at 6/19/2024 0514  Gross per 24 hour   Intake 980 ml   Output 3865 ml   Net -2885 ml          Vital Signs (Most Recent):  Temp: 97.7 °F (36.5 °C) (06/19/24 0400)  Pulse: 73 (06/19/24 0500)  Resp: 16 (06/19/24 0500)  BP: (!) 154/76 (06/19/24 0631)  SpO2: 99 % (06/19/24 0500)  Body mass index is 31.09 kg/m².  Weight: 77.1 kg (170 lb) Vital Signs (24h Range):  Temp:  [97.7 °F (36.5 °C)-98.5 °F (36.9 °C)] 97.7 °F (36.5 °C)  Pulse:  [70-92] 73  Resp:  [13-27] 16  SpO2:  [86 %-99 %] 99 %  BP: (153-165)/(61-78) 154/76     Physical Exam  Vitals reviewed.   Constitutional:       Appearance: Normal appearance.   HENT:      Head: Normocephalic and atraumatic.      Mouth/Throat:      Mouth: Mucous membranes are moist.      Pharynx: Oropharynx is clear.   Eyes:      Extraocular Movements: Extraocular movements intact.   Cardiovascular:      Rate and Rhythm: Normal rate and regular rhythm.      Heart sounds: Normal heart sounds.   Pulmonary:      Breath sounds: Rales present.      Comments: Decreased lung sounds R>L slightly worse than yesterday  Musculoskeletal:      Cervical back: Normal range of motion.   Skin:     General: Skin is warm and dry.   Neurological:      General: No focal deficit present.      Mental Status: She is alert and oriented to person, place, and time.   Psychiatric:         Mood and Affect: Mood normal.         Behavior: Behavior normal.           Lines/Drains/Airways       Drain  Duration                  Urethral Catheter 06/09/24 1754 9 days              Peripheral Intravenous Line  Duration                  Peripheral IV - Single Lumen 06/07/24 1700 20 G Anterior;Proximal;Right Forearm 11 days         Peripheral IV - Single Lumen 06/08/24 0428 20 G Left Forearm 11 days                    Significant Labs:    Lab Results   Component Value Date    WBC 7.01 06/19/2024    HGB 8.8 (L) 06/19/2024    HCT 27.5 (L) 06/19/2024    MCV 91.1 06/19/2024     06/19/2024           BMP  Lab Results   Component Value Date     06/19/2024    K 3.5 06/19/2024    CHLORIDE 97 05/04/2021    CO2 35  "(H) 06/19/2024    BUN 48.9 (H) 06/19/2024    CREATININE 1.26 (H) 06/19/2024    CALCIUM 9.7 06/19/2024    AGAP 14.0 06/19/2024    ESTGFRAFRICA 92 05/16/2024    EGFRNONAA 28.1 (A) 08/10/2017         ABG  No results for input(s): "PH", "PO2", "PCO2", "HCO3", "POCBASEDEF" in the last 168 hours.    Mechanical Ventilation Support:  Oxygen Concentration (%): 100 (06/18/24 2154)      Significant Imaging:  I have reviewed the pertinent imaging within the past 24 hours.  Cardiac catheterization  The procedure log was documented by No documenter listed and verified by   Steven Wells MD.    Procedure:  Right heart catheterization    Preoperative diagnosis:    Hypoxic respiratory failure    Postoperative diagnosis:    As above  Congestive heart failure    Access: Right common femoral vein  EBL: 10 cc  Complications: None    Summary:  Consent obtained.  Risks and benefits discussed with the patient.  The   patient was brought to the cath lab in a fasting state.  The patient was   prepped and draped in usual sterile fashion.  Ultrasound-guided right   common femoral venous access via modified Seldinger technique with   micropuncture.  A 7 Faroese sheath was inserted into the right common   femoral vein.  Seven [place]ese balloon tip Adrian-Micky catheter was used to   obtain intracardiac pressures, O2 sats, cardiac outputs and wedge   pressure.  At the end the procedure all wires and catheters removed.    Manual hemostasis.    Findings:  RA 11, RV 53/9, PA 57/31 (mean 41), PCWP 34, PA sat 58% (on 100% FiO2   BiPAP)  CO/CI: 3.8/2.1 (TD)  PVR: 1.9 Woods    Assessment:  -Elevated right and left-sided filling pressures  -Low cardiac outputs  -Moderate pulmonary hypertension with normal pulmonary vascular resistance    Plan:  -Consider diuresis and maintenance of normal sinus rhythm    Date: 6/17/2024  Time: 3:57 PM           Assessment/Plan:     Assessment  Acute hypoxic respiratory failure  Diastolic heart failure  Right ventricular " failure with potential pulmonary hypertension: right heart cath 06/17 PCWP 34  Atrial fibrillation    Plan  -Aggressive diuresis per cardiology and nephrology  -wean o2 as tolerated  -hold amiodarone for potential amiodarone related pulmonary toxicity.   -Ok to DC steroids as they are not necessary from a pulmonary perspective at this time  -patient wishes to remain DO NOT RESUSCITATE/DO NOT INTUBATE      HARSHIL Yen-[id number]  Pulmonary Critical Care Medicine  [facility] - 7th Floor ICU  DOS: 06/19/2024

## 2024-06-19 NOTE — PROGRESS NOTES
Inpatient Nutrition Assessment    Admit Date: 6/1/2024   Total duration of encounter: 18 days   Patient Age: 79 y.o.    Nutrition Recommendation/Prescription     Continue current diet (Diet Heart Healthy Fluid - 1500mL) as tolerated.     Communication of Recommendations: reviewed with nurse    Nutrition Assessment     Malnutrition Assessment/Nutrition-Focused Physical Exam    Malnutrition Context: other (see comments) (does not meet criteria) (06/03/24 1413)  Malnutrition Level: other (see comments) (does not meet criteria) (06/03/24 1413)  Energy Intake (Malnutrition): other (see comments) (does not meet criteria) (06/03/24 1413)  Weight Loss (Malnutrition): other (see comments) (does not meet criteria) (06/03/24 1413)  Subcutaneous Fat (Malnutrition): other (see comments) (does not meet criteria) (06/03/24 1413)           Muscle Mass (Malnutrition): other (see comments) (does not meet criteria) (06/03/24 1413)                          Fluid Accumulation (Malnutrition): other (see comments) (does not meet criteria) (06/03/24 1413)        A minimum of two characteristics is recommended for diagnosis of either severe or non-severe malnutrition.    Chart Review    Reason Seen: follow-up    Malnutrition Screening Tool Results   Have you recently lost weight without trying?: No  Have you been eating poorly because of a decreased appetite?: No   MST Score: 0   Diagnosis:  Acute respiratory failure alternating between BiPAP and Vapotherm   CHF exacerbation  Pulmonary Edema vs Pneumonia  HTN  Aortic Valve replacement     Relevant Medical History:    Abdominal pain      Aortic stenosis      Coronary atherosclerosis      Esophageal reflux      Fatigue      Glaucoma      Heart attack      Hypertension      Lumbosacral spondylosis without myelopathy      Other and unspecified hyperlipidemia      Rectocele      SOB (shortness of breath) on exertion      CABG and AVR       Scheduled Medications:  acetaZOLAMIDE, 250 mg,  Q8H  apixaban, 5 mg, BID  diltiaZEM, 180 mg, Daily  docusate, 50 mg, Daily  furosemide (LASIX) injection, 40 mg, Q6H  gabapentin, 300 mg, BID  Lactobacillus rhamnosus GG, 1 packet, BID  LIDOcaine, 2 patch, Q24H  pantoprazole, 40 mg, BID  sertraline, 100 mg, QHS  traMADoL, 50 mg, TID    Continuous Infusions: none  dilTIAZem, Last Rate: 5 mg/hr (06/19/24 1112)    PRN Medications:  sodium chloride 0.9%, , PRN  acetaminophen, 650 mg, Q6H PRN  acetaminophen, 650 mg, Q4H PRN  hydrALAZINE, 10 mg, Q6H PRN  labetalol, 10 mg, Q4H PRN  levalbuterol, 1.25 mg, Q6H PRN  LORazepam, 0.5 mg, Q4H PRN  melatonin, 6 mg, Nightly PRN  metoprolol, 5 mg, Q4H PRN  morphine, 2 mg, Q4H PRN  ondansetron, 4 mg, Q4H PRN    Calorie Containing IV Medications: no significant kcals from medications at this time    Recent Labs   Lab 06/13/24  0257 06/14/24  0241 06/14/24  1706 06/15/24  0257 06/16/24  0327 06/16/24  0349 06/17/24  0208 06/18/24  0258 06/18/24  1511 06/19/24  0307   * 135* 135* 138  --  136 133* 132*  --  138   K 3.8 4.3 4.2 4.0  --  4.0 5.0 3.3* 3.2* 3.5   CALCIUM 9.0 9.2 8.8 9.2  --  8.8 8.6 8.7  --  9.7   PHOS 3.3 4.0  --   --   --   --   --   --   --   --    MG 1.60 2.50 2.10 2.00  --  1.90 2.10 2.00  --  1.80   CO2 28 28 30 28  --  29 32* 31  --  35*   BUN 20.7* 22.2* 27.0* 29.4*  --  30.9* 40.7* 46.1*  --  48.9*   CREATININE 0.98 0.98 1.05* 1.05*  --  1.10* 1.55* 1.55*  --  1.26*   EGFRNORACEVR 60 60 55 55  --  52 34 34  --  44   GLUCOSE 103 94 84 91  --  79* 253* 180*  --  178*   BILITOT 0.4  --   --  0.5  --  0.5 0.4  --   --  0.5   ALKPHOS 77  --   --  93  --  100 107  --   --  83   ALT 9  --   --  9  --  9 10  --   --  10   AST 16  --   --  16  --  17 16  --   --  14   ALBUMIN 2.5* 2.5*  --  2.3*  --  2.5* 2.5*  --   --  3.0*   WBC 11.55* 12.77*  --  11.23 9.59  --  4.20* 7.42  --  7.01   HGB 9.0* 9.3*  --  9.0* 9.7*  --  8.6* 8.1*  --  8.8*   HCT 28.4* 29.4*  --  28.9* 30.4*  --  26.9* 24.6*  --  27.5*     Nutrition  "Orders:  Diet Heart Healthy Fluid - 1500mL    Appetite/Oral Intake: good/% of meals  Factors Affecting Nutritional Intake: none identified  Social Needs Impacting Access to Food: none identified  Food/Holiness/Cultural Preferences: none reported  Food Allergies: no known food allergies  Last Bowel Movement: 06/19/24  Wound(s):  none noted    Comments    6/3/24: Patient currently NPO on BiPAP/Vapotherm. Clinimix running via PICC. Patient reports good-fair oral intake prior to admit. Denies any unintentional wt loss.     6/7/24 Patient started on a diet, PPN discontinued, patient reports a good appetite, was not liking the food on full liquid diet this morning, agreeable to chocolate Boost, nurse reports patient eating about 75% of meals.    6/12/24 Good appetite/intake continues, not drinking much Boost, will discontinue.    6/19/24 Patient sleeping during rounds, good intake reported by nurse.    Anthropometrics    Height: 5' 2" (157.5 cm),    Last Weight: 77.1 kg (170 lb) (06/01/24 2230), Weight Method: Standard Scale  BMI (Calculated): 31.1  BMI Classification: obese grade I (BMI 30-34.9)     Ideal Body Weight (IBW), Female: 110 lb     % Ideal Body Weight, Female (lb): 154.55 %                             Usual Weight Provided By: patient denies unintentional weight loss    Wt Readings from Last 5 Encounters:   06/01/24 77.1 kg (170 lb)   02/27/23 77 kg (169 lb 12.1 oz)   02/15/23 74.8 kg (165 lb)   08/10/17 78.8 kg (173 lb 11.6 oz)   08/10/17 78.8 kg (173 lb 11.6 oz)     Weight Change(s) Since Admission:   (6/7) no new weights  Wt Readings from Last 1 Encounters:   06/01/24 2230 77.1 kg (170 lb)   Admit Weight: 77.1 kg (170 lb) (06/01/24 2230), Weight Method: Stated    Estimated Needs    Weight Used For Calorie Calculations: 77.1 kg (169 lb 15.6 oz)  Energy Calorie Requirements (kcal): 1467 kcal/day (mifflin st jeor x 1.2 SF)  Energy Need Method: Dyer-St Jeor  Weight Used For Protein Calculations: 77.1 " kg (169 lb 15.6 oz)  Protein Requirements: 85 g/day (1.1 g/kg/d)  Fluid Requirements (mL): 1467 mL/day (1mL/kcal)      Enteral Nutrition Patient not receiving enteral nutrition at this time.    Parenteral Nutrition Patient no longer receiving parenteral nutrition support.    Evaluation of Received Nutrient Intake    Calories: meeting estimated needs  Protein: meeting estimated needs    Patient Education Not applicable.    Nutrition Diagnosis     PES: Inadequate oral intake related to acute illness as evidenced by NPO due to BiPAP. (resolved)     Nutrition Interventions     Intervention(s): general/healthful diet and collaboration with other providers    Goal: Meet greater than 80% of nutritional needs by follow-up. (goal met)  Goal: Maintain weight throughout hospitalization. (goal progressing)    Nutrition Goals & Monitoring     Dietitian will monitor: food and beverage intake, energy intake, weight, electrolyte/renal panel, and glucose/endocrine profile  Discharge planning: continue heart healthy diet  Nutrition Risk/Follow-Up: low (follow-up in 5-7 days)   Please consult if re-assessment needed sooner.

## 2024-06-20 LAB
ALBUMIN SERPL-MCNC: 3.3 G/DL (ref 3.4–4.8)
ALBUMIN/GLOB SERPL: 0.9 RATIO (ref 1.1–2)
ALLENS TEST BLOOD GAS (OHS): NO
ALP SERPL-CCNC: 99 UNIT/L (ref 40–150)
ALT SERPL-CCNC: 14 UNIT/L (ref 0–55)
ANION GAP SERPL CALC-SCNC: 15 MEQ/L
AST SERPL-CCNC: 21 UNIT/L (ref 5–34)
BASE EXCESS BLD CALC-SCNC: 21.5 MMOL/L (ref -2–2)
BASOPHILS # BLD AUTO: 0.02 X10(3)/MCL
BASOPHILS NFR BLD AUTO: 0.1 %
BILIRUB SERPL-MCNC: 0.5 MG/DL
BLOOD GAS SAMPLE TYPE (OHS): ABNORMAL
BUN SERPL-MCNC: 62.1 MG/DL (ref 9.8–20.1)
CA-I BLD-SCNC: 1.05 MMOL/L (ref 1.12–1.23)
CALCIUM SERPL-MCNC: 10.3 MG/DL (ref 8.4–10.2)
CHLORIDE SERPL-SCNC: 83 MMOL/L (ref 98–107)
CO2 BLDA-SCNC: 49.2 MMOL/L
CO2 SERPL-SCNC: 38 MMOL/L (ref 23–31)
COHGB MFR BLDA: 1.6 % (ref 0.5–1.5)
CREAT SERPL-MCNC: 1.24 MG/DL (ref 0.55–1.02)
CREAT/UREA NIT SERPL: 50
DRAWN BY BLOOD GAS (OHS): ABNORMAL
EOSINOPHIL # BLD AUTO: 0.06 X10(3)/MCL (ref 0–0.9)
EOSINOPHIL NFR BLD AUTO: 0.4 %
ERYTHROCYTE [DISTWIDTH] IN BLOOD BY AUTOMATED COUNT: 14.4 % (ref 11.5–17)
GFR SERPLBLD CREATININE-BSD FMLA CKD-EPI: 45 ML/MIN/1.73/M2
GLOBULIN SER-MCNC: 3.8 GM/DL (ref 2.4–3.5)
GLUCOSE SERPL-MCNC: 125 MG/DL (ref 82–115)
HCO3 BLDA-SCNC: 47.4 MMOL/L (ref 22–26)
HCT VFR BLD AUTO: 33.2 % (ref 37–47)
HGB BLD-MCNC: 10.6 G/DL (ref 12–16)
IMM GRANULOCYTES # BLD AUTO: 0.22 X10(3)/MCL (ref 0–0.04)
IMM GRANULOCYTES NFR BLD AUTO: 1.4 %
INHALED O2 CONCENTRATION: 70 %
LPM (OHS): 25
LYMPHOCYTES # BLD AUTO: 1.16 X10(3)/MCL (ref 0.6–4.6)
LYMPHOCYTES NFR BLD AUTO: 7.6 %
MAGNESIUM SERPL-MCNC: 2.2 MG/DL (ref 1.6–2.6)
MCH RBC QN AUTO: 29 PG (ref 27–31)
MCHC RBC AUTO-ENTMCNC: 31.9 G/DL (ref 33–36)
MCV RBC AUTO: 91 FL (ref 80–94)
METHGB MFR BLDA: 1.2 % (ref 0.4–1.5)
MONOCYTES # BLD AUTO: 1.77 X10(3)/MCL (ref 0.1–1.3)
MONOCYTES NFR BLD AUTO: 11.6 %
NEUTROPHILS # BLD AUTO: 11.98 X10(3)/MCL (ref 2.1–9.2)
NEUTROPHILS NFR BLD AUTO: 78.9 %
NRBC BLD AUTO-RTO: 0 %
O2 HB BLOOD GAS (OHS): 90.2 % (ref 94–97)
OHS QRS DURATION: 152 MS
OHS QTC CALCULATION: 442 MS
OXYGEN DEVICE BLOOD GAS (OHS): ABNORMAL
OXYHGB MFR BLDA: 11.2 G/DL (ref 12–16)
PCO2 BLDA: 58 MMHG (ref 35–45)
PH BLDA: 7.52 [PH] (ref 7.35–7.45)
PLATELET # BLD AUTO: 343 X10(3)/MCL (ref 130–400)
PMV BLD AUTO: 10.1 FL (ref 7.4–10.4)
PO2 BLDA: 53 MMHG (ref 80–100)
POTASSIUM BLOOD GAS (OHS): 2.9 MMOL/L (ref 3.5–5)
POTASSIUM SERPL-SCNC: 3.2 MMOL/L (ref 3.5–5.1)
PROT SERPL-MCNC: 7.1 GM/DL (ref 5.8–7.6)
RBC # BLD AUTO: 3.65 X10(6)/MCL (ref 4.2–5.4)
SAMPLE SITE BLOOD GAS (OHS): ABNORMAL
SAO2 % BLDA: 90.5 %
SODIUM BLOOD GAS (OHS): 129 MMOL/L (ref 137–145)
SODIUM SERPL-SCNC: 136 MMOL/L (ref 136–145)
WBC # BLD AUTO: 15.21 X10(3)/MCL (ref 4.5–11.5)

## 2024-06-20 PROCEDURE — 25000003 PHARM REV CODE 250

## 2024-06-20 PROCEDURE — 97530 THERAPEUTIC ACTIVITIES: CPT | Mod: CQ

## 2024-06-20 PROCEDURE — 99900035 HC TECH TIME PER 15 MIN (STAT)

## 2024-06-20 PROCEDURE — 37799 UNLISTED PX VASCULAR SURGERY: CPT

## 2024-06-20 PROCEDURE — 97535 SELF CARE MNGMENT TRAINING: CPT

## 2024-06-20 PROCEDURE — 94760 N-INVAS EAR/PLS OXIMETRY 1: CPT

## 2024-06-20 PROCEDURE — 99900031 HC PATIENT EDUCATION (STAT)

## 2024-06-20 PROCEDURE — 25000003 PHARM REV CODE 250: Performed by: NURSE PRACTITIONER

## 2024-06-20 PROCEDURE — 25000003 PHARM REV CODE 250: Performed by: HOSPITALIST

## 2024-06-20 PROCEDURE — 63600175 PHARM REV CODE 636 W HCPCS: Performed by: INTERNAL MEDICINE

## 2024-06-20 PROCEDURE — 93005 ELECTROCARDIOGRAM TRACING: CPT

## 2024-06-20 PROCEDURE — 27100171 HC OXYGEN HIGH FLOW UP TO 24 HOURS

## 2024-06-20 PROCEDURE — 63600175 PHARM REV CODE 636 W HCPCS: Performed by: STUDENT IN AN ORGANIZED HEALTH CARE EDUCATION/TRAINING PROGRAM

## 2024-06-20 PROCEDURE — 83735 ASSAY OF MAGNESIUM: CPT | Performed by: INTERNAL MEDICINE

## 2024-06-20 PROCEDURE — 25000003 PHARM REV CODE 250: Performed by: STUDENT IN AN ORGANIZED HEALTH CARE EDUCATION/TRAINING PROGRAM

## 2024-06-20 PROCEDURE — 25000003 PHARM REV CODE 250: Performed by: INTERNAL MEDICINE

## 2024-06-20 PROCEDURE — 80053 COMPREHEN METABOLIC PANEL: CPT | Performed by: NURSE PRACTITIONER

## 2024-06-20 PROCEDURE — 85025 COMPLETE CBC W/AUTO DIFF WBC: CPT

## 2024-06-20 PROCEDURE — 36415 COLL VENOUS BLD VENIPUNCTURE: CPT

## 2024-06-20 PROCEDURE — 20000000 HC ICU ROOM

## 2024-06-20 PROCEDURE — C9113 INJ PANTOPRAZOLE SODIUM, VIA: HCPCS | Performed by: INTERNAL MEDICINE

## 2024-06-20 PROCEDURE — 36600 WITHDRAWAL OF ARTERIAL BLOOD: CPT

## 2024-06-20 PROCEDURE — 11000001 HC ACUTE MED/SURG PRIVATE ROOM

## 2024-06-20 PROCEDURE — 97110 THERAPEUTIC EXERCISES: CPT | Mod: CQ

## 2024-06-20 PROCEDURE — 93010 ELECTROCARDIOGRAM REPORT: CPT | Mod: ,,, | Performed by: INTERNAL MEDICINE

## 2024-06-20 PROCEDURE — 94799 UNLISTED PULMONARY SVC/PX: CPT

## 2024-06-20 RX ORDER — POTASSIUM CHLORIDE 20 MEQ/1
20 TABLET, EXTENDED RELEASE ORAL ONCE
Status: CANCELLED | OUTPATIENT
Start: 2024-06-20

## 2024-06-20 RX ORDER — POTASSIUM CHLORIDE 20 MEQ/1
20 TABLET, EXTENDED RELEASE ORAL ONCE
Status: COMPLETED | OUTPATIENT
Start: 2024-06-20 | End: 2024-06-20

## 2024-06-20 RX ORDER — FUROSEMIDE 10 MG/ML
40 INJECTION INTRAMUSCULAR; INTRAVENOUS DAILY
Status: DISCONTINUED | OUTPATIENT
Start: 2024-06-21 | End: 2024-06-23 | Stop reason: HOSPADM

## 2024-06-20 RX ORDER — POTASSIUM CHLORIDE 20 MEQ/1
40 TABLET, EXTENDED RELEASE ORAL ONCE
Status: COMPLETED | OUTPATIENT
Start: 2024-06-20 | End: 2024-06-20

## 2024-06-20 RX ORDER — ACETAZOLAMIDE 500 MG/5ML
250 INJECTION, POWDER, LYOPHILIZED, FOR SOLUTION INTRAVENOUS ONCE
Status: COMPLETED | OUTPATIENT
Start: 2024-06-20 | End: 2024-06-20

## 2024-06-20 RX ORDER — BUTALBITAL, ACETAMINOPHEN AND CAFFEINE 50; 325; 40 MG/1; MG/1; MG/1
1 TABLET ORAL EVERY 6 HOURS PRN
Status: DISCONTINUED | OUTPATIENT
Start: 2024-06-20 | End: 2024-06-23 | Stop reason: HOSPADM

## 2024-06-20 RX ORDER — POTASSIUM CHLORIDE 20 MEQ/1
40 TABLET, EXTENDED RELEASE ORAL ONCE
Status: CANCELLED | OUTPATIENT
Start: 2024-06-20

## 2024-06-20 RX ADMIN — TRAMADOL HYDROCHLORIDE 50 MG: 50 TABLET, COATED ORAL at 08:06

## 2024-06-20 RX ADMIN — PANTOPRAZOLE SODIUM 40 MG: 40 INJECTION, POWDER, FOR SOLUTION INTRAVENOUS at 08:06

## 2024-06-20 RX ADMIN — ACETAZOLAMIDE 250 MG: 500 INJECTION, POWDER, LYOPHILIZED, FOR SOLUTION INTRAVENOUS at 05:06

## 2024-06-20 RX ADMIN — DILTIAZEM HYDROCHLORIDE 240 MG: 120 CAPSULE, COATED, EXTENDED RELEASE ORAL at 08:06

## 2024-06-20 RX ADMIN — LIDOCAINE 2 PATCH: 700 PATCH TOPICAL at 02:06

## 2024-06-20 RX ADMIN — ACETAZOLAMIDE 250 MG: 500 INJECTION, POWDER, LYOPHILIZED, FOR SOLUTION INTRAVENOUS at 06:06

## 2024-06-20 RX ADMIN — POTASSIUM CHLORIDE 40 MEQ: 1500 TABLET, EXTENDED RELEASE ORAL at 10:06

## 2024-06-20 RX ADMIN — METOPROLOL TARTRATE 5 MG: 1 INJECTION, SOLUTION INTRAVENOUS at 05:06

## 2024-06-20 RX ADMIN — APIXABAN 5 MG: 5 TABLET, FILM COATED ORAL at 08:06

## 2024-06-20 RX ADMIN — FUROSEMIDE 40 MG: 10 INJECTION, SOLUTION INTRAVENOUS at 12:06

## 2024-06-20 RX ADMIN — METOPROLOL TARTRATE 5 MG: 1 INJECTION, SOLUTION INTRAVENOUS at 11:06

## 2024-06-20 RX ADMIN — BUTALBITAL, ACETAMINOPHEN, AND CAFFEINE 1 TABLET: 50; 325; 40 TABLET ORAL at 05:06

## 2024-06-20 RX ADMIN — POTASSIUM CHLORIDE 20 MEQ: 1500 TABLET, EXTENDED RELEASE ORAL at 05:06

## 2024-06-20 RX ADMIN — FUROSEMIDE 40 MG: 10 INJECTION, SOLUTION INTRAVENOUS at 06:06

## 2024-06-20 RX ADMIN — GABAPENTIN 300 MG: 300 CAPSULE ORAL at 08:06

## 2024-06-20 RX ADMIN — Medication 1 EACH: at 08:06

## 2024-06-20 RX ADMIN — METOPROLOL TARTRATE 5 MG: 1 INJECTION, SOLUTION INTRAVENOUS at 06:06

## 2024-06-20 RX ADMIN — SERTRALINE HYDROCHLORIDE 100 MG: 50 TABLET ORAL at 08:06

## 2024-06-20 NOTE — PROGRESS NOTES
Williamsmaria isabel [facility] Medicine Progress Note        Chief Complaint: Inpatient Follow-up for multifocal pneumonia     HPI:   78-year-old lady with PMH of AS s/p AVR, CAD, MI, HTN, chronic back pain, HLD, CVA, CHF, GERD, diverticulosis who was being downgraded from the ICU to Hospital Medicine after she was initially admitted for acute hypoxemic respiratory failure requiring BiPAP for adequate saturations. She was transferred from an outside facility to [facility] with complaints of worsening shortness of breath for the last few weeks with initial CXR at outside facility showing bilateral airspace opacities and increased right-sided pleural effusion. Her last echocardiogram from 05/16 showed EF 55-60%. She was placed on antibiotics, steroids as well as IV Lasix for diuresis for pulmonary congestion. She was able to be weaned down from BiPAP to Vapotherm and eventually Oxymizer 8 L. she was also noted to have new onset atrial fibrillation for which he was started on IV Cardizem which has since been transitioned to p.o. Cardizem. She is on full-dose Lovenox due to CHADS-VASc of 7. She reported feeling significantly better and endorsed improvement in her shortness of breath since admission. Denied having any fever, chills, chest pain, cough, sputum production, abdominal pain, nausea, vomiting, headache, numbness, weakness, dizziness/lightheadedness or loss of consciousness. Blood cultures from admission are negative. PT/OT on board; in recommending moderate intensity therapy. Cardiology on board.   Patient had repeat chest x-ray done on June 9th and that showed denser consolidation patient continues to be on Zosyn now has been put on BiPAP with FiO2 of 90%    She is status post thoracentesis with removal of 950 cc of fluid by pulmonology Will follow up with cultures 6/14.  She is status post right heart catheterization with Cardiology. Recommendation to continue aggressive diuresis.     Patient  no edema, no murmurs, regular rate and rhythm currently being managed for persistent hypoxic respiratory failure due to diastolic CHF exacerbation.  On Vapotherm/BiPAP and diuresis.  Pulmonology, Cardiology, Nephrology on board.     Interval Hx:   Today, Mrs Moya stated she was doing well and had no new complaints.  Remains on IV Lasix b.i.d., restarted on IV Cardizem on 06/19; to be transitioned to p.o. Cardizem and infusion to be turned off.  IV Lasix to be decreased to 40 mg daily tomorrow.  On Vapotherm at 65%/25 L today with adequate saturations.  Replaced K. BUN/creatinine stable at 6 2.1/1.24.  HC03 of 38 noted.  Awaiting placement.      Objective/physical exam:  General: alert lady lying comfortably in bed, in no acute distress  HENT: oral and oropharyngeal mucosa moist, pink, with no erythema or exudates, no ear pain or discharge  Neck: normal neck movement, no lymph nodes or swellings, no JVD or Carotid bruit  Respiratory: clear breathing sounds bilaterally, no crackles, rales, ronchi or wheezes  Cardiovascular: clear S1 and S2, no murmurs, rubs or gallops  Peripheral Vascular: no lesions, ulcers or erosions, normal peripheral pulses and no pedal edema  Gastrointestinal: soft, non-tender, non-distended abdomen, no guarding, rigidity or rebound tenderness, normal bowel sounds  Integumentary: normal skin color, no rashes or lesions  Neuro: AAO x 3; motor strength 5/5 in B/L UEs & LEs; sensation intact to gross and fine touch B/L; CN II-XII grossly intact      Assessment/Plan:  Migraine HA   Acute respiratory failure with hypoxia requiring high-flow O2 /bipap  HFpEF with exacerbation and bilateral pleural effusions   Metabolic Alkalemia  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   Bilateral pleural effusions right greater than left status post thoracentesis on 06/14   Leucocytosis, resolved   Atrial Fibrillation with RVR (New Onset) ,  JUN- IMPROVING   DNR     Hx: CAD-status post CABG,Valvular heart disease-status post AVR     Plan  Continues to be  admitted  No new complaints  Continue Vapotherm 25 L/65%  S/P RHC  Follow-up Nephrology recs   On IV Lasix 40 mg BID; to be decreased to IV Lasix daily tomorrow   Will give Diamox as needed  Replacing K  Cardiology on board; following recommendations   IV Cardizem discontinued; PO Diltiazem 240 mg continued; patient on Eliquis 5 mg b.i.d.  Give a dose of IV morphine 0.5 mg Q 4 hourly to help with shortness of breath  Use of p.o. lorazepam to help with anxiety   HGB/HCT trended down; FOBT pending   On IV Protonix 40 b.i.d.  She is status post thoracentesis with removal of 950 cc of fluid on 6/14  CTA neg for PE on 6/15  Palliative care consulted and following  continue with p.r.n. Xopenex   Patient is DNR/DNI  Steroids discontinued per Pulmonology recs  Continue with speech   PT and OT on-board  Patient was denied by LTAC; case management remains on board for placement     Critical care note:  Critical care diagnosis:  Acute hypoxemic respiratory failure secondary tod bilateral pleural effusions on Vapotherm,  Critical care interventions: Hands-on evaluation, review of labs/radiographs/records and discussion with patient and family if present  Critical care time spent: 35 minutes      VTE prophylaxis:  eliquis   Patient condition:  Stable   Anticipated discharge and Disposition: tbd        All diagnosis and differential diagnosis have been reviewed; assessment and plan has been documented; I have personally reviewed the labs and test results that are presently available; I have reviewed the patients medication list; I have reviewed the consulting providers response and recommendations. I have reviewed or attempted to review medical records based upon their availability     All of the patient's questions have been  addressed and answered. Patient's is agreeable to the above stated plan. I will continue to monitor closely and make adjustments to medical management as needed.    VITAL SIGNS: 24 HRS MIN & MAX LAST   Temp   Min: 98 °F (36.7 °C)  Max: 98.3 °F (36.8 °C) 98.3 °F (36.8 °C)   BP  Min: 99/65  Max: 140/65 137/88   Pulse  Min: 72  Max: 138  92   Resp  Min: 10  Max: 32 18   SpO2  Min: 91 %  Max: 98 % 97 %     I have reviewed the following labs:  Recent Labs   Lab 06/18/24  0258 06/19/24  0307 06/20/24  0300   WBC 7.42 7.01 15.21*   RBC 2.71* 3.02* 3.65*   HGB 8.1* 8.8* 10.6*   HCT 24.6* 27.5* 33.2*   MCV 90.8 91.1 91.0   MCH 29.9 29.1 29.0   MCHC 32.9* 32.0* 31.9*   RDW 14.6 14.5 14.4    294 343   MPV 10.7* 10.4 10.1     Recent Labs   Lab 06/17/24  0208 06/18/24  0258 06/18/24  1511 06/19/24  0307 06/20/24  0300   * 132*  --  138 136   K 5.0 3.3* 3.2* 3.5 3.2*   CL 88* 89*  --  89* 83*   CO2 32* 31  --  35* 38*   BUN 40.7* 46.1*  --  48.9* 62.1*   CREATININE 1.55* 1.55*  --  1.26* 1.24*   CALCIUM 8.6 8.7  --  9.7 10.3*   MG 2.10 2.00  --  1.80 2.20   ALBUMIN 2.5*  --   --  3.0* 3.3*   ALKPHOS 107  --   --  83 99   ALT 10  --   --  10 14   AST 16  --   --  14 21   BILITOT 0.4  --   --  0.5 0.5     Microbiology Results (last 7 days)       Procedure Component Value Units Date/Time    Body Fluid Culture [[id number]] Collected: 06/14/24 1021    Order Status: Completed Specimen: Body Fluid from Thoracentesis Fluid Updated: 06/19/24 1003     Body Fluid Culture Final Report: At 5 days. No growth    Gram Stain [[id number]] Collected: 06/14/24 1021    Order Status: Completed Specimen: Body Fluid from Pleural Fluid Updated: 06/14/24 1320     GRAM STAIN Rare WBC observed      No bacteria seen    KOH Prep [[id number]] Collected: 06/14/24 1022    Order Status: Completed Specimen: Body Fluid from Pleural Fluid Updated: 06/14/24 1256     KOH Prep No fungal elements seen    Respiratory Culture [[id number]] Updated: 06/14/24 1111    Order Status: Canceled Specimen: Sputum, Expectorated     Respiratory Culture [[id number]] Updated: 06/14/24 1111    Order Status: Canceled Specimen: Respiratory from Sputum, Expectorated     Fungal  Culture [[id number]] Collected: 06/14/24 1021    Order Status: Sent Specimen: Body Fluid from Pleural Fluid Updated: 06/14/24 1028    Mycobacteria and Nocardia Culture [[id number]] Collected: 06/14/24 1021    Order Status: Sent Specimen: Body Fluid from Pleural Fluid, Right Updated: 06/14/24 1028             See below for Radiology    Scheduled Med:   apixaban  5 mg Oral BID    diltiaZEM  240 mg Oral Daily    docusate  50 mg Oral Daily    [START ON 6/21/2024] furosemide (LASIX) injection  40 mg Intravenous Daily    gabapentin  300 mg Oral BID    Lactobacillus rhamnosus GG  1 packet Oral BID    LIDOcaine  2 patch Transdermal Q24H    pantoprazole  40 mg Intravenous BID    potassium chloride  20 mEq Oral Once    sertraline  100 mg Oral QHS    traMADoL  50 mg Oral TID      Continuous Infusions:       PRN Meds:    Current Facility-Administered Medications:     0.9% NaCl, , Intravenous, PRN    acetaminophen, 650 mg, Oral, Q6H PRN    acetaminophen, 650 mg, Oral, Q4H PRN    hydrALAZINE, 10 mg, Intravenous, Q6H PRN    labetalol, 10 mg, Intravenous, Q4H PRN    levalbuterol, 1.25 mg, Nebulization, Q6H PRN    LORazepam, 0.5 mg, Oral, Q4H PRN    melatonin, 6 mg, Oral, Nightly PRN    metoprolol, 5 mg, Intravenous, Q4H PRN    morphine, 0.5 mg, Intravenous, Q4H PRN    ondansetron, 4 mg, Intravenous, Q4H PRN    traMADoL, 50 mg, Oral, TID PRN     Assessment/Plan:      VTE prophylaxis:     Patient condition:  Stable/Fair/Guarded/ Serious/ Critical    Anticipated discharge and Disposition:         All diagnosis and differential diagnosis have been reviewed; assessment and plan has been documented; I have personally reviewed the labs and test results that are presently available; I have reviewed the patients medication list; I have reviewed the consulting providers response and recommendations. I have reviewed or attempted to review medical records based upon their availability    All of the patient's questions have been  addressed and  answered. Patient's is agreeable to the above stated plan. I will continue to monitor closely and make adjustments to medical management as needed.  _____________________________________________________________________    Nutrition Status:    Radiology:  I have personally reviewed the following imaging and agree with the radiologist.     Cardiac catheterization  The procedure log was documented by No documenter listed and verified by   Steven Wells MD.    Procedure:  Right heart catheterization    Preoperative diagnosis:    Hypoxic respiratory failure    Postoperative diagnosis:    As above  Congestive heart failure    Access: Right common femoral vein  EBL: 10 cc  Complications: None    Summary:  Consent obtained.  Risks and benefits discussed with the patient.  The   patient was brought to the cath lab in a fasting state.  The patient was   prepped and draped in usual sterile fashion.  Ultrasound-guided right   common femoral venous access via modified Seldinger technique with   micropuncture.  A 7 French sheath was inserted into the right common   femoral vein.  Seven French balloon tip [place]-Micky catheter was used to   obtain intracardiac pressures, O2 sats, cardiac outputs and wedge   pressure.  At the end the procedure all wires and catheters removed.    Manual hemostasis.    Findings:  RA 11, RV 53/9, PA 57/31 (mean 41), PCWP 34, PA sat 58% (on 100% FiO2   BiPAP)  CO/CI: 3.8/2.1 (TD)  PVR: 1.9 Woods    Assessment:  -Elevated right and left-sided filling pressures  -Low cardiac outputs  -Moderate pulmonary hypertension with normal pulmonary vascular resistance    Plan:  -Consider diuresis and maintenance of normal sinus rhythm    Date: 6/17/2024  Time: 3:57 PM      Marshal Alvarado MD  Department of Hospital Medicine   [facility]   06/20/2024

## 2024-06-20 NOTE — PT/OT/SLP PROGRESS
Occupational Therapy   Treatment    Name: Alanna Moya  MRN: [id number]  Admitting Diagnosis:  Respiratory failure  3 Days Post-Op    Recommendations:     Recommended therapy intensity at discharge: Moderate Intensity Therapy   Discharge Equipment Recommendations:  to be determined by next level of care  Barriers to discharge:  Other (Comment) (ongoing medical needs)    Assessment:     Alanna Moya is a 79 y.o. female with a medical diagnosis of acute hypoxic resp failure, diastolic heart failure, R ventricular failure with potential pulmonary hypertension, afib, potential amiodarone related pulmonary toxicity. Performance deficits affecting function are weakness, impaired endurance, impaired self care skills, impaired functional mobility, gait instability, decreased lower extremity function, decreased upper extremity function, impaired cardiopulmonary response to activity, impaired balance, decreased coordination. Pt reports having just returned to bed and presents fatigued, however agreeable to grooming tasks EOB. She required min-mod A for bed mobility and required min-CGA for sitting balance EOB. Continue to recommend moderate intensity therapy upon d/c.     Rehab Prognosis:  Good; patient would benefit from acute skilled OT services to address these deficits and reach maximum level of function.       Plan:     Patient to be seen 4 x/week to address the above listed problems via self-care/home management  Plan of Care Expires: 07/19/24  Plan of Care Reviewed with: patient    Subjective     Pain/Comfort:  Pain Rating 1: 0/10    Objective:     Communicated with: RN prior to session.  Patient found HOB elevated with blood pressure cuff, pulse ox (continuous), telemetry, melton catheter, oxygen, peripheral IV upon OT entry to room.    General Precautions: Standard, fall    Orthopedic Precautions:N/A  Braces: N/A  Respiratory Status: High flow, flow 25 L/min, concentration 70%  Vital Signs: Blood  Pressure: 131/80  HR: 100-130's     Occupational Performance:     Bed Mobility:    Patient completed Scooting/Bridging with moderate assistance  Patient completed Supine to Sit with minimum assistance  Patient completed Sit to Supine with moderate assistance     Activities of Daily Living:  Grooming: minimum assistance to brush teeth while sitting EOB.     Therapeutic Positioning    OT interventions performed during the course of today's session in an effort to prevent and/or reduce acquired pressure injuries:   Therapeutic positioning was provided at the conclusion of session to offload all bony prominences for the prevention and/or reduction of pressure injuries    Skin assessment: all bony prominences were assessed    Findings:  redness to sacrum.     [facility] 6 Click ADL: 12    Patient Education:  Patient provided with verbal education education regarding OT role/goals/POC, fall prevention, safety awareness, Discharge/DME recommendations, and pressure ulcer prevention.  Understanding was verbalized.      Patient left HOB elevated with all lines intact, call button in reach, pressure relief boots, and RN present.    GOALS:   Multidisciplinary Problems       Occupational Therapy Goals          Problem: Occupational Therapy    Goal Priority Disciplines Outcome Interventions   Occupational Therapy Goal     OT, PT/OT Progressing    Description: Goals to be met by: 7/19/24    Patient will increase functional independence with ADLs by performing:    UE Dressing with Supervision.  LE Dressing with Supervision.  Grooming while standing at sink with Supervision.  Toileting from toilet with Supervision for hygiene and clothing management.   Toilet transfer to toilet with Supervision.                         Time Tracking:     OT Date of Treatment: 06/20/24  OT Start Time: 1406  OT Stop Time: 1431  OT Total Time (min): 25 min    Billable Minutes:Self Care/Home Management 25 minutes.     OT/MARY: OT     Number of MARY visits  since last OT visit: 1 6/20/2024

## 2024-06-20 NOTE — PT/OT/SLP PROGRESS
Physical Therapy Treatment    Patient Name:  Alanna Moya   MRN:  [id number]    Recommendations:     Discharge therapy intensity: Moderate Intensity Therapy   Discharge Equipment Recommendations: to be determined by next level of care  Barriers to discharge: Impaired mobility and Ongoing medical needs    Assessment:     Alanna Moya is a 79 y.o. female admitted with a medical diagnosis of SOB, respiratory failure, pneumonia, pleural effusions.  She presents with the following impairments/functional limitations: weakness, gait instability, decreased upper extremity function, impaired endurance, impaired balance, decreased lower extremity function, impaired self care skills, impaired functional mobility.    Pt appeared very tired upon arrival. HR improved however still in Afib. Intermittently HR increased into the 130-140's. Pt reports she just got back into bed but was agreeable and motivated to perform therex. Pt found to be soiled and was unaware. RN present for kirk-care and adjusted FIO2 2/2 desaturation when laying flat.     Rehab Prognosis: Good; patient would benefit from acute skilled PT services to address these deficits and reach maximum level of function.    Recent Surgery: Procedure(s) (LRB):  INSERTION, CATHETER, RIGHT HEART (N/A) 3 Days Post-Op    Plan:     During this hospitalization, patient would benefit from acute PT services 5 x/week to address the identified rehab impairments via gait training, therapeutic activities, therapeutic exercises and progress toward the following goals:    Plan of Care Expires:  07/04/24    Subjective     Chief Complaint: tired  Patient/Family Comments/goals: to get better  Pain/Comfort:  Pain Rating 1: 0/10      Objective:     Communicated with nurse prior to session.  Patient found HOB elevated with blood pressure cuff, pulse ox (continuous), telemetry, melton catheter, oxygen upon PT entry to room.     General Precautions: Standard, fall  Orthopedic  Precautions: N/A  Braces: N/A  Respiratory Status: High flow, flow 25 L/min, concentration 65%; FIO2 increased to 70% by RN  Blood Pressure: 119/85  HR: 101-142  SPO2: 97%-83%  Skin Integrity:  redness to sacrum; RN aware      Functional Mobility:  Bed mobility:   Rolling GAYE with mod assist; encouraged pt to increase participation bed mobility with nursing staff to improve her strength    Therapeutic Activities/Exercises:  GAYE heel slides, hip abd/add, SLR and AP's; 10 reps x 2 sets. Noted increased fatigue/weakness LLE compared to RLE. Improved strength on second set noted as well. Frequent rest breaks to improve HR.   Bridges x 5 reps.     Education:  Patient provided with verbal education education regarding PT role/goals/POC, fall prevention, safety awareness, and discharge/DME recommendations.  Understanding was verbalized.     Patient left HOB elevated with all lines intact, call button in reach, and nurse notified.    GOALS:   Multidisciplinary Problems       Physical Therapy Goals          Problem: Physical Therapy    Goal Priority Disciplines Outcome Goal Variances Interventions   Physical Therapy Goal     PT, PT/OT Progressing     Description: Goals to be met by: 24     Patient will increase functional independence with mobility by performin. Supine to sit with Stand-by Assistance  2. Sit to supine with Stand-by Assistance  3. Sit to stand transfer with Stand-by Assistance  4. Bed to chair transfer with Stand-by Assistance using Rolling Walker  5. Gait  x 150 feet with Stand-by Assistance using Rolling Walker.                          Time Tracking:     PT Received On: 24  PT Start Time: 1303     PT Stop Time: 1333  PT Total Time (min): 30 min     Billable Minutes: Therapeutic Activity 1 unit and Therapeutic Exercise 1 unit    Treatment Type: Treatment  PT/PTA: PTA     Number of PTA visits since last PT visit: 3     2024

## 2024-06-20 NOTE — PROGRESS NOTES
"    [facility]    Cardiology  Progress Note    Patient Name: Alanna Moya  MRN: [id number]  Admission Date: 6/1/2024  Hospital Length of Stay: 19 days  Code Status: DNR   Attending Physician: Augustus Barreto MD   Primary Care Physician: Cornel Haddad MD  Expected Discharge Date:   Principal Problem:Respiratory failure    Subjective:   Reason for Consult: SVT     HPI:Ms. Moya is a 78 year old female, known to Dr. Ramon in [place], who presented to the hospital from [facility] as transfer due to respiratory failure. Upon arrival she was noted to be SOB. Noted hypoxia requiring BIPAP. Chesr Radiograph from outside facility revealed bilateral airspace opacification and increase his right-sided pleural effusion suggestive of worsening pneumonia or possibly edema. CT Chest revealed mixed picture of decompensated HF, pleural effusions and superimposed pneumonia. During this hospitalization patient with tachycardia concerning for AF RVR, with noted Left Bundle Branch Block. She was given IV Metoprolol and started on Cardizem Infusion for Acute HR Control. She does have history of PSVT. Echocardiogram on 6.2.24 revealed intact LV Function with EF 55-60% & Grade I DD. Electrolytes grossly stable. Troponin normal. Lactic Acid Normal. . CIS Consulted for AF Management.     Hospital Course:   6.7.24: Patient seen sitting in chair. She denies SOB, CP, or nausea. Remains in Afib RVR   6.8.24: NAD. VSS. No complaints of CP/SOB/Palps. "I feel okay" WBC 14.27  6.9.24: NAD. VSS. AFIB RVR on telemetry. No complaints CP/SOB/Palps. "I feel okay" possible aspiration - awaiting Speech Evaluation. K 3.7, Mag 1.8, WBC 16.21.  6.10.24: NAD Noted. Sitting up in chair. AF RVR. Requiring Vapotherm Support. Denies CP. Reports exertional SOB, however, functional capacity is limited as she is deconditioned. Hypertension Noted. Diuresis noted, - 1604 ML/24 Hours.   6.11.24: BP Improved. AF RVR. Diuresis noted, " "- 2144 ML/24 Hours. Vapotherm oxygen support- 75% FIO2.   6.12.24: NAD Noted. Remains AF RVR. BP Stable. Requiring Significant Oxygen Support. Continues to Diurese well.   6.13.24: NAD. VSS. Urine output 1050/850 ml Fluid Net Negative. AFIB RVR on telemetry. No complaints of CP/SOB/Palps.   6.14.24: NAD. VSS. Sitting in Bedside Chair. Denies CP and Palps. + SOB. Remain in A.Fib with CVR. Fluid Balance Net Negative 1210mL.  6.15.24: NAD. VSS. Sitting in Bedside Chair. Denies CP and Palps. + SOB. PAF with CVR. Fluid Balance Net Negative 1285mL. "I am ok." HF NC O2  6.16.24: NAD. PAF/RVR. Amiodarone 0.5mg/min. Fluid Balance Net Negative 1175mL. HF NC O2  6.17.24: NAD. PAF with CVR. Cardizem 2.5mg/HR. Fluid Balance Net + 458mL. "I am ok."   6.18.24: NAD. Laying in Bed on HF NC. Cardizem 5mg/HR. Fluid Balance Net Negative 1816.7mL. "I am feeling a little better." + SOB/NIETO. Denies CP and Palps. S/P RHC with Elevated EDP.   6.19.24: NAD. "I am ok" + SOB/HF NC O2 85%, Fluid Balance Net Negative 3195mL. Denies CP and Palps.   6.20.24: NAD Noted. Sitting up in chair. FIO2 65% on Vapotherm 25 L/Min. BP Stable. AF CVR. On Cardizem Infusion at 5 Mg/Hour.     PMH: CAD/CABG, Hypertension, Dyslipidemia, MELCHOR, CEA, CVA, AS/TAVR, PSVT  PSH: LHC, CABG, TAVR, CEA, Hysterectomy, Cholecystectomy, Eye Surgery, Hand Surgery  Family History: Father- Old MI/CAD, Mother- HF, Son- Hypertension, Daughter- Cancer/Hypertension, Brother- Heart Disease, Brother- Old MI/CAD, Brother- Cancer  Social History: Tobacco- Negative, Alcohol- Negative, Substance Abuse- Negative      Previous Cardiac Diagnostics:   RHC (6.17.24):  RA 11, RV 53/9, PA 57/31 (mean 41), PCWP 34, PA sat 58% (on 100% FiO2 BiPAP)  CO/CI: 3.8/2.1 (TD)  PVR: 1.9 Woods  Assessment:  -Elevated right and left-sided filling pressures  -Low cardiac outputs  -Moderate pulmonary hypertension with normal pulmonary vascular resistance  Plan:  -Consider diuresis and maintenance of normal sinus " rhythm    Echocardiogram (6.2.24):  Left Ventricle: The left ventricle is normal in size. Increased wall thickness. There is normal systolic function with a visually estimated ejection fraction of 55 - 60%. Grade I diastolic dysfunction.  Right Ventricle: Systolic function is reduced.TAPSE is 1.60 cm.  Aortic Valve: There is a transcatheter valve replacement in the aortic position. Aortic valve area by VTI is 1.56 cm². Aortic valve peak velocity is 1.94 m/s. Mean gradient is 8 mmHg. The dimensionless index is 0.50.  Mitral Valve: Mildly thickened leaflets. There is mitral annular calcification present. There is mild stenosis. The mean pressure gradient across the mitral valve is 5 mmHg at a heart rate of 87 bpm. There is mild regurgitation.  Tricuspid Valve: The tricuspid valve is structurally normal. There is mild regurgitation.  Pulmonic Valve: The pulmonic valve is structurally normal.  Pulmonary Artery: The estimated pulmonary artery systolic pressure is 47 mmHg.  IVC/SVC: Normal venous pressure at 3 mmHg.  Pericardium: Left pleural effusion.     Echocardiogram (5.15.24):  EF 55-60%. Grade I Diastolic Dysfunction. Normal LV Wall Motion.  Mild MAC with Mild MR and No MS. Mild to Moderate TR.  Mild to Moderate AI, No AS.     CT Angiogram Head/Neck (5.15.24):  Carotid arteries:   Calcified plaque at the siphons without significant narrowing.   Normal A1 and M1 segments.   Vertebrobasilar Circulation:   Vertebral arteries: Patent. Calcified plaque on the right without significant narrowing. Otherwise no abnormalities.   Basilar artery: Patent, no abnormalities.   Posterior cerebral arteries:  Patent. Right fetal origin. Otherwise no abnormalities.      Echocardiogram (4.21.23):  The study quality is good.   The left ventricle is normal in size. Global left ventricular systolic function is normal. The left ventricular ejection fraction by Power's is 62%. The left ventricle diastolic function is impaired (Grade II)  with an elevated left atrial pressure. Noted left ventricular hypertrophy. Concentric left ventricular hypertrophy is present. It is mild. LVSI=41ml/m2.    LVEDV=67.2ml and LVESV=25.7ml.  The left atrial diameter is moderately increased. Left atrial diameter is 4.4 cms. The left atrium is severely enlarged based on the left atrium volume index of 48.2ml/m².  S/P TAVR. Bioprosthetic aortic valve is well seated and functions normally with no evidence of insufficiency or perivalvular leaks. JACINTA=2.0cm2. The trans-aortic peak gradient is 15.8 mmHg. The trans-aortic mean gradient is 8.6 mmHg. DI=1.1.  Mild mitral annular calcification is noted. The mean trans mitral gradient is 1.9 mmHg.  Mild to moderate (1-2+) mitral regurgitation. Mild to moderate (1-2+) tricuspid regurgitation.  The estimated pulmonary artery systolic pressure is 41 mmHg assuming a right atrial pressure of 3 mmHg. Evidence of pulmonary hypertension is noted.      MCT Monitor (3.15.23):  Predominant Rhythm SR.  PSVT     TAVR (2.27.23):  TAVR  23  mm S3 ultra valve, nominal prep,  Right TF approach  Limited echo pre, post valve placement  Root angiography  Distal aortography  Temporary pacemaker placement removal  Manta closure  Right CFA access site.  U/S guided bilateral right groin access   Balloon angioplasty of right CFA with a 5 x 40 Napoleon.     LHC (1.26.23):  LM: Distal 50% Stenosis, LAD: Occluded Mid, LCX: Ostial 50% at Mid Portion, RCA: Dominant Patent Arter.  JENNINGS to LAD is Patent.  Impression: LIMA to LAD- Patent, 50% Stenosis LCX Artery  Medical Management.    Review of Systems   Constitutional: Positive for malaise/fatigue.   Cardiovascular:  Negative for chest pain.   Respiratory:          Exertional SOB     Objective:     Vital Signs (Most Recent):  Temp: 98.3 °F (36.8 °C) (06/20/24 0800)  Pulse: 92 (06/20/24 0815)  Resp: 18 (06/20/24 0818)  BP: 105/76 (06/20/24 0707)  SpO2: 97 % (06/20/24 0800) Vital Signs (24h Range):  Temp:  [97.9  °F (36.6 °C)-98.3 °F (36.8 °C)] 98.3 °F (36.8 °C)  Pulse:  [] 92  Resp:  [10-37] 18  SpO2:  [90 %-98 %] 97 %  BP: ()/(58-89) 105/76   Weight: 77.1 kg (170 lb)  Body mass index is 31.09 kg/m².  SpO2: 97 %       Intake/Output Summary (Last 24 hours) at 6/20/2024 0842  Last data filed at 6/20/2024 0800  Gross per 24 hour   Intake 1365.42 ml   Output 3400 ml   Net -2034.58 ml     Lines/Drains/Airways       Drain  Duration                  Urethral Catheter 06/09/24 1754 10 days              Peripheral Intravenous Line  Duration                  Peripheral IV - Single Lumen 06/07/24 1700 20 G Anterior;Proximal;Right Forearm 12 days         Peripheral IV - Single Lumen 06/08/24 0428 20 G Left Forearm 12 days                  Significant Labs:   Recent Results (from the past 72 hour(s))   POCT glucose    Collection Time: 06/18/24 12:18 AM   Result Value Ref Range    POCT Glucose 195 (H) 70 - 110 mg/dL   Magnesium    Collection Time: 06/18/24  2:58 AM   Result Value Ref Range    Magnesium Level 2.00 1.60 - 2.60 mg/dL   Basic Metabolic Panel    Collection Time: 06/18/24  2:58 AM   Result Value Ref Range    Sodium 132 (L) 136 - 145 mmol/L    Potassium 3.3 (L) 3.5 - 5.1 mmol/L    Chloride 89 (L) 98 - 107 mmol/L    CO2 31 23 - 31 mmol/L    Glucose 180 (H) 82 - 115 mg/dL    Blood Urea Nitrogen 46.1 (H) 9.8 - 20.1 mg/dL    Creatinine 1.55 (H) 0.55 - 1.02 mg/dL    BUN/Creatinine Ratio 30     Calcium 8.7 8.4 - 10.2 mg/dL    Anion Gap 12.0 mEq/L    eGFR 34 mL/min/1.73/m2   CBC with Differential    Collection Time: 06/18/24  2:58 AM   Result Value Ref Range    WBC 7.42 4.50 - 11.50 x10(3)/mcL    RBC 2.71 (L) 4.20 - 5.40 x10(6)/mcL    Hgb 8.1 (L) 12.0 - 16.0 g/dL    Hct 24.6 (L) 37.0 - 47.0 %    MCV 90.8 80.0 - 94.0 fL    MCH 29.9 27.0 - 31.0 pg    MCHC 32.9 (L) 33.0 - 36.0 g/dL    RDW 14.6 11.5 - 17.0 %    Platelet 278 130 - 400 x10(3)/mcL    MPV 10.7 (H) 7.4 - 10.4 fL    Neut % 87.8 %    Lymph % 5.9 %    Mono % 5.1 %     Eos % 0.1 %    Basophil % 0.0 %    Lymph # 0.44 (L) 0.6 - 4.6 x10(3)/mcL    Neut # 6.51 2.1 - 9.2 x10(3)/mcL    Mono # 0.38 0.1 - 1.3 x10(3)/mcL    Eos # 0.01 0 - 0.9 x10(3)/mcL    Baso # 0.00 <=0.2 x10(3)/mcL    IG# 0.08 (H) 0 - 0.04 x10(3)/mcL    IG% 1.1 %    NRBC% 0.0 %   Potassium    Collection Time: 06/18/24  3:11 PM   Result Value Ref Range    Potassium 3.2 (L) 3.5 - 5.1 mmol/L   Magnesium    Collection Time: 06/19/24  3:07 AM   Result Value Ref Range    Magnesium Level 1.80 1.60 - 2.60 mg/dL   Comprehensive Metabolic Panel    Collection Time: 06/19/24  3:07 AM   Result Value Ref Range    Sodium 138 136 - 145 mmol/L    Potassium 3.5 3.5 - 5.1 mmol/L    Chloride 89 (L) 98 - 107 mmol/L    CO2 35 (H) 23 - 31 mmol/L    Glucose 178 (H) 82 - 115 mg/dL    Blood Urea Nitrogen 48.9 (H) 9.8 - 20.1 mg/dL    Creatinine 1.26 (H) 0.55 - 1.02 mg/dL    Calcium 9.7 8.4 - 10.2 mg/dL    Protein Total 6.5 5.8 - 7.6 gm/dL    Albumin 3.0 (L) 3.4 - 4.8 g/dL    Globulin 3.5 2.4 - 3.5 gm/dL    Albumin/Globulin Ratio 0.9 (L) 1.1 - 2.0 ratio    Bilirubin Total 0.5 <=1.5 mg/dL    ALP 83 40 - 150 unit/L    ALT 10 0 - 55 unit/L    AST 14 5 - 34 unit/L    eGFR 44 mL/min/1.73/m2    Anion Gap 14.0 mEq/L    BUN/Creatinine Ratio 39    CBC with Differential    Collection Time: 06/19/24  3:07 AM   Result Value Ref Range    WBC 7.01 4.50 - 11.50 x10(3)/mcL    RBC 3.02 (L) 4.20 - 5.40 x10(6)/mcL    Hgb 8.8 (L) 12.0 - 16.0 g/dL    Hct 27.5 (L) 37.0 - 47.0 %    MCV 91.1 80.0 - 94.0 fL    MCH 29.1 27.0 - 31.0 pg    MCHC 32.0 (L) 33.0 - 36.0 g/dL    RDW 14.5 11.5 - 17.0 %    Platelet 294 130 - 400 x10(3)/mcL    MPV 10.4 7.4 - 10.4 fL    Neut % 90.5 %    Lymph % 5.1 %    Mono % 2.9 %    Eos % 0.1 %    Basophil % 0.1 %    Lymph # 0.36 (L) 0.6 - 4.6 x10(3)/mcL    Neut # 6.34 2.1 - 9.2 x10(3)/mcL    Mono # 0.20 0.1 - 1.3 x10(3)/mcL    Eos # 0.01 0 - 0.9 x10(3)/mcL    Baso # 0.01 <=0.2 x10(3)/mcL    IG# 0.09 (H) 0 - 0.04 x10(3)/mcL    IG% 1.3 %    NRBC% 0.0  %   EKG 12-lead    Collection Time: 06/19/24  4:25 AM   Result Value Ref Range    QRS Duration 154 ms    [facility] QTC Calculation 453 ms   Magnesium    Collection Time: 06/20/24  3:00 AM   Result Value Ref Range    Magnesium Level 2.20 1.60 - 2.60 mg/dL   Comprehensive Metabolic Panel    Collection Time: 06/20/24  3:00 AM   Result Value Ref Range    Sodium 136 136 - 145 mmol/L    Potassium 3.2 (L) 3.5 - 5.1 mmol/L    Chloride 83 (L) 98 - 107 mmol/L    CO2 38 (H) 23 - 31 mmol/L    Glucose 125 (H) 82 - 115 mg/dL    Blood Urea Nitrogen 62.1 (H) 9.8 - 20.1 mg/dL    Creatinine 1.24 (H) 0.55 - 1.02 mg/dL    Calcium 10.3 (H) 8.4 - 10.2 mg/dL    Protein Total 7.1 5.8 - 7.6 gm/dL    Albumin 3.3 (L) 3.4 - 4.8 g/dL    Globulin 3.8 (H) 2.4 - 3.5 gm/dL    Albumin/Globulin Ratio 0.9 (L) 1.1 - 2.0 ratio    Bilirubin Total 0.5 <=1.5 mg/dL    ALP 99 40 - 150 unit/L    ALT 14 0 - 55 unit/L    AST 21 5 - 34 unit/L    eGFR 45 mL/min/1.73/m2    Anion Gap 15.0 mEq/L    BUN/Creatinine Ratio 50    CBC with Differential    Collection Time: 06/20/24  3:00 AM   Result Value Ref Range    WBC 15.21 (H) 4.50 - 11.50 x10(3)/mcL    RBC 3.65 (L) 4.20 - 5.40 x10(6)/mcL    Hgb 10.6 (L) 12.0 - 16.0 g/dL    Hct 33.2 (L) 37.0 - 47.0 %    MCV 91.0 80.0 - 94.0 fL    MCH 29.0 27.0 - 31.0 pg    MCHC 31.9 (L) 33.0 - 36.0 g/dL    RDW 14.4 11.5 - 17.0 %    Platelet 343 130 - 400 x10(3)/mcL    MPV 10.1 7.4 - 10.4 fL    Neut % 78.9 %    Lymph % 7.6 %    Mono % 11.6 %    Eos % 0.4 %    Basophil % 0.1 %    Lymph # 1.16 0.6 - 4.6 x10(3)/mcL    Neut # 11.98 (H) 2.1 - 9.2 x10(3)/mcL    Mono # 1.77 (H) 0.1 - 1.3 x10(3)/mcL    Eos # 0.06 0 - 0.9 x10(3)/mcL    Baso # 0.02 <=0.2 x10(3)/mcL    IG# 0.22 (H) 0 - 0.04 x10(3)/mcL    IG% 1.4 %    NRBC% 0.0 %     Telemetry: AF CVR    Physical Exam  Vitals and nursing note reviewed.   Constitutional:       General: She is not in acute distress.     Appearance: Normal appearance. She is ill-appearing.   HENT:      Head: Normocephalic.       Mouth/Throat:      Mouth: Mucous membranes are moist.   Cardiovascular:      Rate and Rhythm: Normal rate. Rhythm irregular.      Heart sounds: Normal heart sounds.   Pulmonary:      Effort: Pulmonary effort is normal. No respiratory distress.      Breath sounds: Rales present.      Comments: Diminished Breath Sounds Bilateral Bases On Vapotherm 65%  Abdominal:      Palpations: Abdomen is soft.   Musculoskeletal:      Right lower leg: No edema.      Left lower leg: No edema.      Comments: Bilateral Lower Extremities Warm   Skin:     General: Skin is warm and dry.   Neurological:      Mental Status: She is alert. Mental status is at baseline.   Psychiatric:         Behavior: Behavior normal.       Current Inpatient Medications:  Current Facility-Administered Medications:     0.9%  NaCl infusion, , Intravenous, PRN, Vishnu Stevenson MD, Stopped at 06/08/24 0133    acetaminophen tablet 650 mg, 650 mg, Oral, Q6H PRN, Naa Chin MD, 650 mg at 06/17/24 1012    acetaminophen tablet 650 mg, 650 mg, Oral, Q4H PRN, Steven Wells MD, 650 mg at 06/19/24 1054    apixaban tablet 5 mg, 5 mg, Oral, BID, Murtaza Burris ANP, 5 mg at 06/20/24 0818    diltiaZEM 125 mg in dextrose 5 % 125 mL IVPB (ready to mix) (titrating), 0-15 mg/hr, Intravenous, Continuous, Murtaza Burris ANP, Last Rate: 20 mL/hr at 06/20/24 0609, 20 mg/hr at 06/20/24 0609    diltiaZEM 24 hr capsule 240 mg, 240 mg, Oral, Daily, Murtaza Burris ANP, 240 mg at 06/20/24 0818    docusate 50 mg/5 mL liquid 50 mg, 50 mg, Oral, Daily, Marga Smith MD, 50 mg at 06/19/24 0814    furosemide injection 40 mg, 40 mg, Intravenous, Q6H, Jay Padilla DO, 40 mg at 06/20/24 0604    gabapentin capsule 300 mg, 300 mg, Oral, BID, Augustus Barreto MD, 300 mg at 06/20/24 0818    hydrALAZINE injection 10 mg, 10 mg, Intravenous, Q6H PRN, Eleno Abbasi MD, 10 mg at 06/16/24 0025    labetaloL injection 10 mg, 10 mg, Intravenous, Q4H PRN, Baldo Finney DO, 10 mg at  06/11/24 0257    Lactobacillus rhamnosus GG 5 billion cell packet (PEDS) 1 each, 1 packet, Oral, BID, Naa Chin MD, 1 each at 06/20/24 0819    levalbuterol nebulizer solution 1.25 mg, 1.25 mg, Nebulization, Q6H PRN, Shanna Montiel MD, 1.25 mg at 06/09/24 1200    LIDOcaine 5 % patch 2 patch, 2 patch, Transdermal, Q24H, Sheri Carson, NP, 2 patch at 06/19/24 1448    LORazepam tablet 0.5 mg, 0.5 mg, Oral, Q4H PRN, Sheri Carson, NP, 0.5 mg at 06/19/24 1210    melatonin tablet 6 mg, 6 mg, Oral, Nightly PRN, Naa Chin MD, 6 mg at 06/14/24 2038    metoprolol injection 5 mg, 5 mg, Intravenous, Q4H PRN, Justyn Landaverde MD, 5 mg at 06/20/24 0604    morphine injection 0.5 mg, 0.5 mg, Intravenous, Q4H PRN, Augustus Barreto MD    ondansetron injection 4 mg, 4 mg, Intravenous, Q4H PRN, Julissa Rinaldi FNP, 4 mg at 06/11/24 0405    pantoprazole injection 40 mg, 40 mg, Intravenous, BID, Augustus Barreto MD, 40 mg at 06/20/24 0818    sertraline tablet 100 mg, 100 mg, Oral, QHS, Marga Smith MD, 100 mg at 06/19/24 2130    traMADoL tablet 50 mg, 50 mg, Oral, TID, Vishnu Stevenson MD, 50 mg at 06/20/24 0818    traMADoL tablet 50 mg, 50 mg, Oral, TID PRN, Augustus Barreto MD  VTE Risk Mitigation (From admission, onward)           Ordered     apixaban tablet 5 mg  2 times daily         06/17/24 1821     IP VTE HIGH RISK PATIENT  Once         06/01/24 2247                  Assessment:   Acute on Chronic Diastolic HF/EF 55-60%    - EF 55-60% with Grade I Diastolic Dysfunction    - RHC (6.17.24) - RA 11, RV 53/9, PA 57/31 (mean 41), PCWP 34, PA sat 58% (on 100% FiO2 BiPAP), CO/CI: 3.8/2.1 (TD), PVR: 1.9 Woods Assessment: Elevated R and L-Sided Filling Pressures    - Right Pleural Effusion Status Post Thoracentesis (950 ML Removed on 6.14.24)  Atrial Fibrillation with RVR (New Onset) - Now AF CVR    - CHADsVASc - 7 Points - 11.2% Stroke Risk per Year     - History of PSVT    - On Eliquis for Stroke Risk  Reduction  Valvular Heart Disease    - AS: Status Post TAVR  23  mm S3 ultra valve, nominal prep,  Right TF approach (2.27.23)  CAD/CABG    - LIMA to LAD (Patent) with 50% Stenosis Native LCX  Hypertension   Hyperlipidemia  Chronic Left Bundle Branch Block (Rock TAVR)  MELCHOR/CEA  Questionable Amiodarone Toxicity- Persistent and Non-Improving Lung Disease/Condition     - RHC Consistent with HF with EDP 31mmHg (6.17.24)  Aspiration Pneumonia    - Cleared Swallowing Study for Oral Intake  Acute Hypoxemic Respiratory Failure Requiring Oxygenation - Vapotherm Support  Leukocytosis/Sepsis  History of CVA  Anemia  Hypokalemia  DNR Status   No known history of GI Bleed     Plan:   Continue Cardizem  Mg PO Daily. Turn off Cardizem Infusion.  Continue Eliquis for Stroke Risk Reduction  Hold further diuresis today. Transition to Lasix 40 Mg IVP Daily in AM. Ensure Accurate I/0.  Wean Supplemental Oxygen Support down as Able.   Replete Potassium this AM  Mobilize as Able    STEF Iqbal  Cardiology  [facility]  06/20/2024    Physician addendum:  I have seen and examined this patient as a split-shared visit with the KELLIE d/t complicated medical management of above problems written in assessment and high acuity requiring physician expertise in medical decision-making. I performed the substantive portion of the history and exam. Above medical decision-making is also formulated by me.    Cardiovascular exam:  S1, S2  Lungs:  fine crackles at bases.  Extremities:  1+ edema bilaterally    Plan:  Change Lasix dose to once a day.  Medications as above.  Continue supportive therapy.  We will follow up.      Salas Gould MD  Cardiologist

## 2024-06-20 NOTE — PROGRESS NOTES
[facility] - 7th Floor ICU  Nephrology  Progress Note    Patient Name: Alanna Moya  MRN: [id number]  Admission Date: 6/1/2024  Hospital Length of Stay: 19 days  Attending Provider: Augustus Barreto MD   Primary Care Physician: Cornel Haddad MD  Principal Problem:Respiratory failure      Subjective:     Alanna Moya is a 79 y.o. female who presents with shortness of breath.  Past medical history significant for coronary artery disease status post CABG, hypertension, hyperlipidemia, HFpEF, aortic valve replacement, diverticulosis, and history of CVA.  She initially presented on 06/01/2024 with worsening shortness of breath.  She was initially admitted to the ICU due to BiPAP requirements.  She was given IV Lasix, and had some improvement in her shortness of breath initially, however, shortness of breath recurred and continue to worsen.  She was transferred to the floor, but had to be transferred back to the ICU due to increasing oxygen requirements.  There was some concern for possible amiodarone toxicity causing worsening lung function.  She did have a right heart catheterization done by Cardiology on 06/17/2024 showed HFpEF, and significantly elevated pulmonary capillary wedge pressure of 34.  Overall she has not diuresed very well, and Nephrology was consulted for assistance with volume management.  Patient states that prior to hospitalization she had not required supplemental oxygen.  She is now on high-flow nasal cannula 90% FiO2, 40 L. she has no lower extremity edema.  She does feel short of breath.  No chest pain, abdominal pain, nausea, or vomiting.     Interval History:   Urine output improved with increased in diuretics. Vapotherm requirements decreased. She is awake and alert with stable renal function.     Review of patient's allergies indicates:  No Known Allergies  Current Facility-Administered Medications   Medication Frequency    0.9%  NaCl infusion PRN    acetaminophen  tablet 650 mg Q6H PRN    acetaminophen tablet 650 mg Q4H PRN    apixaban tablet 5 mg BID    diltiaZEM 24 hr capsule 240 mg Daily    docusate 50 mg/5 mL liquid 50 mg Daily    [START ON 6/21/2024] furosemide injection 40 mg Daily    gabapentin capsule 300 mg BID    hydrALAZINE injection 10 mg Q6H PRN    labetaloL injection 10 mg Q4H PRN    Lactobacillus rhamnosus GG 5 billion cell packet (PEDS) 1 each BID    levalbuterol nebulizer solution 1.25 mg Q6H PRN    LIDOcaine 5 % patch 2 patch Q24H    LORazepam tablet 0.5 mg Q4H PRN    melatonin tablet 6 mg Nightly PRN    metoprolol injection 5 mg Q4H PRN    morphine injection 0.5 mg Q4H PRN    ondansetron injection 4 mg Q4H PRN    pantoprazole injection 40 mg BID    potassium chloride SA CR tablet 20 mEq Once    potassium chloride SA CR tablet 40 mEq Once    sertraline tablet 100 mg QHS    traMADoL tablet 50 mg TID    traMADoL tablet 50 mg TID PRN       Objective:     Vital Signs (Most Recent):  Temp: 98.3 °F (36.8 °C) (06/20/24 0800)  Pulse: 92 (06/20/24 0815)  Resp: 18 (06/20/24 0818)  BP: 105/76 (06/20/24 0707)  SpO2: 97 % (06/20/24 0800) Vital Signs (24h Range):  Temp:  [97.9 °F (36.6 °C)-98.3 °F (36.8 °C)] 98.3 °F (36.8 °C)  Pulse:  [] 92  Resp:  [10-37] 18  SpO2:  [91 %-98 %] 97 %  BP: ()/(58-89) 105/76     Weight: 77.1 kg (170 lb) (06/01/24 2230)  Body mass index is 31.09 kg/m².  Body surface area is 1.84 meters squared.    I/O last 3 completed shifts:  In: 1365.4 [P.O.:980; I.V.:385.4]  Out: 6445 [Urine:6445]    Physical Exam  Constitutional:       General: She is not in acute distress.     Appearance: She is ill-appearing.   HENT:      Head: Atraumatic.      Nose: Nose normal.      Mouth/Throat:      Mouth: Mucous membranes are moist.   Cardiovascular:      Rate and Rhythm: Normal rate and regular rhythm.   Pulmonary:      Effort: Pulmonary effort is normal.      Comments: Vapotherm with decreased settings  Abdominal:      General: There is no  distension.      Palpations: Abdomen is soft.   Genitourinary:     Comments: Urinary catheter with pale clear yellow urine   Musculoskeletal:         General: No swelling.      Cervical back: Neck supple.   Skin:     General: Skin is warm.   Neurological:      Mental Status: She is alert and oriented to person, place, and time.   Psychiatric:         Mood and Affect: Mood normal.         Behavior: Behavior normal.         Significant Labs:sureBMP:   Recent Labs   Lab 06/20/24  0300      K 3.2*   CL 83*   CO2 38*   BUN 62.1*   CREATININE 1.24*   CALCIUM 10.3*   MG 2.20     CBC:   Recent Labs   Lab 06/20/24  0300   WBC 15.21*   RBC 3.65*   HGB 10.6*   HCT 33.2*      MCV 91.0   MCH 29.0   MCHC 31.9*     Microbiology Results (last 7 days)       Procedure Component Value Units Date/Time    Body Fluid Culture [[id number]] Collected: 06/14/24 1021    Order Status: Completed Specimen: Body Fluid from Thoracentesis Fluid Updated: 06/19/24 1003     Body Fluid Culture Final Report: At 5 days. No growth    Gram Stain [[id number]] Collected: 06/14/24 1021    Order Status: Completed Specimen: Body Fluid from Pleural Fluid Updated: 06/14/24 1320     GRAM STAIN Rare WBC observed      No bacteria seen    KOH Prep [[id number]] Collected: 06/14/24 1022    Order Status: Completed Specimen: Body Fluid from Pleural Fluid Updated: 06/14/24 1256     KOH Prep No fungal elements seen    Respiratory Culture [[id number]] Updated: 06/14/24 1111    Order Status: Canceled Specimen: Sputum, Expectorated     Respiratory Culture [[id number]] Updated: 06/14/24 1111    Order Status: Canceled Specimen: Respiratory from Sputum, Expectorated     Fungal Culture [[id number]] Collected: 06/14/24 1021    Order Status: Sent Specimen: Body Fluid from Pleural Fluid Updated: 06/14/24 1028    Mycobacteria and Nocardia Culture [[id number]] Collected: 06/14/24 1021    Order Status: Sent Specimen: Body Fluid from Pleural Fluid, Right Updated: 06/14/24  "1028          Specimen (24h ago, onward)      None          No results for input(s): "COLORU", "CLARITYU", "SPECGRAV", "PHUR", "PROTEINUA", "GLUCOSEU", "BILIRUBINCON", "BLOODU", "WBCU", "RBCU", "BACTERIA", "MUCUS", "NITRITE", "LEUKOCYTESUR", "UROBILINOGEN", "HYALINECASTS" in the last 168 hours.    Significant Imaging:    Imaging reviewed     Assessment/Plan:     Acute kidney injury - no known CKD.  Baseline creatinine around 1  Acute hypoxic respiratory failure  HFpEF  Pulmonary hypertension  Atrial fibrillation    Cardiology decreased IV Lasix to daily. Agree with plan.   We will continue to monitor renal indices      LATANYA Stearns  Nephrology  [facility] - 7th Floor ICU  "

## 2024-06-21 LAB
ALBUMIN SERPL-MCNC: 3.2 G/DL (ref 3.4–4.8)
ALBUMIN/GLOB SERPL: 1 RATIO (ref 1.1–2)
ALLENS TEST BLOOD GAS (OHS): YES
ALP SERPL-CCNC: 103 UNIT/L (ref 40–150)
ALT SERPL-CCNC: 17 UNIT/L (ref 0–55)
ANION GAP SERPL CALC-SCNC: 13 MEQ/L
AST SERPL-CCNC: 22 UNIT/L (ref 5–34)
BASE EXCESS BLD CALC-SCNC: 18.4 MMOL/L (ref -2–2)
BASOPHILS # BLD AUTO: 0.02 X10(3)/MCL
BASOPHILS NFR BLD AUTO: 0.1 %
BILIRUB SERPL-MCNC: 0.6 MG/DL
BLOOD GAS SAMPLE TYPE (OHS): ABNORMAL
BUN SERPL-MCNC: 55.6 MG/DL (ref 9.8–20.1)
CA-I BLD-SCNC: 1.04 MMOL/L (ref 1.12–1.23)
CALCIUM SERPL-MCNC: 9.3 MG/DL (ref 8.4–10.2)
CHLORIDE SERPL-SCNC: 88 MMOL/L (ref 98–107)
CO2 BLDA-SCNC: 45.6 MMOL/L
CO2 SERPL-SCNC: 37 MMOL/L (ref 23–31)
COHGB MFR BLDA: 1.4 % (ref 0.5–1.5)
CREAT SERPL-MCNC: 1.16 MG/DL (ref 0.55–1.02)
CREAT/UREA NIT SERPL: 48
DRAWN BY BLOOD GAS (OHS): ABNORMAL
EOSINOPHIL # BLD AUTO: 0.06 X10(3)/MCL (ref 0–0.9)
EOSINOPHIL NFR BLD AUTO: 0.4 %
ERYTHROCYTE [DISTWIDTH] IN BLOOD BY AUTOMATED COUNT: 14.5 % (ref 11.5–17)
GFR SERPLBLD CREATININE-BSD FMLA CKD-EPI: 49 ML/MIN/1.73/M2
GLOBULIN SER-MCNC: 3.2 GM/DL (ref 2.4–3.5)
GLUCOSE SERPL-MCNC: 141 MG/DL (ref 82–115)
HCO3 BLDA-SCNC: 43.9 MMOL/L (ref 22–26)
HCT VFR BLD AUTO: 34.8 % (ref 37–47)
HGB BLD-MCNC: 11.1 G/DL (ref 12–16)
IMM GRANULOCYTES # BLD AUTO: 0.22 X10(3)/MCL (ref 0–0.04)
IMM GRANULOCYTES NFR BLD AUTO: 1.5 %
INHALED O2 CONCENTRATION: 60 %
LPM (OHS): 30
LYMPHOCYTES # BLD AUTO: 0.99 X10(3)/MCL (ref 0.6–4.6)
LYMPHOCYTES NFR BLD AUTO: 6.9 %
MAGNESIUM SERPL-MCNC: 2.2 MG/DL (ref 1.6–2.6)
MCH RBC QN AUTO: 28.8 PG (ref 27–31)
MCHC RBC AUTO-ENTMCNC: 31.9 G/DL (ref 33–36)
MCV RBC AUTO: 90.4 FL (ref 80–94)
METHGB MFR BLDA: 0.7 % (ref 0.4–1.5)
MONOCYTES # BLD AUTO: 1.23 X10(3)/MCL (ref 0.1–1.3)
MONOCYTES NFR BLD AUTO: 8.5 %
NEUTROPHILS # BLD AUTO: 11.88 X10(3)/MCL (ref 2.1–9.2)
NEUTROPHILS NFR BLD AUTO: 82.6 %
NRBC BLD AUTO-RTO: 0 %
O2 HB BLOOD GAS (OHS): 96.2 % (ref 94–97)
OXYGEN DEVICE BLOOD GAS (OHS): ABNORMAL
OXYHGB MFR BLDA: 10.8 G/DL (ref 12–16)
PCO2 BLDA: 55 MMHG (ref 35–45)
PH BLDA: 7.51 [PH] (ref 7.35–7.45)
PLATELET # BLD AUTO: 282 X10(3)/MCL (ref 130–400)
PMV BLD AUTO: 10.3 FL (ref 7.4–10.4)
PO2 BLDA: 102 MMHG (ref 80–100)
POTASSIUM BLOOD GAS (OHS): 2.9 MMOL/L (ref 3.5–5)
POTASSIUM SERPL-SCNC: 3.4 MMOL/L (ref 3.5–5.1)
PROT SERPL-MCNC: 6.4 GM/DL (ref 5.8–7.6)
RBC # BLD AUTO: 3.85 X10(6)/MCL (ref 4.2–5.4)
SAMPLE SITE BLOOD GAS (OHS): ABNORMAL
SAO2 % BLDA: 98.4 %
SODIUM BLOOD GAS (OHS): 128 MMOL/L (ref 137–145)
SODIUM SERPL-SCNC: 138 MMOL/L (ref 136–145)
WBC # BLD AUTO: 14.4 X10(3)/MCL (ref 4.5–11.5)

## 2024-06-21 PROCEDURE — 85025 COMPLETE CBC W/AUTO DIFF WBC: CPT

## 2024-06-21 PROCEDURE — 99900035 HC TECH TIME PER 15 MIN (STAT)

## 2024-06-21 PROCEDURE — 97110 THERAPEUTIC EXERCISES: CPT | Mod: CQ

## 2024-06-21 PROCEDURE — 82803 BLOOD GASES ANY COMBINATION: CPT

## 2024-06-21 PROCEDURE — 83735 ASSAY OF MAGNESIUM: CPT | Performed by: NURSE PRACTITIONER

## 2024-06-21 PROCEDURE — 97535 SELF CARE MNGMENT TRAINING: CPT

## 2024-06-21 PROCEDURE — 25000003 PHARM REV CODE 250: Performed by: INTERNAL MEDICINE

## 2024-06-21 PROCEDURE — 63600175 PHARM REV CODE 636 W HCPCS: Performed by: NURSE PRACTITIONER

## 2024-06-21 PROCEDURE — 36415 COLL VENOUS BLD VENIPUNCTURE: CPT

## 2024-06-21 PROCEDURE — 25000003 PHARM REV CODE 250: Performed by: STUDENT IN AN ORGANIZED HEALTH CARE EDUCATION/TRAINING PROGRAM

## 2024-06-21 PROCEDURE — 25000003 PHARM REV CODE 250

## 2024-06-21 PROCEDURE — 25000003 PHARM REV CODE 250: Performed by: NURSE PRACTITIONER

## 2024-06-21 PROCEDURE — 27100171 HC OXYGEN HIGH FLOW UP TO 24 HOURS

## 2024-06-21 PROCEDURE — 63600175 PHARM REV CODE 636 W HCPCS: Performed by: INTERNAL MEDICINE

## 2024-06-21 PROCEDURE — 94760 N-INVAS EAR/PLS OXIMETRY 1: CPT | Mod: XB

## 2024-06-21 PROCEDURE — 80053 COMPREHEN METABOLIC PANEL: CPT | Performed by: NURSE PRACTITIONER

## 2024-06-21 PROCEDURE — 63600175 PHARM REV CODE 636 W HCPCS: Performed by: STUDENT IN AN ORGANIZED HEALTH CARE EDUCATION/TRAINING PROGRAM

## 2024-06-21 PROCEDURE — 97530 THERAPEUTIC ACTIVITIES: CPT | Mod: CQ

## 2024-06-21 PROCEDURE — C9113 INJ PANTOPRAZOLE SODIUM, VIA: HCPCS | Performed by: INTERNAL MEDICINE

## 2024-06-21 PROCEDURE — 11000001 HC ACUTE MED/SURG PRIVATE ROOM

## 2024-06-21 PROCEDURE — 99900031 HC PATIENT EDUCATION (STAT)

## 2024-06-21 PROCEDURE — 97530 THERAPEUTIC ACTIVITIES: CPT

## 2024-06-21 PROCEDURE — 36600 WITHDRAWAL OF ARTERIAL BLOOD: CPT

## 2024-06-21 RX ORDER — POTASSIUM CHLORIDE 20 MEQ/1
40 TABLET, EXTENDED RELEASE ORAL ONCE
Status: DISCONTINUED | OUTPATIENT
Start: 2024-06-21 | End: 2024-06-21

## 2024-06-21 RX ORDER — DIGOXIN 0.25 MG/ML
500 INJECTION INTRAMUSCULAR; INTRAVENOUS ONCE
Status: COMPLETED | OUTPATIENT
Start: 2024-06-21 | End: 2024-06-21

## 2024-06-21 RX ORDER — METOPROLOL TARTRATE 25 MG/1
25 TABLET, FILM COATED ORAL 3 TIMES DAILY
Status: DISCONTINUED | OUTPATIENT
Start: 2024-06-21 | End: 2024-06-23 | Stop reason: HOSPADM

## 2024-06-21 RX ADMIN — SERTRALINE HYDROCHLORIDE 100 MG: 50 TABLET ORAL at 08:06

## 2024-06-21 RX ADMIN — Medication 1 EACH: at 08:06

## 2024-06-21 RX ADMIN — METOPROLOL TARTRATE 25 MG: 25 TABLET, FILM COATED ORAL at 10:06

## 2024-06-21 RX ADMIN — POTASSIUM BICARBONATE 40 MEQ: 391 TABLET, EFFERVESCENT ORAL at 01:06

## 2024-06-21 RX ADMIN — DOCUSATE SODIUM LIQUID 50 MG: 100 LIQUID ORAL at 09:06

## 2024-06-21 RX ADMIN — FUROSEMIDE 40 MG: 10 INJECTION, SOLUTION INTRAMUSCULAR; INTRAVENOUS at 08:06

## 2024-06-21 RX ADMIN — PANTOPRAZOLE SODIUM 40 MG: 40 INJECTION, POWDER, FOR SOLUTION INTRAVENOUS at 08:06

## 2024-06-21 RX ADMIN — LABETALOL HYDROCHLORIDE 10 MG: 5 INJECTION, SOLUTION INTRAVENOUS at 09:06

## 2024-06-21 RX ADMIN — METOPROLOL TARTRATE 25 MG: 25 TABLET, FILM COATED ORAL at 03:06

## 2024-06-21 RX ADMIN — APIXABAN 5 MG: 5 TABLET, FILM COATED ORAL at 08:06

## 2024-06-21 RX ADMIN — DEXTROSE MONOHYDRATE 500 MG: 50 INJECTION, SOLUTION INTRAVENOUS at 01:06

## 2024-06-21 RX ADMIN — GABAPENTIN 300 MG: 300 CAPSULE ORAL at 08:06

## 2024-06-21 RX ADMIN — METOPROLOL TARTRATE 25 MG: 25 TABLET, FILM COATED ORAL at 08:06

## 2024-06-21 RX ADMIN — METOPROLOL TARTRATE 5 MG: 1 INJECTION, SOLUTION INTRAVENOUS at 05:06

## 2024-06-21 RX ADMIN — Medication 6 MG: at 08:06

## 2024-06-21 RX ADMIN — LORAZEPAM 0.5 MG: 0.5 TABLET ORAL at 08:06

## 2024-06-21 RX ADMIN — DIGOXIN 500 MCG: 0.25 INJECTION INTRAMUSCULAR; INTRAVENOUS at 10:06

## 2024-06-21 RX ADMIN — DILTIAZEM HYDROCHLORIDE 240 MG: 120 CAPSULE, COATED, EXTENDED RELEASE ORAL at 08:06

## 2024-06-21 RX ADMIN — LIDOCAINE 2 PATCH: 700 PATCH TOPICAL at 03:06

## 2024-06-21 NOTE — PLAN OF CARE
Hospice referral noted. Patient requesting [facility]. Called Dyana with [facility] and sent referral via [facility].

## 2024-06-21 NOTE — PROGRESS NOTES
"    [facility]    Cardiology  Progress Note    Patient Name: Alanna Moya  MRN: [id number]  Admission Date: 6/1/2024  Hospital Length of Stay: 20 days  Code Status: DNR   Attending Physician: Augustus Barreto MD   Primary Care Physician: Cornel Haddad MD  Expected Discharge Date:   Principal Problem:Respiratory failure    Subjective:   Reason for Consult: SVT     HPI:Ms. Moya is a 78 year old female, known to Dr. Ramon in [place], who presented to the hospital from [facility] as transfer due to respiratory failure. Upon arrival she was noted to be SOB. Noted hypoxia requiring BIPAP. Chesr Radiograph from outside facility revealed bilateral airspace opacification and increase his right-sided pleural effusion suggestive of worsening pneumonia or possibly edema. CT Chest revealed mixed picture of decompensated HF, pleural effusions and superimposed pneumonia. During this hospitalization patient with tachycardia concerning for AF RVR, with noted Left Bundle Branch Block. She was given IV Metoprolol and started on Cardizem Infusion for Acute HR Control. She does have history of PSVT. Echocardiogram on 6.2.24 revealed intact LV Function with EF 55-60% & Grade I DD. Electrolytes grossly stable. Troponin normal. Lactic Acid Normal. . CIS Consulted for AF Management.     Hospital Course:   6.7.24: Patient seen sitting in chair. She denies SOB, CP, or nausea. Remains in Afib RVR   6.8.24: NAD. VSS. No complaints of CP/SOB/Palps. "I feel okay" WBC 14.27  6.9.24: NAD. VSS. AFIB RVR on telemetry. No complaints CP/SOB/Palps. "I feel okay" possible aspiration - awaiting Speech Evaluation. K 3.7, Mag 1.8, WBC 16.21.  6.10.24: NAD Noted. Sitting up in chair. AF RVR. Requiring Vapotherm Support. Denies CP. Reports exertional SOB, however, functional capacity is limited as she is deconditioned. Hypertension Noted. Diuresis noted, - 1604 ML/24 Hours.   6.11.24: BP Improved. AF RVR. Diuresis noted, " "- 2144 ML/24 Hours. Vapotherm oxygen support- 75% FIO2.   6.12.24: NAD Noted. Remains AF RVR. BP Stable. Requiring Significant Oxygen Support. Continues to Diurese well.   6.13.24: NAD. VSS. Urine output 1050/850 ml Fluid Net Negative. AFIB RVR on telemetry. No complaints of CP/SOB/Palps.   6.14.24: NAD. VSS. Sitting in Bedside Chair. Denies CP and Palps. + SOB. Remain in A.Fib with CVR. Fluid Balance Net Negative 1210mL.  6.15.24: NAD. VSS. Sitting in Bedside Chair. Denies CP and Palps. + SOB. PAF with CVR. Fluid Balance Net Negative 1285mL. "I am ok." HF NC O2  6.16.24: NAD. PAF/RVR. Amiodarone 0.5mg/min. Fluid Balance Net Negative 1175mL. HF NC O2  6.17.24: NAD. PAF with CVR. Cardizem 2.5mg/HR. Fluid Balance Net + 458mL. "I am ok."   6.18.24: NAD. Laying in Bed on HF NC. Cardizem 5mg/HR. Fluid Balance Net Negative 1816.7mL. "I am feeling a little better." + SOB/NIETO. Denies CP and Palps. S/P RHC with Elevated EDP.   6.19.24: NAD. "I am ok" + SOB/HF NC O2 85%, Fluid Balance Net Negative 3195mL. Denies CP and Palps.   6.20.24: NAD Noted. Sitting up in chair. FIO2 65% on Vapotherm 25 L/Min. BP Stable. AF CVR. On Cardizem Infusion at 5 Mg/Hour.   6.21.24: NAD. Sitting in Bedside Chair. Denies CP and Palps. + Exertional SOB. "I am ok." Remains in PAF/RVR. Off Cardizem Infusion.     PMH: CAD/CABG, Hypertension, Dyslipidemia, MELCHOR, CEA, CVA, AS/TAVR, PSVT  PSH: LHC, CABG, TAVR, CEA, Hysterectomy, Cholecystectomy, Eye Surgery, Hand Surgery  Family History: Father- Old MI/CAD, Mother- HF, Son- Hypertension, Daughter- Cancer/Hypertension, Brother- Heart Disease, Brother- Old MI/CAD, Brother- Cancer  Social History: Tobacco- Negative, Alcohol- Negative, Substance Abuse- Negative      Previous Cardiac Diagnostics:   RHC (6.17.24):  RA 11, RV 53/9, PA 57/31 (mean 41), PCWP 34, PA sat 58% (on 100% FiO2 BiPAP)  CO/CI: 3.8/2.1 (TD)  PVR: 1.9 Woods  Assessment:  -Elevated right and left-sided filling pressures  -Low cardiac " outputs  -Moderate pulmonary hypertension with normal pulmonary vascular resistance  Plan:  -Consider diuresis and maintenance of normal sinus rhythm    Echocardiogram (6.2.24):  Left Ventricle: The left ventricle is normal in size. Increased wall thickness. There is normal systolic function with a visually estimated ejection fraction of 55 - 60%. Grade I diastolic dysfunction.  Right Ventricle: Systolic function is reduced.TAPSE is 1.60 cm.  Aortic Valve: There is a transcatheter valve replacement in the aortic position. Aortic valve area by VTI is 1.56 cm². Aortic valve peak velocity is 1.94 m/s. Mean gradient is 8 mmHg. The dimensionless index is 0.50.  Mitral Valve: Mildly thickened leaflets. There is mitral annular calcification present. There is mild stenosis. The mean pressure gradient across the mitral valve is 5 mmHg at a heart rate of 87 bpm. There is mild regurgitation.  Tricuspid Valve: The tricuspid valve is structurally normal. There is mild regurgitation.  Pulmonic Valve: The pulmonic valve is structurally normal.  Pulmonary Artery: The estimated pulmonary artery systolic pressure is 47 mmHg.  IVC/SVC: Normal venous pressure at 3 mmHg.  Pericardium: Left pleural effusion.     Echocardiogram (5.15.24):  EF 55-60%. Grade I Diastolic Dysfunction. Normal LV Wall Motion.  Mild MAC with Mild MR and No MS. Mild to Moderate TR.  Mild to Moderate AI, No AS.     CT Angiogram Head/Neck (5.15.24):  Carotid arteries:   Calcified plaque at the siphons without significant narrowing.   Normal A1 and M1 segments.   Vertebrobasilar Circulation:   Vertebral arteries: Patent. Calcified plaque on the right without significant narrowing. Otherwise no abnormalities.   Basilar artery: Patent, no abnormalities.   Posterior cerebral arteries:  Patent. Right fetal origin. Otherwise no abnormalities.      Echocardiogram (4.21.23):  The study quality is good.   The left ventricle is normal in size. Global left ventricular  systolic function is normal. The left ventricular ejection fraction by Power's is 62%. The left ventricle diastolic function is impaired (Grade II) with an elevated left atrial pressure. Noted left ventricular hypertrophy. Concentric left ventricular hypertrophy is present. It is mild. LVSI=41ml/m2.    LVEDV=67.2ml and LVESV=25.7ml.  The left atrial diameter is moderately increased. Left atrial diameter is 4.4 cms. The left atrium is severely enlarged based on the left atrium volume index of 48.2ml/m².  S/P TAVR. Bioprosthetic aortic valve is well seated and functions normally with no evidence of insufficiency or perivalvular leaks. JACINTA=2.0cm2. The trans-aortic peak gradient is 15.8 mmHg. The trans-aortic mean gradient is 8.6 mmHg. DI=1.1.  Mild mitral annular calcification is noted. The mean trans mitral gradient is 1.9 mmHg.  Mild to moderate (1-2+) mitral regurgitation. Mild to moderate (1-2+) tricuspid regurgitation.  The estimated pulmonary artery systolic pressure is 41 mmHg assuming a right atrial pressure of 3 mmHg. Evidence of pulmonary hypertension is noted.      MCT Monitor (3.15.23):  Predominant Rhythm SR.  PSVT     TAVR (2.27.23):  TAVR  23  mm S3 ultra valve, nominal prep,  Right TF approach  Limited echo pre, post valve placement  Root angiography  Distal aortography  Temporary pacemaker placement removal  Manta closure  Right CFA access site.  U/S guided bilateral right groin access   Balloon angioplasty of right CFA with a 5 x 40 Napoleon.     LHC (1.26.23):  LM: Distal 50% Stenosis, LAD: Occluded Mid, LCX: Ostial 50% at Mid Portion, RCA: Dominant Patent Arter.  JENNINGS to LAD is Patent.  Impression: LIMA to LAD- Patent, 50% Stenosis LCX Artery  Medical Management.    Review of Systems   Constitutional: Positive for malaise/fatigue.   Cardiovascular:  Negative for chest pain.   Respiratory:  Positive for shortness of breath.         Exertional SOB   All other systems reviewed and are  negative.    Objective:     Vital Signs (Most Recent):  Temp: 98.4 °F (36.9 °C) (06/21/24 0800)  Pulse: (!) 116 (06/21/24 0826)  Resp: (!) 21 (06/21/24 0826)  BP: (!) 145/91 (06/21/24 0800)  SpO2: 98 % (06/21/24 0826) Vital Signs (24h Range):  Temp:  [98.4 °F (36.9 °C)-99.5 °F (37.5 °C)] 98.4 °F (36.9 °C)  Pulse:  [106-143] 116  Resp:  [15-28] 21  SpO2:  [95 %-100 %] 98 %  BP: ()/(63-92) 145/91   Weight: 77.1 kg (170 lb)  Body mass index is 31.09 kg/m².  SpO2: 98 %       Intake/Output Summary (Last 24 hours) at 6/21/2024 0900  Last data filed at 6/21/2024 0800  Gross per 24 hour   Intake 39.8 ml   Output 1300 ml   Net -1260.2 ml     Lines/Drains/Airways       Drain  Duration                  Urethral Catheter 06/09/24 1754 11 days              Peripheral Intravenous Line  Duration                  Peripheral IV - Single Lumen 06/07/24 1700 20 G Anterior;Proximal;Right Forearm 13 days         Peripheral IV - Single Lumen 06/08/24 0428 20 G Left Forearm 13 days                  Significant Labs:   Recent Results (from the past 72 hour(s))   Potassium    Collection Time: 06/18/24  3:11 PM   Result Value Ref Range    Potassium 3.2 (L) 3.5 - 5.1 mmol/L   Magnesium    Collection Time: 06/19/24  3:07 AM   Result Value Ref Range    Magnesium Level 1.80 1.60 - 2.60 mg/dL   Comprehensive Metabolic Panel    Collection Time: 06/19/24  3:07 AM   Result Value Ref Range    Sodium 138 136 - 145 mmol/L    Potassium 3.5 3.5 - 5.1 mmol/L    Chloride 89 (L) 98 - 107 mmol/L    CO2 35 (H) 23 - 31 mmol/L    Glucose 178 (H) 82 - 115 mg/dL    Blood Urea Nitrogen 48.9 (H) 9.8 - 20.1 mg/dL    Creatinine 1.26 (H) 0.55 - 1.02 mg/dL    Calcium 9.7 8.4 - 10.2 mg/dL    Protein Total 6.5 5.8 - 7.6 gm/dL    Albumin 3.0 (L) 3.4 - 4.8 g/dL    Globulin 3.5 2.4 - 3.5 gm/dL    Albumin/Globulin Ratio 0.9 (L) 1.1 - 2.0 ratio    Bilirubin Total 0.5 <=1.5 mg/dL    ALP 83 40 - 150 unit/L    ALT 10 0 - 55 unit/L    AST 14 5 - 34 unit/L    eGFR 44  mL/min/1.73/m2    Anion Gap 14.0 mEq/L    BUN/Creatinine Ratio 39    CBC with Differential    Collection Time: 06/19/24  3:07 AM   Result Value Ref Range    WBC 7.01 4.50 - 11.50 x10(3)/mcL    RBC 3.02 (L) 4.20 - 5.40 x10(6)/mcL    Hgb 8.8 (L) 12.0 - 16.0 g/dL    Hct 27.5 (L) 37.0 - 47.0 %    MCV 91.1 80.0 - 94.0 fL    MCH 29.1 27.0 - 31.0 pg    MCHC 32.0 (L) 33.0 - 36.0 g/dL    RDW 14.5 11.5 - 17.0 %    Platelet 294 130 - 400 x10(3)/mcL    MPV 10.4 7.4 - 10.4 fL    Neut % 90.5 %    Lymph % 5.1 %    Mono % 2.9 %    Eos % 0.1 %    Basophil % 0.1 %    Lymph # 0.36 (L) 0.6 - 4.6 x10(3)/mcL    Neut # 6.34 2.1 - 9.2 x10(3)/mcL    Mono # 0.20 0.1 - 1.3 x10(3)/mcL    Eos # 0.01 0 - 0.9 x10(3)/mcL    Baso # 0.01 <=0.2 x10(3)/mcL    IG# 0.09 (H) 0 - 0.04 x10(3)/mcL    IG% 1.3 %    NRBC% 0.0 %   EKG 12-lead    Collection Time: 06/19/24  4:25 AM   Result Value Ref Range    QRS Duration 154 ms    [facility]HS QTC Calculation 453 ms   Magnesium    Collection Time: 06/20/24  3:00 AM   Result Value Ref Range    Magnesium Level 2.20 1.60 - 2.60 mg/dL   Comprehensive Metabolic Panel    Collection Time: 06/20/24  3:00 AM   Result Value Ref Range    Sodium 136 136 - 145 mmol/L    Potassium 3.2 (L) 3.5 - 5.1 mmol/L    Chloride 83 (L) 98 - 107 mmol/L    CO2 38 (H) 23 - 31 mmol/L    Glucose 125 (H) 82 - 115 mg/dL    Blood Urea Nitrogen 62.1 (H) 9.8 - 20.1 mg/dL    Creatinine 1.24 (H) 0.55 - 1.02 mg/dL    Calcium 10.3 (H) 8.4 - 10.2 mg/dL    Protein Total 7.1 5.8 - 7.6 gm/dL    Albumin 3.3 (L) 3.4 - 4.8 g/dL    Globulin 3.8 (H) 2.4 - 3.5 gm/dL    Albumin/Globulin Ratio 0.9 (L) 1.1 - 2.0 ratio    Bilirubin Total 0.5 <=1.5 mg/dL    ALP 99 40 - 150 unit/L    ALT 14 0 - 55 unit/L    AST 21 5 - 34 unit/L    eGFR 45 mL/min/1.73/m2    Anion Gap 15.0 mEq/L    BUN/Creatinine Ratio 50    CBC with Differential    Collection Time: 06/20/24  3:00 AM   Result Value Ref Range    WBC 15.21 (H) 4.50 - 11.50 x10(3)/mcL    RBC 3.65 (L) 4.20 - 5.40 x10(6)/mcL    Hgb  10.6 (L) 12.0 - 16.0 g/dL    Hct 33.2 (L) 37.0 - 47.0 %    MCV 91.0 80.0 - 94.0 fL    MCH 29.0 27.0 - 31.0 pg    MCHC 31.9 (L) 33.0 - 36.0 g/dL    RDW 14.4 11.5 - 17.0 %    Platelet 343 130 - 400 x10(3)/mcL    MPV 10.1 7.4 - 10.4 fL    Neut % 78.9 %    Lymph % 7.6 %    Mono % 11.6 %    Eos % 0.4 %    Basophil % 0.1 %    Lymph # 1.16 0.6 - 4.6 x10(3)/mcL    Neut # 11.98 (H) 2.1 - 9.2 x10(3)/mcL    Mono # 1.77 (H) 0.1 - 1.3 x10(3)/mcL    Eos # 0.06 0 - 0.9 x10(3)/mcL    Baso # 0.02 <=0.2 x10(3)/mcL    IG# 0.22 (H) 0 - 0.04 x10(3)/mcL    IG% 1.4 %    NRBC% 0.0 %   EKG 12-lead    Collection Time: 06/20/24  4:49 AM   Result Value Ref Range    QRS Duration 152 ms    OHS QTC Calculation 442 ms   RT Blood Gas    Collection Time: 06/20/24  5:09 PM   Result Value Ref Range    Sample Type Arterial Blood     Sample site Right Brachial Artery     Drawn by aslrt     pH, Blood gas 7.520 (H) 7.350 - 7.450    pCO2, Blood gas 58.0 (HH) 35.0 - 45.0 mmHg    pO2, Blood gas 53.0 (LL) 80.0 - 100.0 mmHg    Sodium, Blood Gas 129 (L) 137 - 145 mmol/L    Potassium, Blood Gas 2.9 (L) 3.5 - 5.0 mmol/L    Calcium Level Ionized 1.05 (L) 1.12 - 1.23 mmol/L    TOC2, Blood gas 49.2 mmol/L    Base Excess, Blood gas 21.50 (H) -2.00 - 2.00 mmol/L    sO2, Blood gas 90.5 %    HCO3, Blood gas 47.4 (H) 22.0 - 26.0 mmol/L    THb, Blood gas 11.2 (L) 12 - 16 g/dL    O2 Hb, Blood Gas 90.2 (L) 94.0 - 97.0 %    CO Hgb 1.6 (H) 0.5 - 1.5 %    Met Hgb 1.2 0.4 - 1.5 %    Allens Test No     Oxygen Device, Blood gas High Flow Cannula     LPM 25     FIO2, Blood gas 70.0 %   Comprehensive Metabolic Panel    Collection Time: 06/21/24  2:40 AM   Result Value Ref Range    Sodium 138 136 - 145 mmol/L    Potassium 3.4 (L) 3.5 - 5.1 mmol/L    Chloride 88 (L) 98 - 107 mmol/L    CO2 37 (H) 23 - 31 mmol/L    Glucose 141 (H) 82 - 115 mg/dL    Blood Urea Nitrogen 55.6 (H) 9.8 - 20.1 mg/dL    Creatinine 1.16 (H) 0.55 - 1.02 mg/dL    Calcium 9.3 8.4 - 10.2 mg/dL    Protein Total 6.4  5.8 - 7.6 gm/dL    Albumin 3.2 (L) 3.4 - 4.8 g/dL    Globulin 3.2 2.4 - 3.5 gm/dL    Albumin/Globulin Ratio 1.0 (L) 1.1 - 2.0 ratio    Bilirubin Total 0.6 <=1.5 mg/dL     40 - 150 unit/L    ALT 17 0 - 55 unit/L    AST 22 5 - 34 unit/L    eGFR 49 mL/min/1.73/m2    Anion Gap 13.0 mEq/L    BUN/Creatinine Ratio 48    Magnesium    Collection Time: 06/21/24  2:40 AM   Result Value Ref Range    Magnesium Level 2.20 1.60 - 2.60 mg/dL   CBC with Differential    Collection Time: 06/21/24  2:40 AM   Result Value Ref Range    WBC 14.40 (H) 4.50 - 11.50 x10(3)/mcL    RBC 3.85 (L) 4.20 - 5.40 x10(6)/mcL    Hgb 11.1 (L) 12.0 - 16.0 g/dL    Hct 34.8 (L) 37.0 - 47.0 %    MCV 90.4 80.0 - 94.0 fL    MCH 28.8 27.0 - 31.0 pg    MCHC 31.9 (L) 33.0 - 36.0 g/dL    RDW 14.5 11.5 - 17.0 %    Platelet 282 130 - 400 x10(3)/mcL    MPV 10.3 7.4 - 10.4 fL    Neut % 82.6 %    Lymph % 6.9 %    Mono % 8.5 %    Eos % 0.4 %    Basophil % 0.1 %    Lymph # 0.99 0.6 - 4.6 x10(3)/mcL    Neut # 11.88 (H) 2.1 - 9.2 x10(3)/mcL    Mono # 1.23 0.1 - 1.3 x10(3)/mcL    Eos # 0.06 0 - 0.9 x10(3)/mcL    Baso # 0.02 <=0.2 x10(3)/mcL    IG# 0.22 (H) 0 - 0.04 x10(3)/mcL    IG% 1.5 %    NRBC% 0.0 %     Telemetry: AF RVR    Physical Exam  Vitals and nursing note reviewed.   Constitutional:       General: She is not in acute distress.     Appearance: Normal appearance. She is ill-appearing.   HENT:      Head: Normocephalic.      Mouth/Throat:      Mouth: Mucous membranes are moist.   Cardiovascular:      Rate and Rhythm: Normal rate. Rhythm irregular.      Heart sounds: Normal heart sounds.   Pulmonary:      Effort: Pulmonary effort is normal. No respiratory distress.      Breath sounds: Rales present.      Comments: Diminished Breath Sounds Bilateral Bases On Vapotherm 65%  Abdominal:      Palpations: Abdomen is soft.   Musculoskeletal:      Right lower leg: No edema.      Left lower leg: No edema.      Comments: Bilateral Lower Extremities Warm   Skin:      General: Skin is warm and dry.   Neurological:      Mental Status: She is alert. Mental status is at baseline.   Psychiatric:         Behavior: Behavior normal.       Current Inpatient Medications:  Current Facility-Administered Medications:     0.9%  NaCl infusion, , Intravenous, PRN, Vishnu Stevenson MD, Stopped at 06/08/24 0133    acetaminophen tablet 650 mg, 650 mg, Oral, Q6H PRN, Naa Chin MD, 650 mg at 06/17/24 1012    acetaminophen tablet 650 mg, 650 mg, Oral, Q4H PRN, Steven Wells MD, 650 mg at 06/19/24 1054    apixaban tablet 5 mg, 5 mg, Oral, BID, Murtaza Burris ANP, 5 mg at 06/21/24 0822    butalbital-acetaminophen-caffeine -40 mg per tablet 1 tablet, 1 tablet, Oral, Q6H PRN, Marshal Alvarado MD, 1 tablet at 06/20/24 1721    diltiaZEM 24 hr capsule 240 mg, 240 mg, Oral, Daily, Murtaza Burris ANP, 240 mg at 06/21/24 0822    docusate 50 mg/5 mL liquid 50 mg, 50 mg, Oral, Daily, Marga Smith MD, 50 mg at 06/19/24 0814    furosemide injection 40 mg, 40 mg, Intravenous, Daily, Nataly Mead FNP, 40 mg at 06/21/24 0823    gabapentin capsule 300 mg, 300 mg, Oral, BID, Augustus Barreto MD, 300 mg at 06/21/24 0822    hydrALAZINE injection 10 mg, 10 mg, Intravenous, Q6H PRN, Eleno Abbasi MD, 10 mg at 06/16/24 0025    labetaloL injection 10 mg, 10 mg, Intravenous, Q4H PRN, Baldo Finney DO, 10 mg at 06/11/24 0257    Lactobacillus rhamnosus GG 5 billion cell packet (PEDS) 1 each, 1 packet, Oral, BID, Naa Chin MD, 1 each at 06/21/24 0823    levalbuterol nebulizer solution 1.25 mg, 1.25 mg, Nebulization, Q6H PRN, Shanna Montiel MD, 1.25 mg at 06/09/24 1200    LIDOcaine 5 % patch 2 patch, 2 patch, Transdermal, Q24H, Sheri Carson, NP, 2 patch at 06/20/24 1431    LORazepam tablet 0.5 mg, 0.5 mg, Oral, Q4H PRN, Sheri Carson, NP, 0.5 mg at 06/19/24 1210    melatonin tablet 6 mg, 6 mg, Oral, Nightly PRN, Naa Chin MD, 6 mg at 06/14/24 2038    metoprolol injection 5 mg, 5  mg, Intravenous, Q4H PRN, Justyn Landaverde MD, 5 mg at 06/21/24 0515    morphine injection 0.5 mg, 0.5 mg, Intravenous, Q4H PRN, Augustus Barreto MD    ondansetron injection 4 mg, 4 mg, Intravenous, Q4H PRN, Julissa Rinaldi, FNP, 4 mg at 06/11/24 0405    pantoprazole injection 40 mg, 40 mg, Intravenous, BID, Augustus Barreto MD, 40 mg at 06/21/24 0823    sertraline tablet 100 mg, 100 mg, Oral, QHS, Marga Smith MD, 100 mg at 06/20/24 2006    traMADoL tablet 50 mg, 50 mg, Oral, TID PRN, Augustus Barreto MD  VTE Risk Mitigation (From admission, onward)           Ordered     apixaban tablet 5 mg  2 times daily         06/17/24 1821     IP VTE HIGH RISK PATIENT  Once         06/01/24 2247                  Assessment:   Atrial Fibrillation with RVR (New Onset)    - CHADsVASc - 7 Points - 11.2% Stroke Risk per Year     - History of PSVT    - On Eliquis for Stroke Risk Reduction  Acute on Chronic Diastolic HF/EF 55-60% - Improving    - EF 55-60% with Grade I Diastolic Dysfunction    - RHC (6.17.24) - RA 11, RV 53/9, PA 57/31 (mean 41), PCWP 34, PA sat 58% (on 100% FiO2 BiPAP), CO/CI: 3.8/2.1 (TD), PVR: 1.9 Woods Assessment: Elevated R and L-Sided Filling Pressures    - Right Pleural Effusion Status Post Thoracentesis (950 ML Removed on 6.14.24)  Valvular Heart Disease    - AS: Status Post TAVR  23  mm S3 ultra valve, nominal prep,  Right TF approach (2.27.23)  CAD/CABG    - LIMA to LAD (Patent) with 50% Stenosis Native LCX  Hypertension   Hyperlipidemia  Chronic Left Bundle Branch Block ([employer] TAVR)  MELCHOR/CEA  Questionable Amiodarone Toxicity- Persistent and Non-Improving Lung Disease/Condition     - RHC Consistent with HF with EDP 31mmHg (6.17.24)  Aspiration Pneumonia    - Cleared Swallowing Study for Oral Intake  Acute Hypoxemic Respiratory Failure Requiring Oxygenation - Vapotherm Support  Leukocytosis/Sepsis  History of CVA  Anemia  Hypokalemia  DNR Status   No known history of GI Bleed     Plan:   Continue  Cardizem  Mg PO Daily  Start Metoprolol Tartrate 25mg PO TID  Digoxin 500mcg IVP x 1 Now   Continue Eliquis for Stroke Risk Reduction  Continue IV Diuresis with Lasix 40mg Daily  Accurate I&Os and Daily Weights   Wean O2 Therapy   Keep K > 4.0 and Mg > 2.0  Replete K   Labs in AM: CBC, CMP and Mg    Murtaza Burris, ANP  Cardiology  [facility]  06/21/2024    Physician addendum:  I have seen and examined this patient as a split-shared visit with the KELLIE d/t complicated medical management of above problems written in assessment and high acuity requiring physician expertise in medical decision-making. I performed the substantive portion of the history and exam. Above medical decision-making is also formulated by me.    Cardiovascular exam:  S1, S2  Lungs:  fine crackles at bases.  Extremities:  1+ edema bilaterally    Plan:  Continue diuresis.  Rest Medications as above.  Continue supportive therapy.  We will follow up.      Salas Gould MD  Cardiologist

## 2024-06-21 NOTE — CARE UPDATE
[id number] Pt may go to the [facility] today. Placed pt in will call with [facility] ambulance

## 2024-06-21 NOTE — NURSING
Nurses Note -- 4 Eyes      6/21/2024   6:57 PM      Skin assessed during: Q Shift Change      [] No Altered Skin Integrity Present    []Prevention Measures Documented      [x] Yes- Altered Skin Integrity Present or Discovered   [x] LDA Added if Not in Epic (Describe Wound)   [] New Altered Skin Integrity was Present on Admit and Documented in LDA   [] Wound Image Taken    Wound Care Consulted? No    Attending Nurse:  Rakesh Pederson RN/Staff Member:  Kimberly

## 2024-06-21 NOTE — PT/OT/SLP PROGRESS
Occupational Therapy   Treatment    Name: Alanna Moya  MRN: [id number]  Admitting Diagnosis:  Respiratory failure  4 Days Post-Op    Recommendations:     Recommended therapy intensity at discharge: Moderate Intensity Therapy   Discharge Equipment Recommendations:  to be determined by next level of care  Barriers to discharge:  Other (Comment) (ongoing medical needs)    Assessment:     Alanna Moya is a 79 y.o. female with a medical diagnosis of acute hypoxic resp failure, diastolic heart failure, R ventricular failure with potential pulmonary hypertension, afib, potential amiodarone related pulmonary toxicity. She presents with resting HR between  and 115-138 with activity. Noted pt to present with increased weakness vs yesterday; also observed BUE tremors and poor ability to project her voice. Performance deficits affecting function are weakness, impaired endurance, impaired self care skills, impaired functional mobility, gait instability, impaired balance, decreased lower extremity function, decreased upper extremity function, impaired cardiopulmonary response to activity, decreased coordination. She required mod-max A for bed mobility and mod A x2 for functional transfers. Pt up in chair with all needs met at end of OT session; RN aware. Returned 12:14-12:24 to assist with transfer back to bed.     Rehab Prognosis:  Good; patient would benefit from acute skilled OT services to address these deficits and reach maximum level of function.       Plan:     Patient to be seen 4 x/week to address the above listed problems via self-care/home management, therapeutic activities, therapeutic exercises  Plan of Care Expires: 07/19/24  Plan of Care Reviewed with: patient    Subjective     Pain/Comfort:  Pain Rating 1: 0/10    Objective:     Communicated with: RN prior to session.  Patient found HOB elevated with blood pressure cuff, pulse ox (continuous), telemetry, melton catheter, oxygen, peripheral IV  upon OT entry to room.    General Precautions: Standard, fall    Orthopedic Precautions:N/A  Braces: N/A  Respiratory Status: High flow, flow 30 L/min, concentration 60%  Vital Signs: Blood Pressure: 133/65  HR:  at rest, 115-138 with activity  Sp02: 96%     Occupational Performance:     Bed Mobility:    Patient completed Scooting/Bridging with moderate assistance  Patient completed Supine to Sit with maximal assistance  Patient completed Sit to Supine with moderate assistance     Functional Mobility/Transfers:  Patient completed Sit <> Stand Transfer with moderate assistance and of x2 persons  with  hand-held assist   Patient completed Bed <> Chair Transfer using Stand Pivot technique with moderate assistance and of x2 persons with hand-held assist    Activities of Daily Living:  Lower Body Dressing: maximal assistance to don socks while supine.     Therapeutic Positioning    OT interventions performed during the course of today's session in an effort to prevent and/or reduce acquired pressure injuries:   Therapeutic positioning was provided at the conclusion of session to offload all bony prominences for the prevention and/or reduction of pressure injuries    Skin assessment: all bony prominences were assessed    Findings:  Visible skin intact.     [facility] 6 Click ADL: 14    Patient Education:  Patient and family were provided with verbal education education regarding OT role/goals/POC, fall prevention, safety awareness, Discharge/DME recommendations, and pressure ulcer prevention.  Understanding was verbalized.      Patient left up in chair with all lines intact, call button in reach, latoya pad in place, and RN notified. Returned 12:14-12:24 to assist with t/f back to bed. Pt left with HOB elevated, wedge on R side, call button in reach, all lines intact, B SCDs donned, RN notified, and family present.     GOALS:   Multidisciplinary Problems       Occupational Therapy Goals          Problem: Occupational  Therapy    Goal Priority Disciplines Outcome Interventions   Occupational Therapy Goal     OT, PT/OT Progressing    Description: Goals to be met by: 7/19/24    Patient will increase functional independence with ADLs by performing:    UE Dressing with Supervision.  LE Dressing with Supervision.  Grooming while standing at sink with Supervision.  Toileting from toilet with Supervision for hygiene and clothing management.   Toilet transfer to toilet with Supervision.                         Time Tracking:     OT Date of Treatment: 06/21/24  OT Start Time: 0940 & 12:14  OT Stop Time: 1006 & 12:24  OT Total Time (min): 26 min & 10 minutes.     Billable Minutes:Self Care/Home Management 1.   Therapeutic Activity 2.     OT/MARY: OT     Number of MARY visits since last OT visit: 2    6/21/2024

## 2024-06-21 NOTE — PLAN OF CARE
Problem: Adult Inpatient Plan of Care  Goal: Plan of Care Review  Outcome: Progressing  Goal: Patient-Specific Goal (Individualized)  Outcome: Progressing  Goal: Absence of Hospital-Acquired Illness or Injury  Outcome: Progressing  Goal: Optimal Comfort and Wellbeing  Outcome: Progressing  Goal: Readiness for Transition of Care  Outcome: Progressing     Problem: Infection  Goal: Absence of Infection Signs and Symptoms  Outcome: Progressing     Problem: Skin Injury Risk Increased  Goal: Skin Health and Integrity  Outcome: Progressing     Problem: Fall Injury Risk  Goal: Absence of Fall and Fall-Related Injury  Outcome: Progressing     Problem: Coping Ineffective  Goal: Effective Coping  Outcome: Progressing     Problem: Wound  Goal: Optimal Coping  Outcome: Progressing  Goal: Optimal Functional Ability  Outcome: Progressing  Goal: Absence of Infection Signs and Symptoms  Outcome: Progressing  Goal: Improved Oral Intake  Outcome: Progressing  Goal: Optimal Pain Control and Function  Outcome: Progressing  Goal: Skin Health and Integrity  Outcome: Progressing  Goal: Optimal Wound Healing  Outcome: Progressing

## 2024-06-21 NOTE — NURSING
Nurses Note -- 4 Eyes      6/21/2024   4:43 PM      Skin assessed during: Daily Assessment      [x] No Altered Skin Integrity Present    [x]Prevention Measures Documented      [] Yes- Altered Skin Integrity Present or Discovered   [] LDA Added if Not in Epic (Describe Wound)   [] New Altered Skin Integrity was Present on Admit and Documented in LDA   [] Wound Image Taken    Wound Care Consulted? No    Attending Nurse:  Kimberly Pederson RN/Staff Member:  MELINA Nazario

## 2024-06-21 NOTE — PROGRESS NOTES
Orders received, chart reviewed, and POC discussed with RN. Patient recently discharged from ST services on regular diet (s/p MBS 6/10/24). ST re-consulted 6/21/24 due to hoarse voicing. Recommend ENT consult to address dysphonia. Unable to provide voice therapy until seen by ENT. ST to sign off at this time.

## 2024-06-21 NOTE — PROGRESS NOTES
[facility]ia isabel [facility] Medicine Progress Note        Chief Complaint: Inpatient Follow-up for multifocal pneumonia     HPI:   78-year-old lady with PMH of AS s/p AVR, CAD, MI, HTN, chronic back pain, HLD, CVA, CHF, GERD, diverticulosis who was being downgraded from the ICU to Hospital Medicine after she was initially admitted for acute hypoxemic respiratory failure requiring BiPAP for adequate saturations. She was transferred from an outside facility to [facility] with complaints of worsening shortness of breath for the last few weeks with initial CXR at outside facility showing bilateral airspace opacities and increased right-sided pleural effusion. Her last echocardiogram from 05/16 showed EF 55-60%. She was placed on antibiotics, steroids as well as IV Lasix for diuresis for pulmonary congestion. She was able to be weaned down from BiPAP to Vapotherm and eventually Oxymizer 8 L. she was also noted to have new onset atrial fibrillation for which he was started on IV Cardizem which has since been transitioned to p.o. Cardizem. She is on full-dose Lovenox due to CHADS-VASc of 7. She reported feeling significantly better and endorsed improvement in her shortness of breath since admission. Denied having any fever, chills, chest pain, cough, sputum production, abdominal pain, nausea, vomiting, headache, numbness, weakness, dizziness/lightheadedness or loss of consciousness. Blood cultures from admission are negative. PT/OT on board; in recommending moderate intensity therapy. Cardiology on board.   Patient had repeat chest x-ray done on June 9th and that showed denser consolidation patient continues to be on Zosyn now has been put on BiPAP with FiO2 of 90%    She is status post thoracentesis with removal of 950 cc of fluid by pulmonology Will follow up with cultures 6/14.  She is status post right heart catheterization with Cardiology. Recommendation to continue aggressive diuresis.     Patient  currently being managed for persistent hypoxic respiratory failure due to diastolic CHF exacerbation.  On Vapotherm/BiPAP and diuresis.  Pulmonology, Cardiology, Nephrology on board. ABG noted to have metabolic alkalemia with respiratory acidemia. Placed on BiPAP. Remains on IV Lasix b.i.d., restarted on IV Cardizem on 06/19; to be transitioned to p.o. Cardizem and infusion to be turned off.  IV Lasix to be decreased to 40 mg daily tomorrow.       Interval Hx:   Today, Mrs Moya stated she was doing well and had no new complaints. Noted to be more lethargic and somnolent but oriented x 3. On Vapotherm at 60%/30 L today with adequate saturations.  Repeat ABG showing persistent metabolic alkalosis and respiratory acidemia; will place back on BiPAP and give IV Diamox 500 mg. Plans to consult hospice noted; will continue following. Replaced K.     Objective/physical exam:  General: alert lady lying comfortably in bed, in no acute distress  HENT: oral and oropharyngeal mucosa moist, pink, with no erythema or exudates, no ear pain or discharge  Neck: normal neck movement, no lymph nodes or swellings, no JVD or Carotid bruit  Respiratory: clear breathing sounds bilaterally, no crackles, rales, ronchi or wheezes  Cardiovascular: clear S1 and S2, no murmurs, rubs or gallops  Peripheral Vascular: no lesions, ulcers or erosions, normal peripheral pulses and no pedal edema  Gastrointestinal: soft, non-tender, non-distended abdomen, no guarding, rigidity or rebound tenderness, normal bowel sounds  Integumentary: normal skin color, no rashes or lesions  Neuro: AAO x 3; motor strength 5/5 in B/L UEs & LEs; sensation intact to gross and fine touch B/L; CN II-XII grossly intact      Assessment/Plan:  Migraine HA   Acute respiratory failure with hypoxia requiring high-flow O2/BIPAP  HFpEF with exacerbation and bilateral pleural effusions   Metabolic Alkalemia  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   Bilateral pleural effusions right  greater than left status post thoracentesis on 06/14   Leucocytosis, resolved   Atrial Fibrillation with RVR (New Onset) ,  JUN- IMPROVING   DNR     Hx: CAD-status post CABG,Valvular heart disease-status post AVR     Plan  Continues to be admitted  No new complaints  Continue Vapotherm 30 L/60% & BiPAP as needed  Plans to switch to hospice care noted in Palliative Care note; will follow   S/P RHC  Follow-up Nephrology recs   On IV Lasix daily   Will give Diamox 500 mg x 1  Replacing K  Cardiology on board; following recommendations   IV Cardizem discontinued; PO Diltiazem 240 mg continued; patient on Eliquis 5 mg b.i.d.  Give a dose of IV morphine 0.5 mg Q 4 hourly PRN to help with shortness of breath  HGB/HCT trended down; FOBT pending   On IV Protonix 40 b.i.d.  She is status post thoracentesis with removal of 950 cc of fluid on 6/14  CTA neg for PE on 6/15  Palliative care on-board  Patient is DNR/DNI  Steroids discontinued per Pulmonology recs  Continue with speech   PT and OT on-board; recommended moderate intensity therapy  Patient was denied by LTAC; case management remains on board     Critical care note:  Critical care diagnosis:  Acute hypoxemic respiratory failure secondary to bilateral pleural effusions on Vapotherm,  Critical care interventions: Hands-on evaluation, review of labs/radiographs/records and discussion with patient and family if present  Critical care time spent: 35 minutes      VTE prophylaxis:  eliquis   Patient condition:  Stable   Anticipated discharge and Disposition: tbd        All diagnosis and differential diagnosis have been reviewed; assessment and plan has been documented; I have personally reviewed the labs and test results that are presently available; I have reviewed the patients medication list; I have reviewed the consulting providers response and recommendations. I have reviewed or attempted to review medical records based upon their availability     All of the patient's  questions have been  addressed and answered. Patient's is agreeable to the above stated plan. I will continue to monitor closely and make adjustments to medical management as needed.    VITAL SIGNS: 24 HRS MIN & MAX LAST   Temp  Min: 98.3 °F (36.8 °C)  Max: 99.5 °F (37.5 °C) 98.3 °F (36.8 °C)   BP  Min: 97/66  Max: 145/91 139/63   Pulse  Min: 63  Max: 143  75   Resp  Min: 12  Max: 28 12   SpO2  Min: 92 %  Max: 100 % (!) 92 %     I have reviewed the following labs:  Recent Labs   Lab 06/19/24  0307 06/20/24  0300 06/21/24  0240   WBC 7.01 15.21* 14.40*   RBC 3.02* 3.65* 3.85*   HGB 8.8* 10.6* 11.1*   HCT 27.5* 33.2* 34.8*   MCV 91.1 91.0 90.4   MCH 29.1 29.0 28.8   MCHC 32.0* 31.9* 31.9*   RDW 14.5 14.4 14.5    343 282   MPV 10.4 10.1 10.3     Recent Labs   Lab 06/19/24  0307 06/20/24  0300 06/20/24  1709 06/21/24  0240 06/21/24  1235    136  --  138  --    K 3.5 3.2*  --  3.4*  --    CL 89* 83*  --  88*  --    CO2 35* 38*  --  37*  --    BUN 48.9* 62.1*  --  55.6*  --    CREATININE 1.26* 1.24*  --  1.16*  --    CALCIUM 9.7 10.3*  --  9.3  --    PH  --   --  7.520*  --  7.510*   MG 1.80 2.20  --  2.20  --    ALBUMIN 3.0* 3.3*  --  3.2*  --    ALKPHOS 83 99  --  103  --    ALT 10 14  --  17  --    AST 14 21  --  22  --    BILITOT 0.5 0.5  --  0.6  --      Microbiology Results (last 7 days)       Procedure Component Value Units Date/Time    Body Fluid Culture [[id number]] Collected: 06/14/24 1021    Order Status: Completed Specimen: Body Fluid from Thoracentesis Fluid Updated: 06/19/24 1003     Body Fluid Culture Final Report: At 5 days. No growth             See below for Radiology    Scheduled Med:   apixaban  5 mg Oral BID    diltiaZEM  240 mg Oral Daily    docusate  50 mg Oral Daily    furosemide (LASIX) injection  40 mg Intravenous Daily    gabapentin  300 mg Oral BID    Lactobacillus rhamnosus GG  1 packet Oral BID    LIDOcaine  2 patch Transdermal Q24H    metoprolol tartrate  25 mg Oral TID     pantoprazole  40 mg Intravenous BID    sertraline  100 mg Oral QHS      Continuous Infusions:       PRN Meds:    Current Facility-Administered Medications:     0.9% NaCl, , Intravenous, PRN    acetaminophen, 650 mg, Oral, Q6H PRN    acetaminophen, 650 mg, Oral, Q4H PRN    butalbital-acetaminophen-caffeine -40 mg, 1 tablet, Oral, Q6H PRN    hydrALAZINE, 10 mg, Intravenous, Q6H PRN    labetalol, 10 mg, Intravenous, Q4H PRN    levalbuterol, 1.25 mg, Nebulization, Q6H PRN    LORazepam, 0.5 mg, Oral, Q4H PRN    melatonin, 6 mg, Oral, Nightly PRN    metoprolol, 5 mg, Intravenous, Q4H PRN    morphine, 0.5 mg, Intravenous, Q4H PRN    ondansetron, 4 mg, Intravenous, Q4H PRN    traMADoL, 50 mg, Oral, TID PRN     Assessment/Plan:      VTE prophylaxis:     Patient condition:  Stable/Fair/Guarded/ Serious/ Critical    Anticipated discharge and Disposition:         All diagnosis and differential diagnosis have been reviewed; assessment and plan has been documented; I have personally reviewed the labs and test results that are presently available; I have reviewed the patients medication list; I have reviewed the consulting providers response and recommendations. I have reviewed or attempted to review medical records based upon their availability    All of the patient's questions have been  addressed and answered. Patient's is agreeable to the above stated plan. I will continue to monitor closely and make adjustments to medical management as needed.  _____________________________________________________________________    Nutrition Status:    Radiology:  I have personally reviewed the following imaging and agree with the radiologist.     X-Ray Chest 1 View  Narrative: EXAMINATION:  XR CHEST 1 VIEW    CPT 45742    CLINICAL HISTORY:  pleural effusions;    COMPARISON:  June 14, 2024    FINDINGS:  Examination reveals mediastinal silhouette to be within normal limits cardiac silhouette is not enlarged right lung field is clear and  free of gross infiltrates atelectasis or effusions.    There is some blunting of the left costophrenic angle which may indicate the presence of a left-sided pleural effusion.    There is increased left retrocardiac density and silhouetting of the left hemidiaphragm although these might be related to pleural fluid it may also represent an infiltrate/atelectasis.    No other focal consolidative changes  Impression: Changes that might suggest a left-sided pleural effusion.    Increased left retrocardiac density and silhouetting of the left hemidiaphragm.    No focal consolidative changes    Electronically signed by: Baljeet Mullins  Date:    06/21/2024  Time:    10:39      Marshal Alvarado MD  Department of Hospital Medicine   [facility]   06/21/2024

## 2024-06-21 NOTE — NURSING
Nurses Note -- 4 Eyes      6/20/2024   7:34 PM      Skin assessed during: Q Shift Change      [] No Altered Skin Integrity Present    []Prevention Measures Documented      [x] Yes- Altered Skin Integrity Present or Discovered   [x] LDA Added if Not in Epic (Describe Wound)   [] New Altered Skin Integrity was Present on Admit and Documented in LDA   [] Wound Image Taken    Wound Care Consulted? No    Attending Nurse:  Rakesh Pederson RN/Staff Member: Lilly

## 2024-06-21 NOTE — PROGRESS NOTES
"Advance Care Planning     Date: 06/21/2024    Today a voluntary meeting took place: bedside    Patient Participation: Patient is unable to participate     Attendees (Name and  Relationship to patient): Health care power of : Nakia (daughter) and a second daughter at bedside    Staff attendees (Name and  Role): Mary RN-CHPN    ACP Conversation (General): Understanding of current condition Notified by patient's nurse reporting that patient has experienced a decline and family at bedside requesting information regarding hospice services. Upon arrival to the patient's room, 2 of patient's daughters at bed side. Patient lying in bed with vaportherm 50%FIO2. Patient does not respond to verbal or tactile stimulation, purse lip breathing noted. Reviewed patient's goals of care and patient's current status to which the daughter's state the patient mentioned earlier she did not want to wear the BiPAP or proceed with any other aggressive treatments. Discussed hospice services inpatient vs. at home. Noted rapid decline in patient status from days prior assessment when patient was up in the recliner awake, alert and oriented x's 4 and visiting with her sister at bedside. Daughters expressed interest in speaking to inpatient hospice agency, states they are familiar with and would like for [facility] be contacted. Emotional support provided and education provided regarding the start of comfort care prior to making final decision regarding hospice services. Daughters verbalized understanding states they will speak to their siblings and will let the medical team know how they would like to proceed, states at this time they will like to continue to honor the patient's wishes and state "THEY DO NOT WANT THE BIPAP PLACED BACK ON AT THIS TIME AND CONTINUE TO AGREE WITH DNR STATUS."       Code Status: DNR; status confirmed/order placed in chart    ACP Documents: Reviewed current existing digital forms    Goals of " care: The patient and family endorses that what is most important right now is to focus on symptom/pain control and comfort and QOL     Accordingly, we have decided that the best plan to meet the patient's goals includes no further escalation in treatment and pivot to comfort-focused care      Recommendations/  Follow-up tasks: The patient and health care agent were provided the following recommendations Hospice Inpatient vs Home       [facility]  I engaged the family in a voluntary conversation about advance care planning and we specifically addressed what the goals of care would be moving forward, in light of the patient's change in clinical status, specifically patient's rapid decline and goals of care.  We did specifically address the patient's likely prognosis, which is poor.  We explored the patient's values and preferences for future care.  The family endorses that what is most important right now is to focus on symptom/pain control, improvement in condition but with limits to invasive therapies, and comfort and QOL     Accordingly, we have decided that the best plan to meet the patient's goals includes no further escalation in treatment and pivot to comfort-focused care    spent on advance care planning, goals of care discussion, emotional support, formulating and communicating prognosis and exploring burden/benefit of various approaches of treatment. This discussion occurred on a fully voluntary basis with the verbal consent of the patient and/or family.     Hospice  I did explain the role for hospice care at this stage of the patient's illness, including its ability to help the patient live with the best quality of life possible.  We willbe making a hospice referral, family requesting [facility].

## 2024-06-21 NOTE — PROGRESS NOTES
[facility] - 7th Floor ICU  Nephrology  Progress Note    Patient Name: Alanna Moya  MRN: [id number]  Admission Date: 6/1/2024  Hospital Length of Stay: 20 days  Attending Provider: Augustus Barreto MD   Primary Care Physician: Cornel Haddad MD  Principal Problem:Respiratory failure      Subjective:     Alanna Moya is a 79 y.o. female who presents with shortness of breath.  Past medical history significant for coronary artery disease status post CABG, hypertension, hyperlipidemia, HFpEF, aortic valve replacement, diverticulosis, and history of CVA.  She initially presented on 06/01/2024 with worsening shortness of breath.  She was initially admitted to the ICU due to BiPAP requirements.  She was given IV Lasix, and had some improvement in her shortness of breath initially, however, shortness of breath recurred and continue to worsen.  She was transferred to the floor, but had to be transferred back to the ICU due to increasing oxygen requirements.  There was some concern for possible amiodarone toxicity causing worsening lung function.  She did have a right heart catheterization done by Cardiology on 06/17/2024 showed HFpEF, and significantly elevated pulmonary capillary wedge pressure of 34.  Overall she has not diuresed very well, and Nephrology was consulted for assistance with volume management.  Patient states that prior to hospitalization she had not required supplemental oxygen.  She is now on high-flow nasal cannula 90% FiO2, 40 L. she has no lower extremity edema.  She does feel short of breath.  No chest pain, abdominal pain, nausea, or vomiting.     Interval History:   Urine output improved with increased in diuretics. Vapotherm requirements decreased. She appears clinically improved but endorses being weak.     Review of patient's allergies indicates:  No Known Allergies  Current Facility-Administered Medications   Medication Frequency    0.9%  NaCl infusion PRN     acetaminophen tablet 650 mg Q6H PRN    acetaminophen tablet 650 mg Q4H PRN    apixaban tablet 5 mg BID    butalbital-acetaminophen-caffeine -40 mg per tablet 1 tablet Q6H PRN    diltiaZEM 24 hr capsule 240 mg Daily    docusate 50 mg/5 mL liquid 50 mg Daily    furosemide injection 40 mg Daily    gabapentin capsule 300 mg BID    hydrALAZINE injection 10 mg Q6H PRN    labetaloL injection 10 mg Q4H PRN    Lactobacillus rhamnosus GG 5 billion cell packet (PEDS) 1 each BID    levalbuterol nebulizer solution 1.25 mg Q6H PRN    LIDOcaine 5 % patch 2 patch Q24H    LORazepam tablet 0.5 mg Q4H PRN    melatonin tablet 6 mg Nightly PRN    metoprolol injection 5 mg Q4H PRN    metoprolol tartrate (LOPRESSOR) tablet 25 mg TID    morphine injection 0.5 mg Q4H PRN    ondansetron injection 4 mg Q4H PRN    pantoprazole injection 40 mg BID    sertraline tablet 100 mg QHS    traMADoL tablet 50 mg TID PRN       Objective:     Vital Signs (Most Recent):  Temp: 98.4 °F (36.9 °C) (06/21/24 0800)  Pulse: (!) 112 (06/21/24 1000)  Resp: (!) 21 (06/21/24 0826)  BP: 134/76 (06/21/24 1013)  SpO2: 98 % (06/21/24 0826) Vital Signs (24h Range):  Temp:  [98.4 °F (36.9 °C)-99.5 °F (37.5 °C)] 98.4 °F (36.9 °C)  Pulse:  [106-143] 112  Resp:  [15-28] 21  SpO2:  [95 %-100 %] 98 %  BP: ()/(63-92) 134/76     Weight: 77.1 kg (170 lb) (06/01/24 2230)  Body mass index is 31.09 kg/m².  Body surface area is 1.84 meters squared.    I/O last 3 completed shifts:  In: 211.5 [I.V.:211.5]  Out: 2875 [Urine:2875]    Physical Exam  Constitutional:       General: She is not in acute distress.     Appearance: She is ill-appearing.   HENT:      Head: Atraumatic.      Nose: Nose normal.      Mouth/Throat:      Mouth: Mucous membranes are moist.   Cardiovascular:      Rate and Rhythm: Normal rate and regular rhythm.   Pulmonary:      Effort: Pulmonary effort is normal.      Comments: Vapotherm   Abdominal:      General: There is no distension.      Palpations:  "Abdomen is soft.   Genitourinary:     Comments: Urinary catheter with pale clear yellow urine   Musculoskeletal:         General: No swelling.      Cervical back: Neck supple.   Skin:     General: Skin is warm.   Neurological:      Mental Status: She is alert and oriented to person, place, and time.   Psychiatric:         Mood and Affect: Mood normal.         Behavior: Behavior normal.         Significant Labs:sureBMP:   Recent Labs   Lab 06/21/24  0240      K 3.4*   CL 88*   CO2 37*   BUN 55.6*   CREATININE 1.16*   CALCIUM 9.3   MG 2.20     CBC:   Recent Labs   Lab 06/21/24  0240   WBC 14.40*   RBC 3.85*   HGB 11.1*   HCT 34.8*      MCV 90.4   MCH 28.8   MCHC 31.9*     Microbiology Results (last 7 days)       Procedure Component Value Units Date/Time    Body Fluid Culture [[id number]] Collected: 06/14/24 1021    Order Status: Completed Specimen: Body Fluid from Thoracentesis Fluid Updated: 06/19/24 1003     Body Fluid Culture Final Report: At 5 days. No growth    Gram Stain [[id number]] Collected: 06/14/24 1021    Order Status: Completed Specimen: Body Fluid from Pleural Fluid Updated: 06/14/24 1320     GRAM STAIN Rare WBC observed      No bacteria seen    KOH Prep [[id number]] Collected: 06/14/24 1022    Order Status: Completed Specimen: Body Fluid from Pleural Fluid Updated: 06/14/24 1256     KOH Prep No fungal elements seen    Respiratory Culture [[id number]] Updated: 06/14/24 1111    Order Status: Canceled Specimen: Sputum, Expectorated     Respiratory Culture [[id number]] Updated: 06/14/24 1111    Order Status: Canceled Specimen: Respiratory from Sputum, Expectorated           Specimen (24h ago, onward)      None          No results for input(s): "COLORU", "CLARITYU", "SPECGRAV", "PHUR", "PROTEINUA", "GLUCOSEU", "BILIRUBINCON", "BLOODU", "WBCU", "RBCU", "BACTERIA", "MUCUS", "NITRITE", "LEUKOCYTESUR", "UROBILINOGEN", "HYALINECASTS" in the last 168 hours.    Significant Imaging:    Imaging reviewed "     Assessment/Plan:     Acute kidney injury - no known CKD.  Baseline creatinine around 1  Acute hypoxic respiratory failure  HFpEF  Pulmonary hypertension  Atrial fibrillation      Diuretics altered this morning per cardiology. Renal function stable. We will sign off and defer further diuretic management to cardiology.       LATANYA Stearns  Nephrology  [facility] - 7th Floor ICU

## 2024-06-21 NOTE — PT/OT/SLP PROGRESS
Physical Therapy Treatment    Patient Name:  Alanna Moya   MRN:  [id number]    Recommendations:     Discharge therapy intensity: Moderate Intensity Therapy   Discharge Equipment Recommendations: to be determined by next level of care  Barriers to discharge: Impaired mobility and Ongoing medical needs    Assessment:     Alanna Moya is a 79 y.o. female admitted with a medical diagnosis of SOB, respiratory failure, pneumonia, pleural effusions.  She presents with the following impairments/functional limitations: weakness, gait instability, decreased upper extremity function, impaired endurance, impaired balance, decreased lower extremity function, impaired self care skills, impaired functional mobility.    Attempted at 0904 however resting HR in the 130-140's. Returned at 0940 after medication, improved HR between 109-121. Noted increased weakness when scooting to EOB with observed UE tremors. Poor ability to project her voice.     Rehab Prognosis: Good; patient would benefit from acute skilled PT services to address these deficits and reach maximum level of function.    Recent Surgery: Procedure(s) (LRB):  INSERTION, CATHETER, RIGHT HEART (N/A) 4 Days Post-Op    Plan:     During this hospitalization, patient would benefit from acute PT services 5 x/week to address the identified rehab impairments via gait training, therapeutic activities, therapeutic exercises and progress toward the following goals:    Plan of Care Expires:  07/04/24    Subjective     Chief Complaint: sore throat; ready to get to the chair  Patient/Family Comments/goals: to get better  Pain/Comfort:  Pain Rating 1: 0/10      Objective:     Communicated with nurse prior to session.  Patient found HOB elevated with blood pressure cuff, pulse ox (continuous), telemetry, melton catheter, oxygen upon PT entry to room.     General Precautions: Standard, fall  Orthopedic Precautions: N/A  Braces: N/A  Respiratory Status: High flow, flow 30  L/min, concentration 60%  HR: 109-121 at rest; 119-138 at EOB  SPO2: 96%  Skin Integrity:  no new findings      Functional Mobility:  Bed mobility:   Supine to sit with mod assist  Scooting anteriorly to EOB with max assist; pt gave good effort however demonstrated significant weakness  Posterior scooting in chair with max assist x 2  Sitting balance:   EOB ~ 10 minutes with CGA to min assist 2/2 anterior lean  Transfers:   Sit<->stand with mod assist x 2  Stand step/pivot with max assist    Therapeutic Activities/Exercises:  GAYE LAQ's at EOB x 10 reps     Education:  Patient provided with verbal education education regarding PT role/goals/POC, fall prevention, safety awareness, and discharge/DME recommendations.  Understanding was verbalized.     Patient left up in chair with all lines intact, call button in reach, latoya pad in place, nurse notified, and wedge placed under knees to prevent anterior sliding out of chair .    GOALS:   Multidisciplinary Problems       Physical Therapy Goals          Problem: Physical Therapy    Goal Priority Disciplines Outcome Goal Variances Interventions   Physical Therapy Goal     PT, PT/OT Progressing     Description: Goals to be met by: 24     Patient will increase functional independence with mobility by performin. Supine to sit with Stand-by Assistance  2. Sit to supine with Stand-by Assistance  3. Sit to stand transfer with Stand-by Assistance  4. Bed to chair transfer with Stand-by Assistance using Rolling Walker  5. Gait  x 150 feet with Stand-by Assistance using Rolling Walker.                          Time Tracking:     PT Received On: 24  PT Start Time: 0940     PT Stop Time: 1006  PT Total Time (min): 26 min     Billable Minutes: Therapeutic Activity 1 unit and Therapeutic Exercise 1 unit    Treatment Type: Treatment  PT/PTA: PTA     Number of PTA visits since last PT visit: 4     2024

## 2024-06-22 VITALS
BODY MASS INDEX: 31.28 KG/M2 | RESPIRATION RATE: 18 BRPM | DIASTOLIC BLOOD PRESSURE: 59 MMHG | HEART RATE: 88 BPM | SYSTOLIC BLOOD PRESSURE: 117 MMHG | OXYGEN SATURATION: 96 % | HEIGHT: 62 IN | TEMPERATURE: 99 F | WEIGHT: 170 LBS

## 2024-06-22 LAB
ALBUMIN SERPL-MCNC: 3 G/DL (ref 3.4–4.8)
ALBUMIN/GLOB SERPL: 1 RATIO (ref 1.1–2)
ALP SERPL-CCNC: 100 UNIT/L (ref 40–150)
ALT SERPL-CCNC: 12 UNIT/L (ref 0–55)
ANION GAP SERPL CALC-SCNC: 16 MEQ/L
AST SERPL-CCNC: 17 UNIT/L (ref 5–34)
BASOPHILS # BLD AUTO: 0.02 X10(3)/MCL
BASOPHILS NFR BLD AUTO: 0.2 %
BILIRUB SERPL-MCNC: 0.7 MG/DL
BUN SERPL-MCNC: 53.5 MG/DL (ref 9.8–20.1)
CALCIUM SERPL-MCNC: 9.4 MG/DL (ref 8.4–10.2)
CHLORIDE SERPL-SCNC: 86 MMOL/L (ref 98–107)
CO2 SERPL-SCNC: 38 MMOL/L (ref 23–31)
CREAT SERPL-MCNC: 1.31 MG/DL (ref 0.55–1.02)
CREAT/UREA NIT SERPL: 41
EOSINOPHIL # BLD AUTO: 0.03 X10(3)/MCL (ref 0–0.9)
EOSINOPHIL NFR BLD AUTO: 0.3 %
ERYTHROCYTE [DISTWIDTH] IN BLOOD BY AUTOMATED COUNT: 14.5 % (ref 11.5–17)
GFR SERPLBLD CREATININE-BSD FMLA CKD-EPI: 42 ML/MIN/1.73/M2
GLOBULIN SER-MCNC: 3 GM/DL (ref 2.4–3.5)
GLUCOSE SERPL-MCNC: 151 MG/DL (ref 82–115)
HCT VFR BLD AUTO: 33.9 % (ref 37–47)
HGB BLD-MCNC: 10.6 G/DL (ref 12–16)
IMM GRANULOCYTES # BLD AUTO: 0.21 X10(3)/MCL (ref 0–0.04)
IMM GRANULOCYTES NFR BLD AUTO: 2.1 %
LYMPHOCYTES # BLD AUTO: 0.94 X10(3)/MCL (ref 0.6–4.6)
LYMPHOCYTES NFR BLD AUTO: 9.3 %
MAGNESIUM SERPL-MCNC: 2.3 MG/DL (ref 1.6–2.6)
MCH RBC QN AUTO: 29 PG (ref 27–31)
MCHC RBC AUTO-ENTMCNC: 31.3 G/DL (ref 33–36)
MCV RBC AUTO: 92.9 FL (ref 80–94)
MONOCYTES # BLD AUTO: 1 X10(3)/MCL (ref 0.1–1.3)
MONOCYTES NFR BLD AUTO: 9.9 %
NEUTROPHILS # BLD AUTO: 7.93 X10(3)/MCL (ref 2.1–9.2)
NEUTROPHILS NFR BLD AUTO: 78.2 %
NRBC BLD AUTO-RTO: 0 %
PLATELET # BLD AUTO: 278 X10(3)/MCL (ref 130–400)
PMV BLD AUTO: 10.3 FL (ref 7.4–10.4)
POTASSIUM SERPL-SCNC: 3.6 MMOL/L (ref 3.5–5.1)
PROT SERPL-MCNC: 6 GM/DL (ref 5.8–7.6)
RBC # BLD AUTO: 3.65 X10(6)/MCL (ref 4.2–5.4)
SODIUM SERPL-SCNC: 140 MMOL/L (ref 136–145)
WBC # BLD AUTO: 10.13 X10(3)/MCL (ref 4.5–11.5)

## 2024-06-22 PROCEDURE — 25000003 PHARM REV CODE 250: Performed by: NURSE PRACTITIONER

## 2024-06-22 PROCEDURE — 85025 COMPLETE CBC W/AUTO DIFF WBC: CPT

## 2024-06-22 PROCEDURE — 63600175 PHARM REV CODE 636 W HCPCS: Performed by: NURSE PRACTITIONER

## 2024-06-22 PROCEDURE — 99900035 HC TECH TIME PER 15 MIN (STAT)

## 2024-06-22 PROCEDURE — 94760 N-INVAS EAR/PLS OXIMETRY 1: CPT

## 2024-06-22 PROCEDURE — 25000003 PHARM REV CODE 250

## 2024-06-22 PROCEDURE — 27100171 HC OXYGEN HIGH FLOW UP TO 24 HOURS

## 2024-06-22 PROCEDURE — 83735 ASSAY OF MAGNESIUM: CPT | Performed by: NURSE PRACTITIONER

## 2024-06-22 PROCEDURE — 99900031 HC PATIENT EDUCATION (STAT)

## 2024-06-22 PROCEDURE — 80053 COMPREHEN METABOLIC PANEL: CPT | Performed by: NURSE PRACTITIONER

## 2024-06-22 PROCEDURE — C9113 INJ PANTOPRAZOLE SODIUM, VIA: HCPCS | Performed by: INTERNAL MEDICINE

## 2024-06-22 PROCEDURE — 63600175 PHARM REV CODE 636 W HCPCS: Performed by: INTERNAL MEDICINE

## 2024-06-22 PROCEDURE — 36415 COLL VENOUS BLD VENIPUNCTURE: CPT | Performed by: NURSE PRACTITIONER

## 2024-06-22 PROCEDURE — 25000003 PHARM REV CODE 250: Performed by: INTERNAL MEDICINE

## 2024-06-22 RX ORDER — FUROSEMIDE 40 MG/1
40 TABLET ORAL DAILY
Qty: 60 TABLET | Refills: 3 | Status: SHIPPED | OUTPATIENT
Start: 2024-06-22

## 2024-06-22 RX ORDER — DILTIAZEM HYDROCHLORIDE 240 MG/1
240 CAPSULE, COATED, EXTENDED RELEASE ORAL DAILY
Qty: 30 CAPSULE | Refills: 11 | Status: SHIPPED | OUTPATIENT
Start: 2024-06-23 | End: 2025-06-23

## 2024-06-22 RX ORDER — PANTOPRAZOLE SODIUM 40 MG/1
40 TABLET, DELAYED RELEASE ORAL 2 TIMES DAILY
Qty: 180 TABLET | Refills: 3 | Status: SHIPPED | OUTPATIENT
Start: 2024-06-22 | End: 2025-06-22

## 2024-06-22 RX ORDER — METOPROLOL TARTRATE 25 MG/1
25 TABLET, FILM COATED ORAL 3 TIMES DAILY
Qty: 270 TABLET | Refills: 3 | Status: SHIPPED | OUTPATIENT
Start: 2024-06-22 | End: 2025-06-22

## 2024-06-22 RX ADMIN — LIDOCAINE 2 PATCH: 700 PATCH TOPICAL at 03:06

## 2024-06-22 RX ADMIN — DILTIAZEM HYDROCHLORIDE 240 MG: 120 CAPSULE, COATED, EXTENDED RELEASE ORAL at 09:06

## 2024-06-22 RX ADMIN — METOPROLOL TARTRATE 25 MG: 25 TABLET, FILM COATED ORAL at 03:06

## 2024-06-22 RX ADMIN — DOCUSATE SODIUM LIQUID 50 MG: 100 LIQUID ORAL at 09:06

## 2024-06-22 RX ADMIN — FUROSEMIDE 40 MG: 10 INJECTION, SOLUTION INTRAMUSCULAR; INTRAVENOUS at 09:06

## 2024-06-22 RX ADMIN — METOPROLOL TARTRATE 25 MG: 25 TABLET, FILM COATED ORAL at 09:06

## 2024-06-22 RX ADMIN — Medication 1 EACH: at 09:06

## 2024-06-22 RX ADMIN — ACETAMINOPHEN 650 MG: 325 TABLET, FILM COATED ORAL at 04:06

## 2024-06-22 RX ADMIN — APIXABAN 5 MG: 5 TABLET, FILM COATED ORAL at 08:06

## 2024-06-22 RX ADMIN — APIXABAN 5 MG: 5 TABLET, FILM COATED ORAL at 09:06

## 2024-06-22 RX ADMIN — ACETAMINOPHEN 650 MG: 325 TABLET, FILM COATED ORAL at 10:06

## 2024-06-22 RX ADMIN — MORPHINE SULFATE 0.5 MG: 4 INJECTION INTRAVENOUS at 01:06

## 2024-06-22 RX ADMIN — PANTOPRAZOLE SODIUM 40 MG: 40 INJECTION, POWDER, FOR SOLUTION INTRAVENOUS at 08:06

## 2024-06-22 RX ADMIN — SERTRALINE HYDROCHLORIDE 100 MG: 50 TABLET ORAL at 08:06

## 2024-06-22 RX ADMIN — METOPROLOL TARTRATE 25 MG: 25 TABLET, FILM COATED ORAL at 08:06

## 2024-06-22 RX ADMIN — PANTOPRAZOLE SODIUM 40 MG: 40 INJECTION, POWDER, FOR SOLUTION INTRAVENOUS at 09:06

## 2024-06-22 NOTE — NURSING
Nurses Note -- 4 Eyes      6/22/2024   10:14 AM      Skin assessed during: Daily Assessment      [x] No Altered Skin Integrity Present    [x]Prevention Measures Documented      [] Yes- Altered Skin Integrity Present or Discovered   [] LDA Added if Not in Epic (Describe Wound)   [] New Altered Skin Integrity was Present on Admit and Documented in LDA   [] Wound Image Taken    Wound Care Consulted? No    Attending Nurse:  Lori Pederson RN/Staff Member:  MELINA Wright

## 2024-06-22 NOTE — PROGRESS NOTES
"    [facility]    [facility]  Progress Note    Patient Name: Alanna Moya  MRN: [id number]  Admission Date: 6/1/2024  Hospital Length of Stay: 21 days  Code Status: DNR   Attending Physician: Augustus Barreto MD   Primary Care Physician: Cornel Haddad MD  Expected Discharge Date: 6/22/2024  Principal Problem:Respiratory failure    Subjective:   Reason for Consult: SVT     HPI:Ms. Moya is a 78 year old female, known to Dr. Ramon in [place], who presented to the hospital from [facility] as transfer due to respiratory failure. Upon arrival she was noted to be SOB. Noted hypoxia requiring BIPAP. Chesr Radiograph from outside facility revealed bilateral airspace opacification and increase his right-sided pleural effusion suggestive of worsening pneumonia or possibly edema. CT Chest revealed mixed picture of decompensated HF, pleural effusions and superimposed pneumonia. During this hospitalization patient with tachycardia concerning for AF RVR, with noted Left Bundle Branch Block. She was given IV Metoprolol and started on Cardizem Infusion for Acute HR Control. She does have history of PSVT. Echocardiogram on 6.2.24 revealed intact LV Function with EF 55-60% & Grade I DD. Electrolytes grossly stable. Troponin normal. Lactic Acid Normal. . CIS Consulted for AF Management.     Hospital Course:   6.7.24: Patient seen sitting in chair. She denies SOB, CP, or nausea. Remains in Afib RVR   6.8.24: NAD. VSS. No complaints of CP/SOB/Palps. "I feel okay" WBC 14.27  6.9.24: NAD. VSS. AFIB RVR on telemetry. No complaints CP/SOB/Palps. "I feel okay" possible aspiration - awaiting Speech Evaluation. K 3.7, Mag 1.8, WBC 16.21.  6.10.24: NAD Noted. Sitting up in chair. AF RVR. Requiring Vapotherm Support. Denies CP. Reports exertional SOB, however, functional capacity is limited as she is deconditioned. Hypertension Noted. Diuresis noted, - 1604 ML/24 Hours.   6.11.24: BP Improved. AF RVR. " "Diuresis noted, - 2144 ML/24 Hours. Vapotherm oxygen support- 75% FIO2.   6.12.24: NAD Noted. Remains AF RVR. BP Stable. Requiring Significant Oxygen Support. Continues to Diurese well.   6.13.24: NAD. VSS. Urine output 1050/850 ml Fluid Net Negative. AFIB RVR on telemetry. No complaints of CP/SOB/Palps.   6.14.24: NAD. VSS. Sitting in Bedside Chair. Denies CP and Palps. + SOB. Remain in A.Fib with CVR. Fluid Balance Net Negative 1210mL.  6.15.24: NAD. VSS. Sitting in Bedside Chair. Denies CP and Palps. + SOB. PAF with CVR. Fluid Balance Net Negative 1285mL. "I am ok." HF NC O2  6.16.24: NAD. PAF/RVR. Amiodarone 0.5mg/min. Fluid Balance Net Negative 1175mL. HF NC O2  6.17.24: NAD. PAF with CVR. Cardizem 2.5mg/HR. Fluid Balance Net + 458mL. "I am ok."   6.18.24: NAD. Laying in Bed on HF NC. Cardizem 5mg/HR. Fluid Balance Net Negative 1816.7mL. "I am feeling a little better." + SOB/NIETO. Denies CP and Palps. S/P RHC with Elevated EDP.   6.19.24: NAD. "I am ok" + SOB/HF NC O2 85%, Fluid Balance Net Negative 3195mL. Denies CP and Palps.   6.20.24: NAD Noted. Sitting up in chair. FIO2 65% on Vapotherm 25 L/Min. BP Stable. AF CVR. On Cardizem Infusion at 5 Mg/Hour.   6.21.24: NAD. Sitting in Bedside Chair. Denies CP and Palps. + Exertional SOB. "I am ok." Remains in PAF/RVR. Off Cardizem Infusion.   6.22.24: NAD. Sitting in Bedside Chair. Plans to D/C and Transfer to [facility] for Hospice Care.    PMH: CAD/CABG, Hypertension, Dyslipidemia, MELCHOR, CEA, CVA, AS/TAVR, PSVT  PSH: LHC, CABG, TAVR, CEA, Hysterectomy, Cholecystectomy, Eye Surgery, Hand Surgery  Family History: Father- Old MI/CAD, Mother- HF, Son- Hypertension, Daughter- Cancer/Hypertension, Brother- Heart Disease, Brother- Old MI/CAD, Brother- Cancer  Social History: Tobacco- Negative, Alcohol- Negative, Substance Abuse- Negative      Previous Cardiac Diagnostics:   RHC (6.17.24):  RA 11, RV 53/9, PA 57/31 (mean 41), PCWP 34, PA sat 58% (on 100% FiO2 " BiPAP)  CO/CI: 3.8/2.1 (TD)  PVR: 1.9 Woods  Assessment:  -Elevated right and left-sided filling pressures  -Low cardiac outputs  -Moderate pulmonary hypertension with normal pulmonary vascular resistance  Plan:  -Consider diuresis and maintenance of normal sinus rhythm    Echocardiogram (6.2.24):  Left Ventricle: The left ventricle is normal in size. Increased wall thickness. There is normal systolic function with a visually estimated ejection fraction of 55 - 60%. Grade I diastolic dysfunction.  Right Ventricle: Systolic function is reduced.TAPSE is 1.60 cm.  Aortic Valve: There is a transcatheter valve replacement in the aortic position. Aortic valve area by VTI is 1.56 cm². Aortic valve peak velocity is 1.94 m/s. Mean gradient is 8 mmHg. The dimensionless index is 0.50.  Mitral Valve: Mildly thickened leaflets. There is mitral annular calcification present. There is mild stenosis. The mean pressure gradient across the mitral valve is 5 mmHg at a heart rate of 87 bpm. There is mild regurgitation.  Tricuspid Valve: The tricuspid valve is structurally normal. There is mild regurgitation.  Pulmonic Valve: The pulmonic valve is structurally normal.  Pulmonary Artery: The estimated pulmonary artery systolic pressure is 47 mmHg.  IVC/SVC: Normal venous pressure at 3 mmHg.  Pericardium: Left pleural effusion.     Echocardiogram (5.15.24):  EF 55-60%. Grade I Diastolic Dysfunction. Normal LV Wall Motion.  Mild MAC with Mild MR and No MS. Mild to Moderate TR.  Mild to Moderate AI, No AS.     CT Angiogram Head/Neck (5.15.24):  Carotid arteries:   Calcified plaque at the siphons without significant narrowing.   Normal A1 and M1 segments.   Vertebrobasilar Circulation:   Vertebral arteries: Patent. Calcified plaque on the right without significant narrowing. Otherwise no abnormalities.   Basilar artery: Patent, no abnormalities.   Posterior cerebral arteries:  Patent. Right fetal origin. Otherwise no abnormalities.       Echocardiogram (4.21.23):  The study quality is good.   The left ventricle is normal in size. Global left ventricular systolic function is normal. The left ventricular ejection fraction by Power's is 62%. The left ventricle diastolic function is impaired (Grade II) with an elevated left atrial pressure. Noted left ventricular hypertrophy. Concentric left ventricular hypertrophy is present. It is mild. LVSI=41ml/m2.    LVEDV=67.2ml and LVESV=25.7ml.  The left atrial diameter is moderately increased. Left atrial diameter is 4.4 cms. The left atrium is severely enlarged based on the left atrium volume index of 48.2ml/m².  S/P TAVR. Bioprosthetic aortic valve is well seated and functions normally with no evidence of insufficiency or perivalvular leaks. JACINTA=2.0cm2. The trans-aortic peak gradient is 15.8 mmHg. The trans-aortic mean gradient is 8.6 mmHg. DI=1.1.  Mild mitral annular calcification is noted. The mean trans mitral gradient is 1.9 mmHg.  Mild to moderate (1-2+) mitral regurgitation. Mild to moderate (1-2+) tricuspid regurgitation.  The estimated pulmonary artery systolic pressure is 41 mmHg assuming a right atrial pressure of 3 mmHg. Evidence of pulmonary hypertension is noted.      MCT Monitor (3.15.23):  Predominant Rhythm SR.  PSVT     TAVR (2.27.23):  TAVR  23  mm S3 ultra valve, nominal prep,  Right TF approach  Limited echo pre, post valve placement  Root angiography  Distal aortography  Temporary pacemaker placement removal  Manta closure  Right CFA access site.  U/S guided bilateral right groin access   Balloon angioplasty of right CFA with a 5 x 40 Napoleon.     LHC (1.26.23):  LM: Distal 50% Stenosis, LAD: Occluded Mid, LCX: Ostial 50% at Mid Portion, RCA: Dominant Patent Arter.  JENNINGS to LAD is Patent.  Impression: LIMA to LAD- Patent, 50% Stenosis LCX Artery  Medical Management.    Review of Systems   Constitutional: Positive for malaise/fatigue.   Cardiovascular:  Negative for chest pain and  claudication.   Respiratory:  Positive for shortness of breath.         Exertional SOB   All other systems reviewed and are negative.    Objective:     Vital Signs (Most Recent):  Temp: 98.3 °F (36.8 °C) (06/22/24 1200)  Pulse: 89 (06/22/24 1515)  Resp: 17 (06/22/24 1515)  BP: (!) 102/50 (06/22/24 1542)  SpO2: 98 % (06/22/24 1515) Vital Signs (24h Range):  Temp:  [98 °F (36.7 °C)-98.6 °F (37 °C)] 98.3 °F (36.8 °C)  Pulse:  [] 89  Resp:  [0-47] 17  SpO2:  [87 %-100 %] 98 %  BP: (102-153)/() 102/50   Weight: 77.1 kg (170 lb)  Body mass index is 31.09 kg/m².  SpO2: 98 %       Intake/Output Summary (Last 24 hours) at 6/22/2024 1551  Last data filed at 6/22/2024 0600  Gross per 24 hour   Intake 50 ml   Output 950 ml   Net -900 ml     Lines/Drains/Airways       Drain  Duration                  Urethral Catheter 06/09/24 1754 12 days              Peripheral Intravenous Line  Duration                  Peripheral IV - Single Lumen 06/07/24 1700 20 G Anterior;Proximal;Right Forearm 14 days         Peripheral IV - Single Lumen 06/08/24 0428 20 G Left Forearm 14 days                  Significant Labs:   Recent Results (from the past 72 hour(s))   Magnesium    Collection Time: 06/20/24  3:00 AM   Result Value Ref Range    Magnesium Level 2.20 1.60 - 2.60 mg/dL   Comprehensive Metabolic Panel    Collection Time: 06/20/24  3:00 AM   Result Value Ref Range    Sodium 136 136 - 145 mmol/L    Potassium 3.2 (L) 3.5 - 5.1 mmol/L    Chloride 83 (L) 98 - 107 mmol/L    CO2 38 (H) 23 - 31 mmol/L    Glucose 125 (H) 82 - 115 mg/dL    Blood Urea Nitrogen 62.1 (H) 9.8 - 20.1 mg/dL    Creatinine 1.24 (H) 0.55 - 1.02 mg/dL    Calcium 10.3 (H) 8.4 - 10.2 mg/dL    Protein Total 7.1 5.8 - 7.6 gm/dL    Albumin 3.3 (L) 3.4 - 4.8 g/dL    Globulin 3.8 (H) 2.4 - 3.5 gm/dL    Albumin/Globulin Ratio 0.9 (L) 1.1 - 2.0 ratio    Bilirubin Total 0.5 <=1.5 mg/dL    ALP 99 40 - 150 unit/L    ALT 14 0 - 55 unit/L    AST 21 5 - 34 unit/L    eGFR 45  mL/min/1.73/m2    Anion Gap 15.0 mEq/L    BUN/Creatinine Ratio 50    CBC with Differential    Collection Time: 06/20/24  3:00 AM   Result Value Ref Range    WBC 15.21 (H) 4.50 - 11.50 x10(3)/mcL    RBC 3.65 (L) 4.20 - 5.40 x10(6)/mcL    Hgb 10.6 (L) 12.0 - 16.0 g/dL    Hct 33.2 (L) 37.0 - 47.0 %    MCV 91.0 80.0 - 94.0 fL    MCH 29.0 27.0 - 31.0 pg    MCHC 31.9 (L) 33.0 - 36.0 g/dL    RDW 14.4 11.5 - 17.0 %    Platelet 343 130 - 400 x10(3)/mcL    MPV 10.1 7.4 - 10.4 fL    Neut % 78.9 %    Lymph % 7.6 %    Mono % 11.6 %    Eos % 0.4 %    Basophil % 0.1 %    Lymph # 1.16 0.6 - 4.6 x10(3)/mcL    Neut # 11.98 (H) 2.1 - 9.2 x10(3)/mcL    Mono # 1.77 (H) 0.1 - 1.3 x10(3)/mcL    Eos # 0.06 0 - 0.9 x10(3)/mcL    Baso # 0.02 <=0.2 x10(3)/mcL    IG# 0.22 (H) 0 - 0.04 x10(3)/mcL    IG% 1.4 %    NRBC% 0.0 %   EKG 12-lead    Collection Time: 06/20/24  4:49 AM   Result Value Ref Range    QRS Duration 152 ms    OHS QTC Calculation 442 ms   RT Blood Gas    Collection Time: 06/20/24  5:09 PM   Result Value Ref Range    Sample Type Arterial Blood     Sample site Right Brachial Artery     Drawn by aslrt     pH, Blood gas 7.520 (H) 7.350 - 7.450    pCO2, Blood gas 58.0 (HH) 35.0 - 45.0 mmHg    pO2, Blood gas 53.0 (LL) 80.0 - 100.0 mmHg    Sodium, Blood Gas 129 (L) 137 - 145 mmol/L    Potassium, Blood Gas 2.9 (L) 3.5 - 5.0 mmol/L    Calcium Level Ionized 1.05 (L) 1.12 - 1.23 mmol/L    TOC2, Blood gas 49.2 mmol/L    Base Excess, Blood gas 21.50 (H) -2.00 - 2.00 mmol/L    sO2, Blood gas 90.5 %    HCO3, Blood gas 47.4 (H) 22.0 - 26.0 mmol/L    THb, Blood gas 11.2 (L) 12 - 16 g/dL    O2 Hb, Blood Gas 90.2 (L) 94.0 - 97.0 %    CO Hgb 1.6 (H) 0.5 - 1.5 %    Met Hgb 1.2 0.4 - 1.5 %    Allens Test No     Oxygen Device, Blood gas High Flow Cannula     LPM 25     FIO2, Blood gas 70.0 %   Comprehensive Metabolic Panel    Collection Time: 06/21/24  2:40 AM   Result Value Ref Range    Sodium 138 136 - 145 mmol/L    Potassium 3.4 (L) 3.5 - 5.1 mmol/L     Chloride 88 (L) 98 - 107 mmol/L    CO2 37 (H) 23 - 31 mmol/L    Glucose 141 (H) 82 - 115 mg/dL    Blood Urea Nitrogen 55.6 (H) 9.8 - 20.1 mg/dL    Creatinine 1.16 (H) 0.55 - 1.02 mg/dL    Calcium 9.3 8.4 - 10.2 mg/dL    Protein Total 6.4 5.8 - 7.6 gm/dL    Albumin 3.2 (L) 3.4 - 4.8 g/dL    Globulin 3.2 2.4 - 3.5 gm/dL    Albumin/Globulin Ratio 1.0 (L) 1.1 - 2.0 ratio    Bilirubin Total 0.6 <=1.5 mg/dL     40 - 150 unit/L    ALT 17 0 - 55 unit/L    AST 22 5 - 34 unit/L    eGFR 49 mL/min/1.73/m2    Anion Gap 13.0 mEq/L    BUN/Creatinine Ratio 48    Magnesium    Collection Time: 06/21/24  2:40 AM   Result Value Ref Range    Magnesium Level 2.20 1.60 - 2.60 mg/dL   CBC with Differential    Collection Time: 06/21/24  2:40 AM   Result Value Ref Range    WBC 14.40 (H) 4.50 - 11.50 x10(3)/mcL    RBC 3.85 (L) 4.20 - 5.40 x10(6)/mcL    Hgb 11.1 (L) 12.0 - 16.0 g/dL    Hct 34.8 (L) 37.0 - 47.0 %    MCV 90.4 80.0 - 94.0 fL    MCH 28.8 27.0 - 31.0 pg    MCHC 31.9 (L) 33.0 - 36.0 g/dL    RDW 14.5 11.5 - 17.0 %    Platelet 282 130 - 400 x10(3)/mcL    MPV 10.3 7.4 - 10.4 fL    Neut % 82.6 %    Lymph % 6.9 %    Mono % 8.5 %    Eos % 0.4 %    Basophil % 0.1 %    Lymph # 0.99 0.6 - 4.6 x10(3)/mcL    Neut # 11.88 (H) 2.1 - 9.2 x10(3)/mcL    Mono # 1.23 0.1 - 1.3 x10(3)/mcL    Eos # 0.06 0 - 0.9 x10(3)/mcL    Baso # 0.02 <=0.2 x10(3)/mcL    IG# 0.22 (H) 0 - 0.04 x10(3)/mcL    IG% 1.5 %    NRBC% 0.0 %   RT Blood Gas    Collection Time: 06/21/24 12:35 PM   Result Value Ref Range    Sample Type Arterial Blood     Sample site Left Brachial Artery     Drawn by sd rrt     pH, Blood gas 7.510 (H) 7.350 - 7.450    pCO2, Blood gas 55.0 (HH) 35.0 - 45.0 mmHg    pO2, Blood gas 102.0 (H) 80.0 - 100.0 mmHg    Sodium, Blood Gas 128 (L) 137 - 145 mmol/L    Potassium, Blood Gas 2.9 (L) 3.5 - 5.0 mmol/L    Calcium Level Ionized 1.04 (L) 1.12 - 1.23 mmol/L    TOC2, Blood gas 45.6 mmol/L    Base Excess, Blood gas 18.40 (H) -2.00 - 2.00 mmol/L     sO2, Blood gas 98.4 %    HCO3, Blood gas 43.9 (H) 22.0 - 26.0 mmol/L    THb, Blood gas 10.8 (L) 12 - 16 g/dL    O2 Hb, Blood Gas 96.2 94.0 - 97.0 %    CO Hgb 1.4 0.5 - 1.5 %    Met Hgb 0.7 0.4 - 1.5 %    Allens Test Yes     Oxygen Device, Blood gas High Flow Cannula     LPM 30     FIO2, Blood gas 60 %   Comprehensive Metabolic Panel    Collection Time: 06/22/24  1:35 AM   Result Value Ref Range    Sodium 140 136 - 145 mmol/L    Potassium 3.6 3.5 - 5.1 mmol/L    Chloride 86 (L) 98 - 107 mmol/L    CO2 38 (H) 23 - 31 mmol/L    Glucose 151 (H) 82 - 115 mg/dL    Blood Urea Nitrogen 53.5 (H) 9.8 - 20.1 mg/dL    Creatinine 1.31 (H) 0.55 - 1.02 mg/dL    Calcium 9.4 8.4 - 10.2 mg/dL    Protein Total 6.0 5.8 - 7.6 gm/dL    Albumin 3.0 (L) 3.4 - 4.8 g/dL    Globulin 3.0 2.4 - 3.5 gm/dL    Albumin/Globulin Ratio 1.0 (L) 1.1 - 2.0 ratio    Bilirubin Total 0.7 <=1.5 mg/dL     40 - 150 unit/L    ALT 12 0 - 55 unit/L    AST 17 5 - 34 unit/L    eGFR 42 mL/min/1.73/m2    Anion Gap 16.0 mEq/L    BUN/Creatinine Ratio 41    Magnesium    Collection Time: 06/22/24  1:35 AM   Result Value Ref Range    Magnesium Level 2.30 1.60 - 2.60 mg/dL   CBC with Differential    Collection Time: 06/22/24  1:35 AM   Result Value Ref Range    WBC 10.13 4.50 - 11.50 x10(3)/mcL    RBC 3.65 (L) 4.20 - 5.40 x10(6)/mcL    Hgb 10.6 (L) 12.0 - 16.0 g/dL    Hct 33.9 (L) 37.0 - 47.0 %    MCV 92.9 80.0 - 94.0 fL    MCH 29.0 27.0 - 31.0 pg    MCHC 31.3 (L) 33.0 - 36.0 g/dL    RDW 14.5 11.5 - 17.0 %    Platelet 278 130 - 400 x10(3)/mcL    MPV 10.3 7.4 - 10.4 fL    Neut % 78.2 %    Lymph % 9.3 %    Mono % 9.9 %    Eos % 0.3 %    Basophil % 0.2 %    Lymph # 0.94 0.6 - 4.6 x10(3)/mcL    Neut # 7.93 2.1 - 9.2 x10(3)/mcL    Mono # 1.00 0.1 - 1.3 x10(3)/mcL    Eos # 0.03 0 - 0.9 x10(3)/mcL    Baso # 0.02 <=0.2 x10(3)/mcL    IG# 0.21 (H) 0 - 0.04 x10(3)/mcL    IG% 2.1 %    NRBC% 0.0 %     Telemetry: AF CVR    Physical Exam  Vitals reviewed.   Constitutional:        General: She is not in acute distress.     Appearance: Normal appearance. She is ill-appearing.   HENT:      Head: Normocephalic.      Mouth/Throat:      Mouth: Mucous membranes are moist.   Cardiovascular:      Rate and Rhythm: Normal rate. Rhythm irregular.      Heart sounds: Normal heart sounds.   Pulmonary:      Effort: Pulmonary effort is normal. No respiratory distress.      Breath sounds: Rales present.      Comments: Diminished Breath Sounds Bilateral Bases On Vapotherm 65%  Abdominal:      Palpations: Abdomen is soft.   Musculoskeletal:      Right lower leg: No edema.      Left lower leg: No edema.      Comments: Bilateral Lower Extremities Warm   Skin:     General: Skin is warm and dry.   Neurological:      Mental Status: She is alert. Mental status is at baseline.   Psychiatric:         Behavior: Behavior normal.         Judgment: Judgment normal.       Current Inpatient Medications:  Current Facility-Administered Medications:     0.9%  NaCl infusion, , Intravenous, PRN, Vishnu Stevenson MD, Stopped at 06/08/24 0133    acetaminophen tablet 650 mg, 650 mg, Oral, Q6H PRN, Naa Chin MD, 650 mg at 06/22/24 1004    acetaminophen tablet 650 mg, 650 mg, Oral, Q4H PRN, Steven Wells MD, 650 mg at 06/19/24 1054    apixaban tablet 5 mg, 5 mg, Oral, BID, Murtaza Burris ANP, 5 mg at 06/22/24 0917    butalbital-acetaminophen-caffeine -40 mg per tablet 1 tablet, 1 tablet, Oral, Q6H PRN, Marshal Alvarado MD, 1 tablet at 06/20/24 1721    diltiaZEM 24 hr capsule 240 mg, 240 mg, Oral, Daily, Murtaza Burris ANP, 240 mg at 06/22/24 0918    docusate 50 mg/5 mL liquid 50 mg, 50 mg, Oral, Daily, Marga Smith MD, 50 mg at 06/22/24 0917    furosemide injection 40 mg, 40 mg, Intravenous, Daily, Nataly Mead FNP, 40 mg at 06/22/24 0917    hydrALAZINE injection 10 mg, 10 mg, Intravenous, Q6H PRN, Eleno Abbasi MD, 10 mg at 06/16/24 0025    labetaloL injection 10 mg, 10 mg, Intravenous, Q4H PRN, Baldo Finney,  DO, 10 mg at 06/21/24 0904    Lactobacillus rhamnosus GG 5 billion cell packet (PEDS) 1 each, 1 packet, Oral, BID, Naa Chin MD, 1 each at 06/22/24 0922    levalbuterol nebulizer solution 1.25 mg, 1.25 mg, Nebulization, Q6H PRN, Shanna Montiel MD, 1.25 mg at 06/09/24 1200    LIDOcaine 5 % patch 2 patch, 2 patch, Transdermal, Q24H, Sheri Carson, NP, 2 patch at 06/22/24 1542    LORazepam tablet 0.5 mg, 0.5 mg, Oral, Q4H PRN, Sheri Carson, NP, 0.5 mg at 06/21/24 2016    melatonin tablet 6 mg, 6 mg, Oral, Nightly PRN, Naa Chin MD, 6 mg at 06/21/24 2016    metoprolol injection 5 mg, 5 mg, Intravenous, Q4H PRN, Justyn Landaverde MD, 5 mg at 06/21/24 0515    metoprolol tartrate (LOPRESSOR) tablet 25 mg, 25 mg, Oral, TID, Murtaza Burris ANP, 25 mg at 06/22/24 1542    morphine injection 0.5 mg, 0.5 mg, Intravenous, Q4H PRN, Augustus Barreto MD, 0.5 mg at 06/22/24 1314    ondansetron injection 4 mg, 4 mg, Intravenous, Q4H PRN, Julissa Rinaldi FNP, 4 mg at 06/11/24 0405    pantoprazole injection 40 mg, 40 mg, Intravenous, BID, Augustus Barreto MD, 40 mg at 06/22/24 0917    sertraline tablet 100 mg, 100 mg, Oral, QHS, Marga Smith MD, 100 mg at 06/21/24 2016    traMADoL tablet 50 mg, 50 mg, Oral, TID PRN, Augustus Barreto MD  VTE Risk Mitigation (From admission, onward)           Ordered     apixaban tablet 5 mg  2 times daily         06/17/24 1821     IP VTE HIGH RISK PATIENT  Once         06/01/24 2247                  Assessment:   Atrial Fibrillation with RVR (New Onset)    - CHADsVASc - 7 Points - 11.2% Stroke Risk per Year     - History of PSVT    - On Eliquis for Stroke Risk Reduction  Acute on Chronic Diastolic HF/EF 55-60% - Improving    - EF 55-60% with Grade I Diastolic Dysfunction    - [facility] (6.17.24) - RA 11, RV 53/9, PA 57/31 (mean 41), PCWP 34, PA sat 58% (on 100% FiO2 BiPAP), CO/CI: 3.8/2.1 (TD), PVR: 1.9 Woods Assessment: Elevated R and L-Sided Filling Pressures    - Right  Pleural Effusion Status Post Thoracentesis (950 ML Removed on 6.14.24)  Valvular Heart Disease    - AS: Status Post TAVR  23  mm S3 ultra valve, nominal prep,  Right TF approach (2.27.23)  CAD/CABG    - LIMA to LAD (Patent) with 50% Stenosis Native LCX  Hypertension   Hyperlipidemia  Chronic Left Bundle Branch Block (Padroni TAVR)  MELCHOR/CEA  Questionable Amiodarone Toxicity- Persistent and Non-Improving Lung Disease/Condition     - RHC Consistent with HF with EDP 31mmHg (6.17.24)  Aspiration Pneumonia    - Cleared Swallowing Study for Oral Intake  Acute Hypoxemic Respiratory Failure Requiring Oxygenation - Vapotherm Support  Leukocytosis/Sepsis  History of CVA  Anemia  Hypokalemia  DNR Status   No known history of GI Bleed     Plan:   Continue CCB and BB   Continue Eliquis for Stroke Risk Reduction  Accurate I&Os and Daily Weights   Wean O2 Therapy   Keep K > 4.0 and Mg > 2.0  PT and Family wish to Transition to IP Hospice at [facility], We wish them Well and will keep them in our thoughts and prayers.  We will be available as needed    Murtaza Burris, LYLE  Cardiology  [facility]  06/22/2024    Physician addendum:  I have seen and examined this patient as a split-shared visit with the KELLIE d/t complicated medical management of above problems written in assessment and high acuity requiring physician expertise in medical decision-making. I performed the substantive portion of the history and exam. Above medical decision-making is also formulated by me.    Cardiovascular exam:  S1, S2  Lungs:  fine crackles at bases.  Extremities:  1+ edema bilaterally    Plan:  Patient is planned to go for hospice.  We will sign off.    Salas Gould MD  Cardiologist

## 2024-06-28 LAB — FUNGUS SPEC CULT: NORMAL

## 2024-06-28 NOTE — DISCHARGE SUMMARY
[facility] Hospital Medicine Discharge Summary    Admit Date: 6/1/2024  Discharge Date and Time:  06/22/2024  Admitting Physician:  Team  Discharging Physician: Marshal Alvarado MD.  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 She was transferred from an outside facility to [facility] with complaints of worsening shortness of breath for the last few weeks with initial CXR at outside facility showing bilateral airspace opacities and increased right-sided pleural effusion. Her last echocardiogram from 05/16 showed EF 55-60%. She was placed on antibiotics, steroids as well as IV Lasix for diuresis for pulmonary congestion. She was able to be weaned down from BiPAP to Vapotherm and eventually Oxymizer 8 L. she was also noted to have new onset atrial fibrillation for which he was started on IV Cardizem which has since been transitioned to p.o. Cardizem. She is on full-dose Lovenox due to CHADS-VASc of 7. She reported feeling significantly better and endorsed improvement in her shortness of breath since admission. Denied having any fever, chills, chest pain, cough, sputum production, abdominal pain, nausea,  vomiting, headache, numbness, weakness, dizziness/lightheadedness or loss of consciousness. Blood cultures from admission are negative. PT/OT on board; in recommending moderate intensity therapy. Cardiology on board.   Patient had repeat chest x-ray done on June 9th and that showed denser consolidation patient continues to be on Zosyn now has been put on BiPAP with FiO2 of 90%     She is status post thoracentesis with removal of 950 cc of fluid by pulmonology Will follow up with cultures 6/14.  She is status post right heart catheterization with Cardiology. Recommendation to continue aggressive diuresis.     Patient currently being managed for persistent hypoxic respiratory failure due to diastolic CHF exacerbation.  On Vapotherm/BiPAP and diuresis.  Pulmonology, Cardiology, Nephrology on board. ABG noted to have metabolic alkalemia with respiratory acidemia. Placed on BiPAP. Remains on IV Lasix b.i.d., restarted on IV Cardizem on 06/19; to be transitioned to p.o. Cardizem and infusion to be turned off.  IV Lasix to be decreased to 40 mg daily tomorrow.      Pt was seen and examined on the day of discharge.  She stated she was doing well and had no new complaints.  Continues to be more lethargic and somnolent but oriented x3.  Remains on Vapotherm at 60%/30 L. patient does not wish to be placed back on BiPAP despite worsening respiratory acidosis.  Hospice consulted and patient planned for discharge to inpatient hospice.  Vitals:  VITAL SIGNS: 24 HRS MIN & MAX LAST   No data recorded 98.5 °F (36.9 °C)   No data recorded (!) 117/59   No data recorded  88   No data recorded 18   No data recorded 96 %       Physical Exam:  General: alert lady lying comfortably in bed, in no acute distress  HENT:  Vapotherm in place; oral and oropharyngeal mucosa moist, pink, with no erythema or exudates, no ear pain or discharge  Neck: normal neck movement, no lymph nodes or swellings, no JVD or Carotid bruit  Respiratory: clear  breathing sounds bilaterally, no crackles, rales, ronchi or wheezes  Cardiovascular: clear S1 and S2, no murmurs, rubs or gallops  Peripheral Vascular: no lesions, ulcers or erosions, normal peripheral pulses and no pedal edema  Gastrointestinal: soft, non-tender, non-distended abdomen, no guarding, rigidity or rebound tenderness, normal bowel sounds  Integumentary: normal skin color, no rashes or lesions  Neuro: AAO x 3; motor strength 5/5 in B/L UEs & LEs; sensation intact to gross and fine touch B/L; CN II-XII grossly intact       Procedures Performed: No admission procedures for hospital encounter.     Significant Diagnostic Studies: See Full reports for all details    Recent Labs   Lab 06/22/24  0135   WBC 10.13   RBC 3.65*   HGB 10.6*   HCT 33.9*   MCV 92.9   MCH 29.0   MCHC 31.3*   RDW 14.5      MPV 10.3       Recent Labs   Lab 06/21/24  1235 06/22/24  0135   NA  --  140   K  --  3.6   CL  --  86*   CO2  --  38*   BUN  --  53.5*   CREATININE  --  1.31*   CALCIUM  --  9.4   PH 7.510*  --    MG  --  2.30   ALBUMIN  --  3.0*   ALKPHOS  --  100   ALT  --  12   AST  --  17   BILITOT  --  0.7        Microbiology Results (last 7 days)       Procedure Component Value Units Date/Time    Body Fluid Culture [[id number]] Collected: 06/14/24 1021    Order Status: Completed Specimen: Body Fluid from Thoracentesis Fluid Updated: 06/19/24 1003     Body Fluid Culture Final Report: At 5 days. No growth             X-Ray Chest 1 View  Narrative: EXAMINATION:  XR CHEST 1 VIEW    CPT 99237    CLINICAL HISTORY:  pleural effusions;    COMPARISON:  June 14, 2024    FINDINGS:  Examination reveals mediastinal silhouette to be within normal limits cardiac silhouette is not enlarged right lung field is clear and free of gross infiltrates atelectasis or effusions.    There is some blunting of the left costophrenic angle which may indicate the presence of a left-sided pleural effusion.    There is increased left retrocardiac  density and silhouetting of the left hemidiaphragm although these might be related to pleural fluid it may also represent an infiltrate/atelectasis.    No other focal consolidative changes  Impression: Changes that might suggest a left-sided pleural effusion.    Increased left retrocardiac density and silhouetting of the left hemidiaphragm.    No focal consolidative changes    Electronically signed by: Baljeet Mullins  Date:    06/21/2024  Time:    10:39         Medication List        START taking these medications      apixaban 5 mg Tab  Commonly known as: ELIQUIS  Take 1 tablet (5 mg total) by mouth 2 (two) times daily.     diltiaZEM 240 MG 24 hr capsule  Commonly known as: CARDIZEM CD  Take 1 capsule (240 mg total) by mouth once daily.     metoprolol tartrate 25 MG tablet  Commonly known as: LOPRESSOR  Take 1 tablet (25 mg total) by mouth 3 (three) times daily.     pantoprazole 40 MG tablet  Commonly known as: PROTONIX  Take 1 tablet (40 mg total) by mouth 2 (two) times daily.            CONTINUE taking these medications      cholecalciferol (vitamin D3) 50 mcg (2,000 unit) Cap capsule  Commonly known as: VITAMIN D3     cyanocobalamin (vitamin B-12) 2,000 mcg Tab     DAILY PROBIOTIC ORAL     furosemide 40 MG tablet  Commonly known as: LASIX  Take 1 tablet (40 mg total) by mouth once daily.     rosuvastatin 40 MG Tab  Commonly known as: CRESTOR     sertraline 50 MG tablet  Commonly known as: ZOLOFT     STOOL SOFTENER ORAL     traMADoL 50 mg tablet  Commonly known as: ULTRAM            STOP taking these medications      amLODIPine 10 MG tablet  Commonly known as: NORVASC     aspirin 325 MG tablet     carvediloL 12.5 MG tablet  Commonly known as: COREG     cyclobenzaprine 10 MG tablet  Commonly known as: FLEXERIL     hydrALAZINE 25 MG tablet  Commonly known as: APRESOLINE     potassium gluconate 600 mg (99 mg) Tab               Where to Get Your Medications        These medications were sent to [facility]  [id number] [facility] [address] [facility]  [address] [facility] [address] [facility] [address]      Phone: [phone]   apixaban 5 mg Tab  diltiaZEM 240 MG 24 hr capsule  furosemide 40 MG tablet  metoprolol tartrate 25 MG tablet  pantoprazole 40 MG tablet          Explained in detail to the patient about the discharge plan, medications, and follow-up visits. Pt understands and agrees with the treatment plan  Discharge Disposition: Hospice/Medical Facility   Discharged Condition: stable  Diet-    Medications Per DC med rec  Activities as tolerated   Follow-up Information       [facility] Follow up.    Specialty: Home Health Services  Contact information:  [address] [facility] [address]  [phone]               Alejandro Ramon MD Follow up on 6/24/2024.    Specialties: Cardiovascular Disease, Interventional Cardiology, Cardiology  Why: @ 10:30AM [place] Location  Contact information:  [address]  [facility] [id number] [phone]                           For further questions contact hospitalist office    Discharge time 33 minutes    For worsening symptoms, chest pain, shortness of breath, increased abdominal pain, high grade fever, stroke or stroke like symptoms, immediately go to the nearest Emergency Room or call 911 as soon as possible.      Marshal Sparks M.D.

## 2025-06-02 NOTE — Clinical Note
ID band present and verified. Family is in the lobby. Patient was provided with discharge instructions, education, and follow up information. Prescriptions were already sent electronically to patient's pharmacy. Patient verbalizes understanding of follow up information. Patient has no questions after reviewing all instructions.

## (undated) DEVICE — SET MICPUNC ACC STIFF CANNULA

## (undated) DEVICE — PAD HEARTSTART DEFIB ADULT

## (undated) DEVICE — CATH AL 1.0 5/BX

## (undated) DEVICE — GUIDEWIRE STF .035X180CM STR

## (undated) DEVICE — CATH SWAN GANZ STND 7FR

## (undated) DEVICE — CATH TEMP PACER 5.0FR

## (undated) DEVICE — KIT HAND CONTROL HIGH PRESSUR

## (undated) DEVICE — Device

## (undated) DEVICE — CATH IMPULSE 5F 100CM FR4

## (undated) DEVICE — COVER PROBE US 5.5X58L NON LTX

## (undated) DEVICE — GUIDEWIRE GLADIUS STR 300CM

## (undated) DEVICE — CATH ANGIO OMNI W/10SH 5F .035

## (undated) DEVICE — PAD DEFIB RADIOLUCENT ADULT

## (undated) DEVICE — CATH ELECTRODE BLLN 5FR 110CM

## (undated) DEVICE — GUIDEWIRE EMERALD 3MM 175X5CM

## (undated) DEVICE — GUIDEWIRE STF .035X260CM ANG

## (undated) DEVICE — ADHESIVE DERMABOND ADVANCED

## (undated) DEVICE — GUIDEWIRE AMPLATZ STIFF 260X7

## (undated) DEVICE — TOWEL OR BLUE STRL 16X26 4/PK

## (undated) DEVICE — KIT MINI STK MAX COAX 5FR 10CM

## (undated) DEVICE — SHEATH INTRODUCER 7FR 11CM

## (undated) DEVICE — PAD DEFIB CADENCE ADULT R2

## (undated) DEVICE — SYS WASTE FLD DISPOSAL 1400ML

## (undated) DEVICE — TUBING INJ M/F 1000 PSI 20IN

## (undated) DEVICE — GUIDEWIRE V-18 CONTROL .18

## (undated) DEVICE — CATH EMPULSE ANGLED 5FR PIGTAI

## (undated) DEVICE — GUIDEWIRE SAFARI2 XS CRV 275CM

## (undated) DEVICE — SPONGE LAP XRAY DTECT 18X18IN

## (undated) DEVICE — BOWL UTILITY BLUE 32OZ

## (undated) DEVICE — SHEATH DESTINATION 6F X 45CM

## (undated) DEVICE — SHEATH INTRODUCER 6FR 11CM

## (undated) DEVICE — SHEATH PINNACLE 8FR

## (undated) DEVICE — SLEEVE CONTAMINATION 80CM